# Patient Record
Sex: FEMALE | Race: WHITE | NOT HISPANIC OR LATINO | Employment: OTHER | URBAN - METROPOLITAN AREA
[De-identification: names, ages, dates, MRNs, and addresses within clinical notes are randomized per-mention and may not be internally consistent; named-entity substitution may affect disease eponyms.]

---

## 2017-03-08 ENCOUNTER — HOSPITAL ENCOUNTER (OUTPATIENT)
Dept: NON INVASIVE DIAGNOSTICS | Facility: CLINIC | Age: 82
Discharge: HOME | End: 2017-03-08
Payer: MEDICARE

## 2017-03-08 DIAGNOSIS — I73.9 PERIPHERAL VASCULAR DISEASE (HCC): ICD-10-CM

## 2017-03-08 DIAGNOSIS — I70.0 ATHEROSCLEROSIS OF AORTA (HCC): ICD-10-CM

## 2017-03-08 PROCEDURE — 93978 VASCULAR STUDY: CPT

## 2017-03-08 PROCEDURE — 93925 LOWER EXTREMITY STUDY: CPT

## 2017-03-08 PROCEDURE — 93923 UPR/LXTR ART STDY 3+ LVLS: CPT

## 2017-03-22 ENCOUNTER — HOSPITAL ENCOUNTER (OUTPATIENT)
Dept: NON INVASIVE DIAGNOSTICS | Facility: CLINIC | Age: 82
Discharge: HOME | End: 2017-03-22
Payer: MEDICARE

## 2017-03-22 DIAGNOSIS — I65.29 OCCLUSION AND STENOSIS OF UNSPECIFIED CAROTID ARTERY: ICD-10-CM

## 2017-03-22 PROCEDURE — 93880 EXTRACRANIAL BILAT STUDY: CPT

## 2017-10-17 ENCOUNTER — HOSPITAL ENCOUNTER (OUTPATIENT)
Dept: NON INVASIVE DIAGNOSTICS | Facility: CLINIC | Age: 82
Discharge: HOME | End: 2017-10-17
Payer: MEDICARE

## 2017-10-17 DIAGNOSIS — I73.9 PERIPHERAL VASCULAR DISEASE (HCC): ICD-10-CM

## 2017-10-17 DIAGNOSIS — I65.23 CAROTID ARTERY STENOSIS, ASYMPTOMATIC, BILATERAL: ICD-10-CM

## 2017-10-17 PROCEDURE — 93925 LOWER EXTREMITY STUDY: CPT

## 2017-10-17 PROCEDURE — 93923 UPR/LXTR ART STDY 3+ LVLS: CPT

## 2017-10-17 PROCEDURE — 93880 EXTRACRANIAL BILAT STUDY: CPT

## 2017-10-30 ENCOUNTER — ALLSCRIPTS OFFICE VISIT (OUTPATIENT)
Dept: OTHER | Facility: OTHER | Age: 82
End: 2017-10-30

## 2017-10-31 NOTE — PROGRESS NOTES
Assessment  1  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 (440 21) (I70 219)   2  Aortoiliac occlusive disease (444 09) (I74 09)   3  Asymptomatic carotid artery stenosis (433 10) (I65 29)    Plan  Acute anterior circulation transient ischemic attack    · VAS CAROTID COMPLETE STUDY; SIDE:Bilateral; Status:Hold For -  Scheduling,Retrospective By Protocol Authorization; Requested for:[id number]; Perform:[address]; Due:48Agp7559; Last Updated By:Maria Antonia Valentino; 10/30/2017 4:09:22 PM;Ordered; For:Acute anterior circulation transient ischemic attack; Ordered By:Junaid Brown; Aortoiliac occlusive disease, Asymptomatic carotid artery stenosis, Atherosclerosis of  artery of extremity with intermittent claudication    · VAS ABDOMINAL AORTA/ILIACS; COMPLETE STUDY; Status:Hold For - Scheduling;  Requested for:30Oct2018; Perform:[address]; Due:30Oct2019;Ordered; For:Aortoiliac occlusive disease, Asymptomatic carotid artery stenosis, 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; Ordered By:Junaid Brown;   · VAS LOWER LIMB ARTERIAL DUPLEX, COMPLETE BILATERAL/GRAFTS; SIDE:Bilateral;  Status:Hold For - Scheduling; Requested for:30Oct2018; Perform:[address]; Due:30Oct2019;Ordered; For:Aortoiliac occlusive disease, Asymptomatic carotid artery stenosis, 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; Ordered By:Junaid Brown;   · Increasing your exercise may help improve your circulation ; Status:Complete;   Done:  [id number]   Ordered; For:Aortoiliac occlusive disease, Asymptomatic carotid artery stenosis, 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; Ordered By:Junaid Brown;    Discussion/Summary  Discussion Summary:   1  Aortoiliac and infrainguinal arterial occlusive disease with bilateral lower extremity claudication   This does not appear to create any major limitation in activity  We did discuss the findings on duplex evaluation and the treatment options available  At this time We will plan on continuing conservative management with follow-up duplex annually  We did discuss the importance of continued exercise program   Bilateral asymptomatic carotid occlusive disease with moderate restenosis status post stenting on the left  This time no further intervention is necessary other than duplex follow-up in 6 months  Chief Complaint  Chief Complaint Free Text Note Form:  I am here to review my test results  Mira Espinoza is here to review her KERRY she had on 10/17/2017  Pt c/o pain in her calf area that starts when walking  Pt denies swelling  Pt denies open wounds or sores  Pt does not offer any other concerns at this time  History of Present Illness  HPI: 41-year-old with long history of aortoiliac and infrainguinal arterial occlusive disease as well as bilateral carotid occlusive disease secondary to neck irradiation presents in follow-up after recent duplex  She denies any focal neurologic symptoms which would be consistent with TIA, CVA or amorosis fugax  She does experience bilateral lower extremity claudication more severe on the left as compared to the right  She states she can walk 3â4 blocks before experiencing I lateral calf as well as some thigh discomfort  She will then rest for approximately 3 minutes at which time her discomfort will resolve and she can walk another 3â4 blocks  She denies any symptoms consistent with rest pain  This has not progressed significantly  This does not limit her activities other than slows her activities  Pt c/o pain in her calf area that starts when walking  Pt denies swelling  Pt denies open wounds or sores  Pt does not offer any other concerns at this time  Review of Systems  Complete Female - Vasc:   Constitutional: No fever or chills, feels well, no tiredness, no recent weight gain or weight loss     Eyes: no sudden vision loss,-- no blurred vision-- and-- no double vision, but-- no eye pain,-- no eyesight problems,-- no dryness of the eyes,-- eyes not red,-- no purulent discharge from the eyes,-- no itching of the eyes-- and-- wears glasses  ENT: no loss of hearing, no nosebleeds, no hoarseness  Cardiovascular: irregular heart rate,-- no painful veins-- and-- no bleeding veins, but-- no chest pain,-- no intermittent leg claudication,-- no palpitations-- and-- leg pain with walking  Respiratory: no shortness of breath,-- no wheezing,-- no cough,-- no orthopnea-- and-- no complaints of PND, but-- shortness of breath during exertion  Gastrointestinal: No nausea, No vomiting, no diarrhea, no blood in stool  Genitourinary: no dysuria, no Hematuria,no urinary incontinence  Musculoskeletal: no limb pain, no limb swelling  Integumentary: no rash, no lesions, no wounds, no ulcer  Neurological: no dementia, no headache, no numbness, no limb weakness, no dizziness, no difficulty walking  Psychiatric: no depression, no mood disorders, no anxiety  Hematologic/Lymphatic: no bleeding disorder, no easy bruising  ROS Reviewed:   ROS reviewed  Active Problems  1  Abdominal aortic atherosclerosis (440 0) (I70 0)   2  Aortoiliac occlusive disease (444 09) (I74 09)   3  Asymptomatic carotid artery stenosis (433 10) (I65 29)   4  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 (440 21) (I70 219)   5  Hyperlipidemia (272 4) (E78 5)   6  Hypertension (401 9) (I10)   7  Peripheral vascular disease, unspecified (443 9) (I73 9)    Past Medical History  1  History of Malignant neoplasm without specification of site (199 1) (C80 1)  Active Problems And Past Medical History Reviewed: The active problems and past medical history were reviewed and updated today  Surgical History  Surgical History Reviewed: The surgical history was reviewed and updated today  Family History  Brother    1   Family history of Heart Disease (V17 49)  Family History Reviewed: The family history was reviewed and updated today  Social History   · Being A Social Drinker   · Former smoker (W88 18) (M39 246)  Social History Reviewed: The social history was reviewed and updated today  Current Meds   1  Aspirin Low Dose 81 MG TABS; Therapy: (Recorded:17Swy9075) to Recorded   2  Atorvastatin Calcium 40 MG Oral Tablet; Therapy: (Recorded:36Msb1716) to Recorded   3  Benazepril HCl - 20 MG Oral Tablet; Therapy: 43ZYY9605 to Recorded   4  Centrum Silver TABS; Therapy: (Recorded:29Bky6960) to Recorded   5  Citracal Slow Release TB24;   Therapy: (Recorded:95Iog0938) to Recorded   6  Fluticasone Propionate 50 MCG/ACT Nasal Suspension; Therapy: 74LMU4761 to Recorded   7  [facility]; Therapy: (Zoe Magallanes) to Recorded   8  Levothyroxine Sodium 75 MCG Oral Tablet; Therapy: (Recorded:81Jrc4515) to Recorded   9  Nystatin 734313 UNIT/ML Mouth/Throat Suspension; Therapy: 35WCM0615 to Recorded   10  Omega 3 1000 MG Oral Capsule; Therapy: (Recorded:76Mua2495) to Recorded   11  Omeprazole 20 MG Oral Capsule Delayed Release; Therapy: 73KXQ0376 to Recorded   12  Vitamin B Complex-C CAPS; Therapy: (Recorded:94Mof4005) to Recorded   13  Vitamin D-3 TABS; Therapy: (Recorded:00Sxo4119) to Recorded  Medication List Reviewed: The medication list was reviewed and updated today  Allergies  1  No Known Drug Allergies    Vitals  Vital Signs    Recorded: 82KYV7375 03:51PM   Heart Rate 92   Respiration Quality Normal   Respiration 18   Systolic 135, RUE, Sitting   Diastolic 82, RUE, Sitting   Height 5 ft      Physical Exam    Carotid: right 2+,-- no bruit heard on the right,-- left 2+-- and-- no bruit on the left  Brachial: right 2+-- and-- left 2+  Radial: right 2+-- and-- left 2+  Femoral: right 2+,-- no bruit heard on the right,-- left 1+-- and-- no bruit on the left  Popliteal: right 1+-- and-- left 0  Posterior tibialis: right 0-- and-- left 0  Dorsalis pedis: right 2+-- and-- left 1+  Distal Pulse Exam: Normal Capillary Refill  Extremities: No upper or lower extremity edema  LE Varicose Veins: No Varicose Veins are Present  The heart rate was normal  The rhythm was regular  Heart sounds: normal S1,-- normal S2,-- no S3-- and-- no S4  Murmurs: No murmurs were heard  Pulmonary   Respiratory effort: No increased work of breathing or signs of respiratory distress  Auscultation of lungs: Clear to auscultation  No wheezing, no rales, no rhonchi  Psychiatric   Orientation to person, place and time: Normal    Mood and affect: Normal    Eyes   Conjunctiva and lids: No swelling, erythema, or discharge  Ears, Nose, Mouth, and Throat   Hearing: Normal    Neck   Neck:-- ChangesConsistent with her previous neck surgery and radiation with a permanent tracheostomy  Neurologic   Motor skills intact  Musculoskeletal   Gait and station: Normal    Muscle strength/tone: Normal    Skin   Venous Disease: No lipodermatosclerosis, stasis dermatitis, hyperpigmentation, or atrophie benja noted on exam       Results/Data  Diagnostic Studies Reviewed Vasc: I personally reviewed the films/images/results in the office today  My interpretation follows  Vascular Study Review Carotid duplex with right greater than 70% stenosis with flow velocity 266/57 cm/s  On the left there is a moderate restenosis with flow velocity of 277/49 cm/s  This is without significant change since previous study  and lower extremity duplex studies on the right there is greater than 75% stenosis in the proximal superficial femoral artery with ankle brachial index of 0 61  On the left there is 50-75% stenosis in the left common iliac artery stent and multiple areas the superficial femoral artery with ankle-brachial index of 0 57  This is a mild progression since previous studies        Signatures   Electronically signed by : Thai Ames MD; Oct 30 2017  4:12PM EST                       (Author)

## 2018-01-12 VITALS
HEIGHT: 60 IN | SYSTOLIC BLOOD PRESSURE: 142 MMHG | HEART RATE: 92 BPM | RESPIRATION RATE: 18 BRPM | DIASTOLIC BLOOD PRESSURE: 82 MMHG

## 2018-01-17 ENCOUNTER — GENERIC CONVERSION - ENCOUNTER (OUTPATIENT)
Dept: OTHER | Facility: OTHER | Age: 83
End: 2018-01-17

## 2018-01-17 ENCOUNTER — HOSPITAL ENCOUNTER (OUTPATIENT)
Dept: RADIOLOGY | Facility: HOSPITAL | Age: 83
Discharge: HOME | End: 2018-01-17
Payer: MEDICARE

## 2018-01-17 ENCOUNTER — TRANSCRIBE ORDERS (OUTPATIENT)
Dept: RADIOLOGY | Facility: HOSPITAL | Age: 83
End: 2018-01-17

## 2018-01-17 DIAGNOSIS — R05.9 COUGH: ICD-10-CM

## 2018-01-17 DIAGNOSIS — R06.02 SHORT OF BREATH ON EXERTION: ICD-10-CM

## 2018-01-17 DIAGNOSIS — R05.9 COUGH: Primary | ICD-10-CM

## 2018-01-17 PROCEDURE — 71046 X-RAY EXAM CHEST 2 VIEWS: CPT

## 2018-01-30 ENCOUNTER — TRANSCRIBE ORDERS (OUTPATIENT)
Dept: ADMINISTRATIVE | Facility: HOSPITAL | Age: 83
End: 2018-01-30

## 2018-01-30 DIAGNOSIS — I34.1 J.B. BARLOW'S SYNDROME: ICD-10-CM

## 2018-01-30 DIAGNOSIS — I07.9: ICD-10-CM

## 2018-01-30 DIAGNOSIS — R05.9 COUGH: ICD-10-CM

## 2018-01-30 DIAGNOSIS — I10 ESSENTIAL HYPERTENSION, MALIGNANT: ICD-10-CM

## 2018-01-30 DIAGNOSIS — C32.9 CARCINOMA LARYNX (HCC): ICD-10-CM

## 2018-01-30 DIAGNOSIS — F45.8 OTHER SOMATOFORM DISORDERS: Primary | ICD-10-CM

## 2018-02-07 ENCOUNTER — HOSPITAL ENCOUNTER (OUTPATIENT)
Dept: PULMONOLOGY | Facility: HOSPITAL | Age: 83
Discharge: HOME | End: 2018-02-07

## 2018-02-07 DIAGNOSIS — F45.8 OTHER SOMATOFORM DISORDERS: ICD-10-CM

## 2018-02-07 DIAGNOSIS — C32.9 CARCINOMA LARYNX (HCC): ICD-10-CM

## 2018-02-07 DIAGNOSIS — R05.9 COUGH: ICD-10-CM

## 2018-02-07 NOTE — RESPIRATORY THERAPY NOTE
Pt with total laryngectomy arrived for PFT  Communicated with patient, that due to laryngectomy PFT could not be performed, which was understood  Pt left without PFT performed

## 2018-02-14 ENCOUNTER — HOSPITAL ENCOUNTER (OUTPATIENT)
Dept: NON INVASIVE DIAGNOSTICS | Facility: CLINIC | Age: 83
Discharge: HOME | End: 2018-02-14
Payer: MEDICARE

## 2018-02-14 DIAGNOSIS — F45.8 OTHER SOMATOFORM DISORDERS: ICD-10-CM

## 2018-02-14 DIAGNOSIS — I07.9: ICD-10-CM

## 2018-02-14 DIAGNOSIS — I34.1 J.B. BARLOW'S SYNDROME: ICD-10-CM

## 2018-02-14 DIAGNOSIS — I10 ESSENTIAL HYPERTENSION, MALIGNANT: ICD-10-CM

## 2018-02-14 PROCEDURE — 93306 TTE W/DOPPLER COMPLETE: CPT | Performed by: INTERNAL MEDICINE

## 2018-02-14 PROCEDURE — 93306 TTE W/DOPPLER COMPLETE: CPT

## 2018-04-17 DIAGNOSIS — G45.8 OTHER TRANSIENT CEREBRAL ISCHEMIC ATTACKS AND RELATED SYNDROMES: ICD-10-CM

## 2018-04-18 ENCOUNTER — HOSPITAL ENCOUNTER (OUTPATIENT)
Dept: NON INVASIVE DIAGNOSTICS | Facility: CLINIC | Age: 83
Discharge: HOME | End: 2018-04-18
Payer: MEDICARE

## 2018-04-18 DIAGNOSIS — G45.8 OTHER TRANSIENT CEREBRAL ISCHEMIC ATTACKS AND RELATED SYNDROMES: ICD-10-CM

## 2018-04-18 PROCEDURE — 93880 EXTRACRANIAL BILAT STUDY: CPT

## 2018-04-18 PROCEDURE — 93880 EXTRACRANIAL BILAT STUDY: CPT | Performed by: SURGERY

## 2018-04-26 ENCOUNTER — OFFICE VISIT (OUTPATIENT)
Dept: VASCULAR SURGERY | Facility: CLINIC | Age: 83
End: 2018-04-26
Payer: MEDICARE

## 2018-04-26 VITALS
HEART RATE: 78 BPM | BODY MASS INDEX: 29.06 KG/M2 | HEIGHT: 60 IN | SYSTOLIC BLOOD PRESSURE: 120 MMHG | DIASTOLIC BLOOD PRESSURE: 60 MMHG | RESPIRATION RATE: 14 BRPM | WEIGHT: 148 LBS

## 2018-04-26 DIAGNOSIS — I65.23 ASYMPTOMATIC BILATERAL CAROTID ARTERY STENOSIS: Primary | ICD-10-CM

## 2018-04-26 PROBLEM — G45.8: Status: ACTIVE | Noted: 2017-10-30

## 2018-04-26 PROCEDURE — 99214 OFFICE O/P EST MOD 30 MIN: CPT | Performed by: NURSE PRACTITIONER

## 2018-04-26 RX ORDER — LEVOTHYROXINE SODIUM 88 UG/1
88 TABLET ORAL DAILY
COMMUNITY
Start: 2017-08-28 | End: 2021-02-07

## 2018-04-26 RX ORDER — BENAZEPRIL HYDROCHLORIDE 20 MG/1
TABLET ORAL
COMMUNITY
Start: 2016-03-11 | End: 2018-04-26 | Stop reason: SDUPTHER

## 2018-04-26 RX ORDER — CHLORAL HYDRATE 500 MG
4 CAPSULE ORAL DAILY
COMMUNITY

## 2018-04-26 RX ORDER — OMEPRAZOLE 20 MG/1
CAPSULE, DELAYED RELEASE ORAL
COMMUNITY
Start: 2016-02-09 | End: 2021-02-08 | Stop reason: HOSPADM

## 2018-04-26 RX ORDER — ATORVASTATIN CALCIUM 40 MG/1
TABLET, FILM COATED ORAL
COMMUNITY

## 2018-04-26 RX ORDER — CHOLECALCIFEROL (VITAMIN D3) 25 MCG
CAPSULE ORAL
COMMUNITY

## 2018-04-26 RX ORDER — FLUTICASONE PROPIONATE 50 MCG
SPRAY, SUSPENSION (ML) NASAL
COMMUNITY
Start: 2016-03-30

## 2018-04-26 RX ORDER — METHOCARBAMOL 500 MG/1
TABLET, FILM COATED ORAL
Refills: 1 | COMMUNITY
Start: 2018-01-17

## 2018-04-26 RX ORDER — BIOTIN 5 MG
TABLET ORAL
COMMUNITY

## 2018-04-26 RX ORDER — LOSARTAN POTASSIUM 50 MG/1
50 TABLET ORAL DAILY
Status: ON HOLD | COMMUNITY
Start: 2018-03-15 | End: 2020-07-23 | Stop reason: SDUPTHER

## 2018-04-26 NOTE — PATIENT INSTRUCTIONS
Carotid Artery Disease   AMBULATORY CARE:   Carotid artery disease  is a condition that causes narrow or blocked carotid arteries  Your carotid arteries are the blood vessels that supply your brain with most of the blood it needs to work  You have 2 carotid arteries, one on each side of your neck  Call 911 for any of the following:   · You have any of the following signs of a stroke:      ¨ Numbness or drooping on one side of your face     ¨ Weakness in an arm or leg    ¨ Confusion or difficulty speaking    ¨ Dizziness, a severe headache, or vision loss    · You have any of the following signs of a heart attack:      ¨ Squeezing, pressure, or pain in your chest that lasts longer than 5 minutes or returns    ¨ Discomfort or pain in your back, neck, jaw, stomach, or arm     ¨ Trouble breathing    ¨ Nausea or vomiting    ¨ Lightheadedness or a sudden cold sweat, especially with chest pain or trouble breathing  Contact your healthcare provider if:   · You have questions or concerns about your condition or care  Signs and symptoms of carotid artery disease: You may have no signs or symptoms  Most commonly, carotid artery disease causes transient ischemic attacks (TIAs), or mini-strokes  You may have numbness, weakness, lack of movement, or vision or speech problems  A TIA goes away quickly and does not cause permanent damage  A TIA may be a warning sign that you are about to have a stroke  If you have any symptoms of a TIA or stroke, seek care immediately  Warning signs of a stroke: The word F A S T  can help you remember and recognize warning signs of a stroke  · F = Face:  One side of the face droops  · A = Arms:  One arm starts to drop when both arms are raised  · S = Speech:  Speech is slurred or sounds different than usual     · T = Time:  A person who is having a stroke needs to be seen immediately  A stroke is a medical emergency that needs immediate treatment   Some medicines and treatments work best if given within a few hours of a stroke  Treatment  for carotid artery disease depends on how narrow your arteries have become, your symptoms, and your general health  The goal of treatment is to lower your risk for a stroke  You may need any of the following:  · Take aspirin if directed  Your healthcare provider may suggest that you take an aspirin a day to prevent blood clots from forming in the carotid arteries  If your healthcare provider wants you to take aspirin daily, do not take acetaminophen or ibuprofen instead  · Control risk factors  High blood pressure, high cholesterol, heart disease, diabetes, and being overweight increase your risk for atherosclerosis  · Procedures can help open blocked arteries  A carotid endarterectomy is used to cut plaque out of the artery  An angioplasty is used to push the plaque against the artery wall with a balloon device  Sometimes a stent is placed during an angioplasty  A stent is a metal mesh tube that is placed in the artery to keep it open  Manage carotid artery disease:   · Eat a variety of healthy foods  Healthy foods include fruit, vegetables, whole-grain breads, low-fat dairy products, lean meat, and fish  Choose fish that are high in omega-3 fatty acids, such as salmon and fresh tuna  Ask your healthcare provider for more information on a heart healthy diet and the DASH eating plan  · Limit sodium (salt)  Sodium may increase your blood pressure  Add less table salt to your foods  Read food labels and choose foods that are low in sodium  Your healthcare provider may suggest you follow a low sodium diet  · Reach or maintain a healthy weight  Extra weight makes your heart work harder  Ask your healthcare provider how much you should weight  He can help you create a safe weight loss plan  Even a weight loss of 10% of your body weight can help your heart function better  · Exercise as directed    Exercise helps improve heart function and can help you manage your weight  Exercise can also help lower your cholesterol and blood sugar levels  Try to get at least 30 minutes of exercise at least 5 times each week  Try to be active every day  Your healthcare provider can help you create an exercise plan that works best for you  · Limit alcohol  Alcohol can increase your blood pressure and triglyceride levels  Men should limit alcohol to 2 drinks per day  Women should limit alcohol to 1 drink per day  A drink of alcohol is 12 ounces of beer, 5 ounces of wine, or 1½ ounces of liquor  · Do not smoke  Nicotine and other chemicals in cigarettes and cigars can cause heart and lung damage  Ask your healthcare provider for information if you currently smoke and need help to quit  E-cigarettes or smokeless tobacco still contain nicotine  Talk to your healthcare provider before you use these products  Follow up with your healthcare provider as directed:  Write down your questions so you remember to ask them during your visits  © 2017 [facility] [facility] Information is for End User's use only and may not be sold, redistributed or otherwise used for commercial purposes  All illustrations and images included in CareNotes® are the copyrighted property of [employer]  or [employer] Manuel Rojas  The above information is an  only  It is not intended as medical advice for individual conditions or treatments  Talk to your doctor, nurse or pharmacist before following any medical regimen to see if it is safe and effective for you

## 2018-04-26 NOTE — PROGRESS NOTES
Assessment/Plan:  81year old female with history of laryngeal surgery, tracheostomy and neck radiation with bilateral carotid stenosis, history of L carotid stenting '10 who presents for follow up after recent carotid duplex  She is asymptomatic from a carotid standpoint  1  Carotid stenosis  Recent CV duplex showed progression of R ICA stenosis, 70-99% with velocities 601/120 and L ICA recurrent stenosis 70% with velocities 316/67  Will evaluate with CTA of the neck  Check BMP prior  Follow up after CTA with Dr [address]  2  Right Subclavian stenosis  Easily palpable radial pulse  Intermittent fatigue of the right upper extremity  3  Aortoiliac and peripheral arterial occlusive disease  History of iliac stenting '12  No limitations in her daily activities  Walks 1-2 miles a day  Will follow by routine surveillance duplex  Problem List Items Addressed This Visit        Cardiovascular and Mediastinum    Asymptomatic carotid artery stenosis - Primary    Relevant Orders    Basic metabolic panel    CTA neck w wo contrast            Subjective: "I am here to go over my test results "    Patient had a CV on 4/18/18  She denies any TIA or stroke symptoms  She denies any one sided weakness or numbness  She does not have any facial droopiness  Patient ID: Gregory Ortiz is a 81 y o  female  HPI  Patient returns to the office to follow up after carotid duplex 4/18/18 which noted a progression of carotid stenosis bilaterally  She has history of left carotid stenosis secondary to high grade carotid stenosis relate to radiation of the neck  She has known recurrent stenosis of L ICA stent  She was last seen by Dr Kalee Portillo 10/2017  She denies any focal neurological events consistent with TIA/CVA, amaurosis fugax  She has macular degeneration  She is ambulating without limitation     The following portions of the patient's history were reviewed and updated as appropriate: allergies, current medications, past family history, past medical history, past social history, past surgical history and problem list     Review of Systems   Constitutional: Negative for activity change, chills, fatigue and fever  HENT: Negative for congestion, ear pain, facial swelling, rhinorrhea, sinus pressure and sneezing  Eyes: Negative for photophobia, pain, discharge and itching  Respiratory: Positive for shortness of breath  Negative for apnea, choking, chest tightness and wheezing  Cardiovascular: Negative for chest pain, palpitations and leg swelling  Gastrointestinal: Negative for abdominal distention, anal bleeding, constipation, nausea and vomiting  Endocrine: Negative for cold intolerance, heat intolerance and polyuria  Genitourinary: Negative for difficulty urinating, dyspareunia, enuresis and flank pain  Musculoskeletal: Negative for arthralgias, gait problem, joint swelling, neck pain and neck stiffness  Skin: Negative for color change, rash and wound  Allergic/Immunologic: Negative for immunocompromised state  Neurological: Negative for dizziness, tremors, facial asymmetry, speech difficulty, weakness, light-headedness and numbness  Hematological: Does not bruise/bleed easily  Psychiatric/Behavioral: Negative for agitation, dysphoric mood and hallucinations  The patient is not nervous/anxious            Objective:  Vitals:    04/26/18 1138 04/26/18 1140   BP: 140/70 120/60   BP Location: Left arm Right arm   Patient Position: Sitting Sitting   Pulse: 78    Resp: 14    Weight: 67 1 kg (148 lb)    Height: 5' (1 524 m)        Patient Active Problem List   Diagnosis    Asymptomatic carotid artery stenosis    Abdominal aortic atherosclerosis (HCC)    Hyperlipidemia    Hypertension    Acute anterior circulation transient ischemic attack    Peripheral vascular disease (HCC)       Past Surgical History:   Procedure Laterality Date    ILIAC ARTERY STENT Left        Family History   Problem Relation Age of Onset    Heart disease Brother        Social History     Social History    Marital status:      Spouse name: N/A    Number of children: N/A    Years of education: N/A     Occupational History    Not on file  Social History Main Topics    Smoking status: Former Smoker     Quit date: 2002    Smokeless tobacco: Never Used    Alcohol use Not on file    Drug use: Unknown    Sexual activity: Not on file     Other Topics Concern    Not on file     Social History Narrative    No narrative on file       No Known Allergies      Current Outpatient Prescriptions:     aspirin (ASPIRIN LOW DOSE) 81 MG tablet, Take by mouth, Disp: , Rfl:     atorvastatin (LIPITOR) 40 mg tablet, Take by mouth, Disp: , Rfl:     B Complex Vitamins (VITAMIN B-COMPLEX PO), Take by mouth, Disp: , Rfl:     Calcium-Magnesium-Vitamin D ER (CITRACAL SLOW RELEASE) 600- MG-MG-UNIT TB24, Take by mouth, Disp: , Rfl:     Cholecalciferol (VITAMIN D-3) 1000 units CAPS, Take by mouth, Disp: , Rfl:     fluticasone (FLONASE) 50 mcg/act nasal spray, into each nostril, Disp: , Rfl:     Krill Oil 1000 MG CAPS, Take by mouth, Disp: , Rfl:     levothyroxine 88 mcg tablet, Take by mouth, Disp: , Rfl:     losartan (COZAAR) 50 mg tablet, , Disp: , Rfl:     methocarbamol (ROBAXIN) 500 mg tablet, TK 1 T PO BID PRN, Disp: , Rfl: 1    Multiple Vitamins-Minerals (CENTRUM SILVER 50+WOMEN PO), Take by mouth, Disp: , Rfl:     Omega-3 1000 MG CAPS, Take by mouth, Disp: , Rfl:     omeprazole (PriLOSEC) 20 mg delayed release capsule, Take by mouth, Disp: , Rfl:     VENTOLIN  (90 Base) MCG/ACT inhaler, , Disp: , Rfl:        Physical Exam   Constitutional: She is oriented to person, place, and time  She appears well-nourished  HENT:   Head: Normocephalic and atraumatic  Eyes:   glasses   Neck:   + bilateral carotid bruit and subclavian bruit bilaterally  Stoma with voicebox    Cardiovascular: Normal heart sounds      Pulses: Carotid pulses are on the right side with bruit, and on the left side with bruit  Radial pulses are 2+ on the right side  Femoral pulses are 2+ on the right side, and 2+ on the left side  Dorsalis pedis pulses are 1+ on the right side  Pulmonary/Chest: Effort normal and breath sounds normal    Abdominal: Soft  Bowel sounds are normal    Musculoskeletal: Normal range of motion  Neurological: She is alert and oriented to person, place, and time  Skin: Skin is warm

## 2018-05-02 LAB
BUN SERPL-MCNC: 31 MG/DL (ref 7–25)
BUN/CREAT SERPL: 20 (CALC) (ref 6–22)
CALCIUM SERPL-MCNC: 9.1 MG/DL (ref 8.6–10.4)
CHLORIDE SERPL-SCNC: 103 MMOL/L (ref 98–110)
CO2 SERPL-SCNC: 28 MMOL/L (ref 20–31)
CREAT SERPL-MCNC: 1.52 MG/DL (ref 0.6–0.88)
GLUCOSE SERPL-MCNC: 101 MG/DL (ref 65–99)
POTASSIUM SERPL-SCNC: 4.4 MMOL/L (ref 3.5–5.3)
SL AMB EGFR AFRICAN AMERICAN: 37 ML/MIN/1.73M2
SL AMB EGFR NON AFRICAN AMERICAN: 32 ML/MIN/1.73M2
SODIUM SERPL-SCNC: 140 MMOL/L (ref 135–146)

## 2018-05-07 ENCOUNTER — HOSPITAL ENCOUNTER (OUTPATIENT)
Dept: RADIOLOGY | Facility: HOSPITAL | Age: 83
Discharge: HOME | End: 2018-05-07

## 2018-05-07 ENCOUNTER — TELEPHONE (OUTPATIENT)
Dept: VASCULAR SURGERY | Facility: CLINIC | Age: 83
End: 2018-05-07

## 2018-05-07 DIAGNOSIS — I65.23 ASYMPTOMATIC BILATERAL CAROTID ARTERY STENOSIS: ICD-10-CM

## 2018-05-07 NOTE — TELEPHONE ENCOUNTER
Rec'd a call from ct dept that the pt is there now to have her cta but her gfr is low at 32 so they will not do the study  I asked Alvaro Hendrix to apologize to the pt and that we will call the pt with additional instructions/reschedule  There is a task to Manohar asking to clarify hydration orders  Once that is rec'd we will call the pt to [facility]

## 2018-06-29 ENCOUNTER — TRANSCRIBE ORDERS (OUTPATIENT)
Dept: ADMINISTRATIVE | Facility: HOSPITAL | Age: 83
End: 2018-06-29

## 2018-06-29 DIAGNOSIS — Z12.39 SCREENING BREAST EXAMINATION: Primary | ICD-10-CM

## 2018-07-10 ENCOUNTER — HOSPITAL ENCOUNTER (OUTPATIENT)
Dept: RADIOLOGY | Age: 83
Discharge: HOME | End: 2018-07-10
Payer: MEDICARE

## 2018-07-10 DIAGNOSIS — Z12.39 SCREENING BREAST EXAMINATION: ICD-10-CM

## 2018-07-10 PROCEDURE — 77067 SCR MAMMO BI INCL CAD: CPT

## 2018-08-27 DIAGNOSIS — I70.0 AORTIC ATHEROSCLEROSIS (HCC): Primary | ICD-10-CM

## 2018-08-27 DIAGNOSIS — I73.9 PERIPHERAL VASCULAR DISEASE, UNSPECIFIED (HCC): ICD-10-CM

## 2018-09-25 ENCOUNTER — TRANSCRIBE ORDERS (OUTPATIENT)
Dept: RADIOLOGY | Facility: HOSPITAL | Age: 83
End: 2018-09-25

## 2018-09-25 ENCOUNTER — HOSPITAL ENCOUNTER (OUTPATIENT)
Dept: RADIOLOGY | Facility: HOSPITAL | Age: 83
Discharge: HOME | End: 2018-09-25
Attending: PODIATRIST
Payer: MEDICARE

## 2018-09-25 DIAGNOSIS — M79.672 LEFT FOOT PAIN: Primary | ICD-10-CM

## 2018-09-25 DIAGNOSIS — M79.672 LEFT FOOT PAIN: ICD-10-CM

## 2018-09-25 PROCEDURE — 73630 X-RAY EXAM OF FOOT: CPT

## 2018-10-30 ENCOUNTER — HOSPITAL ENCOUNTER (OUTPATIENT)
Dept: NON INVASIVE DIAGNOSTICS | Facility: CLINIC | Age: 83
Discharge: HOME | End: 2018-10-30
Payer: MEDICARE

## 2018-10-30 DIAGNOSIS — I73.9 PERIPHERAL VASCULAR DISEASE, UNSPECIFIED (HCC): ICD-10-CM

## 2018-10-30 DIAGNOSIS — I70.0 AORTIC ATHEROSCLEROSIS (HCC): ICD-10-CM

## 2018-10-30 DIAGNOSIS — I65.29 OCCLUSION AND STENOSIS OF UNSPECIFIED CAROTID ARTERY: ICD-10-CM

## 2018-10-30 DIAGNOSIS — I70.219 ATHEROSCLEROSIS OF NATIVE ARTERIES OF EXTREMITY WITH INTERMITTENT CLAUDICATION (HCC): ICD-10-CM

## 2018-10-30 DIAGNOSIS — I74.09 OTHER ARTERIAL EMBOLISM AND THROMBOSIS OF ABDOMINAL AORTA (HCC): ICD-10-CM

## 2018-10-30 PROCEDURE — 93922 UPR/L XTREMITY ART 2 LEVELS: CPT | Performed by: SURGERY

## 2018-10-30 PROCEDURE — 93925 LOWER EXTREMITY STUDY: CPT | Performed by: SURGERY

## 2018-10-30 PROCEDURE — 93978 VASCULAR STUDY: CPT

## 2018-10-30 PROCEDURE — 93923 UPR/LXTR ART STDY 3+ LVLS: CPT

## 2018-10-30 PROCEDURE — 93925 LOWER EXTREMITY STUDY: CPT

## 2018-10-30 PROCEDURE — 93978 VASCULAR STUDY: CPT | Performed by: SURGERY

## 2019-02-28 PROBLEM — C32.9 LARYNGEAL CARCINOMA (HCC): Status: ACTIVE | Noted: 2019-02-28

## 2019-02-28 PROBLEM — Z43.0 TRACHEOSTOMY CARE (HCC): Status: ACTIVE | Noted: 2019-02-28

## 2019-07-16 ENCOUNTER — HOSPITAL ENCOUNTER (OUTPATIENT)
Dept: RADIOLOGY | Age: 84
Discharge: HOME | End: 2019-07-16
Payer: MEDICARE

## 2019-07-16 VITALS — WEIGHT: 137 LBS | HEIGHT: 59 IN | BODY MASS INDEX: 27.62 KG/M2

## 2019-07-16 DIAGNOSIS — Z12.31 ENCOUNTER FOR SCREENING MAMMOGRAM FOR MALIGNANT NEOPLASM OF BREAST: ICD-10-CM

## 2019-07-16 PROCEDURE — 77067 SCR MAMMO BI INCL CAD: CPT

## 2019-10-28 DIAGNOSIS — I73.9 PERIPHERAL VASCULAR DISEASE, UNSPECIFIED (HCC): ICD-10-CM

## 2019-10-28 DIAGNOSIS — I71.4 ABDOMINAL AORTIC ANEURYSM WITHOUT RUPTURE (HCC): Primary | ICD-10-CM

## 2019-10-29 ENCOUNTER — TELEPHONE (OUTPATIENT)
Dept: VASCULAR SURGERY | Facility: CLINIC | Age: 84
End: 2019-10-29

## 2019-10-29 NOTE — TELEPHONE ENCOUNTER
Patient called in reference to her f/up on last year test  saw that Brinda Laresn put in orders and told the patient we will be calling her with appointment date  LLF

## 2019-11-19 ENCOUNTER — HOSPITAL ENCOUNTER (OUTPATIENT)
Dept: NON INVASIVE DIAGNOSTICS | Facility: CLINIC | Age: 84
Discharge: HOME | End: 2019-11-19
Payer: MEDICARE

## 2019-11-19 DIAGNOSIS — I73.9 PERIPHERAL VASCULAR DISEASE, UNSPECIFIED (HCC): ICD-10-CM

## 2019-11-19 DIAGNOSIS — I71.4 ABDOMINAL AORTIC ANEURYSM WITHOUT RUPTURE (HCC): ICD-10-CM

## 2019-11-19 PROCEDURE — 93978 VASCULAR STUDY: CPT

## 2019-11-19 PROCEDURE — 93925 LOWER EXTREMITY STUDY: CPT | Performed by: SURGERY

## 2019-11-19 PROCEDURE — 93978 VASCULAR STUDY: CPT | Performed by: SURGERY

## 2019-11-19 PROCEDURE — 93925 LOWER EXTREMITY STUDY: CPT

## 2019-11-19 PROCEDURE — 93923 UPR/LXTR ART STDY 3+ LVLS: CPT

## 2019-11-19 PROCEDURE — 93922 UPR/L XTREMITY ART 2 LEVELS: CPT | Performed by: SURGERY

## 2019-12-04 ENCOUNTER — OFFICE VISIT (OUTPATIENT)
Dept: VASCULAR SURGERY | Facility: CLINIC | Age: 84
End: 2019-12-04
Payer: MEDICARE

## 2019-12-04 VITALS
RESPIRATION RATE: 18 BRPM | BODY MASS INDEX: 29.03 KG/M2 | HEART RATE: 100 BPM | WEIGHT: 144 LBS | DIASTOLIC BLOOD PRESSURE: 54 MMHG | TEMPERATURE: 97.7 F | SYSTOLIC BLOOD PRESSURE: 96 MMHG | HEIGHT: 59 IN

## 2019-12-04 DIAGNOSIS — I65.23 ASYMPTOMATIC BILATERAL CAROTID ARTERY STENOSIS: ICD-10-CM

## 2019-12-04 DIAGNOSIS — N18.30 CKD (CHRONIC KIDNEY DISEASE) STAGE 3, GFR 30-59 ML/MIN (HCC): Chronic | ICD-10-CM

## 2019-12-04 DIAGNOSIS — I70.219 ATHEROSCLEROSIS OF ARTERY OF EXTREMITY WITH INTERMITTENT CLAUDICATION (HCC): Primary | Chronic | ICD-10-CM

## 2019-12-04 PROCEDURE — 99215 OFFICE O/P EST HI 40 MIN: CPT | Performed by: SURGERY

## 2019-12-04 RX ORDER — LANOLIN ALCOHOL/MO/W.PET/CERES
500 CREAM (GRAM) TOPICAL
COMMUNITY

## 2019-12-04 RX ORDER — ATORVASTATIN CALCIUM 40 MG/1
40 TABLET, FILM COATED ORAL DAILY
COMMUNITY
End: 2020-07-23 | Stop reason: HOSPADM

## 2019-12-04 RX ORDER — CETIRIZINE HYDROCHLORIDE 5 MG/1
5 TABLET ORAL DAILY
COMMUNITY

## 2019-12-04 RX ORDER — MULTIVITAMIN WITH IRON
250 TABLET ORAL DAILY
COMMUNITY

## 2019-12-04 NOTE — PATIENT INSTRUCTIONS
Asymptomatic carotid artery stenosis    1  High-grade asymptomatic right carotid artery stenosis  Since her last visit unfortunately she did not obtain her CT angiogram which was initially canceled secondary to CKD  Unfortunately she did not follow-up to reschedule this study  On evaluation today she remains asymptomatic  We discussed the options available and since it has been over a year since her carotid has been imaged I have suggested initially proceeding with duplex to assure residual patency  Following this study further treatment recommendations will be made  2  Recurrent left carotid artery stenosis with history of carotid artery stenting and repeat angioplasty of restenosis  In this regard her restenosis is in the moderate range on previous imaging studies thus no further intervention is planned but further assessment will be made following upcoming duplex evaluation  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 (HCC)    Bilateral lower extremity arterial occlusive disease with minimal claudication symptoms  It appears her limitations are most related to back pain  At this time no further intervention is necessary other than annual follow-up  Of note she is already maintained on appropriate medical therapy with aspirin and statin

## 2019-12-04 NOTE — PROGRESS NOTES
Assessment/Plan:    Asymptomatic carotid artery stenosis    1  High-grade asymptomatic right carotid artery stenosis  Since her last visit unfortunately she did not obtain her CT angiogram which was initially canceled secondary to CKD  Unfortunately she did not follow-up to reschedule this study  On evaluation today she remains asymptomatic  We discussed the options available and since it has been over a year since her carotid has been imaged I have suggested initially proceeding with duplex to assure residual patency  Following this study further treatment recommendations will be made  2  Recurrent left carotid artery stenosis with history of carotid artery stenting and repeat angioplasty of restenosis  In this regard her restenosis is in the moderate range on previous imaging studies thus no further intervention is planned but further assessment will be made following upcoming duplex evaluation  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 (HCC)    Bilateral lower extremity arterial occlusive disease with minimal claudication symptoms  It appears her limitations are most related to back pain  At this time no further intervention is necessary other than annual follow-up  Of note she is already maintained on appropriate medical therapy with aspirin and statin  CKD (chronic kidney disease) stage 3, GFR 30-59 ml/min ([facility])    Chronic renal insufficiency stage III  Serum creatinine of 1 47 and GFR 33       Diagnoses and all orders for this visit:    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 (HCC)  -     VAS lower limb arterial duplex, complete bilateral; Future    Asymptomatic bilateral carotid artery stenosis  -     VAS carotid complete study; Future    CKD (chronic kidney disease) stage 3, GFR 30-59 ml/min ([facility])    Other orders  -     atorvastatin (LIPITOR) 40 mg tablet;  Take 40 mg by mouth daily  -     Magnesium 250 MG TABS; Take 250 mg by mouth daily  -     niacin 500 mg ER capsule; Take 500 mg by mouth daily at bedtime  -     cetirizine (ZyrTEC) 5 MG tablet; Take 5 mg by mouth daily          Subjective:      Patient ID: Cornell Artist is a 81 y o  female  Pt is here to review the results of her AOIL and KERRY on 11/19/19  Pt can walk for a mile or more before leg pain sets in  Pt then rests for 5 minutes for relief  Pt denies any numbness or tingling in her legs  Pt was last seen at [address] [place] [address] on 4/26/18 for bilateral carotid stenosis  Pt is currently taking ASA and Lipitor  52-year-old former smoker with history of laryngeal cancer status post neck irradiation and radical neck surgery with permanent tracheostomy presents in follow-up of carotid occlusive disease and peripheral arterial occlusive disease  From a cerebrovascular standpoint she remains asymptomatic and specifically denies any signs or symptoms which would be consistent with TIA, CVA or amaurosis fugax  She also denies any major limitations in her walking ability  She in fact is able to walk a mi before she has to stop mostly due to lower back pain  Previous evaluation 04/26/2018 revealed a severe progression of right carotid artery stenosis  At that time plans were made for CT angiogram but unfortunately this was canceled secondary to renal insufficiency  Unfortunately patient was lost to follow-up and imaging was never completed  Carotid duplex 04/18/2018  On the right flow velocities are markedly 601/120 cm/sec  On the left flow velocities are also elevated to 316/67 centimeters/second  Lower extremity arterial duplex 11/19/2019 with right distal common femoral artery 50-75% stenosis with ankle-brachial index is 0 81 and toe pressure 121  On the left there is highly calcified atherosclerotic disease of the femoral -popliteal segment with diffuse areas of stenosis of less than 50%  Ankle-brachial index of 0 64 toe pressure 72        The following portions of the patient's history were reviewed and updated as appropriate: allergies, current medications, past family history, past medical history, past social history, past surgical history and problem list     The ROS as paul was reviewed and changes made as indictated       Review of Systems   Constitutional: Negative  HENT: Negative  Eyes: Negative  Respiratory: Negative  Cardiovascular: Negative  Gastrointestinal: Negative  Endocrine: Negative  Genitourinary: Negative  Musculoskeletal: Negative  Skin: Negative  Allergic/Immunologic: Negative  Neurological: Negative  Hematological: Negative  Psychiatric/Behavioral: Negative  Objective:      BP 96/54 (BP Location: Left arm, Patient Position: Sitting, Cuff Size: Adult)   Pulse 100   Temp 97 7 °F (36 5 °C) (Tympanic)   Resp 18   Ht 4' 11" (1 499 m)   Wt 65 3 kg (144 lb)   BMI 29 08 kg/m²          Physical Exam   Constitutional: She is oriented to person, place, and time  She appears well-developed and well-nourished  HENT:   Head: Normocephalic and atraumatic  Eyes: Conjunctivae and EOM are normal    Neck: Normal range of motion  No JVD present  Carotid bruit is present ( bilateral carotid bruit)  Skin change in bilateral neck consistent with bilateral neck surgery post radiation  There is a permanent tracheostomy in place  Cardiovascular: Normal rate, regular rhythm, S1 normal, S2 normal and normal heart sounds  No murmur heard  Pulses:       Carotid pulses are 2+ on the right side, and 2+ on the left side  Radial pulses are 2+ on the right side, and 2+ on the left side  Femoral pulses are 2+ on the right side, and 2+ on the left side  Dorsalis pedis pulses are 2+ on the right side, and 1+ on the left side  Posterior tibial pulses are 0 on the right side, and 0 on the left side  Pulmonary/Chest: Effort normal and breath sounds normal    Abdominal: Soft   Normal appearance and normal aorta  She exhibits no abdominal bruit and no pulsatile midline mass  There is no tenderness  No hernia  Musculoskeletal: Normal range of motion  She exhibits no edema, tenderness or deformity  Neurological: She is alert and oriented to person, place, and time  She has normal strength  No sensory deficit  Skin: Skin is warm, dry and intact  No pallor  Psychiatric: She has a normal mood and affect   Her speech is normal and behavior is normal

## 2019-12-04 NOTE — ASSESSMENT & PLAN NOTE
Bilateral lower extremity arterial occlusive disease with minimal claudication symptoms  It appears her limitations are most related to back pain  At this time no further intervention is necessary other than annual follow-up  Of note she is already maintained on appropriate medical therapy with aspirin and statin

## 2019-12-04 NOTE — LETTER
December 4, 2019     Joceline Agrawal MD  [address] [address]    Patient: Fili Vela   YOB: 1935   Date of Visit: 12/4/2019       Dear Dr Julito Harper:    Thank you for referring Christoph Cornelius to me for evaluation  Below are the relevant portions of my assessment and plan of care  Asymptomatic carotid artery stenosis    1  High-grade asymptomatic right carotid artery stenosis  Since her last visit unfortunately she did not obtain her CT angiogram which was initially canceled secondary to CKD  Unfortunately she did not follow-up to reschedule this study  On evaluation today she remains asymptomatic  We discussed the options available and since it has been over a year since her carotid has been imaged I have suggested initially proceeding with duplex to assure residual patency  Following this study further treatment recommendations will be made  2  Recurrent left carotid artery stenosis with history of carotid artery stenting and repeat angioplasty of restenosis  In this regard her restenosis is in the moderate range on previous imaging studies thus no further intervention is planned but further assessment will be made following upcoming duplex evaluation  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 (HCC)    Bilateral lower extremity arterial occlusive disease with minimal claudication symptoms  It appears her limitations are most related to back pain  At this time no further intervention is necessary other than annual follow-up  Of note she is already maintained on appropriate medical therapy with aspirin and statin  If you have questions, please do not hesitate to call me  I look forward to following Bishop Wharton along with you           Sincerely,        Brooklyn Spears MD        CC: No Recipients

## 2019-12-04 NOTE — ASSESSMENT & PLAN NOTE
1  High-grade asymptomatic right carotid artery stenosis  Since her last visit unfortunately she did not obtain her CT angiogram which was initially canceled secondary to CKD  Unfortunately she did not follow-up to reschedule this study  On evaluation today she remains asymptomatic  We discussed the options available and since it has been over a year since her carotid has been imaged I have suggested initially proceeding with duplex to assure residual patency  Following this study further treatment recommendations will be made  2  Recurrent left carotid artery stenosis with history of carotid artery stenting and repeat angioplasty of restenosis  In this regard her restenosis is in the moderate range on previous imaging studies thus no further intervention is planned but further assessment will be made following upcoming duplex evaluation

## 2020-03-04 ENCOUNTER — OFFICE VISIT (OUTPATIENT)
Dept: VASCULAR SURGERY | Facility: CLINIC | Age: 85
End: 2020-03-04
Payer: MEDICARE

## 2020-03-04 ENCOUNTER — HOSPITAL ENCOUNTER (OUTPATIENT)
Dept: NON INVASIVE DIAGNOSTICS | Facility: HOSPITAL | Age: 85
Discharge: HOME | End: 2020-03-04
Attending: SURGERY
Payer: MEDICARE

## 2020-03-04 VITALS
WEIGHT: 144 LBS | HEIGHT: 59 IN | TEMPERATURE: 97.5 F | SYSTOLIC BLOOD PRESSURE: 100 MMHG | BODY MASS INDEX: 29.03 KG/M2 | RESPIRATION RATE: 18 BRPM | DIASTOLIC BLOOD PRESSURE: 56 MMHG | HEART RATE: 70 BPM

## 2020-03-04 DIAGNOSIS — I65.23 ASYMPTOMATIC BILATERAL CAROTID ARTERY STENOSIS: Primary | ICD-10-CM

## 2020-03-04 DIAGNOSIS — I70.219 ATHEROSCLEROSIS OF ARTERY OF EXTREMITY WITH INTERMITTENT CLAUDICATION (HCC): Chronic | ICD-10-CM

## 2020-03-04 DIAGNOSIS — I65.23 ASYMPTOMATIC BILATERAL CAROTID ARTERY STENOSIS: ICD-10-CM

## 2020-03-04 PROCEDURE — 99214 OFFICE O/P EST MOD 30 MIN: CPT | Performed by: SURGERY

## 2020-03-04 PROCEDURE — 93880 EXTRACRANIAL BILAT STUDY: CPT | Performed by: SURGERY

## 2020-03-04 PROCEDURE — 93880 EXTRACRANIAL BILAT STUDY: CPT

## 2020-03-04 NOTE — PROGRESS NOTES
Assessment/Plan:    Asymptomatic carotid artery stenosis    1  Recurrent left internal carotid artery stenosis status post carotid artery stenting  We discussed the findings on recent duplex evaluation which show no significant change  In addition she remains asymptomatic  At this point no further workup or intervention is planned  2  High-grade right internal carotid artery stenosis versus occlusion  We also discussed the findings on duplex evaluation in this regard along with the possibilities of occlusion versus high-grade stenosis and the treatment recommendations in that setting  We discussed the option of imaging further with either MRI or CT angiogram   Because of her underlying renal insufficiency and concern for further renal dysfunction we will proceed with noncontrast MRI  Following that she will return to the office for further treatment recommendations  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 (HCC)    Aortoiliac and infrainguinal arterial occlusive disease which is minimally symptomatic  Her current complaints are mostly of lower back pain  At this point no further workup or intervention is planned  We discussed the importance of continued exercise and medical management  Diagnoses and all orders for this visit:    Asymptomatic bilateral carotid artery stenosis  -     MRI brain wo mra head and neck wo; Future    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 (HCC)          Subjective:      Patient ID: Alyce Barnard is a 81 y o  female  PT is here to review her CV of 3/4/2020  Pt denies having a CVA or TIA in the past   Pt denies all CVA or TIA symptoms  PT does not exhibit any CVA or TIA symptoms  Pt was last seen on 12/4/2019 for Carotid Artery Stenosis and Leg pain w/walking  Pt has a Hx of Carotid Artery Stenosis, HTN, CKD Stage 3, HLD and Lower Extremity Claudication  Pt is currently taking ASA and Lipitor  16-year-old with history of laryngeal cancer status post surgical resection with permanent tracheostomy as well as radiation therapy to the neck has had known carotid occlusive disease and is status post left carotid artery stenting  She has had recurrent stenosis on the left treated with a recurrent angioplasty  She has also been found to have a right  Carotid artery stenosis which has been asymptomatic  Initial evaluation was plan with CT angiogram but the procedure was canceled secondary to renal insufficiency and the patient did not follow-up in that regard  Subsequently follow-up duplex has been ordered and again this follow-up study has been delayed at the patient request   On evaluation today she has no complaints  Specifically she denies any focal neurologic event which would be consistent with TIA, CVA or amaurosis fugax  She does complain of some lower back pain with walking but denies any specific calf claudication symptoms  She denies any limitations from these complaints and in fact walks daily  Carotid duplex 03/04/2020 with minimal flow in the internal carotid artery beyond its origin with lack of diastolic flow which is suggestive of a critical stenosis verses occlusion more distally  On the left there is a recurrent stenosis with flow velocity of 321/83 centimeters/second which is unchanged from previous study 04/18/2018  The following portions of the patient's history were reviewed and updated as appropriate: allergies, current medications, past family history, past medical history, past social history, past surgical history and problem list     I have reviewed and made appropriate changes to the review of systems input by the medical assistant      Vitals:    03/04/20 0946 03/04/20 0950   BP: 108/60 100/56   BP Location: Left arm Right arm   Patient Position: Sitting Sitting   Cuff Size: Adult Adult   Pulse: 70    Resp: 18    Temp: 97 5 °F (36 4 °C)    TempSrc: Tympanic    Weight: 65 3 kg (144 lb)    Height: 4' 11" (1 499 m)        Patient Active Problem List   Diagnosis    Asymptomatic carotid artery stenosis    Abdominal aortic atherosclerosis (HCC)    Hyperlipidemia    Hypertension    Acute anterior circulation transient ischemic attack    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 (HCC)    [facility] care ([facility] )    [facility]    CKD (chronic kidney disease) stage 3, GFR 30-59 ml/min ([facility])       Past Surgical History:   Procedure Laterality Date    ILIAC ARTERY STENT Left     LARYNX SURGERY         Family History   Problem Relation Age of Onset    Heart disease Brother     No Known Problems Mother     No Known Problems Father     Breast cancer Sister 71    No Known Problems Daughter     No Known Problems Maternal Grandmother     No Known Problems Maternal Grandfather     No Known Problems Paternal Grandmother     No Known Problems Paternal Grandfather     No Known Problems Sister     No Known Problems Maternal Aunt     No Known Problems Maternal Aunt     No Known Problems Maternal Aunt        Social History     Socioeconomic History    Marital status:       Spouse name: Not on file    Number of children: Not on file    Years of education: Not on file    Highest education level: Not on file   Occupational History    Not on file   Social Needs    Financial resource strain: Not on file    Food insecurity:     Worry: Not on file     Inability: Not on file    Transportation needs:     Medical: Not on file     Non-medical: Not on file   Tobacco Use    Smoking status: Former Smoker     Last attempt to quit:      Years since quittin 1    Smokeless tobacco: Never Used   Substance and Sexual Activity    Alcohol use: Not on file    Drug use: Not on file    Sexual activity: Not on file   Lifestyle    Physical activity:     Days per week: Not on file     Minutes per session: Not on file    Stress: Not on file   Relationships    Social connections:     Talks on phone: Not on file     Gets together: Not on file     Attends Gnosticism service: Not on file     Active member of club or organization: Not on file     Attends meetings of clubs or organizations: Not on file     Relationship status: Not on file    Intimate partner violence:     Fear of current or ex partner: Not on file     Emotionally abused: Not on file     Physically abused: Not on file     Forced sexual activity: Not on file   Other Topics Concern    Not on file   Social History Narrative    Not on file       No Known Allergies      Current Outpatient Medications:     aspirin (ASPIRIN LOW DOSE) 81 MG tablet, Take by mouth, Disp: , Rfl:     atorvastatin (LIPITOR) 40 mg tablet, Take by mouth, Disp: , Rfl:     atorvastatin (LIPITOR) 40 mg tablet, Take 40 mg by mouth daily, Disp: , Rfl:     Calcium-Magnesium-Vitamin D ER (CITRACAL SLOW RELEASE) 600- MG-MG-UNIT TB24, Take by mouth, Disp: , Rfl:     cefuroxime (CEFTIN) 250 mg tablet, , Disp: , Rfl:     cetirizine (ZyrTEC) 5 MG tablet, Take 5 mg by mouth daily, Disp: , Rfl:     Cholecalciferol (VITAMIN D-3) 1000 units CAPS, Take by mouth, Disp: , Rfl:     fluticasone (FLONASE) 50 mcg/act nasal spray, into each nostril, Disp: , Rfl:     Krill Oil 1000 MG CAPS, Take by mouth, Disp: , Rfl:     levothyroxine 100 mcg tablet, , Disp: , Rfl:     levothyroxine 88 mcg tablet, Take by mouth, Disp: , Rfl:     losartan (COZAAR) 50 mg tablet, , Disp: , Rfl:     Magnesium 250 MG TABS, Take 250 mg by mouth daily, Disp: , Rfl:     metFORMIN (GLUCOPHAGE) 500 mg tablet, , Disp: , Rfl:     methocarbamol (ROBAXIN) 500 mg tablet, TK 1 T PO BID PRN, Disp: , Rfl: 1    Multiple Vitamins-Minerals (CENTRUM SILVER 50+WOMEN PO), Take by mouth, Disp: , Rfl:     niacin 500 mg ER capsule, Take 500 mg by mouth daily at bedtime, Disp: , Rfl:     Omega-3 1000 MG CAPS, Take by mouth, Disp: , Rfl:     omeprazole (PriLOSEC) 20 mg delayed release capsule, Take by mouth, Disp: , Rfl:     VENTOLIN  (90 Base) MCG/ACT inhaler, , Disp: , Rfl:        Review of Systems   Constitutional: Negative  HENT: Negative  Eyes: Negative  Respiratory: Negative  Cardiovascular: Negative  Gastrointestinal: Negative  Endocrine: Negative  Genitourinary: Negative  Musculoskeletal: Positive for back pain and neck pain  Skin: Negative  Allergic/Immunologic: Negative  Neurological: Negative  Hematological: Bruises/bleeds easily  Psychiatric/Behavioral: Negative  Objective:      /56 (BP Location: Right arm, Patient Position: Sitting, Cuff Size: Adult)   Pulse 70   Temp 97 5 °F (36 4 °C) (Tympanic)   Resp 18   Ht 4' 11" (1 499 m)   Wt 65 3 kg (144 lb)   BMI 29 08 kg/m²          Physical Exam   Constitutional: She is oriented to person, place, and time  She appears well-developed and well-nourished  HENT:   Head: Normocephalic and atraumatic  Eyes: Pupils are equal, round, and reactive to light  EOM are normal    Neck: Normal range of motion  No JVD present  Carotid bruit is present (  Bilateral)  Permanent tracheostomy  Skin changes and appearance consistent with previous surgery and radiation therapy   Cardiovascular: Normal rate and regular rhythm  No murmur heard  Pulses:       Carotid pulses are 2+ on the right side, and 2+ on the left side  Radial pulses are 2+ on the right side, and 2+ on the left side  Femoral pulses are 2+ on the right side, and 2+ on the left side  Popliteal pulses are 0 on the right side, and 0 on the left side  Dorsalis pedis pulses are 0 on the right side, and 0 on the left side  Posterior tibial pulses are 0 on the right side, and 0 on the left side  Pulmonary/Chest: Effort normal and breath sounds normal  No respiratory distress  Musculoskeletal: Normal range of motion  She exhibits no edema or tenderness  Neurological: She is alert and oriented to person, place, and time  She has normal strength  No sensory deficit  Skin: Skin is warm and dry  Psychiatric: She has a normal mood and affect

## 2020-03-04 NOTE — PATIENT INSTRUCTIONS
Asymptomatic carotid artery stenosis    1  Recurrent left internal carotid artery stenosis status post carotid artery stenting  We discussed the findings on recent duplex evaluation which show no significant change  In addition she remains asymptomatic  At this point no further workup or intervention is planned  2  High-grade right internal carotid artery stenosis versus occlusion  We also discussed the findings on duplex evaluation in this regard along with the possibilities of occlusion versus high-grade stenosis and the treatment recommendations in that setting  We discussed the option of imaging further with either MRI or CT angiogram   Because of her underlying renal insufficiency and concern for further renal dysfunction we will proceed with noncontrast MRI  Following that she will return to the office for further treatment recommendations  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 (HCC)    Aortoiliac and infrainguinal arterial occlusive disease which is minimally symptomatic  Her current complaints are mostly of lower back pain  At this point no further workup or intervention is planned  We discussed the importance of continued exercise and medical management

## 2020-03-04 NOTE — LETTER
March 4, 2020     Sofy Castro MD  [address] [address]    Patient: Mike Mc   YOB: 1935   Date of Visit: 3/4/2020       Dear Dr Ariadne Yadav:    Thank you for referring Kelsey Pool to me for evaluation  Below are the relevant portions of my assessment and plan of care  Asymptomatic carotid artery stenosis    1  Recurrent left internal carotid artery stenosis status post carotid artery stenting  We discussed the findings on recent duplex evaluation which show no significant change  In addition she remains asymptomatic  At this point no further workup or intervention is planned  2  High-grade right internal carotid artery stenosis versus occlusion  We also discussed the findings on duplex evaluation in this regard along with the possibilities of occlusion versus high-grade stenosis and the treatment recommendations in that setting  We discussed the option of imaging further with either MRI or CT angiogram   Because of her underlying renal insufficiency and concern for further renal dysfunction we will proceed with noncontrast MRI  Following that she will return to the office for further treatment recommendations  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 (HCC)    Aortoiliac and infrainguinal arterial occlusive disease which is minimally symptomatic  Her current complaints are mostly of lower back pain  At this point no further workup or intervention is planned  We discussed the importance of continued exercise and medical management  If you have questions, please do not hesitate to call me  I look forward to following Carol Lopez along with you           Sincerely,        Garcia Oshea MD        CC: No Recipients

## 2020-03-04 NOTE — ASSESSMENT & PLAN NOTE
1  Recurrent left internal carotid artery stenosis status post carotid artery stenting  We discussed the findings on recent duplex evaluation which show no significant change  In addition she remains asymptomatic  At this point no further workup or intervention is planned  2  High-grade right internal carotid artery stenosis versus occlusion  We also discussed the findings on duplex evaluation in this regard along with the possibilities of occlusion versus high-grade stenosis and the treatment recommendations in that setting  We discussed the option of imaging further with either MRI or CT angiogram   Because of her underlying renal insufficiency and concern for further renal dysfunction we will proceed with noncontrast MRI  Following that she will return to the office for further treatment recommendations

## 2020-03-04 NOTE — ASSESSMENT & PLAN NOTE
Aortoiliac and infrainguinal arterial occlusive disease which is minimally symptomatic  Her current complaints are mostly of lower back pain  At this point no further workup or intervention is planned  We discussed the importance of continued exercise and medical management

## 2020-03-17 ENCOUNTER — HOSPITAL ENCOUNTER (OUTPATIENT)
Dept: RADIOLOGY | Facility: HOSPITAL | Age: 85
Discharge: HOME | End: 2020-03-17
Attending: SURGERY
Payer: MEDICARE

## 2020-03-17 DIAGNOSIS — I65.23 ASYMPTOMATIC BILATERAL CAROTID ARTERY STENOSIS: ICD-10-CM

## 2020-03-17 PROCEDURE — 70551 MRI BRAIN STEM W/O DYE: CPT

## 2020-03-17 PROCEDURE — 70547 MR ANGIOGRAPHY NECK W/O DYE: CPT

## 2020-03-18 ENCOUNTER — DOCUMENTATION (OUTPATIENT)
Dept: OTHER | Facility: HOSPITAL | Age: 85
End: 2020-03-18

## 2020-03-18 ENCOUNTER — TELEPHONE (OUTPATIENT)
Dept: VASCULAR SURGERY | Facility: CLINIC | Age: 85
End: 2020-03-18

## 2020-03-18 NOTE — QUICK NOTE
Dalia Saavedra 80 y/o F patient of Dr Zachary Marshall with severe bilateral carotid artery stenosis  The patient has recurrent L ICA stenosis s/p stenting (70+% 321/83/3 40 by duplex) and R ICA could not be identified c/w occlusion  Further imaging was recommended  She underwent non-contrast MR studies for further evaluation due to CKD with eGFR around 30  I received call from Dr Reji Reina, radiology today regarding findings on imaging studies  He reports that the MRA carotid imaging is very limited and he is unable to fully delineate her anatomy without contrast  He reports that there is a severe R stenosis and diminished caliber vessel on the RIGHT  Also, that there is a "decent stenosis" on the left but unable to define  The MR machine stopped imaging at 3D TOF but likely stent artifact would prohibit seeing the lumen  Also, on MRA he is unable to see the vertebral until intracranially  MRA head w/o contrast shows the R ICA is smaller than the L  The R signal and vessel caliber are diminished intracranially c/w 2014  MRI of Brain w/o contrast shows nothing acute with mild micorangiopathic disease  Dr Reji Reina suggesting contrast based CT study, if info is needed and patient would qualify for such study  I told him that I would send findings to Dr Zachary Marshall and we will look for the full report

## 2020-03-18 NOTE — TELEPHONE ENCOUNTER
Per Mary Ellen Preston from Radiology, patient's MRA head done 3/17/20 has significant findings that are available for review in [facility]  Patient is scheduled to see Dr Kuo 4/1/20 to review results

## 2020-03-30 ENCOUNTER — TELEPHONE (OUTPATIENT)
Dept: ADMINISTRATIVE | Facility: HOSPITAL | Age: 85
End: 2020-03-30

## 2020-03-30 ENCOUNTER — TELEMEDICINE (OUTPATIENT)
Dept: VASCULAR SURGERY | Facility: CLINIC | Age: 85
End: 2020-03-30
Payer: MEDICARE

## 2020-03-30 DIAGNOSIS — I65.23 ASYMPTOMATIC BILATERAL CAROTID ARTERY STENOSIS: ICD-10-CM

## 2020-03-30 DIAGNOSIS — N18.30 CKD (CHRONIC KIDNEY DISEASE) STAGE 3, GFR 30-59 ML/MIN (HCC): Chronic | ICD-10-CM

## 2020-03-30 DIAGNOSIS — I65.23 ASYMPTOMATIC BILATERAL CAROTID ARTERY STENOSIS: Primary | ICD-10-CM

## 2020-03-30 PROCEDURE — 99443 PR PHYS/QHP TELEPHONE EVALUATION 21-30 MIN: CPT | Performed by: SURGERY

## 2020-03-30 RX ORDER — CLOPIDOGREL BISULFATE 75 MG/1
75 TABLET ORAL DAILY
Qty: 30 TABLET | Refills: 3 | Status: SHIPPED | OUTPATIENT
Start: 2020-03-30 | End: 2020-03-30

## 2020-03-30 RX ORDER — CLOPIDOGREL BISULFATE 75 MG/1
TABLET ORAL
Qty: 90 TABLET | Refills: 3 | Status: SHIPPED | OUTPATIENT
Start: 2020-03-30 | End: 2021-03-29

## 2020-03-30 NOTE — PROGRESS NOTES
Virtual Brief Visit    Problem List Items Addressed This Visit        Cardiovascular and Mediastinum    Asymptomatic carotid artery stenosis - Primary     Recurrent asymptomatic bilateral internal carotid artery stenosis  We discussed the findings on recent duplex and MRI studies along with the options available  We specifically discussed the options of continued conservative management verses further imaging with CT angiogram verses further imaging with arteriography with possible endovascular intervention  After discussion it was felt the best option would be arteriography following nephrology clearance with imaging and possible endovascular intervention and treatment of either the right or the left internal carotid artery based upon findings at that time  We discussed the associated risks and benefits  We will plan on proceeding following nephrology clearance  This procedure will also be on hold until clearance for elective procedures following the COVID 19 pandemic  In the interval she will continue her current medical management for which I have added Plavix therapy  Relevant Medications    clopidogrel (PLAVIX) 75 mg tablet    Other Relevant Orders    IR consult       Genitourinary    CKD (chronic kidney disease) stage 3, GFR 30-59 ml/min ([facility]) (Chronic)     Chronic renal insufficiency in patient pending cerebral arteriogram   We will ask that Nephrology evaluate in preparation for contrast based imaging  Relevant Orders    Ambulatory referral to Nephrology                Reason for visit is recurrent bilateral carotid occlusive disease    Encounter provider Nely Montoya MD    Provider located at [place] [address]  [address] [place] [address]      Recent Visits  No visits were found meeting these conditions     Showing recent visits within past 7 days and meeting all other requirements     Today's Visits  Date Type Provider Dept   03/30/20 Orlando Hashimoto, MD [facility] [address] [facility] today's visits and meeting all other requirements     Future Appointments  No visits were found meeting these conditions  Showing future appointments within next 150 days and meeting all other requirements        After connecting through telephone, the patient was identified by name and date of birth  Ania Arora was informed that this is a telemedicine visit and that the visit is being conducted through telephone  My office door was closed  No one else was in the room  She acknowledged consent and understanding of privacy and security of the video platform  The patient has agreed to participate and understands they can discontinue the visit at any time  Patient is aware this is a billable service  Adelina So is a 81 y o  female   Patient with remote history of laryngeal cancer with chronic tracheostomy and previous radiation therapy  Patient has known bilateral carotid occlusive disease previously treated with angioplasty and stenting  On follow-up imaging she has had significant recurrence more severe on the right as compared to the left  She now presents in follow-up of recent MR study which was performed secondary to chronic renal insufficiency  On discussion today she denies any focal neurologic symptoms which would be consistent with TIA, CVA or amaurosis fugax  She remains active with no major limitations  She states she remains healthy and confined to home secondary to the COVID 19 pandemic  She states she underwent the MR study without difficulty         Past Medical History:   Diagnosis Date    Aortoiliac occlusive disease ([address] )     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 ([address] )     Carotid artery stenosis     Hyperlipidemia     Hypertension     PVD (peripheral vascular disease) ([address] )     Throat cancer [facility])        Past Surgical History:   Procedure Laterality Date  ILIAC ARTERY STENT Left     LARYNX SURGERY  2002       Current Outpatient Medications   Medication Sig Dispense Refill    aspirin (ASPIRIN LOW DOSE) 81 MG tablet Take by mouth      atorvastatin (LIPITOR) 40 mg tablet Take by mouth      atorvastatin (LIPITOR) 40 mg tablet Take 40 mg by mouth daily      Calcium-Magnesium-Vitamin D ER (CITRACAL SLOW RELEASE) 600- MG-MG-UNIT TB24 Take by mouth      cefuroxime (CEFTIN) 250 mg tablet       cetirizine (ZyrTEC) 5 MG tablet Take 5 mg by mouth daily      Cholecalciferol (VITAMIN D-3) 1000 units CAPS Take by mouth      clopidogrel (PLAVIX) 75 mg tablet Take 1 tablet (75 mg total) by mouth daily 30 tablet 3    fluticasone (FLONASE) 50 mcg/act nasal spray into each nostril      Krill Oil 1000 MG CAPS Take by mouth      levothyroxine 100 mcg tablet       levothyroxine 88 mcg tablet Take by mouth      losartan (COZAAR) 50 mg tablet       Magnesium 250 MG TABS Take 250 mg by mouth daily      metFORMIN (GLUCOPHAGE) 500 mg tablet       methocarbamol (ROBAXIN) 500 mg tablet TK 1 T PO BID PRN  1    Multiple Vitamins-Minerals (CENTRUM SILVER 50+WOMEN PO) Take by mouth      niacin 500 mg ER capsule Take 500 mg by mouth daily at bedtime      Omega-3 1000 MG CAPS Take by mouth      omeprazole (PriLOSEC) 20 mg delayed release capsule Take by mouth      VENTOLIN  (90 Base) MCG/ACT inhaler        No current facility-administered medications for this visit  No Known Allergies    Review of Systems   Constitutional: Negative  Respiratory: Negative  Chronic trach   Cardiovascular: Negative  Neurological: Negative  Psychiatric/Behavioral: Negative  I spent 20 minutes with the patient during this visit          Vascular Surgery Virtual Telemedicine Visit -   Atul Guerra 81 y o  female MRN: [id number]    @ Encounter: [id number]    Due to the [address] Emergency, this visit was done via telephone  Assessment/Plan:    Patient Active Problem List    Diagnosis Date Noted    CKD (chronic kidney disease) stage 3, GFR 30-59 ml/min ([facility] ) 12/04/2019    Tracheostomy care ([facility] ) 02/28/2019    Laryngeal carcinoma ([facility] ) 02/28/2019    Acute anterior circulation transient ischemic attack 10/30/2017    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 ([facility] ) 02/24/2016    Abdominal aortic atherosclerosis ([place] [address] ) 02/16/2015    Asymptomatic carotid artery stenosis 12/30/2013    Hyperlipidemia 12/30/2013    Hypertension 12/30/2013       {Visit Dx and Orders (Refreshable);LNK, Assessment/Plan         -------------------------------------------------------------------------------------------------------------------    Reason for visit is recurrent carotid occlusive disease    This e-visit was done via telephone  Encounter provider Tejas Birmingham MD    Provider located at [address]      Recent Visits  No visits were found meeting these conditions  Showing recent visits within past 7 days and meeting all other requirements     Today's Visits  Date Type Provider Dept   03/30/20 Telemedicine Tejas Birmingham MD [facility] [facility] today's visits and meeting all other requirements     Future Appointments  No visits were found meeting these conditions  Showing future appointments within next 150 days and meeting all other requirements        After connecting through [employer], the patient was identified by name and date of birth  Ti Alvarez was informed that this is a telemedicine visit and that the exam is being conducted confidentially over secure lines  My office door was closed  No one else was in the room  She acknowledged consent and understanding of privacy and security of the video platform   The patient has agreed to participate and understands they can discontinue the visit at any time     HPI:         Past Medical History:   Diagnosis Date    [facility] occlusive disease ([facility] )     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 ([facility] )     Carotid artery stenosis     Hyperlipidemia     Hypertension     PVD [facility]peripheral vascular disease) [facility]     Throat cancer ([facility] )        Review of Systems - Negative except above    No Known Allergies        Current Outpatient Medications:     aspirin (ASPIRIN LOW DOSE) 81 MG tablet, Take by mouth, Disp: , Rfl:     atorvastatin (LIPITOR) 40 mg tablet, Take by mouth, Disp: , Rfl:     atorvastatin (LIPITOR) 40 mg tablet, Take 40 mg by mouth daily, Disp: , Rfl:     Calcium-Magnesium-Vitamin D ER (CITRACAL SLOW RELEASE) 600- MG-MG-UNIT TB24, Take by mouth, Disp: , Rfl:     cefuroxime (CEFTIN) 250 mg tablet, , Disp: , Rfl:     cetirizine (ZyrTEC) 5 MG tablet, Take 5 mg by mouth daily, Disp: , Rfl:     Cholecalciferol (VITAMIN D-3) 1000 units CAPS, Take by mouth, Disp: , Rfl:     clopidogrel (PLAVIX) 75 mg tablet, Take 1 tablet (75 mg total) by mouth daily, Disp: 30 tablet, Rfl: 3    fluticasone (FLONASE) 50 mcg/act nasal spray, into each nostril, Disp: , Rfl:     Krill Oil 1000 MG CAPS, Take by mouth, Disp: , Rfl:     levothyroxine 100 mcg tablet, , Disp: , Rfl:     levothyroxine 88 mcg tablet, Take by mouth, Disp: , Rfl:     losartan (COZAAR) 50 mg tablet, , Disp: , Rfl:     Magnesium 250 MG TABS, Take 250 mg by mouth daily, Disp: , Rfl:     metFORMIN (GLUCOPHAGE) 500 mg tablet, , Disp: , Rfl:     methocarbamol (ROBAXIN) 500 mg tablet, TK 1 T PO BID PRN, Disp: , Rfl: 1    Multiple Vitamins-Minerals (CENTRUM SILVER 50+WOMEN PO), Take by mouth, Disp: , Rfl:     niacin 500 mg ER capsule, Take 500 mg by mouth daily at bedtime, Disp: , Rfl:     Omega-3 1000 MG CAPS, Take by mouth, Disp: , Rfl:     omeprazole (PriLOSEC) 20 mg delayed release capsule, Take by mouth, Disp: , Rfl:     VENTOLIN  (90 Base) MCG/ACT inhaler, , Disp: , Rfl:     Social History     Socioeconomic History    Marital status:       Spouse name: Not on file    Number of children: Not on file    Years of education: Not on file    Highest education level: Not on file   Occupational History    Not on file   Social Needs    Financial resource strain: Not on file    Food insecurity:     Worry: Not on file     Inability: Not on file    Transportation needs:     Medical: Not on file     Non-medical: Not on file   Tobacco Use    Smoking status: Former Smoker     Last attempt to quit:      Years since quittin 2    Smokeless tobacco: Never Used   Substance and Sexual Activity    Alcohol use: Not on file    Drug use: Not on file    Sexual activity: Not on file   Lifestyle    Physical activity:     Days per week: Not on file     Minutes per session: Not on file    Stress: Not on file   Relationships    Social connections:     Talks on phone: Not on file     Gets together: Not on file     Attends Muslim service: Not on file     Active member of club or organization: Not on file     Attends meetings of clubs or organizations: Not on file     Relationship status: Not on file    Intimate partner violence:     Fear of current or ex partner: Not on file     Emotionally abused: Not on file     Physically abused: Not on file     Forced sexual activity: Not on file   Other Topics Concern    Not on file   Social History Narrative    Not on file       Family History   Problem Relation Age of Onset    Heart disease Brother     No Known Problems Mother     No Known Problems Father     Breast cancer Sister 71    No Known Problems Daughter     No Known Problems Maternal Grandmother     No Known Problems Maternal Grandfather     No Known Problems Paternal Grandmother     No Known Problems Paternal Grandfather     No Known Problems Sister     No Known Problems Maternal Aunt     No Known Problems Maternal Aunt     No Known Problems Maternal Aunt        Physical Exam:    Vitals: There were no vitals taken for this visit  , There is no height or weight on file to calculate BMI ,   Wt Readings from Last 3 Encounters:   03/04/20 65 3 kg (144 lb)   02/27/20 65 3 kg (144 lb)   12/04/19 65 3 kg (144 lb)       No exam was done as this was a telemedicine (telephone) visit  Labs & Results:  Serum creatinine 1 5 and GFR 30s from 2019    Imaging review:  MR was reviewed with Radiology  There is no evidence of CVA  There is bilateral severe carotid stenosis with atresia of the right internal carotid artery  Counseling / Coordination of Care  Total time spent today 30minutes  Greater than 50% of total time was spent with the patient and / or family counseling and / or coordination of care  A description of the counseling / coordination of care: 20 min  Thank you for the opportunity to participate in the care of this patient  Operative Scheduling Information:    Hospital:  [facility]    Physician:  Alireza Gilliam and senior interventional radiologist    Surgery: Cerebral arteriogram via femoral approach with possible endovascular intervention for recurrent right carotid artery stenosis, possible left carotid artery intervention for recurrent carotid artery stenosis  Possible use of [employer] AngioGuard system  Request [employer] rep    Dr Alireza Gilliam to do in interventional radiology with IR doc    Urgency:  Standard    Level:  Level 3: Outpatients to be scheduled for elective surgery than can be delayed up to 4 weeks without reasonable expectation of detriment to patient    Case Length:  Normal    Post-op Bed:  Stepdown    OR Table:  IR    Equipment Needs:  Rep:  [employer] with AngioGuard distal protection device    Medication Instructions:  Aspirin:   Continue (do not hold)  Plavix:  Continue (do not hold)    Hydration:  Nephrology preop consult with plan for hydration as per Nephrology

## 2020-03-30 NOTE — PATIENT INSTRUCTIONS
Problem List Items Addressed This Visit        Cardiovascular and Mediastinum    Asymptomatic carotid artery stenosis - Primary     Recurrent asymptomatic bilateral internal carotid artery stenosis  We discussed the findings on recent duplex and MRI studies along with the options available  We specifically discussed the options of continued conservative management verses further imaging with CT angiogram verses further imaging with arteriography with possible endovascular intervention  After discussion it was felt the best option would be arteriography following nephrology clearance with imaging and possible endovascular intervention and treatment of either the right or the left internal carotid artery based upon findings at that time  We discussed the associated risks and benefits  We will plan on proceeding following nephrology clearance  This procedure will also be on hold until clearance for elective procedures following the COVID 19 pandemic  In the interval she will continue her current medical management for which I have added Plavix therapy  Relevant Medications    clopidogrel (PLAVIX) 75 mg tablet    Other Relevant Orders    IR consult       Genitourinary    CKD (chronic kidney disease) stage 3, GFR 30-59 ml/min ([facility]) (Chronic)     Chronic renal insufficiency in patient pending cerebral arteriogram   We will ask that Nephrology evaluate in preparation for contrast based imaging           Relevant Orders    Ambulatory referral to Nephrology No

## 2020-03-30 NOTE — TELEPHONE ENCOUNTER
Patient had a Virtual Visit with Dr Facundo Rosario on 03/30/20 and was order a level 3 Cerebral Arteriogram  Patient will need Nephrology clearance  Spoke with Erlin Kitchen and schedule patient for 05/05/20 because she does not have an smart phone to have a video visit

## 2020-03-30 NOTE — ASSESSMENT & PLAN NOTE
Chronic renal insufficiency in patient pending cerebral arteriogram   We will ask that Nephrology evaluate in preparation for contrast based imaging

## 2020-03-30 NOTE — LETTER
March 30, 2020     Sp López MD  [address] [place] [address]    Patient: Jony Wolf   YOB: 1935   Date of Visit: 3/30/2020       Dear Dr Scott Baker:    Thank you for referring Alix Cullen to me for evaluation  Below are the relevant portions of my assessment and plan of care  Problem List Items Addressed This Visit        Cardiovascular and Mediastinum    Asymptomatic carotid artery stenosis - Primary     Recurrent asymptomatic bilateral internal carotid artery stenosis  We discussed the findings on recent duplex and MRI studies along with the options available  We specifically discussed the options of continued conservative management verses further imaging with CT angiogram verses further imaging with arteriography with possible endovascular intervention  After discussion it was felt the best option would be arteriography following nephrology clearance with imaging and possible endovascular intervention and treatment of either the right or the left internal carotid artery based upon findings at that time  We discussed the associated risks and benefits  We will plan on proceeding following nephrology clearance  This procedure will also be on hold until clearance for elective procedures following the COVID 19 pandemic  In the interval she will continue her current medical management for which I have added Plavix therapy  Relevant Medications    clopidogrel (PLAVIX) 75 mg tablet    Other Relevant Orders    IR consult       Genitourinary    CKD (chronic kidney disease) stage 3, GFR 30-59 ml/min ([facility]) (Chronic)     Chronic renal insufficiency in patient pending cerebral arteriogram   We will ask that Nephrology evaluate in preparation for contrast based imaging  Relevant Orders    Ambulatory referral to Nephrology          If you have questions, please do not hesitate to call me  I look forward to following Elva Jay along with you  Sincerely,        Eric Mcgovern MD        CC: No Recipients

## 2020-03-30 NOTE — ASSESSMENT & PLAN NOTE
Recurrent asymptomatic bilateral internal carotid artery stenosis  We discussed the findings on recent duplex and MRI studies along with the options available  We specifically discussed the options of continued conservative management verses further imaging with CT angiogram verses further imaging with arteriography with possible endovascular intervention  After discussion it was felt the best option would be arteriography following nephrology clearance with imaging and possible endovascular intervention and treatment of either the right or the left internal carotid artery based upon findings at that time  We discussed the associated risks and benefits  We will plan on proceeding following nephrology clearance  This procedure will also be on hold until clearance for elective procedures following the COVID 19 pandemic  In the interval she will continue her current medical management for which I have added Plavix therapy

## 2020-05-19 ENCOUNTER — TELEPHONE (OUTPATIENT)
Dept: NEPHROLOGY | Facility: CLINIC | Age: 85
End: 2020-05-19

## 2020-05-27 ENCOUNTER — CONSULT (OUTPATIENT)
Dept: NEPHROLOGY | Facility: CLINIC | Age: 85
End: 2020-05-27
Payer: MEDICARE

## 2020-05-27 VITALS
WEIGHT: 150.2 LBS | HEIGHT: 59 IN | BODY MASS INDEX: 30.28 KG/M2 | HEART RATE: 100 BPM | DIASTOLIC BLOOD PRESSURE: 70 MMHG | SYSTOLIC BLOOD PRESSURE: 144 MMHG

## 2020-05-27 DIAGNOSIS — I15.0 RENOVASCULAR HYPERTENSION: ICD-10-CM

## 2020-05-27 DIAGNOSIS — N18.30 CKD (CHRONIC KIDNEY DISEASE) STAGE 3, GFR 30-59 ML/MIN (HCC): Primary | Chronic | ICD-10-CM

## 2020-05-27 DIAGNOSIS — I65.23 ASYMPTOMATIC BILATERAL CAROTID ARTERY STENOSIS: ICD-10-CM

## 2020-05-27 LAB
SL AMB  POCT GLUCOSE, UA: ABNORMAL
SL AMB LEUKOCYTE ESTERASE,UA: ABNORMAL
SL AMB POCT BILIRUBIN,UA: ABNORMAL
SL AMB POCT BLOOD,UA: ABNORMAL
SL AMB POCT CLARITY,UA: ABNORMAL
SL AMB POCT COLOR,UA: YELLOW
SL AMB POCT KETONES,UA: ABNORMAL
SL AMB POCT NITRITE,UA: ABNORMAL
SL AMB POCT PH,UA: 6
SL AMB POCT SPECIFIC GRAVITY,UA: 1.01
SL AMB POCT URINE PROTEIN: ABNORMAL
SL AMB POCT UROBILINOGEN: 0.2

## 2020-05-27 PROCEDURE — 99204 OFFICE O/P NEW MOD 45 MIN: CPT | Performed by: INTERNAL MEDICINE

## 2020-05-27 PROCEDURE — 81002 URINALYSIS NONAUTO W/O SCOPE: CPT | Performed by: INTERNAL MEDICINE

## 2020-06-01 ENCOUNTER — HOSPITAL ENCOUNTER (OUTPATIENT)
Dept: RADIOLOGY | Facility: HOSPITAL | Age: 85
Discharge: HOME | End: 2020-06-01
Attending: INTERNAL MEDICINE
Payer: MEDICARE

## 2020-06-01 DIAGNOSIS — I15.0 RENOVASCULAR HYPERTENSION: ICD-10-CM

## 2020-06-01 DIAGNOSIS — I65.23 ASYMPTOMATIC BILATERAL CAROTID ARTERY STENOSIS: ICD-10-CM

## 2020-06-01 DIAGNOSIS — N18.30 CKD (CHRONIC KIDNEY DISEASE) STAGE 3, GFR 30-59 ML/MIN (HCC): ICD-10-CM

## 2020-06-01 PROCEDURE — 76770 US EXAM ABDO BACK WALL COMP: CPT

## 2020-06-01 NOTE — TELEPHONE ENCOUNTER
Per Dr Stas Alejandro 5/27/20 note:  · "Repeat laboratory studies, assuming renal function is within previous baseline okay to proceed with angiography with possible stenting  · Check renal ultrasound for size and echogenicity  · Assuming renal function is stable, follow-up in 6 months with repeat labs at that time  · Given urinary findings, patient asymptomatic, no dysuria, not recommending antibiotic treatment     Pre angiogram:  1  Stop losartan 2 days prior to the procedure  2  Give IV normal saline 100 cc/hour 4 hours pre and post angiogram  3  Repeat BMP within 1 week post procedure"    Pt has not yet had lab work repeated

## 2020-06-03 ENCOUNTER — TELEPHONE (OUTPATIENT)
Dept: NEPHROLOGY | Facility: CLINIC | Age: 85
End: 2020-06-03

## 2020-06-12 NOTE — TELEPHONE ENCOUNTER
[facility] requesting pt go for blood work ordered by Dr Willetta Phoenix

## 2020-06-23 NOTE — TELEPHONE ENCOUNTER
Dr Jeanette Steele is on vacation  Labs reviewed and creatinine stable at 1 3  Patient cleared from nephrology standpoint  Please follow Dr Shannon Cool prior note for fluid/med recommendations

## 2020-06-24 DIAGNOSIS — I65.23 ASYMPTOMATIC BILATERAL CAROTID ARTERY STENOSIS: Primary | ICD-10-CM

## 2020-06-24 DIAGNOSIS — Z11.59 SCREENING FOR VIRAL DISEASE: ICD-10-CM

## 2020-07-02 ENCOUNTER — TELEPHONE (OUTPATIENT)
Dept: VASCULAR SURGERY | Facility: CLINIC | Age: 85
End: 2020-07-02

## 2020-07-02 DIAGNOSIS — I65.23 ASYMPTOMATIC BILATERAL CAROTID ARTERY STENOSIS: Primary | ICD-10-CM

## 2020-07-02 NOTE — TELEPHONE ENCOUNTER
From: Alie Lua   Sent: Wednesday, June 24, 2020 1:38 PM  To: Ishaan Frias; IR Scheduling  Cc: Vascular Surgery Schedulers  Subject: RE: [facility]/IR REQUEST    ADDED for 7/22/20 8a  C=7/15    Kevin w/ Jessika Bland assist    Called patient and confirmed date of cerebral agram for 7/22/20 with Dr Chhaya Hogan and Dr Jessika Bland  Surgery Date: 7/22/20  Surgeon(s): David Corona (NPI: [id number]) and Harpreet Alvarado (NPI: [id number])  Facility: [address] [address] (Tax: [id number] / NPI: [id number])  Inpatient / Outpatient: Outpatient  Level: 3    Clearance Received: Yes, Nephrology  Consent Received: Consent will be signed day of procedure  Medication Hold / Last Dose: NO HOLD ON ASPIRIN OR PLAVIX/HOLD LOSARTEN 2 DAYS PRIOR LAST DOSE 7/19/20  VQI Spreadsheet: N/A  IR Notified: 6/24/20  Rep  Notified: N/A  Vas Lab Requested: N/A  Patient Contacted: 7/2/20    Diagnosis: I65 23   Procedure/ CPT Code(s): N7145193, [id number] NO AUTH NEEDED    Is this an EVLT Case? No     Is patient requesting a call when authorization has been obtained? Patient did not request a call  Post Operative Date: 1PM 8/13/20 [facility]  [facility] IS ON VACATION THE WEEK PRIOR     *Please review with patient med hold, PATs, and check H&P *  PATIENT WAS MAILED SURGERY/SHOWERING/DISCHARGE/COVID INSTRUCTIONS AFTER REVIEWING WITH HER VIA PHONE CALL  INSTRUCTIONS WERE GONE OVER WITH PATIENT  MAILED LAB SCRIPTS, COVID INSTRUCTIONS AND AGRAM INSTRUCTIONS TO PATIENT TODAY

## 2020-07-15 ENCOUNTER — TELEPHONE (OUTPATIENT)
Dept: RADIOLOGY | Facility: HOSPITAL | Age: 85
End: 2020-07-15

## 2020-07-15 RX ORDER — SODIUM CHLORIDE 9 MG/ML
75 INJECTION, SOLUTION INTRAVENOUS CONTINUOUS
Status: CANCELLED | OUTPATIENT
Start: 2020-07-15

## 2020-07-15 NOTE — TELEPHONE ENCOUNTER
From: Ganga Perdomo [mailto:[email or web address]   Sent: Wednesday, July 15, 2020 1:23 PM  To: Oumou Camilo  Cc: Vascular Surgery Schedulers  Subject: Viviana Loera RE: Case request 7/22 Angio Guard [facility]    WARNING! This email came from outside the [address] [place] [address] Ave  DO NOT click on any links or open attachments from this email unless you know the sender & the content are safe  Good Afternoon,    I am your current [employer] Representative and I am available to come in and assist Dr Bria Schultz next Wednesday  Thanks,    [address], [facility]        [email or web address]   mobile         [address]  [facility]        www [email or web address]  com [[email or web address]  [email or web address]]           Visit us at: [email or web address]

## 2020-07-16 ENCOUNTER — TELEPHONE (OUTPATIENT)
Dept: RADIOLOGY | Facility: HOSPITAL | Age: 85
End: 2020-07-16

## 2020-07-17 DIAGNOSIS — Z11.59 SCREENING FOR VIRAL DISEASE: ICD-10-CM

## 2020-07-17 PROCEDURE — U0003 INFECTIOUS AGENT DETECTION BY NUCLEIC ACID (DNA OR RNA); SEVERE ACUTE RESPIRATORY SYNDROME CORONAVIRUS 2 (SARS-COV-2) (CORONAVIRUS DISEASE [COVID-19]), AMPLIFIED PROBE TECHNIQUE, MAKING USE OF HIGH THROUGHPUT TECHNOLOGIES AS DESCRIBED BY CMS-2020-01-R: HCPCS

## 2020-07-18 LAB
INPATIENT: NORMAL
SARS-COV-2 RNA SPEC QL NAA+PROBE: NOT DETECTED

## 2020-07-20 ENCOUNTER — HOSPITAL ENCOUNTER (OUTPATIENT)
Dept: RADIOLOGY | Age: 85
Discharge: HOME | End: 2020-07-20
Payer: MEDICARE

## 2020-07-20 VITALS — BODY MASS INDEX: 29.84 KG/M2 | HEIGHT: 59 IN | WEIGHT: 148 LBS

## 2020-07-20 DIAGNOSIS — Z12.31 ENCOUNTER FOR SCREENING MAMMOGRAM FOR MALIGNANT NEOPLASM OF BREAST: ICD-10-CM

## 2020-07-20 PROCEDURE — 77063 BREAST TOMOSYNTHESIS BI: CPT

## 2020-07-20 PROCEDURE — 77067 SCR MAMMO BI INCL CAD: CPT

## 2020-07-21 ENCOUNTER — TELEPHONE (OUTPATIENT)
Dept: INPATIENT UNIT | Facility: HOSPITAL | Age: 85
End: 2020-07-21

## 2020-07-22 ENCOUNTER — ANESTHESIA EVENT (OUTPATIENT)
Dept: RADIOLOGY | Facility: HOSPITAL | Age: 85
End: 2020-07-22
Payer: MEDICARE

## 2020-07-22 ENCOUNTER — HOSPITAL ENCOUNTER (OUTPATIENT)
Dept: RADIOLOGY | Facility: HOSPITAL | Age: 85
Discharge: HOME | End: 2020-07-23
Attending: SURGERY | Admitting: SURGERY
Payer: MEDICARE

## 2020-07-22 ENCOUNTER — ANESTHESIA (OUTPATIENT)
Dept: RADIOLOGY | Facility: HOSPITAL | Age: 85
End: 2020-07-22
Payer: MEDICARE

## 2020-07-22 DIAGNOSIS — I65.22 ASYMPTOMATIC STENOSIS OF LEFT CAROTID ARTERY: Primary | ICD-10-CM

## 2020-07-22 DIAGNOSIS — I65.23 ASYMPTOMATIC BILATERAL CAROTID ARTERY STENOSIS: ICD-10-CM

## 2020-07-22 LAB
GLUCOSE SERPL-MCNC: 117 MG/DL (ref 65–140)
INR PPP: 1.06 (ref 0.84–1.19)
KCT BLD-ACNC: 120 SEC (ref 89–137)
KCT BLD-ACNC: 246 SEC (ref 89–137)
KCT BLD-ACNC: 285 SEC (ref 89–137)
PROTHROMBIN TIME: 13.8 SECONDS (ref 11.6–14.5)
SPECIMEN SOURCE: ABNORMAL
SPECIMEN SOURCE: ABNORMAL
SPECIMEN SOURCE: NORMAL

## 2020-07-22 PROCEDURE — C1725 CATH, TRANSLUMIN NON-LASER: HCPCS

## 2020-07-22 PROCEDURE — C1769 GUIDE WIRE: HCPCS

## 2020-07-22 PROCEDURE — C1894 INTRO/SHEATH, NON-LASER: HCPCS

## 2020-07-22 PROCEDURE — 37215 TRANSCATH STENT CCA W/EPS: CPT

## 2020-07-22 PROCEDURE — 85347 COAGULATION TIME ACTIVATED: CPT

## 2020-07-22 PROCEDURE — C1760 CLOSURE DEV, VASC: HCPCS

## 2020-07-22 PROCEDURE — C1876 STENT, NON-COA/NON-COV W/DEL: HCPCS

## 2020-07-22 PROCEDURE — 82948 REAGENT STRIP/BLOOD GLUCOSE: CPT

## 2020-07-22 PROCEDURE — 85610 PROTHROMBIN TIME: CPT | Performed by: SURGERY

## 2020-07-22 PROCEDURE — 37215 TRANSCATH STENT CCA W/EPS: CPT | Performed by: RADIOLOGY

## 2020-07-22 PROCEDURE — C1884 EMBOLIZATION PROTECT SYST: HCPCS

## 2020-07-22 PROCEDURE — 99024 POSTOP FOLLOW-UP VISIT: CPT | Performed by: SURGERY

## 2020-07-22 PROCEDURE — 1123F ACP DISCUSS/DSCN MKR DOCD: CPT | Performed by: SURGERY

## 2020-07-22 PROCEDURE — 94760 N-INVAS EAR/PLS OXIMETRY 1: CPT

## 2020-07-22 PROCEDURE — 37215 TRANSCATH STENT CCA W/EPS: CPT | Performed by: SURGERY

## 2020-07-22 RX ORDER — CLOPIDOGREL BISULFATE 75 MG/1
75 TABLET ORAL DAILY
Status: DISCONTINUED | OUTPATIENT
Start: 2020-07-22 | End: 2020-07-23 | Stop reason: HOSPADM

## 2020-07-22 RX ORDER — FENTANYL CITRATE 50 UG/ML
INJECTION, SOLUTION INTRAMUSCULAR; INTRAVENOUS AS NEEDED
Status: DISCONTINUED | OUTPATIENT
Start: 2020-07-22 | End: 2020-07-22 | Stop reason: SURG

## 2020-07-22 RX ORDER — PROPOFOL 10 MG/ML
INJECTION, EMULSION INTRAVENOUS AS NEEDED
Status: DISCONTINUED | OUTPATIENT
Start: 2020-07-22 | End: 2020-07-22 | Stop reason: SURG

## 2020-07-22 RX ORDER — HEPARIN SODIUM 1000 [USP'U]/ML
INJECTION, SOLUTION INTRAVENOUS; SUBCUTANEOUS AS NEEDED
Status: DISCONTINUED | OUTPATIENT
Start: 2020-07-22 | End: 2020-07-22 | Stop reason: SURG

## 2020-07-22 RX ORDER — GLYCOPYRROLATE 0.2 MG/ML
INJECTION INTRAMUSCULAR; INTRAVENOUS AS NEEDED
Status: DISCONTINUED | OUTPATIENT
Start: 2020-07-22 | End: 2020-07-22 | Stop reason: SURG

## 2020-07-22 RX ORDER — OXYCODONE HYDROCHLORIDE 5 MG/1
2.5 TABLET ORAL EVERY 4 HOURS PRN
Status: DISCONTINUED | OUTPATIENT
Start: 2020-07-22 | End: 2020-07-23 | Stop reason: HOSPADM

## 2020-07-22 RX ORDER — ATORVASTATIN CALCIUM 40 MG/1
40 TABLET, FILM COATED ORAL
Status: DISCONTINUED | OUTPATIENT
Start: 2020-07-23 | End: 2020-07-23 | Stop reason: HOSPADM

## 2020-07-22 RX ORDER — HEPARIN SODIUM 5000 [USP'U]/ML
5000 INJECTION, SOLUTION INTRAVENOUS; SUBCUTANEOUS EVERY 8 HOURS SCHEDULED
Status: DISCONTINUED | OUTPATIENT
Start: 2020-07-22 | End: 2020-07-22

## 2020-07-22 RX ORDER — HYDRALAZINE HYDROCHLORIDE 20 MG/ML
15 INJECTION INTRAMUSCULAR; INTRAVENOUS
Status: DISCONTINUED | OUTPATIENT
Start: 2020-07-22 | End: 2020-07-23

## 2020-07-22 RX ORDER — SODIUM CHLORIDE 9 MG/ML
INJECTION, SOLUTION INTRAVENOUS CONTINUOUS PRN
Status: DISCONTINUED | OUTPATIENT
Start: 2020-07-22 | End: 2020-07-22 | Stop reason: SURG

## 2020-07-22 RX ORDER — FENTANYL CITRATE/PF 50 MCG/ML
25 SYRINGE (ML) INJECTION
Status: DISCONTINUED | OUTPATIENT
Start: 2020-07-22 | End: 2020-07-22 | Stop reason: HOSPADM

## 2020-07-22 RX ORDER — ACETAMINOPHEN 325 MG/1
650 TABLET ORAL EVERY 6 HOURS PRN
Status: DISCONTINUED | OUTPATIENT
Start: 2020-07-22 | End: 2020-07-23 | Stop reason: HOSPADM

## 2020-07-22 RX ORDER — ONDANSETRON 2 MG/ML
4 INJECTION INTRAMUSCULAR; INTRAVENOUS ONCE AS NEEDED
Status: DISCONTINUED | OUTPATIENT
Start: 2020-07-22 | End: 2020-07-22 | Stop reason: HOSPADM

## 2020-07-22 RX ORDER — LIDOCAINE HYDROCHLORIDE 10 MG/ML
INJECTION, SOLUTION EPIDURAL; INFILTRATION; INTRACAUDAL; PERINEURAL
Status: COMPLETED | OUTPATIENT
Start: 2020-07-22 | End: 2020-07-22

## 2020-07-22 RX ORDER — OXYCODONE HYDROCHLORIDE 5 MG/1
5 TABLET ORAL EVERY 4 HOURS PRN
Status: DISCONTINUED | OUTPATIENT
Start: 2020-07-22 | End: 2020-07-23 | Stop reason: HOSPADM

## 2020-07-22 RX ORDER — ASPIRIN 81 MG/1
81 TABLET, CHEWABLE ORAL DAILY
Status: DISCONTINUED | OUTPATIENT
Start: 2020-07-22 | End: 2020-07-23 | Stop reason: HOSPADM

## 2020-07-22 RX ORDER — LIDOCAINE HYDROCHLORIDE 10 MG/ML
INJECTION, SOLUTION EPIDURAL; INFILTRATION; INTRACAUDAL; PERINEURAL AS NEEDED
Status: DISCONTINUED | OUTPATIENT
Start: 2020-07-22 | End: 2020-07-22 | Stop reason: SURG

## 2020-07-22 RX ORDER — HEPARIN SODIUM 5000 [USP'U]/ML
5000 INJECTION, SOLUTION INTRAVENOUS; SUBCUTANEOUS EVERY 8 HOURS SCHEDULED
Status: DISCONTINUED | OUTPATIENT
Start: 2020-07-22 | End: 2020-07-23 | Stop reason: HOSPADM

## 2020-07-22 RX ORDER — ONDANSETRON 2 MG/ML
INJECTION INTRAMUSCULAR; INTRAVENOUS AS NEEDED
Status: DISCONTINUED | OUTPATIENT
Start: 2020-07-22 | End: 2020-07-22 | Stop reason: SURG

## 2020-07-22 RX ORDER — SODIUM CHLORIDE 9 MG/ML
75 INJECTION, SOLUTION INTRAVENOUS CONTINUOUS
Status: DISCONTINUED | OUTPATIENT
Start: 2020-07-22 | End: 2020-07-22

## 2020-07-22 RX ORDER — LIDOCAINE WITH 8.4% SOD BICARB 0.9%(10ML)
SYRINGE (ML) INJECTION CODE/TRAUMA/SEDATION MEDICATION
Status: COMPLETED | OUTPATIENT
Start: 2020-07-22 | End: 2020-07-22

## 2020-07-22 RX ORDER — SODIUM CHLORIDE 9 MG/ML
75 INJECTION, SOLUTION INTRAVENOUS CONTINUOUS
Status: DISCONTINUED | OUTPATIENT
Start: 2020-07-22 | End: 2020-07-23

## 2020-07-22 RX ORDER — EPHEDRINE SULFATE 50 MG/ML
INJECTION INTRAVENOUS AS NEEDED
Status: DISCONTINUED | OUTPATIENT
Start: 2020-07-22 | End: 2020-07-22 | Stop reason: SURG

## 2020-07-22 RX ORDER — LABETALOL 20 MG/4 ML (5 MG/ML) INTRAVENOUS SYRINGE
5
Status: DISCONTINUED | OUTPATIENT
Start: 2020-07-22 | End: 2020-07-23

## 2020-07-22 RX ADMIN — PHENYLEPHRINE HYDROCHLORIDE 20 MCG/MIN: 10 INJECTION INTRAVENOUS at 15:09

## 2020-07-22 RX ADMIN — HEPARIN SODIUM 8000 UNITS: 1000 INJECTION INTRAVENOUS; SUBCUTANEOUS at 14:04

## 2020-07-22 RX ADMIN — SODIUM CHLORIDE 4 MG/HR: 0.9 INJECTION, SOLUTION INTRAVENOUS at 13:30

## 2020-07-22 RX ADMIN — EPHEDRINE SULFATE 5 MG: 50 INJECTION, SOLUTION INTRAVENOUS at 14:36

## 2020-07-22 RX ADMIN — ASPIRIN 81 MG 81 MG: 81 TABLET ORAL at 21:20

## 2020-07-22 RX ADMIN — Medication 10 ML: at 13:40

## 2020-07-22 RX ADMIN — FENTANYL CITRATE 25 MCG: 50 INJECTION, SOLUTION INTRAMUSCULAR; INTRAVENOUS at 13:30

## 2020-07-22 RX ADMIN — FENTANYL CITRATE 25 MCG: 50 INJECTION INTRAMUSCULAR; INTRAVENOUS at 16:51

## 2020-07-22 RX ADMIN — FENTANYL CITRATE 25 MCG: 50 INJECTION INTRAMUSCULAR; INTRAVENOUS at 16:30

## 2020-07-22 RX ADMIN — PHENYLEPHRINE HYDROCHLORIDE 200 MCG: 10 INJECTION INTRAVENOUS at 14:59

## 2020-07-22 RX ADMIN — PHENYLEPHRINE HYDROCHLORIDE 200 MCG: 10 INJECTION INTRAVENOUS at 15:05

## 2020-07-22 RX ADMIN — PROPOFOL 20 MG: 10 INJECTION, EMULSION INTRAVENOUS at 13:30

## 2020-07-22 RX ADMIN — ONDANSETRON 4 MG: 2 INJECTION INTRAMUSCULAR; INTRAVENOUS at 13:30

## 2020-07-22 RX ADMIN — GLYCOPYRROLATE 0.2 MG: 0.2 INJECTION, SOLUTION INTRAMUSCULAR; INTRAVENOUS at 14:35

## 2020-07-22 RX ADMIN — LIDOCAINE HYDROCHLORIDE 50 MG: 10 INJECTION, SOLUTION EPIDURAL; INFILTRATION; INTRACAUDAL; PERINEURAL at 13:30

## 2020-07-22 RX ADMIN — CLOPIDOGREL BISULFATE 75 MG: 75 TABLET ORAL at 21:20

## 2020-07-22 RX ADMIN — SODIUM CHLORIDE: 0.9 INJECTION, SOLUTION INTRAVENOUS at 13:16

## 2020-07-22 RX ADMIN — SODIUM CHLORIDE 75 ML/HR: 0.9 INJECTION, SOLUTION INTRAVENOUS at 12:08

## 2020-07-22 RX ADMIN — INSULIN DETEMIR 9 UNITS: 100 INJECTION, SOLUTION SUBCUTANEOUS at 21:21

## 2020-07-22 RX ADMIN — HEPARIN SODIUM 1000 UNITS: 1000 INJECTION INTRAVENOUS; SUBCUTANEOUS at 14:44

## 2020-07-22 RX ADMIN — HEPARIN SODIUM 5000 UNITS: 5000 INJECTION INTRAVENOUS; SUBCUTANEOUS at 21:20

## 2020-07-22 RX ADMIN — EPHEDRINE SULFATE 5 MG: 50 INJECTION, SOLUTION INTRAVENOUS at 14:45

## 2020-07-22 RX ADMIN — LIDOCAINE HYDROCHLORIDE 1 ML: 10 INJECTION, SOLUTION EPIDURAL; INFILTRATION; INTRACAUDAL; PERINEURAL at 13:50

## 2020-07-22 RX ADMIN — LABETALOL 20 MG/4 ML (5 MG/ML) INTRAVENOUS SYRINGE 5 MG: at 20:20

## 2020-07-22 RX ADMIN — SODIUM CHLORIDE 75 ML/HR: 0.9 INJECTION, SOLUTION INTRAVENOUS at 16:16

## 2020-07-22 RX ADMIN — IODIXANOL 65 ML: 320 INJECTION, SOLUTION INTRAVASCULAR at 15:27

## 2020-07-22 RX ADMIN — PHENYLEPHRINE HYDROCHLORIDE 200 MCG: 10 INJECTION INTRAVENOUS at 14:47

## 2020-07-22 NOTE — H&P
Cardiovascular and Mediastinum     Asymptomatic carotid artery stenosis - Primary       Recurrent asymptomatic bilateral internal carotid artery stenosis  We discussed the findings on recent duplex and MRI studies along with the options available  We specifically discussed the options of continued conservative management verses further imaging with CT angiogram verses further imaging with arteriography with possible endovascular intervention  After discussion it was felt the best option would be arteriography following nephrology clearance with imaging and possible endovascular intervention and treatment of either the right or the left internal carotid artery based upon findings at that time  We discussed the associated risks and benefits            Relevant Medications     clopidogrel (PLAVIX) 75 mg tablet                    Genitourinary     CKD (chronic kidney disease) stage 3, GFR 30-59 ml/min (HCC) (Chronic)       Chronic renal insufficiency in patient pending cerebral arteriogram   Pre and periop hydration           Relevant Orders     Ambulatory referral to Nephrology                      Subjective  Sana Siddiqui is a 81 y o  female   Patient with remote history of laryngeal cancer with chronic tracheostomy and previous radiation therapy  Patient has known bilateral carotid occlusive disease previously treated with angioplasty and stenting  On follow-up imaging she has had significant recurrence more severe on the right as compared to the left  She now presents in follow-up of recent MR study which was performed secondary to chronic renal insufficiency      On discussion today she denies any focal neurologic symptoms which would be consistent with TIA, CVA or amaurosis fugax  She remains active with no major limitations  She states she remains healthy and confined to home secondary to the COVID 19 pandemic  She states she underwent the MR study without difficulty           Medical History Past Medical History:   Diagnosis Date    Aortoiliac occlusive disease ([place] [address] )      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 (HCC)      Carotid artery stenosis      Hyperlipidemia      Hypertension      PVD (peripheral vascular disease) ([place] [address] )      Throat cancer ([place] [address] )              Surgical History         Past Surgical History:   Procedure Laterality Date    ILIAC ARTERY STENT Left      LARYNX SURGERY   2002            Current Medications          Current Outpatient Medications   Medication Sig Dispense Refill    aspirin (ASPIRIN LOW DOSE) 81 MG tablet Take by mouth        atorvastatin (LIPITOR) 40 mg tablet Take by mouth        atorvastatin (LIPITOR) 40 mg tablet Take 40 mg by mouth daily        Calcium-Magnesium-Vitamin D ER (CITRACAL SLOW RELEASE) 600- MG-MG-UNIT TB24 Take by mouth        cefuroxime (CEFTIN) 250 mg tablet          cetirizine (ZyrTEC) 5 MG tablet Take 5 mg by mouth daily        Cholecalciferol (VITAMIN D-3) 1000 units CAPS Take by mouth        clopidogrel (PLAVIX) 75 mg tablet Take 1 tablet (75 mg total) by mouth daily 30 tablet 3    fluticasone (FLONASE) 50 mcg/act nasal spray into each nostril        Krill Oil 1000 MG CAPS Take by mouth        levothyroxine 100 mcg tablet          levothyroxine 88 mcg tablet Take by mouth        losartan (COZAAR) 50 mg tablet          Magnesium 250 MG TABS Take 250 mg by mouth daily        metFORMIN (GLUCOPHAGE) 500 mg tablet          methocarbamol (ROBAXIN) 500 mg tablet TK 1 T PO BID PRN   1    Multiple Vitamins-Minerals (CENTRUM SILVER 50+WOMEN PO) Take by mouth        niacin 500 mg ER capsule Take 500 mg by mouth daily at bedtime        Omega-3 1000 MG CAPS Take by mouth        omeprazole (PriLOSEC) 20 mg delayed release capsule Take by mouth        VENTOLIN  (90 Base) MCG/ACT inhaler            No current facility-administered medications for this visit              No Known Allergies     Review of Systems   Constitutional: Negative  Respiratory: Negative  Chronic trach   Cardiovascular: Negative  Neurological: Negative      Psychiatric/Behavioral: Negative                     Patient Active Problem List     Diagnosis Date Noted    CKD (chronic kidney disease) stage 3, GFR 30-59 ml/min ([place] [address] ) 12/04/2019    Tracheostomy care ([place] [address] ) 02/28/2019    Laryngeal carcinoma ([place] [address] ) 02/28/2019    Acute anterior circulation transient ischemic attack 10/30/2017    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 ([place] [address] ) 02/24/2016    Abdominal aortic atherosclerosis ([place] [address] ) 02/16/2015    Asymptomatic carotid artery stenosis 12/30/2013    Hyperlipidemia 12/30/2013    Hypertension 12/30/2013              Medical History        Past Medical History:   Diagnosis Date    Aortoiliac occlusive disease ([place] [address] )      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 (HCC)      Carotid artery stenosis      Hyperlipidemia      Hypertension      PVD (peripheral vascular disease) (HCC)      Throat cancer (HCC)              Review of Systems - Negative except above     No Known Allergies        Current Outpatient Medications:     aspirin (ASPIRIN LOW DOSE) 81 MG tablet, Take by mouth, Disp: , Rfl:     atorvastatin (LIPITOR) 40 mg tablet, Take by mouth, Disp: , Rfl:     atorvastatin (LIPITOR) 40 mg tablet, Take 40 mg by mouth daily, Disp: , Rfl:     Calcium-Magnesium-Vitamin D ER (CITRACAL SLOW RELEASE) 600- MG-MG-UNIT TB24, Take by mouth, Disp: , Rfl:     cefuroxime (CEFTIN) 250 mg tablet, , Disp: , Rfl:     cetirizine (ZyrTEC) 5 MG tablet, Take 5 mg by mouth daily, Disp: , Rfl:     Cholecalciferol (VITAMIN D-3) 1000 units CAPS, Take by mouth, Disp: , Rfl:     clopidogrel (PLAVIX) 75 mg tablet, Take 1 tablet (75 mg total) by mouth daily, Disp: 30 tablet, Rfl: 3    fluticasone (FLONASE) 50 mcg/act nasal spray, into each nostril, Disp: , Rfl:   Krill Oil 1000 MG CAPS, Take by mouth, Disp: , Rfl:     levothyroxine 100 mcg tablet, , Disp: , Rfl:     levothyroxine 88 mcg tablet, Take by mouth, Disp: , Rfl:     losartan (COZAAR) 50 mg tablet, , Disp: , Rfl:     Magnesium 250 MG TABS, Take 250 mg by mouth daily, Disp: , Rfl:     metFORMIN (GLUCOPHAGE) 500 mg tablet, , Disp: , Rfl:     methocarbamol (ROBAXIN) 500 mg tablet, TK 1 T PO BID PRN, Disp: , Rfl: 1    Multiple Vitamins-Minerals (CENTRUM SILVER 50+WOMEN PO), Take by mouth, Disp: , Rfl:     niacin 500 mg ER capsule, Take 500 mg by mouth daily at bedtime, Disp: , Rfl:     Omega-3 1000 MG CAPS, Take by mouth, Disp: , Rfl:     omeprazole (PriLOSEC) 20 mg delayed release capsule, Take by mouth, Disp: , Rfl:     VENTOLIN  (90 Base) MCG/ACT inhaler, , Disp: , Rfl:      Social History               Socioeconomic History    Marital status:         Spouse name: Not on file    Number of children: Not on file    Years of education: Not on file    Highest education level: Not on file   Occupational History    Not on file   Social Needs    Financial resource strain: Not on file    Food insecurity:       Worry: Not on file       Inability: Not on file    Transportation needs:       Medical: Not on file       Non-medical: Not on file   Tobacco Use    Smoking status: Former Smoker       Last attempt to quit:        Years since quittin 2    Smokeless tobacco: Never Used   Substance and Sexual Activity    Alcohol use: Not on file    Drug use: Not on file    Sexual activity: Not on file   Lifestyle    Physical activity:       Days per week: Not on file       Minutes per session: Not on file    Stress: Not on file   Relationships    Social connections:       Talks on phone: Not on file       Gets together: Not on file       Attends Congregational service: Not on file       Active member of club or organization: Not on file       Attends meetings of clubs or organizations: Not on file       Relationship status: Not on file    Intimate partner violence:       Fear of current or ex partner: Not on file       Emotionally abused: Not on file       Physically abused: Not on file       Forced sexual activity: Not on file   Other Topics Concern    Not on file   Social History Narrative    Not on file                  Family History   Problem Relation Age of Onset    Heart disease Brother      No Known Problems Mother      No Known Problems Father      Breast cancer Sister 71    No Known Problems Daughter      No Known Problems Maternal Grandmother      No Known Problems Maternal Grandfather      No Known Problems Paternal Grandmother      No Known Problems Paternal Grandfather      No Known Problems Sister      No Known Problems Maternal Aunt      No Known Problems Maternal Aunt      No Known Problems Maternal Aunt           Physical Exam:     /59 (BP Location: Left arm)   Pulse (!) 116   Temp 98 2 °F (36 8 °C) (Oral)   Resp 18   Ht 4' 11" (1 499 m)   Wt 68 kg (150 lb)   SpO2 94%   BMI 30 30 kg/m²     General: resting comfortably, NAD  Neck: Post radical neck and radiation changes with permanent trach  Cardiac: regular  Lungs: clear  Neuro: strength and sensory intact and symmetric     Labs & Results:  Serum creatinine 1 5 and GFR 30s from 2019     Imaging review:  MR was reviewed with Radiology  There is no evidence of CVA  There is bilateral severe carotid stenosis with atresia of the right internal carotid artery   Left vertebral artery is atretic with large dominant right VA

## 2020-07-22 NOTE — OP NOTE
OPERATIVE REPORT  PATIENT NAME: Sana Siddiqui    :  1935  MRN: [id number]  Pt Location:  [facility] interventional radiology suite    SURGERY DATE: 2020    Surgeon:  Primary surgeon:  Mukesh Alvarenga MD  Interventional radiologist:  Liban Allen MD    Preop Diagnosis:  1  Recurrent left internal carotid artery stenosis status post stenting  2  Critical near occlusive right internal carotid artery stenosis  3  Atresia of left vertebral artery    Postoperative diagnosis:  Same    Procedure:   1  Ultrasound-guided right common femoral artery puncture with closure using Star closure device  2  Aortogram of aortic arch  3  Left carotid arteriogram  4  Left carotid arteriogram with angioplasty and stenting with 8 x 40 mm self expanding stent post dilated with 4 mm balloon using a 6 mm AngioGuard distal protection device     Specimen(s):  None  Estimated Blood Loss:   Minimal    Drains:  None    Anesthesia Type:   IV sedation with anesthesia    Operative Indications:  67-year-old with history of laryngeal cancer status post laryngectomy and neck dissection with radiation therapy has had critical carotid occlusive disease with recurrent stenosis of left internal carotid artery following stenting  On follow-up imaging she has critical restenosis of both the left internal carotid artery and primary critical stenosis of the right internal carotid artery with atresia of the left vertebral artery  This constellation of disease affecting 3 of for cerebral vessels results in a markedly elevated risk of stroke  Operative Findings: The left internal carotid artery stent was patent  There is highly calcified atherosclerotic plaque in the internal carotid artery beyond the stent with evidence of severe stenosis  On the right the internal carotid artery was nearly occluded with a 90% or greater stenosis at its origin  Traversing this stenosis was challenging secondary to the critical stenosis  Pre dilatation was performed with a 4 mm balloon with improvement but significant residual stenosis  Of note the patient was intolerant to balloon inflation with severe bradycardia  Subsequent administration of Glycopyrolate successfully block the bradycardic response  Following placement of a 8 x 40 mm self expanding stent and post dilatation with a 4 mm balloon resulted in a good cosmetic result with no significant residual stenosis  The patient remained awake and following commands throughout the procedure with no new weakness or other neurologic affect  Complications:   None    Procedure and Technique:  A qualified surgical resident was not available for this case  Both a vascular surgeon and interventional radiologists were present through the entire procedure due to the complexity of the procedure and their individual skill sets  After informed consent was obtained, the patient was brought to the hybrid OR and placed in the supine position  IV sedation was administered and an arterial line monitor was placed  The right femoral head was imaged and marked  Duplex ultrasound was then used to evaluate the common femoral artery which was noted to be compressible with no significant atherosclerotic disease  An area void of any significant calcium was then imaged and under direct ultrasound guidance a micropuncture needle was inserted into the artery and exchanged for an appropriate dilator and Bentson wire  A 5 [place] sheath was placed  IV heparin was administered  An ACT level was obtained  Further heparin was administered to maintain a therapeutic level  A Bentson wire and pigtail catheter was then positioned in the aortic arch and an appropriate obliquity was obtained  An arch aortogram was then obtained  Initially the left internal carotid artery was cannulated with a Chuyita catheter  Two view images were obtained of the distal common and cervical internal carotid arteries  The previously placed stent was patent with moderate in stent restenosis but an area of highly calcified plaque just distal to the stent which did appear to create a significant stenosis though the degree of stenosis was difficult to ascertain secondary to the shadowing effect of the calcium  The left common carotid artery was then cannulated with a Chuyita catheter and Bentson wire  Images were obtained of the carotid bifurcation in multiple obliquities  These showed patency of the external carotid artery and a critical stenosis of greater than 90 % in the internal carotid artery over a 1 5 cm segment  An angled wire was then used to cannulate the external carotid artery under roadmap imaging and the catheter was advanced into the internal carotid artery and exchanged for a stiff wire  A 6 [place] shuttle sheath was then passed over this wire into the distal common carotid artery  A 5 mm AngioGuard distal protection device was then used to cross the internal carotid artery under roadmap imaging  Crossing this critical lesion was challenging secondary to the degree of stenosis itself as well as the angulation  Gentle 4 pressure had to be maintained on the filter device to advance the filter itself  The filter was then positioned appropriately in the distal internal carotid artery and deployed  The delivery catheter was removed  A 4 x 30 mm balloon was then positioned across the stenosis and inflated  The patient remained had a severe bradycardic response to balloon inflation which resolved with deflation  The patient remained awake and had no complaints during this episode  Completion imaging showed significant improvement but residual stenosis in the internal carotid artery  A 8 x 40 mm self-expanding stent was then positioned across the stenosis and deployed  The stent appeared constrained at the area of high-grade stenosis  Postdilatation was performed with a 4 x 20 mm balloon  Completion imaging showed no significant residual stenosis in 2 separate obliquities  The AngioGuard distal protection device was then removed in the standard fashion  Completion images were obtained showing no significant residual stenosis  The shuttle sheath was then removed and the right femoral puncture site was closed with a Star closure device  Manual compression was held  No bleeding points were and countered  A Histacryl dressing was placed  The patient remained neurologically intact and awake during this entire procedure  I was present for the entire procedure    Patient Disposition:  PACU         Vascular Quality Initiative - Carotid Artery Stent    Functional Status: Fully active; able to carry on all predisease activities without restriction  High Risk Factors For CEA: Anatomic risk Prior neck radiation and previous neck surgery with permanent trach    Refused for Surgery: No    Pre-op Imaging is CT/CTA: No     MRA    Urgency: Elective      ASA: IV  Anesthesia:  General    Indication: Asymptomatic stenosis    Skin Prep: Chlorhexidene    Arch Type: Type I    Bovine Arch: no     Approach: Femoral     Medication Loading: Both    Prophylactic Anti-bradyarrhythmic no    Anticoagulant: Heparin    Antiplatelet IIb/IIIa: no    Bradyarrhythmia Requiring Tx: Yes, medication    Fluoro Time:     Distinct Lesions Treated: 1      If Distinct Lesions Treated is 1, Second Stenosis (Not Treated): No    Lesion Type: Atherosclerosis  Lesion Side: right    Lesion Location: Bifurcation     ICA Distal Tortuosity: None/Mild    Lesion Length: 15mm    Lesion Stenosis:90%    Protection Device Used: Yes, successful      Protection Device Type: Distal embolic protection device     EPD Type: Angioguard    Pre-dilate Before Protection Device: no  Angioplasty required in order to cross lesion with protection device   For TCAR procedures answer yes if vessel pre-dilated prior to stent placement, or no if not pre-dilated before stent placement  Pre-dilate Lesion: yes  Pre-dilate Lesion: Angioplasty required in order to cross lesion for stent placement  Required even if there is a technical failure in stent deployment         Technical Failure: No       Number of Stents: 1    Post- dilate: Yes, Post Dilate : Balloon Diameter 4mm(2-15 mm)     Neurologic Change: No    Intra-Cranial Completion Angiogram: No    Total Procedure Time: 86min    SIGNATURE: Sugey Lubin MD  DATE: July 22, 2020  TIME: 3:23 PM

## 2020-07-22 NOTE — ANESTHESIA PREPROCEDURE EVALUATION
Review of Systems/Medical History  Patient summary reviewed  Chart reviewed      Cardiovascular  Hyperlipidemia, Hypertension ,   Comment: Carotid stenosis ,  Pulmonary    Comment: Laryngeal carcinoma s/p laryngectomy   Tracheostomy        GI/Hepatic       Chronic kidney disease ,        Endo/Other  Negative endo/other ROS      GYN  Negative gynecology ROS          Hematology   Musculoskeletal  Negative musculoskeletal ROS        Neurology    TIA,    Psychology   Negative psychology ROS            TTE 2018   SUMMARY     SUMMARY:  intra ventricular increase volocity peak to 4cm with valalva  small lv ventricle with vigoous contraction     LEFT VENTRICLE:  The cavity was small  Systolic function was vigorous  There were no regional wall motion abnormalities  Doppler parameters were consistent with abnormal left ventricular relaxation (grade 1 diastolic dysfunction)      MITRAL VALVE:  There was trace regurgitation      TRICUSPID VALVE:  There was trace regurgitation  Physical Exam    Airway  Comment: Tracheostomy in situ             Dental       Cardiovascular      Pulmonary      Other Findings        Anesthesia Plan  ASA Score- 4     Anesthesia Type-   Additional Monitors: arterial line  Airway Plan:     Comment: Existing tracheostomy  Plan for sedation per surgeon request  ETT via stoma in event of general anesthesia        Plan Factors-    Induction- intravenous  Postoperative Plan- Plan for postoperative opioid use  Informed Consent- Anesthetic plan and risks discussed with patient  I personally reviewed this patient with the CRNA  Discussed and agreed on the Anesthesia Plan with the CRNA  Eric Beauchamp

## 2020-07-22 NOTE — ANESTHESIA PROCEDURE NOTES
Arterial Line Insertion  Performed by: Ingrid Cerrato CRNA  Authorized by: Marti Bowling MD   Consent: Verbal consent obtained  Written consent obtained  Risks and benefits: risks, benefits and alternatives were discussed  Consent given by: patient  Patient understanding: patient states understanding of the procedure being performed  Patient consent: the patient's understanding of the procedure matches consent given  Procedure consent: procedure consent matches procedure scheduled  Relevant documents: relevant documents present and verified  Required items: required blood products, implants, devices, and special equipment available  Patient identity confirmed: arm band and verbally with patient  Time out: Immediately prior to procedure a "time out" was called to verify the correct patient, procedure, equipment, support staff and site/side marked as required  Preparation: Patient was prepped and draped in the usual sterile fashion    Indications: hemodynamic monitoring  Orientation:  Left  Location: radial arterylidocaine (PF) (XYLOCAINE-MPF) 1 % infiltration, 1 mL  Procedure Details:  Isrrael's test normal: yes  Needle gauge: 20  Seldinger technique: Seldinger technique used  Number of attempts: 1    Post-procedure:  Post-procedure: dressing applied  Waveform: good waveform and waveform confirmed  Post-procedure CNS: normal  Patient tolerance: Patient tolerated the procedure well with no immediate complications

## 2020-07-22 NOTE — PROGRESS NOTES
IR cerebral angiogram with right carotid stent placement performed by Dr Nery Syed without complication  Patient to recover in PACU, report given bedside to PACU RN

## 2020-07-23 VITALS
SYSTOLIC BLOOD PRESSURE: 127 MMHG | WEIGHT: 157.19 LBS | DIASTOLIC BLOOD PRESSURE: 70 MMHG | HEIGHT: 59 IN | TEMPERATURE: 97.6 F | RESPIRATION RATE: 18 BRPM | OXYGEN SATURATION: 97 % | HEART RATE: 69 BPM | BODY MASS INDEX: 31.69 KG/M2

## 2020-07-23 LAB
ANION GAP SERPL CALCULATED.3IONS-SCNC: 6 MMOL/L (ref 4–13)
APTT PPP: 27 SECONDS (ref 23–37)
BUN SERPL-MCNC: 25 MG/DL (ref 5–25)
CALCIUM SERPL-MCNC: 7.8 MG/DL (ref 8.3–10.1)
CHLORIDE SERPL-SCNC: 113 MMOL/L (ref 100–108)
CO2 SERPL-SCNC: 22 MMOL/L (ref 21–32)
CREAT SERPL-MCNC: 1.01 MG/DL (ref 0.6–1.3)
ERYTHROCYTE [DISTWIDTH] IN BLOOD BY AUTOMATED COUNT: 15.2 % (ref 11.6–15.1)
GFR SERPL CREATININE-BSD FRML MDRD: 52 ML/MIN/1.73SQ M
GLUCOSE SERPL-MCNC: 86 MG/DL (ref 65–140)
GLUCOSE SERPL-MCNC: 94 MG/DL (ref 65–140)
GLUCOSE SERPL-MCNC: 95 MG/DL (ref 65–140)
HCT VFR BLD AUTO: 27.9 % (ref 34.8–46.1)
HGB BLD-MCNC: 8.3 G/DL (ref 11.5–15.4)
INR PPP: 1.13 (ref 0.84–1.19)
MCH RBC QN AUTO: 25.5 PG (ref 26.8–34.3)
MCHC RBC AUTO-ENTMCNC: 29.7 G/DL (ref 31.4–37.4)
MCV RBC AUTO: 86 FL (ref 82–98)
PLATELET # BLD AUTO: 123 THOUSANDS/UL (ref 149–390)
PMV BLD AUTO: 11.1 FL (ref 8.9–12.7)
POTASSIUM SERPL-SCNC: 4.6 MMOL/L (ref 3.5–5.3)
PROTHROMBIN TIME: 14.5 SECONDS (ref 11.6–14.5)
RBC # BLD AUTO: 3.25 MILLION/UL (ref 3.81–5.12)
SODIUM SERPL-SCNC: 141 MMOL/L (ref 136–145)
WBC # BLD AUTO: 7.73 THOUSAND/UL (ref 4.31–10.16)

## 2020-07-23 PROCEDURE — 80048 BASIC METABOLIC PNL TOTAL CA: CPT | Performed by: SURGERY

## 2020-07-23 PROCEDURE — 99024 POSTOP FOLLOW-UP VISIT: CPT | Performed by: PHYSICIAN ASSISTANT

## 2020-07-23 PROCEDURE — 82948 REAGENT STRIP/BLOOD GLUCOSE: CPT

## 2020-07-23 PROCEDURE — 85730 THROMBOPLASTIN TIME PARTIAL: CPT | Performed by: SURGERY

## 2020-07-23 PROCEDURE — 99024 POSTOP FOLLOW-UP VISIT: CPT | Performed by: SURGERY

## 2020-07-23 PROCEDURE — 85610 PROTHROMBIN TIME: CPT | Performed by: SURGERY

## 2020-07-23 PROCEDURE — 94760 N-INVAS EAR/PLS OXIMETRY 1: CPT

## 2020-07-23 PROCEDURE — 85027 COMPLETE CBC AUTOMATED: CPT | Performed by: SURGERY

## 2020-07-23 RX ORDER — ACETAMINOPHEN 325 MG/1
650 TABLET ORAL EVERY 6 HOURS PRN
COMMUNITY
Start: 2020-07-23

## 2020-07-23 RX ORDER — LOSARTAN POTASSIUM 50 MG/1
50 TABLET ORAL DAILY
Status: ON HOLD | COMMUNITY
Start: 2020-07-24 | End: 2021-02-08 | Stop reason: SDUPTHER

## 2020-07-23 RX ADMIN — CLOPIDOGREL BISULFATE 75 MG: 75 TABLET ORAL at 08:16

## 2020-07-23 RX ADMIN — OXYCODONE HYDROCHLORIDE 5 MG: 5 TABLET ORAL at 04:58

## 2020-07-23 RX ADMIN — HEPARIN SODIUM 5000 UNITS: 5000 INJECTION INTRAVENOUS; SUBCUTANEOUS at 04:58

## 2020-07-23 RX ADMIN — INSULIN LISPRO 3 UNITS: 100 INJECTION, SOLUTION INTRAVENOUS; SUBCUTANEOUS at 08:16

## 2020-07-23 NOTE — PROGRESS NOTES
Progress Note - Vascular Surgery   Michael Waddell 81 y o  female MRN: [id number]  Unit/Bed#: [facility] 505-01 Encounter: [id number]    Assessment:  80 y/o F w/ bilateral ICA stenosis    Plan:  - POD#1 s/p Right CFA puncture with Star closure, Left carotid agram with PTA & stenting    - Groin incision site stable  - BP controlled this morning   - Cardiac diet   - d/c arterial line  - ASA/Statin/Plavix  - Likely discharge home today  Subjective/Objective     Subjective: Awake, alert, no acute distress  Denies n/v/f/sob/cp  Objective:    Blood pressure 112/52, pulse 69, temperature 97 5 °F (36 4 °C), temperature source Oral, resp  rate 18, height 4' 11" (1 499 m), weight 71 3 kg (157 lb 3 oz), SpO2 96 %, not currently breastfeeding  ,Body mass index is 31 75 kg/m²  Intake/Output Summary (Last 24 hours) at 7/23/2020 0618  Last data filed at 7/23/2020 0201  Gross per 24 hour   Intake 2425 ml   Output 550 ml   Net 1875 ml       Invasive Devices     Peripheral Intravenous Line            Peripheral IV 07/22/20 Left Antecubital less than 1 day    Peripheral IV 07/22/20 Left Hand less than 1 day          Arterial Line            Arterial Line 07/22/20 Left Radial less than 1 day                Physical Exam: General appearance: alert and oriented, in no acute distress  Head: Normocephalic, without obvious abnormality, atraumatic  Neck: permanent trach noted, on trach collar O2  Lungs: clear to auscultation bilaterally  Heart: regular rate and rhythm, S1, S2 normal, no murmur, click, rub or gallop  Abdomen: soft, non-tender; bowel sounds normal; no masses,  no organomegaly  Extremities: groin incision stable; lower extremities well perfused with motorsensory intact  Lab, Imaging and other studies:I have personally reviewed pertinent lab results      VTE Pharmacologic Prophylaxis: Heparin  VTE Mechanical Prophylaxis: sequential compression device

## 2020-07-23 NOTE — DISCHARGE SUMMARY
Discharge Summary   Cole Barillas 81 y o  female MRN: [id number]  Unit/Bed#: [facility] 505-01 Encounter: [id number]    Admission Date: 7/22/2020     Discharge Date:07/23/20    Attending:Junaid Driscoll MD     Consultants:     Admitting Diagnosis: Asymptomatic bilateral carotid artery stenosis [I65 [id number]]    Principle/ Secondary Diagnosis:  · Recurrent left ICA stenosis (prior stenting)- asymptomatic  · Critical near occlusive right ICA stenosis  · Left vertebral artery atresia    Past Medical History:   Diagnosis Date    [facility]ort[facility]iliac occlusive disease ([facility] )     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 ([facility] )     Carotid artery stenosis     Hyperlipidemia     Hypertension     PVD (peripheral vascular disease) ([facility] )     Throat cancer [facility])      Past Surgical History:   Procedure Laterality Date    GALLBLADDER SURGERY  03/30/2019    ILIAC ARTERY STENT Left     LARYNX SURGERY  2002    LEG SURGERY  07/10/2012       Procedures Performed:   · 07/22/2020 ultrasound-guided right CFA puncture with Star closure  Aortic arch arteriogram, left carotid arteriogram with PTA and stenting- [facility]    Laboratory data at discharge:   Results from last 7 days   Lab Units 07/23/20  0506   WBC Thousand/uL 7 73   HEMOGLOBIN g/dL 8 3*   HEMATOCRIT % 27 9*   PLATELETS Thousands/uL 123*     Results from last 7 days   Lab Units 07/23/20  0506   POTASSIUM mmol/L 4 6   CHLORIDE mmol/L 113*   CO2 mmol/L 22   BUN mg/dL 25   CREATININE mg/dL 1 01   CALCIUM mg/dL 7 8*     Results from last 7 days   Lab Units 07/23/20  0506 07/22/20  1156   INR  1 13 1 06   PTT seconds 27  --            Discharge instructions/Information to patient and family:   See after visit summary for information provided to patient and family  · Carotid stent instructions    Discharge Medications:  See after visit summary for reconciled discharge medications provided to patient and family    · Continued anti-platelet therapy-Plavix 75 mg daily · Continued statin therapy-Lipitor    Hospital Course:  Cass Hicks is an 80y/o female known to the vascular service for bilateral carotid stenosis having undergone prior PTA/stent and aortoiliac occlusive disease with claudication having undergone left iliac stent  She also has a 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 and radiation therapy  She was found to have recurrent left ICA stenosis as well as critical near occlusive right ICA stenosis, both of which were asymptomatic, along with left vertebral artery atresia  She was worked up in the outpatient setting by vascular surgery service and recommended endovascular intervention  On 07/22/2020, the patient was electively admitted to [place] [address] at which time she underwent ultrasound-guided right CFA puncture with Star closure, aortic arch arteriogram, left carotid arteriogram with PTA and stenting by Dr Khushboo Barrios  Postprocedure, she was maintained in the PACU then transferred to medical-surgical/telemetry/step-down floor where she continued to convalesce for the remainder of her hospitalization  She was maintained on anti-platelet therapy in the form of Plavix  By PPD #1, she remained neurovascularly intact  Her groin puncture site was stable, without hematoma  She was tolerating a diet  She was voiding spontaneously  Her pain was controlled  She was ambulatory  She was deemed appropriate for discharge and was discharged home in the care of her daughter on 07/23/2020  Prior to discharge, the patient was given instructions for outpatient care and follow-up  The patient has been instructed to call w/ any questions, changes, or concerns for the medical condition  For further details of the hospitalization, please refer to the medical record      Condition at Discharge: stable     Provisions for Follow-Up Care:  See after visit summary for information related to follow-up care and any pertinent home health orders  Disposition: Home    Planned Readmission: No    Discharge Statement   I spent 30 minutes discharging the patient  This time was spent on the day of discharge  I had direct contact with the patient on the day of discharge  Additional documentation is required if more than 30 minutes were spent on discharge  Julio Cesar Mcknight PA-C  7/23/2020      This text is generated with voice recognition software  There may be translation, syntax,  or grammatical errors  If you have any questions, please contact the dictating provider

## 2020-07-23 NOTE — PLAN OF CARE
Problem: Potential for Falls  Goal: Patient will remain free of falls  Description  INTERVENTIONS:  - Assess patient frequently for physical needs  -  Identify cognitive and physical deficits and behaviors that affect risk of falls  -  [place] fall precautions as indicated by assessment   - Educate patient/family on patient safety including physical limitations  - Instruct patient to call for assistance with activity based on assessment  - Modify environment to reduce risk of injury  - Consider OT/PT consult to assist with strengthening/mobility  Outcome: Progressing     Problem: NEUROSENSORY - ADULT  Goal: Achieves stable or improved neurological status  Description  INTERVENTIONS  - Monitor and report changes in neurological status  - Monitor vital signs such as temperature, blood pressure, glucose, and any other labs ordered   - Initiate measures to prevent increased intracranial pressure  - Monitor for seizure activity and implement precautions if appropriate      Outcome: Progressing  Goal: Remains free of injury related to seizures activity  Description  INTERVENTIONS  - Maintain airway, patient safety  and administer oxygen as ordered  - Monitor patient for seizure activity, document and report duration and description of seizure to physician/advanced practitioner  - If seizure occurs,  ensure patient safety during seizure  - Reorient patient post seizure  - Seizure pads on all 4 side rails  - Instruct patient/family to notify RN of any seizure activity including if an aura is experienced  - Instruct patient/family to call for assistance with activity based on nursing assessment  - Administer anti-seizure medications if ordered    Outcome: Progressing  Goal: Achieves maximal functionality and self care  Description  INTERVENTIONS  - Monitor swallowing and airway patency with patient fatigue and changes in neurological status  - Encourage and assist patient to increase activity and self care     - Encourage visually impaired, hearing impaired and aphasic patients to use assistive/communication devices  Outcome: Progressing     Problem: RESPIRATORY - ADULT  Goal: Achieves optimal ventilation and oxygenation  Description  INTERVENTIONS:  - Assess for changes in respiratory status  - Assess for changes in mentation and behavior  - Position to facilitate oxygenation and minimize respiratory effort  - Oxygen administered by appropriate delivery if ordered  - Initiate smoking cessation education as indicated  - Encourage broncho-pulmonary hygiene including cough, deep breathe, Incentive Spirometry  - Assess the need for suctioning and aspirate as needed  - Assess and instruct to report SOB or any respiratory difficulty  - Respiratory Therapy support as indicated  Outcome: Progressing

## 2020-07-23 NOTE — QUICK NOTE
Postoperative evaluation    Patient is status post redo L carotid artery stent placement with agram for L internal carotid artery stenosis via right CFA puncture with Star closure device  Patient hypertensive 163/119 requiring dose of Lopressor  Patient then hypotensive to 87/51    BP: 106/56 on evaluation    Pt on trach collar 5L resting comfortably  Neuro grossly intact, moving all extremities  Right groin incision soft, c/d/i      Plan:  Cardiac diet  NS at 70  Hypotension and hypertension protocol  Continue to monitor neuro status  Pain control      Kendal Akins MD

## 2020-07-23 NOTE — DISCHARGE INSTRUCTIONS
DISCHARGE INSTRUCTIONS  CAROTID ARTERIOGRAM/STENT    Following discharge from the hospital, you may have some questions about your procedure, your activities or your general condition  These instructions may answer some of your questions and help you adjust during the first few weeks following your procedure  ACTIVITY:   Resume your normal activities and exercise schedule as tolerated  If you were driving prior to the procedure, you may resume driving one week following discharge from the hospital     DIET:  Resume your normal diet  Try to eat low fat and low cholesterol foods  Drink more liquids than usual for the next 24 hours  RECURRENT SYMPTOMS If you develop any new numbness, weakness, blindness, or difficulty with speech after discharge CALL 911 or go to the nearest Emergency department immediately  INCISION: Your physician may have chosen to use a type of adhesive glue, to close your incision  The glue is used to cover the incision, assist in closure, and prevent contamination  This adhesive will darken and peel away on its own within one to two weeks  You may shower after your surgery, but do not scrub the incision  Sitting in a tub is not recommended for the two days following surgery or if you have any open wounds  It is normal to have some bruising, swelling or mild discoloration around the incision  IF increasing redness or pain develops, call our office immediately  If one is present, you may remove the dressing, band-aid or steri-strips over your wound after two days  If you notice any active bleeding, apply pressure to the site and call our office ([phone])  FOLLOW UP STUDIES:  Doppler ultrasound studies are very important to your post-procedure care  Your Physician will arrange for them at your first post-procedure visit        Appt w/ Dr Jacobo Ortiz: 8/13/2020 at [address] [place], [place] [address]Joce Maza Brucker [place] [address] QUESTIONS    [phone] [facility] FREE [phone]  [address] [facility] , [address], [place], [address]  [address], [address], [address]  [address], [place], [address]  [address] [facility], [address] [facility], [facility], [address]  [address]th [address], [address]  [address] [facility] [address]   [address] [facility] [address], [address], [place], [address]  [address], [address] [facility], [facility]

## 2020-07-23 NOTE — SOCIAL WORK
Patient is here for outpatient procedure and anticipates dc home today  Met with the patient to assess discharge needs  She lives alone in an apartment and is independent with all personal and trach care  Her DME provider for trach supplies is Elizabeth  Patient has no HHC and denies MH and D&A treatment  She has an Advance Directive and POA is daughter, Betina Aldana, cell [phone]  Patient  drives but bert has been doing shopping and helping during covid  PCP is Bernadette Perez  The patient has prescription coverage and uses [employer]  Patient has no dc needs

## 2020-08-12 ENCOUNTER — TELEPHONE (OUTPATIENT)
Dept: VASCULAR SURGERY | Facility: CLINIC | Age: 85
End: 2020-08-12

## 2020-08-13 ENCOUNTER — OFFICE VISIT (OUTPATIENT)
Dept: VASCULAR SURGERY | Facility: CLINIC | Age: 85
End: 2020-08-13
Payer: MEDICARE

## 2020-08-13 VITALS
DIASTOLIC BLOOD PRESSURE: 82 MMHG | BODY MASS INDEX: 30.84 KG/M2 | WEIGHT: 153 LBS | RESPIRATION RATE: 18 BRPM | HEART RATE: 73 BPM | HEIGHT: 59 IN | SYSTOLIC BLOOD PRESSURE: 130 MMHG | TEMPERATURE: 97.3 F

## 2020-08-13 DIAGNOSIS — I65.23 ASYMPTOMATIC BILATERAL CAROTID ARTERY STENOSIS: Primary | ICD-10-CM

## 2020-08-13 PROCEDURE — 99213 OFFICE O/P EST LOW 20 MIN: CPT | Performed by: SURGERY

## 2020-08-13 NOTE — LETTER
August 13, 2020     Tristan Lackey MD  [address] [place] [address]    Patient: Dionicio Baez   YOB: 1935   Date of Visit: 8/13/2020       Dear Dr Lev Tripathi:    Thank you for referring Farhan Head to me for evaluation  Below are the relevant portions of my assessment and plan of care  Asymptomatic carotid artery stenosis   1  High-grade asymptomatic right internal carotid artery stenosis with history of neck surgery and radiation therapy now status post carotid artery stenting via femoral approach  Of note findings on arteriography are of a critical stenosis approaching 90% successfully treated with angioplasty and stent placement  She has done well in this regard  We will plan on continued Plavix and statin therapy with standard duplex follow-up which will be scheduled in approximately 1 month  2  Recurrent left internal carotid artery stenosis status post stenting  The degree of stenosis is approximately 70% based upon arteriographic imaging  At this point no further intervention will be performed other than continued surveillance with periodic duplex imaging and continued medical management as noted above  If you have questions, please do not hesitate to call me  I look forward to following Thao Brice along with you           Sincerely,        David Brewster MD        CC: No Recipients

## 2020-08-13 NOTE — ASSESSMENT & PLAN NOTE
1  High-grade asymptomatic right internal carotid artery stenosis with history of neck surgery and radiation therapy now status post carotid artery stenting via femoral approach  Of note findings on arteriography are of a critical stenosis approaching 90% successfully treated with angioplasty and stent placement  She has done well in this regard  We will plan on continued Plavix and statin therapy with standard duplex follow-up which will be scheduled in approximately 1 month  2  Recurrent left internal carotid artery stenosis status post stenting  The degree of stenosis is approximately 70% based upon arteriographic imaging  At this point no further intervention will be performed other than continued surveillance with periodic duplex imaging and continued medical management as noted above

## 2020-08-13 NOTE — PATIENT INSTRUCTIONS
Asymptomatic carotid artery stenosis   1  High-grade asymptomatic right internal carotid artery stenosis with history of neck surgery and radiation therapy now status post carotid artery stenting via femoral approach  Of note findings on arteriography are of a critical stenosis approaching 90% successfully treated with angioplasty and stent placement  She has done well in this regard  We will plan on continued Plavix and statin therapy with standard duplex follow-up which will be scheduled in approximately 1 month  2  Recurrent left internal carotid artery stenosis status post stenting  The degree of stenosis is approximately 70% based upon arteriographic imaging  At this point no further intervention will be performed other than continued surveillance with periodic duplex imaging and continued medical management as noted above

## 2020-08-13 NOTE — PROGRESS NOTES
Assessment/Plan:    Asymptomatic carotid artery stenosis   1  High-grade asymptomatic right internal carotid artery stenosis with history of neck surgery and radiation therapy now status post carotid artery stenting via femoral approach  Of note findings on arteriography are of a critical stenosis approaching 90% successfully treated with angioplasty and stent placement  She has done well in this regard  We will plan on continued Plavix and statin therapy with standard duplex follow-up which will be scheduled in approximately 1 month  2  Recurrent left internal carotid artery stenosis status post stenting  The degree of stenosis is approximately 70% based upon arteriographic imaging  At this point no further intervention will be performed other than continued surveillance with periodic duplex imaging and continued medical management as noted above  Diagnoses and all orders for this visit:    Asymptomatic bilateral carotid artery stenosis  -     VAS carotid complete study; Future          Subjective:      Patient ID: Zoya Christianson is a 81 y o  female  Patient had a Cerebral Agram on 7/22/20 by Harry Burden and Wesley Rodriguez  Pt denies TIA or CVA symptoms  Pt is on Atorvastatin  45-year-old with history of laryngeal cancer status post resection with bilateral radical neck and ultimately radiation therapy has had a long history of bilateral carotid artery stenosis treated with carotid artery stenting and treatment of a recurrent left carotid stenosis with angioplasty was found to have progression of bilateral carotid stenosis on recent duplex and CT angiogram   She underwent cerebral arteriography on 07/22/2020 at which time the degree of stenosis on the left was less severe than anticipated and thus no intervention was performed    On the other hand the stenosis on the right was critical and thus angioplasty and stenting was performed with use of distal protection with restoration of a relatively normal lumen without significant residual stenosis  On evaluation today the patient states she is doing well  She denies any discomfort at the puncture site in the right groin  She denies any focal neurologic symptoms which would be consistent with TIA, CVA or amaurosis fugax  She has returned to her normal level of activity  The following portions of the patient's history were reviewed and updated as appropriate: allergies, current medications, past family history, past medical history, past social history, past surgical history and problem list     I have reviewed and made appropriate changes to the review of systems input by the medical assistant      Vitals:    08/13/20 1300 08/13/20 1302   BP: 122/72 130/82   BP Location: Left arm Right arm   Patient Position: Sitting    Pulse: 73    Resp: 18    Temp: (!) 97 3 °F (36 3 °C)    TempSrc: Tympanic    Weight: 69 4 kg (153 lb)    Height: 4' 11" (1 499 m)        Patient Active Problem List   Diagnosis    Asymptomatic carotid artery stenosis    Abdominal aortic atherosclerosis (HCC)    Hyperlipidemia    Hypertension    Acute anterior circulation transient ischemic attack    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 [facility]    [facility]    Laryngeal carcinoma ([facility]    CKD (chronic kidney disease) stage 3, GFR 30-59 ml/min ([facility])    Renovascular hypertension       Past Surgical History:   Procedure Laterality Date    GALLBLADDER SURGERY  03/30/2019    ILIAC ARTERY STENT Left     IR CEREBRAL ANGIOGRAPHY / INTERVENTION  7/22/2020    LARYNX SURGERY  2002    LEG SURGERY  07/10/2012       Family History   Problem Relation Age of Onset    Heart disease Brother     No Known Problems Mother     No Known Problems Father     Breast cancer Sister 71    No Known Problems Daughter     No Known Problems Maternal Grandmother     No Known Problems Maternal Grandfather     No Known Problems Paternal Grandmother    Robinson No Known Problems Paternal Grandfather     No Known Problems Sister     No Known Problems Maternal Aunt     No Known Problems Maternal Aunt     No Known Problems Maternal Aunt        Social History     Socioeconomic History    Marital status:       Spouse name: Not on file    Number of children: Not on file    Years of education: Not on file    Highest education level: Not on file   Occupational History    Not on file   Social Needs    Financial resource strain: Not on file    Food insecurity     Worry: Not on file     Inability: Not on file    Transportation needs     Medical: Not on file     Non-medical: Not on file   Tobacco Use    Smoking status: Former Smoker     Last attempt to quit:      Years since quittin 6    Smokeless tobacco: Never Used   Substance and Sexual Activity    Alcohol use: Never     Frequency: Never    Drug use: Never    Sexual activity: Not on file   Lifestyle    Physical activity     Days per week: Not on file     Minutes per session: Not on file    Stress: Not on file   Relationships    Social connections     Talks on phone: Not on file     Gets together: Not on file     Attends Shinto service: Not on file     Active member of club or organization: Not on file     Attends meetings of clubs or organizations: Not on file     Relationship status: Not on file    Intimate partner violence     Fear of current or ex partner: Not on file     Emotionally abused: Not on file     Physically abused: Not on file     Forced sexual activity: Not on file   Other Topics Concern    Not on file   Social History Narrative    Not on file       No Known Allergies      Current Outpatient Medications:     acetaminophen (TYLENOL) 325 mg tablet, Take 2 tablets (650 mg total) by mouth every 6 (six) hours as needed for mild pain, Disp: , Rfl:     atorvastatin (LIPITOR) 40 mg tablet, Take by mouth, Disp: , Rfl:     Calcium-Magnesium-Vitamin D ER (CITRACAL SLOW RELEASE) 600- MG-MG-UNIT TB24, Take by mouth, Disp: , Rfl:     cefuroxime (CEFTIN) 250 mg tablet, , Disp: , Rfl:     cetirizine (ZyrTEC) 5 MG tablet, Take 5 mg by mouth daily, Disp: , Rfl:     Cholecalciferol (VITAMIN D-3) 1000 units CAPS, Take by mouth, Disp: , Rfl:     clopidogrel (PLAVIX) 75 mg tablet, TAKE 1 TABLET(75 MG) BY MOUTH DAILY, Disp: 90 tablet, Rfl: 3    fluticasone (FLONASE) 50 mcg/act nasal spray, into each nostril, Disp: , Rfl:     Krill Oil 1000 MG CAPS, Take by mouth, Disp: , Rfl:     levothyroxine 100 mcg tablet, , Disp: , Rfl:     levothyroxine 88 mcg tablet, Take 88 mcg by mouth daily , Disp: , Rfl:     losartan (COZAAR) 50 mg tablet, Take 1 tablet (50 mg total) by mouth daily, Disp: , Rfl:     Magnesium 250 MG TABS, Take 250 mg by mouth daily, Disp: , Rfl:     metFORMIN (GLUCOPHAGE) 500 mg tablet, Take 0 5 tablets (250 mg total) by mouth daily with breakfast, Disp: , Rfl:     methocarbamol (ROBAXIN) 500 mg tablet, TK 1 T PO BID PRN, Disp: , Rfl: 1    Multiple Vitamins-Minerals (CENTRUM SILVER 50+WOMEN PO), Take by mouth, Disp: , Rfl:     niacin 500 mg ER capsule, Take 500 mg by mouth daily at bedtime, Disp: , Rfl:     Omega-3 1000 MG CAPS, Take 4 capsules by mouth daily , Disp: , Rfl:     omeprazole (PriLOSEC) 20 mg delayed release capsule, Take by mouth, Disp: , Rfl:     VENTOLIN  (90 Base) MCG/ACT inhaler, , Disp: , Rfl:        Review of Systems   Constitutional: Negative for chills and fever  HENT: Negative for sore throat and trouble swallowing  Eyes: Negative  Respiratory: Negative  Cardiovascular: Negative  Gastrointestinal: Negative  Endocrine: Negative  Genitourinary: Negative  Musculoskeletal: Positive for arthralgias  Skin: Negative  Neurological: Negative for dizziness, facial asymmetry, speech difficulty, weakness, numbness and headaches  Hematological: Negative  Psychiatric/Behavioral: Negative            Objective:      BP 130/82 (BP Location: Right arm)   Pulse 73   Temp (!) 97 3 °F (36 3 °C) (Tympanic)   Resp 18   Ht 4' 11" (1 499 m)   Wt 69 4 kg (153 lb)   BMI 30 90 kg/m²          Physical Exam  Constitutional:       Appearance: She is well-developed  HENT:      Head: Normocephalic and atraumatic  Eyes:      Pupils: Pupils are equal, round, and reactive to light  Neck:      Musculoskeletal: Normal range of motion  Vascular: Carotid bruit (  Bilateral) present  No JVD  Comments:  Permanent tracheostomy  Skin changes consistent with previous surgery and radiation therapy  Cardiovascular:      Rate and Rhythm: Normal rate and regular rhythm  Pulses:           Carotid pulses are 2+ on the right side with bruit and 2+ on the left side with bruit  Heart sounds: No murmur  Pulmonary:      Effort: Pulmonary effort is normal  No respiratory distress  Breath sounds: Normal breath sounds  Musculoskeletal: Normal range of motion  General: No tenderness  Skin:     General: Skin is warm and dry  Neurological:      Mental Status: She is alert and oriented to person, place, and time  Sensory: No sensory deficit  Motor: Motor function is intact

## 2020-09-15 ENCOUNTER — HOSPITAL ENCOUNTER (OUTPATIENT)
Dept: NON INVASIVE DIAGNOSTICS | Facility: CLINIC | Age: 85
Discharge: HOME | End: 2020-09-15
Payer: MEDICARE

## 2020-09-15 DIAGNOSIS — I65.23 ASYMPTOMATIC BILATERAL CAROTID ARTERY STENOSIS: ICD-10-CM

## 2020-09-15 PROCEDURE — 93880 EXTRACRANIAL BILAT STUDY: CPT

## 2020-09-15 PROCEDURE — 93880 EXTRACRANIAL BILAT STUDY: CPT | Performed by: SURGERY

## 2020-11-04 ENCOUNTER — HOSPITAL ENCOUNTER (OUTPATIENT)
Dept: NON INVASIVE DIAGNOSTICS | Facility: CLINIC | Age: 85
Discharge: HOME | End: 2020-11-04
Payer: MEDICARE

## 2020-11-04 DIAGNOSIS — I70.219 ATHEROSCLEROSIS OF ARTERY OF EXTREMITY WITH INTERMITTENT CLAUDICATION (HCC): Chronic | ICD-10-CM

## 2020-11-04 PROCEDURE — 93922 UPR/L XTREMITY ART 2 LEVELS: CPT | Performed by: SURGERY

## 2020-11-04 PROCEDURE — 93925 LOWER EXTREMITY STUDY: CPT | Performed by: SURGERY

## 2020-11-04 PROCEDURE — 93925 LOWER EXTREMITY STUDY: CPT

## 2020-11-04 PROCEDURE — 93923 UPR/LXTR ART STDY 3+ LVLS: CPT

## 2020-11-30 ENCOUNTER — TELEPHONE (OUTPATIENT)
Dept: NEPHROLOGY | Facility: CLINIC | Age: 85
End: 2020-11-30

## 2020-12-08 PROBLEM — J04.10 TRACHEITIS: Status: ACTIVE | Noted: 2020-12-08

## 2021-02-07 ENCOUNTER — APPOINTMENT (EMERGENCY)
Dept: RADIOLOGY | Facility: HOSPITAL | Age: 86
DRG: 812 | End: 2021-02-07
Payer: MEDICARE

## 2021-02-07 ENCOUNTER — HOSPITAL ENCOUNTER (INPATIENT)
Facility: HOSPITAL | Age: 86
LOS: 1 days | Discharge: HOME | DRG: 812 | End: 2021-02-08
Attending: EMERGENCY MEDICINE | Admitting: INTERNAL MEDICINE
Payer: MEDICARE

## 2021-02-07 DIAGNOSIS — I21.4 NSTEMI (NON-ST ELEVATED MYOCARDIAL INFARCTION) (HCC): ICD-10-CM

## 2021-02-07 DIAGNOSIS — D64.9 ANEMIA: Primary | ICD-10-CM

## 2021-02-07 DIAGNOSIS — N17.9 AKI (ACUTE KIDNEY INJURY) (HCC): ICD-10-CM

## 2021-02-07 PROBLEM — N18.9 ACUTE ON CHRONIC KIDNEY FAILURE (HCC): Status: ACTIVE | Noted: 2021-02-07

## 2021-02-07 PROBLEM — R77.8 ELEVATED TROPONIN: Status: ACTIVE | Noted: 2021-02-07

## 2021-02-07 PROBLEM — R06.02 SOB (SHORTNESS OF BREATH): Status: ACTIVE | Noted: 2021-02-07

## 2021-02-07 PROBLEM — D62 ACUTE BLOOD LOSS ANEMIA: Status: ACTIVE | Noted: 2021-02-07

## 2021-02-07 LAB
ABO GROUP BLD: NORMAL
ALBUMIN SERPL BCP-MCNC: 3.3 G/DL (ref 3.5–5)
ALP SERPL-CCNC: 83 U/L (ref 46–116)
ALT SERPL W P-5'-P-CCNC: 22 U/L (ref 12–78)
ANION GAP SERPL CALCULATED.3IONS-SCNC: 9 MMOL/L (ref 4–13)
AST SERPL W P-5'-P-CCNC: 26 U/L (ref 5–45)
ATRIAL RATE: 102 BPM
BASOPHILS # BLD AUTO: 0.06 THOUSANDS/ΜL (ref 0–0.1)
BASOPHILS NFR BLD AUTO: 1 % (ref 0–1)
BILIRUB DIRECT SERPL-MCNC: 0.13 MG/DL (ref 0–0.2)
BILIRUB SERPL-MCNC: 0.45 MG/DL (ref 0.2–1)
BILIRUB UR QL STRIP: NEGATIVE
BLD GP AB SCN SERPL QL: NEGATIVE
BUN SERPL-MCNC: 27 MG/DL (ref 5–25)
CALCIUM SERPL-MCNC: 9.8 MG/DL (ref 8.3–10.1)
CHLORIDE SERPL-SCNC: 110 MMOL/L (ref 100–108)
CLARITY UR: CLEAR
CO2 SERPL-SCNC: 23 MMOL/L (ref 21–32)
COLOR UR: YELLOW
CREAT SERPL-MCNC: 1.79 MG/DL (ref 0.6–1.3)
EOSINOPHIL # BLD AUTO: 0.11 THOUSAND/ΜL (ref 0–0.61)
EOSINOPHIL NFR BLD AUTO: 2 % (ref 0–6)
ERYTHROCYTE [DISTWIDTH] IN BLOOD BY AUTOMATED COUNT: 19.5 % (ref 11.6–15.1)
FERRITIN SERPL-MCNC: 8 NG/ML (ref 8–388)
GFR SERPL CREATININE-BSD FRML MDRD: 26 ML/MIN/1.73SQ M
GLUCOSE SERPL-MCNC: 100 MG/DL (ref 65–140)
GLUCOSE UR STRIP-MCNC: NEGATIVE MG/DL
HCT VFR BLD AUTO: 23.7 % (ref 34.8–46.1)
HGB BLD-MCNC: 6.4 G/DL (ref 11.5–15.4)
HGB UR QL STRIP.AUTO: NEGATIVE
IMM GRANULOCYTES # BLD AUTO: 0.03 THOUSAND/UL (ref 0–0.2)
IMM GRANULOCYTES NFR BLD AUTO: 1 % (ref 0–2)
IRON SATN MFR SERPL: 2 %
IRON SERPL-MCNC: 10 UG/DL (ref 50–170)
KETONES UR STRIP-MCNC: ABNORMAL MG/DL
LEUKOCYTE ESTERASE UR QL STRIP: NEGATIVE
LIPASE SERPL-CCNC: 84 U/L (ref 73–393)
LYMPHOCYTES # BLD AUTO: 0.92 THOUSANDS/ΜL (ref 0.6–4.47)
LYMPHOCYTES NFR BLD AUTO: 17 % (ref 14–44)
MCH RBC QN AUTO: 17.4 PG (ref 26.8–34.3)
MCHC RBC AUTO-ENTMCNC: 27 G/DL (ref 31.4–37.4)
MCV RBC AUTO: 65 FL (ref 82–98)
MONOCYTES # BLD AUTO: 0.45 THOUSAND/ΜL (ref 0.17–1.22)
MONOCYTES NFR BLD AUTO: 8 % (ref 4–12)
NEUTROPHILS # BLD AUTO: 4.01 THOUSANDS/ΜL (ref 1.85–7.62)
NEUTS SEG NFR BLD AUTO: 71 % (ref 43–75)
NITRITE UR QL STRIP: NEGATIVE
NRBC BLD AUTO-RTO: 1 /100 WBCS
P AXIS: 75 DEGREES
PH UR STRIP.AUTO: 6 [PH] (ref 4.5–8)
PLATELET # BLD AUTO: 254 THOUSANDS/UL (ref 149–390)
PMV BLD AUTO: 10.4 FL (ref 8.9–12.7)
POTASSIUM SERPL-SCNC: 4 MMOL/L (ref 3.5–5.3)
PR INTERVAL: 132 MS
PROCALCITONIN SERPL-MCNC: 0.1 NG/ML
PROT SERPL-MCNC: 7.6 G/DL (ref 6.4–8.2)
PROT UR STRIP-MCNC: NEGATIVE MG/DL
QRS AXIS: -36 DEGREES
QRSD INTERVAL: 70 MS
QT INTERVAL: 358 MS
QTC INTERVAL: 466 MS
RBC # BLD AUTO: 3.67 MILLION/UL (ref 3.81–5.12)
RH BLD: POSITIVE
SODIUM SERPL-SCNC: 142 MMOL/L (ref 136–145)
SP GR UR STRIP.AUTO: 1.01 (ref 1–1.03)
SPECIMEN EXPIRATION DATE: NORMAL
T WAVE AXIS: 68 DEGREES
TIBC SERPL-MCNC: 483 UG/DL (ref 250–450)
TROPONIN I SERPL-MCNC: 0.19 NG/ML
TROPONIN I SERPL-MCNC: 0.24 NG/ML
TROPONIN I SERPL-MCNC: 0.28 NG/ML
UROBILINOGEN UR QL STRIP.AUTO: 0.2 E.U./DL
VENTRICULAR RATE: 102 BPM
WBC # BLD AUTO: 5.58 THOUSAND/UL (ref 4.31–10.16)

## 2021-02-07 PROCEDURE — 80076 HEPATIC FUNCTION PANEL: CPT | Performed by: EMERGENCY MEDICINE

## 2021-02-07 PROCEDURE — P9016 RBC LEUKOCYTES REDUCED: HCPCS

## 2021-02-07 PROCEDURE — 80048 BASIC METABOLIC PNL TOTAL CA: CPT | Performed by: EMERGENCY MEDICINE

## 2021-02-07 PROCEDURE — 99285 EMERGENCY DEPT VISIT HI MDM: CPT

## 2021-02-07 PROCEDURE — 84145 PROCALCITONIN (PCT): CPT | Performed by: EMERGENCY MEDICINE

## 2021-02-07 PROCEDURE — 93005 ELECTROCARDIOGRAM TRACING: CPT

## 2021-02-07 PROCEDURE — C9113 INJ PANTOPRAZOLE SODIUM, VIA: HCPCS | Performed by: STUDENT IN AN ORGANIZED HEALTH CARE EDUCATION/TRAINING PROGRAM

## 2021-02-07 PROCEDURE — 85025 COMPLETE CBC W/AUTO DIFF WBC: CPT | Performed by: EMERGENCY MEDICINE

## 2021-02-07 PROCEDURE — 82728 ASSAY OF FERRITIN: CPT | Performed by: STUDENT IN AN ORGANIZED HEALTH CARE EDUCATION/TRAINING PROGRAM

## 2021-02-07 PROCEDURE — G1004 CDSM NDSC: HCPCS

## 2021-02-07 PROCEDURE — 86900 BLOOD TYPING SEROLOGIC ABO: CPT | Performed by: EMERGENCY MEDICINE

## 2021-02-07 PROCEDURE — 99285 EMERGENCY DEPT VISIT HI MDM: CPT | Performed by: EMERGENCY MEDICINE

## 2021-02-07 PROCEDURE — 81003 URINALYSIS AUTO W/O SCOPE: CPT

## 2021-02-07 PROCEDURE — 83550 IRON BINDING TEST: CPT | Performed by: STUDENT IN AN ORGANIZED HEALTH CARE EDUCATION/TRAINING PROGRAM

## 2021-02-07 PROCEDURE — 84484 ASSAY OF TROPONIN QUANT: CPT | Performed by: EMERGENCY MEDICINE

## 2021-02-07 PROCEDURE — 86850 RBC ANTIBODY SCREEN: CPT | Performed by: EMERGENCY MEDICINE

## 2021-02-07 PROCEDURE — 71046 X-RAY EXAM CHEST 2 VIEWS: CPT

## 2021-02-07 PROCEDURE — 84484 ASSAY OF TROPONIN QUANT: CPT

## 2021-02-07 PROCEDURE — 36415 COLL VENOUS BLD VENIPUNCTURE: CPT | Performed by: EMERGENCY MEDICINE

## 2021-02-07 PROCEDURE — 96374 THER/PROPH/DIAG INJ IV PUSH: CPT

## 2021-02-07 PROCEDURE — 94760 N-INVAS EAR/PLS OXIMETRY 1: CPT

## 2021-02-07 PROCEDURE — 74176 CT ABD & PELVIS W/O CONTRAST: CPT

## 2021-02-07 PROCEDURE — 83540 ASSAY OF IRON: CPT | Performed by: STUDENT IN AN ORGANIZED HEALTH CARE EDUCATION/TRAINING PROGRAM

## 2021-02-07 PROCEDURE — 83690 ASSAY OF LIPASE: CPT | Performed by: EMERGENCY MEDICINE

## 2021-02-07 PROCEDURE — 86901 BLOOD TYPING SEROLOGIC RH(D): CPT | Performed by: EMERGENCY MEDICINE

## 2021-02-07 PROCEDURE — 30233N1 TRANSFUSION OF NONAUTOLOGOUS RED BLOOD CELLS INTO PERIPHERAL VEIN, PERCUTANEOUS APPROACH: ICD-10-PCS | Performed by: EMERGENCY MEDICINE

## 2021-02-07 PROCEDURE — 1123F ACP DISCUSS/DSCN MKR DOCD: CPT | Performed by: EMERGENCY MEDICINE

## 2021-02-07 PROCEDURE — 93010 ELECTROCARDIOGRAM REPORT: CPT | Performed by: INTERNAL MEDICINE

## 2021-02-07 PROCEDURE — 36430 TRANSFUSION BLD/BLD COMPNT: CPT

## 2021-02-07 PROCEDURE — 86923 COMPATIBILITY TEST ELECTRIC: CPT

## 2021-02-07 PROCEDURE — NC001 PR NO CHARGE: Performed by: INTERNAL MEDICINE

## 2021-02-07 PROCEDURE — 84484 ASSAY OF TROPONIN QUANT: CPT | Performed by: STUDENT IN AN ORGANIZED HEALTH CARE EDUCATION/TRAINING PROGRAM

## 2021-02-07 RX ORDER — ATORVASTATIN CALCIUM 40 MG/1
40 TABLET, FILM COATED ORAL
Status: DISCONTINUED | OUTPATIENT
Start: 2021-02-07 | End: 2021-02-08 | Stop reason: HOSPADM

## 2021-02-07 RX ORDER — PANTOPRAZOLE SODIUM 40 MG/1
40 INJECTION, POWDER, FOR SOLUTION INTRAVENOUS EVERY 12 HOURS SCHEDULED
Status: DISCONTINUED | OUTPATIENT
Start: 2021-02-07 | End: 2021-02-08 | Stop reason: HOSPADM

## 2021-02-07 RX ORDER — LORATADINE 10 MG/1
5 TABLET ORAL DAILY
Status: DISCONTINUED | OUTPATIENT
Start: 2021-02-08 | End: 2021-02-08 | Stop reason: HOSPADM

## 2021-02-07 RX ORDER — FENTANYL CITRATE 50 UG/ML
50 INJECTION, SOLUTION INTRAMUSCULAR; INTRAVENOUS ONCE
Status: COMPLETED | OUTPATIENT
Start: 2021-02-07 | End: 2021-02-07

## 2021-02-07 RX ORDER — ASPIRIN 81 MG/1
324 TABLET, CHEWABLE ORAL ONCE
Status: COMPLETED | OUTPATIENT
Start: 2021-02-07 | End: 2021-02-07

## 2021-02-07 RX ORDER — HEPARIN SODIUM 5000 [USP'U]/ML
5000 INJECTION, SOLUTION INTRAVENOUS; SUBCUTANEOUS EVERY 8 HOURS SCHEDULED
Status: DISCONTINUED | OUTPATIENT
Start: 2021-02-07 | End: 2021-02-07

## 2021-02-07 RX ORDER — NIACIN 100 MG
100 TABLET ORAL
Status: DISCONTINUED | OUTPATIENT
Start: 2021-02-07 | End: 2021-02-08 | Stop reason: HOSPADM

## 2021-02-07 RX ORDER — ALBUTEROL SULFATE 90 UG/1
2 AEROSOL, METERED RESPIRATORY (INHALATION) EVERY 4 HOURS PRN
Status: DISCONTINUED | OUTPATIENT
Start: 2021-02-07 | End: 2021-02-08 | Stop reason: HOSPADM

## 2021-02-07 RX ORDER — LEVOTHYROXINE SODIUM 0.1 MG/1
100 TABLET ORAL
Status: DISCONTINUED | OUTPATIENT
Start: 2021-02-08 | End: 2021-02-08 | Stop reason: HOSPADM

## 2021-02-07 RX ORDER — CLOPIDOGREL BISULFATE 75 MG/1
75 TABLET ORAL DAILY
Status: DISCONTINUED | OUTPATIENT
Start: 2021-02-07 | End: 2021-02-08 | Stop reason: HOSPADM

## 2021-02-07 RX ADMIN — Medication 100 MG: at 22:34

## 2021-02-07 RX ADMIN — CLOPIDOGREL BISULFATE 75 MG: 75 TABLET ORAL at 21:52

## 2021-02-07 RX ADMIN — FENTANYL CITRATE 50 MCG: 50 INJECTION INTRAMUSCULAR; INTRAVENOUS at 16:09

## 2021-02-07 RX ADMIN — ATORVASTATIN CALCIUM 40 MG: 40 TABLET, FILM COATED ORAL at 20:58

## 2021-02-07 RX ADMIN — PANTOPRAZOLE SODIUM 40 MG: 40 INJECTION, POWDER, FOR SOLUTION INTRAVENOUS at 20:58

## 2021-02-07 RX ADMIN — ASPIRIN 324 MG: 81 TABLET, CHEWABLE ORAL at 19:05

## 2021-02-07 NOTE — ED PROVIDER NOTES
Final Diagnosis:  1  Anemia    2  NSTEMI (non-ST elevated myocardial infarction) ([facility] )    3  JORDAN (acute kidney injury) [facility])        Chief Complaint   Patient presents with    Shortness of Breath     pt c/o MINER for the "last couple of days" pt denies CP but complains of pain under her left ribs     HPI  Patient with a history of laryngeal cancer status post tracheostomy, with a history of cholecystitis, with history of CKD presents with shortness of breath, fatigue and left upper quadrant tenderness  She denies alcohol use  She denies any blood loss via stool abnormal uterine bleeding etcetera  He has no chest pain associated with her shortness of breath though atypical ACS possible  Patient lives by herself and ambulates normally her shortness of breath is exacerbated thus    - Nolanguage barrier    - History obtained from patient  - There are no limitations to the history obtained  - Previous charting underwent limited review with attention to labs, ekgs, and prior imaging  PMH:   has a past medical history of Aortoiliac occlusive disease ([facility] ), 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 ([place] [address] ), Carotid artery stenosis, Hyperlipidemia, Hypertension, PVD (peripheral vascular disease) ([facility]  ), and Throat cancer ([place] [address] )  PSH:   has a past surgical history that includes Iliac artery stent (Left); Larynx surgery (2002); Leg Surgery (07/10/2012); Gallbladder surgery (03/30/2019); and IR cerebral angiography / intervention (7/22/2020)  Social History:  Patient lives by herself    ROS:  Review of Systems   Constitutional: Positive for fatigue  Negative for activity change, appetite change, chills, diaphoresis and fever  HENT: Negative for congestion, facial swelling, mouth sores, rhinorrhea, sinus pain, sneezing, sore throat, trouble swallowing and voice change  Eyes: Negative for photophobia and visual disturbance     Respiratory: Positive for cough, shortness of breath and wheezing  Negative for chest tightness and stridor  Cardiovascular: Negative for chest pain, palpitations and leg swelling  Gastrointestinal: Positive for abdominal pain  Negative for abdominal distention, blood in stool, constipation, diarrhea, nausea and vomiting  Genitourinary: Negative for decreased urine volume, difficulty urinating, dysuria, flank pain, frequency, menstrual problem, pelvic pain, vaginal bleeding and vaginal discharge  Musculoskeletal: Negative for arthralgias, gait problem, neck pain and neck stiffness  Skin: Negative for color change, rash and wound  Neurological: Positive for weakness  Negative for dizziness, syncope, facial asymmetry, speech difficulty, light-headedness, numbness and headaches  Psychiatric/Behavioral: Negative for confusion, self-injury and suicidal ideas  The patient is not nervous/anxious  PE:   Vitals:    02/07/21 1731 02/07/21 1813 02/07/21 1856 02/07/21 1910   BP:  144/63 156/67 160/67   BP Location:  Left arm     Pulse: (!) 106 98 104 100   Resp: 20 20 20 20   Temp:   98 °F (36 7 °C) 98 1 °F (36 7 °C)   TempSrc:       SpO2: 97% 99%  99%   Weight:         Vitals reviewed by me  Physical Exam  Vitals signs and nursing note reviewed  Constitutional:       General: She is not in acute distress  Appearance: She is well-developed  She is not diaphoretic  HENT:      Head: Normocephalic and atraumatic  Right Ear: External ear normal       Left Ear: External ear normal       Nose: Nose normal       Mouth/Throat:      Comments: Tracheostomy site normal  Eyes:      General: No scleral icterus  Conjunctiva/sclera: Conjunctivae normal       Pupils: Pupils are equal, round, and reactive to light  Neck:      Musculoskeletal: Normal range of motion and neck supple  Cardiovascular:      Rate and Rhythm: Normal rate and regular rhythm  Heart sounds: Normal heart sounds  No murmur     Pulmonary:      Effort: Pulmonary effort is normal  No respiratory distress  Breath sounds: No stridor  Rhonchi and rales present  No wheezing  Abdominal:      General: Bowel sounds are normal  There is no distension  Palpations: Abdomen is soft  There is no mass  Tenderness: There is abdominal tenderness  There is no guarding or rebound  Genitourinary:     Rectum: Normal  Guaiac result negative  Musculoskeletal: Normal range of motion  General: No tenderness or deformity  Lymphadenopathy:      Cervical: No cervical adenopathy  Skin:     General: Skin is warm and dry  Capillary Refill: Capillary refill takes less than 2 seconds  Findings: No rash  Neurological:      General: No focal deficit present  Mental Status: She is alert and oriented to person, place, and time  Mental status is at baseline  Cranial Nerves: No cranial nerve deficit  Sensory: No sensory deficit  Psychiatric:         Behavior: Behavior normal          Thought Content: Thought content normal          Judgment: Judgment normal           A:  - Nursing note reviewed      Differential includes but not limited to anemia    Patient with hemoccult negative unlikely GI bleed given stooling history    Pancreatitis is on the differential lipase is normal      Pleuritis is on the differential will get a chest x-ray to evaluate for aspiration pneumonia    Patient found have JORDAN as well as anemia unclear origin  HEART Risk Score      Most Recent Value   Heart Score Risk Calculator   History  0 Filed at: 02/07/2021 2253   ECG  0 Filed at: 02/07/2021 2253   Age  2 Filed at: 02/07/2021 2253   Risk Factors  2 Filed at: 02/07/2021 2253   Troponin  2 Filed at: 02/07/2021 2253   HEART Score  6 Filed at: 02/07/2021 2253                    Procedure Note: EKG  Date/Time: 02/07/21 10:53 PM   Interpreted by: Colten Grayson  Indications / Diagnosis:  Shortness of breath  ECG reviewed by me, the ED Provider: yes   The EKG demonstrates:  Rhythm: sinus tachycardia 102  Intervals: normal intervals  Axis:  Left axis  QRS/Blocks: normal QRS  ST Changes: No acute ST Changes, no STD/SABRINA  T wave changes: none         CT abdomen pelvis wo contrast   Final Result      Colonic diverticulosis without evidence for diverticulitis  No evidence for bowel obstruction  Small bilateral pleural effusions  Workstation performed: [id number]         XR chest 2 views    (Results Pending)     Orders Placed This Encounter   Procedures    XR chest 2 views    CT abdomen pelvis wo contrast    CBC and differential    Basic metabolic panel    Hepatic function panel    Troponin I    Lipase    Procalcitonin with AM Reflex    Troponin I    Procalcitonin Reflex    Basic metabolic panel    Magnesium    CBC (With Platelets)    Protime-INR    Troponin I    Iron Saturation %    Ferritin    Diet Surgical; Clear Liquid    Diet NPO    Vital Signs    Notify physician and Hold transfusion if:    Nursing communication Continue IV as ordered  [facility] Notify admitting physician    Notify admitting physician on arrival    24 Hour Telemetry Monitoring    Vital Signs per unit routine    Up with assistance    Insert peripheral IV    Maintain IV access    Apply SCD or Foot pumps    Level 3 - DNAR (Do Not Attempt Resuscitation) and DNI (Do Not Intubate)    Inpatient consult to Case Management    Inpatient consult to gastroenterology    OT eval and treat    PT eval and treat    Respiratory Protocol    POCT urinalysis dipstick    ECG 12 lead    Type and screen    Prepare Leukoreduced RBC: 1 Units    Place in Observation     Labs Reviewed   CBC AND DIFFERENTIAL - Abnormal       Result Value Ref Range Status    WBC 5 58  4 31 - 10 16 Thousand/uL Final    RBC 3 67 (*) 3 81 - 5 12 Million/uL Final    Hemoglobin 6 4 (*) 11 5 - 15 4 g/dL Final    Comment: This result has been called to [facility] by Kayla Schwartz on 02 07 2021 at [phone], and has been read back  Hematocrit 23 7 (*) 34 8 - 46 1 % Final    MCV 65 (*) 82 - 98 fL Final    MCH 17 4 (*) 26 8 - 34 3 pg Final    MCHC 27 0 (*) 31 4 - 37 4 g/dL Final    RDW 19 5 (*) 11 6 - 15 1 % Final    MPV 10 4  8 9 - 12 7 fL Final    Platelets 891  649 - 390 Thousands/uL Final    nRBC 1  /100 WBCs Final    Neutrophils Relative 71  43 - 75 % Final    Immat GRANS % 1  0 - 2 % Final    Lymphocytes Relative 17  14 - 44 % Final    Monocytes Relative 8  4 - 12 % Final    Eosinophils Relative 2  0 - 6 % Final    Basophils Relative 1  0 - 1 % Final    Neutrophils Absolute 4 01  1 85 - 7 62 Thousands/µL Final    Immature Grans Absolute 0 03  0 00 - 0 20 Thousand/uL Final    Lymphocytes Absolute 0 92  0 60 - 4 47 Thousands/µL Final    Monocytes Absolute 0 45  0 17 - 1 22 Thousand/µL Final    Eosinophils Absolute 0 11  0 00 - 0 61 Thousand/µL Final    Basophils Absolute 0 06  0 00 - 0 10 Thousands/µL Final   BASIC METABOLIC PANEL - Abnormal    Sodium 142  136 - 145 mmol/L Final    Potassium 4 0  3 5 - 5 3 mmol/L Final    Chloride 110 (*) 100 - 108 mmol/L Final    CO2 23  21 - 32 mmol/L Final    ANION GAP 9  4 - 13 mmol/L Final    BUN 27 (*) 5 - 25 mg/dL Final    Creatinine 1 79 (*) 0 60 - 1 30 mg/dL Final    Comment: Standardized to IDMS reference method    Glucose 100  65 - 140 mg/dL Final    Comment: If the patient is fasting, the ADA then defines impaired fasting glucose as > 100 mg/dL and diabetes as > or equal to 123 mg/dL  Specimen collection should occur prior to Sulfasalazine administration due to the potential for falsely depressed results  Specimen collection should occur prior to Sulfapyridine administration due to the potential for falsely elevated results      Calcium 9 8  8 3 - 10 1 mg/dL Final    eGFR 26  ml/min/1 73sq m Final    Narrative:     [place] guidelines for Chronic Kidney Disease (CKD):     Stage 1 with normal or high GFR (GFR > 90 mL/min/1 73 square meters)    Stage 2 Mild CKD (GFR = 60-89 mL/min/1 73 square meters)    Stage 3A Moderate CKD (GFR = 45-59 mL/min/1 73 square meters)    Stage 3B Moderate CKD (GFR = 30-44 mL/min/1 73 square meters)    Stage 4 Severe CKD (GFR = 15-29 mL/min/1 73 square meters)    Stage 5 End Stage CKD (GFR <15 mL/min/1 73 square meters)  Note: GFR calculation is accurate only with a steady state creatinine   HEPATIC FUNCTION PANEL - Abnormal    Total Bilirubin 0 45  0 20 - 1 00 mg/dL Final    Comment: Use of this assay is not recommended for patients undergoing treatment with eltrombopag due to the potential for falsely elevated results  Bilirubin, Direct 0 13  0 00 - 0 20 mg/dL Final    Alkaline Phosphatase 83  46 - 116 U/L Final    AST 26  5 - 45 U/L Final    Comment: Specimen collection should occur prior to Sulfasalazine and/or Sulfapyridine administration due to the potential for falsely depressed results  ALT 22  12 - 78 U/L Final    Comment: Specimen collection should occur prior to Sulfasalazine and/or Sulfapyridine administration due to the potential for falsely depressed results  Total Protein 7 6  6 4 - 8 2 g/dL Final    Albumin 3 3 (*) 3 5 - 5 0 g/dL Final   TROPONIN I - Abnormal    Troponin I 0 19 (*) <=0 04 ng/mL Final    Comment: Siemens Chemistry analyzer 99% cutoff is > 0 04 ng/mL in network labs     o cTnI 99% cutoff is useful only when applied to patients in the clinical setting of myocardial ischemia   o cTnI 99% cutoff should be interpreted in the context of clinical history, ECG findings and possibly cardiac imaging to establish correct diagnosis  o cTnI 99% cutoff may be suggestive but clearly not indicative of a coronary event without the clinical setting of myocardial ischemia      Results indicate test should be repeated on new specimen collected within 4-6 hours of the original   TROPONIN I - Abnormal    Troponin I 0 24 (*) <=0 04 ng/mL Final    Comment: Siemens Chemistry analyzer 99% cutoff is > 0 04 ng/mL in network labs o cTnI 99% cutoff is useful only when applied to patients in the clinical setting of myocardial ischemia   o cTnI 99% cutoff should be interpreted in the context of clinical history, ECG findings and possibly cardiac imaging to establish correct diagnosis  o cTnI 99% cutoff may be suggestive but clearly not indicative of a coronary event without the clinical setting of myocardial ischemia  Results indicate test should be repeated on new specimen collected within 4-6 hours of the original   TROPONIN I - Abnormal    Troponin I 0 28 (*) <=0 04 ng/mL Final    Comment: Siemens Chemistry analyzer 99% cutoff is > 0 04 ng/mL in network labs     o cTnI 99% cutoff is useful only when applied to patients in the clinical setting of myocardial ischemia   o cTnI 99% cutoff should be interpreted in the context of clinical history, ECG findings and possibly cardiac imaging to establish correct diagnosis  o cTnI 99% cutoff may be suggestive but clearly not indicative of a coronary event without the clinical setting of myocardial ischemia  Results indicate test should be repeated on new specimen collected within 4-6 hours of the original   IRON SATURATION - Abnormal    Iron Saturation 2  % Final    TIBC 483 (*) 250 - 450 ug/dL Final    Iron 10 (*) 50 - 170 ug/dL Final    Comment: Patients treated with metal-binding drugs (ie  Deferoxamine) may have depressed iron values     URINE MACROSCOPIC, POC - Abnormal    Color, UA Yellow   Final    Clarity, UA Clear   Final    pH, UA 6 0  4 5 - 8 0 Final    Leukocytes, UA Negative  Negative Final    Nitrite, UA Negative  Negative Final    Protein, UA Negative  Negative mg/dl Final    Glucose, UA Negative  Negative mg/dl Final    Ketones, UA 15 (1+) (*) Negative mg/dl Final    Urobilinogen, UA 0 2  0 2, 1 0 E U /dl E U /dl Final    Bilirubin, UA Negative  Negative Final    Blood, UA Negative  Negative Final    Specific [place], UA 1 015  1 003 - 1 030 Final    Narrative: CLINITEK RESULT   LIPASE - Normal    Lipase 84  73 - 393 u/L Final   PROCALCITONIN TEST - Normal    Procalcitonin 0 10  <=0 25 ng/ml Final    Comment: Suspected Lower Respiratory Tract Infection (LRTI):  - LESS than or EQUAL to 0 25 ng/mL:   low likelihood for bacterial LRTI; antibiotics DISCOURAGED   - GREATER than 0 25 ng/mL:   increased likelihood for bacterial LRTI; antibiotics ENCOURAGED  Suspected Sepsis:  - Strongly consider initiating antibiotics in ALL UNSTABLE patients  - LESS than or EQUAL to 0 5 ng/mL:   low likelihood for bacterial sepsis; antibiotics DISCOURAGED   - GREATER than 0 5 ng/mL:   increased likelihood for bacterial sepsis; antibiotics ENCOURAGED   - GREATER than 2 ng/mL:   high risk for severe sepsis / septic shock; antibiotics strongly ENCOURAGED  Decisions on antibiotic use should not be based solely on Procalcitonin (PCT) levels  If PCT is low but uncertainty exists with stopping antibiotics, repeat PCT in 6-24 hours to confirm the low level  If antibiotics are administered (regardless if initial PCT was high or low), repeat PCT every 1-2 days to consider early antibiotic cessation (when GREATER than 80% decrease from the peak OR when PCT drops below designated cutoffs, whichever comes first), so long as the infection is NOT one that typically requires prolonged treatment durations (e g , bone/joint infections, endocarditis, Staph  aureus bacteremia)      Situations of FALSE-POSITIVE Procalcitonin values:  1) Newborns < 67 hours old  2) Massive stress from severe trauma / burns, major surgery, acute pancreatitis, cardiogenic / hemorrhagic shock, sickle cell crisis, or other organ perfusion abnormalities  3) Malaria and some Candidal infections  4) Treatment with agents that stimulate cytokines (e g , OKT3, anti-lymphocyte globulins, alemtuzumab, IL-2, granulocyte transfusion [NOT GCSFs])  5) Chronic renal disease causes elevated baseline levels (consider GREATER than 0 75 ng/mL as an abnormal cut-off); initiating HD/CRRT may cause transient decreases  6) Paraneoplastic syndromes from medullary thyroid or SCLC, some forms of vasculitis, and acute wkmkq-ra-wadh disease    Situations of FALSE-NEGATIVE Procalcitonin values:  1) Too early in clinical course for PCT to have reached its peak (may repeat in 6-24 hours to confirm low level)  2) Localized infection WITHOUT systemic (SIRS / sepsis) response (e g , an abscess, osteomyelitis, cystitis)  3) Mycobacteria (e g , Tuberculosis, MAC)  4) Cystic fibrosis exacerbations     FERRITIN - Normal    Ferritin 8  8 - 388 ng/mL Final   PROCALCITONIN REFLEX   POCT URINALYSIS DIPSTICK   TYPE AND SCREEN    ABO Grouping A   Final    Rh Factor Positive   Final    Antibody Screen Negative   Final    Specimen Expiration Date [id number]   Final   PREPARE LEUKOREDUCED RBC    Unit Product Code        Unit Number [id number]       Unit ABO A       Unit [facility] POS       Crossmatch Compatible   Final    Unit Dispense Status Issued      IRON PANEL (INCLUDES FERRITIN,IRON SAT%, IRON, AND TIBC)    Narrative: The following orders were created for panel order Iron Panel (Includes Ferritin, Iron Sat%, Iron, and TIBC)  Procedure                               Abnormality         Status                     ---------                               -----------         ------                     Iron Saturation %[[id number]]            Abnormal            Final result               Ferritin[[id number]]                     Normal              Final result                 Please view results for these tests on the individual orders  Final Diagnosis:  1  Anemia    2  NSTEMI (non-ST elevated myocardial infarction) ([facility] )    3   JORDAN (acute kidney injury) ([facility] )        P:  - patient transfused 1 unit  Patient to be admitted to [facility]    Patient given aspirin repeat troponin and EKG ordered      Medications   atorvastatin (LIPITOR) tablet 40 mg (40 mg Oral Given 21) loratadine (CLARITIN) tablet 5 mg (has no administration in time range)   levothyroxine tablet 100 mcg (has no administration in time range)   niacin tablet 100 mg (100 mg Oral Given 2/7/21 2234)   albuterol (PROVENTIL HFA,VENTOLIN HFA) inhaler 2 puff (has no administration in time range)   pantoprazole (PROTONIX) injection 40 mg (40 mg Intravenous Given 2/7/21 2058)   clopidogrel (PLAVIX) tablet 75 mg (75 mg Oral Given 2/7/21 2152)   fentanyl citrate (PF) 100 MCG/2ML 50 mcg (50 mcg Intravenous Given 2/7/21 1609)   aspirin chewable tablet 324 mg (324 mg Oral Given 2/7/21 1905)     Time reflects when diagnosis was documented in both MDM as applicable and the Disposition within this note     Time User Action Codes Description Comment    2/7/2021  7:06 PM Hyacinth Talavera Add [D64 9] Anemia     2/7/2021  7:06 PM Hyacinth Talavera Add [I21 4] NSTEMI (non-ST elevated myocardial infarction) ([facility] )     2/7/2021  7:06 PM Hyacinth Talavera Add [N17 9] JORDAN (acute kidney injury) [facility])       ED Disposition     ED Disposition Condition Date/Time Comment    Admit Stable Sun Feb 7, 2021  7:07 PM Case was discussed with SOD and the patient's admission status was agreed to be Admission Status: observation status   Follow-up Information    None       Patient's Medications   Discharge Prescriptions    No medications on file     No discharge procedures on file  Prior to Admission Medications   Prescriptions Last Dose Informant Patient Reported? Taking?    Calcium-Magnesium-Vitamin D ER (Trent Vargase[address] 8) 600- MG-MG-UNIT TB24 Past Week at Unknown time Self Yes Yes   Sig: Take by mouth   Cholecalciferol (VITAMIN D-3) 1000 units CAPS 2/6/2021 at Unknown time Self Yes Yes   Sig: Take by mouth   Krill Oil 1000 MG CAPS Past Week at Unknown time Self Yes Yes   Sig: Take by mouth   Magnesium 250 MG TABS 2/6/2021 at Unknown time Self Yes Yes   Sig: Take 250 mg by mouth daily   Multiple Vitamins-Minerals (CENTRUM SILVER 50+WOMEN PO) Past Month at Unknown time Self Yes Yes   Sig: Take by mouth   Omega-3 1000 MG CAPS Past Month at Unknown time Self Yes Yes   Sig: Take 4 capsules by mouth daily    VENTOLIN  (90 Base) MCG/ACT inhaler 2/7/2021 at Unknown time Self Yes Yes   acetaminophen (TYLENOL) 325 mg tablet Past Week at Unknown time  No Yes   Sig: Take 2 tablets (650 mg total) by mouth every 6 (six) hours as needed for mild pain   atorvastatin (LIPITOR) 40 mg tablet 2/6/2021 at Unknown time Self Yes Yes   Sig: Take by mouth   cefuroxime (CEFTIN) 250 mg tablet Unknown at Unknown time Self Yes No   cetirizine (ZyrTEC) 5 MG tablet 2/6/2021 at Unknown time Self Yes Yes   Sig: Take 5 mg by mouth daily   clopidogrel (PLAVIX) 75 mg tablet 2/6/2021 at Unknown time  No Yes   Sig: TAKE 1 TABLET(75 MG) BY MOUTH DAILY   fluticasone (FLONASE) 50 mcg/act nasal spray Unknown at Unknown time Self Yes No   Sig: into each nostril   levothyroxine 100 mcg tablet 2/6/2021 at Unknown time Self Yes Yes   losartan (COZAAR) 50 mg tablet 2/6/2021 at Unknown time  Yes Yes   Sig: Take 1 tablet (50 mg total) by mouth daily   metFORMIN (GLUCOPHAGE) 500 mg tablet 2/6/2021 at Unknown time  Yes Yes   Sig: Take 0 5 tablets (250 mg total) by mouth daily with breakfast   methocarbamol (ROBAXIN) 500 mg tablet Not Taking at Unknown time Self Yes No   Sig: TK 1 T PO BID PRN   niacin 500 mg ER capsule 2/6/2021 at Unknown time Self Yes Yes   Sig: Take 500 mg by mouth daily at bedtime   omeprazole (PriLOSEC) 20 mg delayed release capsule 2/6/2021 at Unknown time Self Yes Yes   Sig: Take by mouth      Facility-Administered Medications: None       Portions of the record may have been created with voice recognition software  Occasional wrong word or "sound a like" substitutions may have occurred due to the inherent limitations of voice recognition software  Read the chart carefully and recognize, using context, where substitutions have occurred      Electronically signed by:  Wild Barber, PGY 2, MD Adam Lares MD  02/07/21 Via Yoan Trammell MD  02/07/21 [id number]

## 2021-02-07 NOTE — ED ATTENDING ATTESTATION
2/7/2021  [place] MD, saw and evaluated the patient  I have discussed the patient with the resident/non-physician practitioner and agree with the resident's/non-physician practitioner's findings, Plan of Care, and MDM as documented in the resident's/non-physician practitioner's note, except where noted  All available labs and Radiology studies were reviewed  I was present for key portions of any procedure(s) performed by the resident/non-physician practitioner and I was immediately available to provide assistance  At this point I agree with the current assessment done in the Emergency Department  I have conducted an independent evaluation of this patient a history and physical is as follows:    ED Course         Critical Care Time  Procedures    80 yo female with luq abdominal pain and sob for the last few weeks, intermittently lasting few days at a time  Pt with no cp  Pt with hx of choleycystitis, cad, laryngeal ca, trach, ckd  No fever, no n/v/d  Vss, afebrile, tachy, lungs with coarse sounds, crackles, no wheezes, rrr, abdomen soft tender luq, no rebound, no guarding    Labs, ct a/p, urine, ekg,

## 2021-02-08 VITALS
SYSTOLIC BLOOD PRESSURE: 112 MMHG | RESPIRATION RATE: 18 BRPM | OXYGEN SATURATION: 93 % | HEART RATE: 105 BPM | DIASTOLIC BLOOD PRESSURE: 55 MMHG | WEIGHT: 142 LBS | TEMPERATURE: 98.1 F | BODY MASS INDEX: 28.68 KG/M2

## 2021-02-08 DIAGNOSIS — D64.9 ANEMIA: ICD-10-CM

## 2021-02-08 LAB
ABO GROUP BLD BPU: NORMAL
ANION GAP SERPL CALCULATED.3IONS-SCNC: 6 MMOL/L (ref 4–13)
ATRIAL RATE: 109 BPM
ATRIAL RATE: 97 BPM
BPU ID: NORMAL
BUN SERPL-MCNC: 25 MG/DL (ref 5–25)
CALCIUM SERPL-MCNC: 9.2 MG/DL (ref 8.3–10.1)
CHLORIDE SERPL-SCNC: 112 MMOL/L (ref 100–108)
CO2 SERPL-SCNC: 26 MMOL/L (ref 21–32)
CREAT SERPL-MCNC: 1.63 MG/DL (ref 0.6–1.3)
CROSSMATCH: NORMAL
ERYTHROCYTE [DISTWIDTH] IN BLOOD BY AUTOMATED COUNT: 20.2 % (ref 11.6–15.1)
GFR SERPL CREATININE-BSD FRML MDRD: 29 ML/MIN/1.73SQ M
GLUCOSE P FAST SERPL-MCNC: 107 MG/DL (ref 65–99)
GLUCOSE SERPL-MCNC: 107 MG/DL (ref 65–140)
HCT VFR BLD AUTO: 28.3 % (ref 34.8–46.1)
HGB BLD-MCNC: 8.1 G/DL (ref 11.5–15.4)
INR PPP: 1.12 (ref 0.84–1.19)
MAGNESIUM SERPL-MCNC: 1.7 MG/DL (ref 1.6–2.6)
MCH RBC QN AUTO: 19.3 PG (ref 26.8–34.3)
MCHC RBC AUTO-ENTMCNC: 28.6 G/DL (ref 31.4–37.4)
MCV RBC AUTO: 68 FL (ref 82–98)
P AXIS: 68 DEGREES
P AXIS: 85 DEGREES
PLATELET # BLD AUTO: 204 THOUSANDS/UL (ref 149–390)
PMV BLD AUTO: 10 FL (ref 8.9–12.7)
POTASSIUM SERPL-SCNC: 3.8 MMOL/L (ref 3.5–5.3)
PR INTERVAL: 138 MS
PR INTERVAL: 150 MS
PROCALCITONIN SERPL-MCNC: 0.1 NG/ML
PROTHROMBIN TIME: 14.4 SECONDS (ref 11.6–14.5)
QRS AXIS: -30 DEGREES
QRS AXIS: -33 DEGREES
QRSD INTERVAL: 66 MS
QRSD INTERVAL: 70 MS
QT INTERVAL: 316 MS
QT INTERVAL: 336 MS
QTC INTERVAL: 425 MS
QTC INTERVAL: 426 MS
RBC # BLD AUTO: 4.19 MILLION/UL (ref 3.81–5.12)
SODIUM SERPL-SCNC: 144 MMOL/L (ref 136–145)
T WAVE AXIS: 53 DEGREES
T WAVE AXIS: 93 DEGREES
TROPONIN I SERPL-MCNC: 0.25 NG/ML
TROPONIN I SERPL-MCNC: 0.26 NG/ML
UNIT DISPENSE STATUS: NORMAL
UNIT PRODUCT CODE: NORMAL
UNIT RH: NORMAL
VENTRICULAR RATE: 109 BPM
VENTRICULAR RATE: 97 BPM
WBC # BLD AUTO: 6.15 THOUSAND/UL (ref 4.31–10.16)

## 2021-02-08 PROCEDURE — 99223 1ST HOSP IP/OBS HIGH 75: CPT | Performed by: INTERNAL MEDICINE

## 2021-02-08 PROCEDURE — 85610 PROTHROMBIN TIME: CPT | Performed by: STUDENT IN AN ORGANIZED HEALTH CARE EDUCATION/TRAINING PROGRAM

## 2021-02-08 PROCEDURE — 84484 ASSAY OF TROPONIN QUANT: CPT | Performed by: INTERNAL MEDICINE

## 2021-02-08 PROCEDURE — 84145 PROCALCITONIN (PCT): CPT | Performed by: EMERGENCY MEDICINE

## 2021-02-08 PROCEDURE — 99222 1ST HOSP IP/OBS MODERATE 55: CPT | Performed by: INTERNAL MEDICINE

## 2021-02-08 PROCEDURE — 80048 BASIC METABOLIC PNL TOTAL CA: CPT | Performed by: STUDENT IN AN ORGANIZED HEALTH CARE EDUCATION/TRAINING PROGRAM

## 2021-02-08 PROCEDURE — NC001 PR NO CHARGE: Performed by: INTERNAL MEDICINE

## 2021-02-08 PROCEDURE — C9113 INJ PANTOPRAZOLE SODIUM, VIA: HCPCS | Performed by: STUDENT IN AN ORGANIZED HEALTH CARE EDUCATION/TRAINING PROGRAM

## 2021-02-08 PROCEDURE — 85027 COMPLETE CBC AUTOMATED: CPT | Performed by: STUDENT IN AN ORGANIZED HEALTH CARE EDUCATION/TRAINING PROGRAM

## 2021-02-08 PROCEDURE — 84484 ASSAY OF TROPONIN QUANT: CPT | Performed by: STUDENT IN AN ORGANIZED HEALTH CARE EDUCATION/TRAINING PROGRAM

## 2021-02-08 PROCEDURE — 36415 COLL VENOUS BLD VENIPUNCTURE: CPT | Performed by: STUDENT IN AN ORGANIZED HEALTH CARE EDUCATION/TRAINING PROGRAM

## 2021-02-08 PROCEDURE — 93010 ELECTROCARDIOGRAM REPORT: CPT | Performed by: INTERNAL MEDICINE

## 2021-02-08 PROCEDURE — 83735 ASSAY OF MAGNESIUM: CPT | Performed by: STUDENT IN AN ORGANIZED HEALTH CARE EDUCATION/TRAINING PROGRAM

## 2021-02-08 RX ORDER — MAGNESIUM SULFATE 1 G/100ML
1 INJECTION INTRAVENOUS ONCE
Status: COMPLETED | OUTPATIENT
Start: 2021-02-08 | End: 2021-02-08

## 2021-02-08 RX ORDER — PANTOPRAZOLE SODIUM 40 MG/1
40 TABLET, DELAYED RELEASE ORAL DAILY
Qty: 30 TABLET | Refills: 0 | Status: SHIPPED | OUTPATIENT
Start: 2021-02-08 | End: 2021-02-09

## 2021-02-08 RX ORDER — LOSARTAN POTASSIUM 50 MG/1
50 TABLET ORAL DAILY
Qty: 30 TABLET | Refills: 0 | Status: SHIPPED | OUTPATIENT
Start: 2021-02-10

## 2021-02-08 RX ORDER — POTASSIUM CHLORIDE 20 MEQ/1
40 TABLET, EXTENDED RELEASE ORAL ONCE
Status: COMPLETED | OUTPATIENT
Start: 2021-02-08 | End: 2021-02-08

## 2021-02-08 RX ADMIN — CLOPIDOGREL BISULFATE 75 MG: 75 TABLET ORAL at 09:09

## 2021-02-08 RX ADMIN — MAGNESIUM SULFATE HEPTAHYDRATE 1 G: 1 INJECTION, SOLUTION INTRAVENOUS at 09:09

## 2021-02-08 RX ADMIN — POTASSIUM CHLORIDE 40 MEQ: 1500 TABLET, EXTENDED RELEASE ORAL at 14:21

## 2021-02-08 RX ADMIN — PANTOPRAZOLE SODIUM 40 MG: 40 INJECTION, POWDER, FOR SOLUTION INTRAVENOUS at 09:09

## 2021-02-08 RX ADMIN — ATORVASTATIN CALCIUM 40 MG: 40 TABLET, FILM COATED ORAL at 16:56

## 2021-02-08 RX ADMIN — LORATADINE 5 MG: 10 TABLET ORAL at 09:09

## 2021-02-08 RX ADMIN — LEVOTHYROXINE SODIUM 100 MCG: 100 TABLET ORAL at 06:10

## 2021-02-08 NOTE — ASSESSMENT & PLAN NOTE
Patient found to have critical near occlusive R ICA stenosis, s/p stenting in 07/2020  Currently stable and on Plavix   -Spoke with GI regarding whether or not to continue Plavix given acute blood loss anemia and likely EGD/colonoscopy tomorrow  They recommended continuing plavix  Will continue plavix for now

## 2021-02-08 NOTE — ASSESSMENT & PLAN NOTE
MINER since November, worsening SOB with exertion and at rest x 3 days, as well as fatigue  Found to have Hgb 6 4 on admission  Patient was given inhaler in November by PCP as PCP felt patient may have COPD from 20 pack year smoking history  SOB on admission very likely 2/2 symptomatic anemia  No chest pain on admission  Patient did have some wheezes and crackles on exam    -Improved s/p 1u pRBC in the ED    -Home inhaler ordered   -respiratory protocol ordered given patient with some wheezes and on 2L NC (which appears to be mostly for comfort)  -Monitor respiratory status

## 2021-02-08 NOTE — ASSESSMENT & PLAN NOTE
Well controlled at home on Losartan 50mg daily   -Will hold for now given JORDAN  -Monitor BP, can add prn antihypertensive if patient is hypertensive

## 2021-02-08 NOTE — ASSESSMENT & PLAN NOTE
Patient presenting with Hgb 6 4, with worsening SOB and fatigue for 3 days, in setting of mild MINER for a few months  Patient denied any melena, hematochezia, hematemesis, hematuria, hemoptysis (had some hemoptysis back in November - felt to be due to dryness from trach), no new bruising  Patient says she thinks she had a colonoscopy >5 years ago (though nothing in chart), which she believed to be normal  Does not use NSAIDs  Has some LUQ tenderness  Tachycardic on exam, likely 2/2 severe anemia  CT showed diverticulosis without diverticulitis  Unclear source of bleed, though feel it is most likely GI source  Received 1u pRBC in ED   -Protonix BID IV  -GI consulted  Discussed patient with them; they recommend NPO at midnight and possible EGD/colonscopy tomorrow  Patient is on plavix due to prior carotid stent  They recommend continuing the plavix   -Clear liquid diet, NPO at midnight    -Plan for EGD only tomorrow as patient is refusing bowel prep  Outpatient Cologuard upon discharge  -Hgb improved to 8 1 following transfusion  -Iron panel consistent with iron deficiency anemia  -Transfuse Hgb <7  -Monitor hemodynamic status

## 2021-02-08 NOTE — ASSESSMENT & PLAN NOTE
Cr 1 79 on admission   Baseline appears to be 1 2-1 5    -Improved following transfusion  -continue to monitor

## 2021-02-08 NOTE — PROGRESS NOTES
INTERNAL MEDICINE RESIDENCY PROGRESS NOTE     Name: John Vincent   Age & Sex: 81 y o  female   MRN: [id number]  Unit/Bed#: ED 21   Encounter: [id number]  Team: SOD Team C     PATIENT INFORMATION     Name: John Vincent   Age & Sex: 81 y o  female   MRN: [id number]  Hospital Stay Days: 0    ASSESSMENT/PLAN     Principal Problem:    Acute blood loss anemia  Active Problems:    Asymptomatic carotid artery stenosis    Hyperlipidemia    Hypertension    Laryngeal carcinoma (HCC)    CKD (chronic kidney disease) stage 3, GFR 30-59 ml/min    SOB (shortness of breath)    JORDAN (acute kidney injury) ([facility] [address] )    Elevated troponin    Acute on chronic kidney failure ([facility] [address] )      Acute on chronic kidney failure [facility])  Assessment & Plan  Lab Results   Component Value Date    EGFR 29 02/08/2021    EGFR 26 02/07/2021    EGFR 52 07/23/2020    CREATININE 1 63 (H) 02/08/2021    CREATININE 1 79 (H) 02/07/2021    CREATININE 1 01 07/23/2020   Patient known to have CKD 3, with baseline Cr on labs appearing to be about 1 1-1 4, however, there is an outpatient Nephro note from 05/2020 that suggests baseline Cr is likely around 1 45  Presenting today with Cr 1 79 in the setting of symptomatic anemia  JORDAN likely pre-renal 2/2 anemia with Hgb 6 4   -Patient received 1u pRBC in the ED  Did have some crackles in lungs, so will only off on giving any fluids for now  Cr improved with transfusion  -Encourage good PO intake  -Avoid nephrotoxic agents  Losartan on hold  Elevated troponin  Assessment & Plan  Patient presented with worsening SOB, fatigue x3 days, has also had mild MINER for a couple months  No CP or palpitations  Found to have Hgb 6 4 on admission  Trop 0 19>0 24  EKG with sinus tach  Elevated troponin likely represent type 2 MI in setting on supply/demand mismatch given severe anemia    -Trops peaked at 0 28  -Will monitor for cardiac symptoms     -Expect resolution of type 2 MI with blood transfusion, which she received in the ED     JORDAN (acute kidney injury) ([facility] [address] )  Assessment & Plan  Cr 1 79 on admission  Baseline appears to be 1 2-1 5    -Improved following transfusion  -continue to monitor    SOB (shortness of breath)  Assessment & Plan  MINER since November, worsening SOB with exertion and at rest x 3 days, as well as fatigue  Found to have Hgb 6 4 on admission  Patient was given inhaler in November by PCP as PCP felt patient may have COPD from 20 pack year smoking history  SOB on admission very likely 2/2 symptomatic anemia  No chest pain on admission  Patient did have some wheezes and crackles on exam    -Improved s/p 1u pRBC in the ED    -Home inhaler ordered   -respiratory protocol ordered given patient with some wheezes and on 2L NC (which appears to be mostly for comfort)  -Monitor respiratory status  CKD (chronic kidney disease) stage 3, GFR 30-59 ml/min  Assessment & Plan  Lab Results   Component Value Date    EGFR 29 02/08/2021    EGFR 26 02/07/2021    EGFR 52 07/23/2020    CREATININE 1 63 (H) 02/08/2021    CREATININE 1 79 (H) 02/07/2021    CREATININE 1 01 07/23/2020       Laryngeal carcinoma ([facility] [address] )  Assessment & Plan  S/p laryngenctomy and trach, as well as oral chemotherapy and radiation in 2002    -Currently stable and patient stable with trach  Nor currently receiving any therapies   -Hypothyroidism and carotid changes possibly 2/2 to radiation to neck  Hypertension  Assessment & Plan  Well controlled at home on Losartan 50mg daily   -Will hold for now given JORDAN  -Monitor BP, can add prn antihypertensive if patient is hypertensive  Hyperlipidemia  Assessment & Plan  On atorvastatin 40mg qhs and niacin at home   -Continue atorvastatin   -Continue Niacin  Asymptomatic carotid artery stenosis  Assessment & Plan  Patient found to have critical near occlusive R ICA stenosis, s/p stenting in 07/2020   Currently stable and on Plavix   -Spoke with GI regarding whether or not to continue Plavix given acute blood loss anemia and likely EGD/colonoscopy tomorrow  They recommended continuing plavix  Will continue plavix for now  * Acute blood loss anemia  Assessment & Plan  Patient presenting with Hgb 6 4, with worsening SOB and fatigue for 3 days, in setting of mild MINER for a few months  Patient denied any melena, hematochezia, hematemesis, hematuria, hemoptysis (had some hemoptysis back in November - felt to be due to dryness from trach), no new bruising  Patient says she thinks she had a colonoscopy >5 years ago (though nothing in chart), which she believed to be normal  Does not use NSAIDs  Has some LUQ tenderness  Tachycardic on exam, likely 2/2 severe anemia  CT showed diverticulosis without diverticulitis  Unclear source of bleed, though feel it is most likely GI source  Received 1u pRBC in ED   -Protonix BID IV  -GI consulted  Discussed patient with them; they recommend NPO at midnight and possible EGD/colonscopy tomorrow  Patient is on plavix due to prior carotid stent  They recommend continuing the plavix   -Clear liquid diet, NPO at midnight    -Plan for EGD only tomorrow as patient is refusing bowel prep  Outpatient Cologuard upon discharge  -Hgb improved to 8 1 following transfusion  -Iron panel consistent with iron deficiency anemia  -Transfuse Hgb <7  -Monitor hemodynamic status  Disposition: EGD only tomorrow  NPO at midnight  SUBJECTIVE     Patient seen and examined  No acute events overnight  Patient reports her SOB is significantly better now and is requesting to go home  Patient informed that she may have GI bleed and that EGD/colonoscopy is recommended  She refuses bowel prep but is amenable to EGD  Will plan for EGD tomorrow with outpatient Cologuard following discharge  Otherwise denies chest pain, SOB, abdominal pain, hematemesis, hematochezia, melena, nausea, vomiting       OBJECTIVE     Vitals:    02/08/21 0035 02/08/21 0315 02/08/21 0600 02/08/21 0919   BP: 108/51 122/55 153/67 140/68   BP Location:  Right arm Right arm Right arm   Pulse: 104 98 96 99   Resp: 19 18 18 17   Temp:       TempSrc:       SpO2: 95% 97% 98% 100%   Weight:          Temperature:   Temp (24hrs), Av 2 °F (36 8 °C), Min:98 °F (36 7 °C), Max:98 6 °F (37 °C)    Temperature: 98 1 °F (36 7 °C)  Intake & Output:  I/O        07 -  07 07 -  07 -  07    Blood  355 8     IV Piggyback   100    Total Intake(mL/kg)  355 8 (5 5) 100 (1 6)    Net  +355 8 +100               Weights:   IBW: -92 5 kg    Body mass index is 28 68 kg/m²  Weight (last 2 days)     Date/Time   Weight    21 1505   64 4 (142)            Physical Exam  Constitutional:       General: She is not in acute distress  Appearance: Normal appearance  HENT:      Head: Normocephalic and atraumatic  Right Ear: External ear normal       Left Ear: External ear normal       Nose: Nose normal       Mouth/Throat:      Mouth: Mucous membranes are moist       Pharynx: Oropharynx is clear  Eyes:      Conjunctiva/sclera: Conjunctivae normal    Neck:      Musculoskeletal: Normal range of motion and neck supple  Comments: trach  Cardiovascular:      Rate and Rhythm: Normal rate and regular rhythm  Pulses: Normal pulses  Heart sounds: Normal heart sounds  Pulmonary:      Effort: Pulmonary effort is normal       Breath sounds: Normal breath sounds  Abdominal:      General: Abdomen is flat  Palpations: Abdomen is soft  Tenderness: There is no abdominal tenderness  Musculoskeletal:      Right lower leg: No edema  Left lower leg: No edema  Skin:     General: Skin is warm and dry  Neurological:      Mental Status: She is alert and oriented to person, place, and time  Mental status is at baseline  Psychiatric:         Mood and Affect: Mood normal          Behavior: Behavior normal        LABORATORY DATA     Labs: I have personally reviewed pertinent reports    Results from last 7 days   Lab Units 02/08/21  0432 02/07/21  1610   WBC Thousand/uL 6 15 5 58   HEMOGLOBIN g/dL 8 1* 6 4*   HEMATOCRIT % 28 3* 23 7*   PLATELETS Thousands/uL 204 254   NEUTROS PCT %  --  71   MONOS PCT %  --  8      Results from last 7 days   Lab Units 02/08/21  0432 02/07/21  1610   POTASSIUM mmol/L 3 8 4 0   CHLORIDE mmol/L 112* 110*   CO2 mmol/L 26 23   BUN mg/dL 25 27*   CREATININE mg/dL 1 63* 1 79*   CALCIUM mg/dL 9 2 9 8   ALK PHOS U/L  --  83   ALT U/L  --  22   AST U/L  --  26     Results from last 7 days   Lab Units 02/08/21  0432   MAGNESIUM mg/dL 1 7          Results from last 7 days   Lab Units 02/08/21  0432   INR  1 12         Results from last 7 days   Lab Units 02/08/21  0432 02/08/21  0111 02/07/21  2208   TROPONIN I ng/mL 0 25* 0 26* 0 28*       IMAGING & DIAGNOSTIC TESTING     Radiology Results: I have personally reviewed pertinent reports  Ct Abdomen Pelvis Wo Contrast    Result Date: 2/7/2021  Impression: Colonic diverticulosis without evidence for diverticulitis  No evidence for bowel obstruction  Small bilateral pleural effusions  Workstation performed: [id number]     Xr Chest 2 Views    Result Date: 2/8/2021  Impression: Vascular congestion with small bilateral pleural effusions  Workstation performed: [id number]     Other Diagnostic Testing: I have personally reviewed pertinent reports      ACTIVE MEDICATIONS     Current Facility-Administered Medications   Medication Dose Route Frequency    albuterol (PROVENTIL HFA,VENTOLIN HFA) inhaler 2 puff  2 puff Inhalation Q4H PRN    atorvastatin (LIPITOR) tablet 40 mg  40 mg Oral Daily With Dinner    clopidogrel (PLAVIX) tablet 75 mg  75 mg Oral Daily    levothyroxine tablet 100 mcg  100 mcg Oral Early Morning    loratadine (CLARITIN) tablet 5 mg  5 mg Oral Daily    niacin tablet 100 mg  100 mg Oral HS    pantoprazole (PROTONIX) injection 40 mg  40 mg Intravenous Q12H [address]       VTE Pharmacologic Prophylaxis: Sequential compression device Korin Loco)   VTE Mechanical Prophylaxis: sequential compression device    Portions of the record may have been created with voice recognition software  Occasional wrong word or "sound a like" substitutions may have occurred due to the inherent limitations of voice recognition software    Read the chart carefully and recognize, using context, where substitutions have occurred   ==  Marietta Chambers MD  [address] [facility]  Internal Medicine Residency PGY-1

## 2021-02-08 NOTE — UTILIZATION REVIEW
Initial Clinical Review    Admission: Date/Time/Statement: 2/7/21  [address] TO INPATIENT 2/8/21 AT 1718    02/08/21 1719  Inpatient Admission Once     Question Answer Comment   Level of Care Med Surg    Estimated length of stay More than 2 Midnights    Certification I certify that inpatient services are medically necessary for this patient for a duration of greater than two midnights  See H&P and MD Progress Notes for additional information about the patient's course of treatment  02/08/21 1718     ED Arrival Information     Expected Arrival Acuity Means of Arrival Escorted By Service Admission Type    - 2/7/2021 14:58 Urgent Ambulance [facility] General Medicine Urgent    Arrival Complaint    SOB     Chief Complaint   Patient presents with    Shortness of Breath     pt c/o MINER for the "last couple of days" pt denies CP but complains of pain under her left ribs     denies CP but complains of pain under her left ribs      HISTORY OF PRESENT ILLNESS      78yo F with PMH significant for laryngeal carcinoma s/p radiation and trach, HTN, HLD, CKD (baseline appears to be 1 1-1 45), hypothyroidism, L carotid artery atresia s/p stent in 07/2020 on plavix who presents for 3 days of fatigue and SOB  Patient states that since November 2020 she has had some mild MINER as well as dyspnea at rest, but no there symptoms  She saw her PCP who suggested patient may have COPD given 20 pack year smoking history, and gave the patient an inhaler, which helped only minimally  She also noticed about 1 month ago that she was experiencing intermittent, mild, non-radiating, 6/10 LUQ abdominal pain  This pain was not associated with eating and came at random  Over the last 3 days prior to admission, patient says she has been having worsening fatigue, MINER, and dyspnea at rest  She also endorsed some lightheadedness when she stands up, which quickly resolves   She denies any associated chest pain, palpitations, dizziness, N/V, headaches, fevers, chills, cough, hemoptysis, hematemesis, melena, hematochezia, diarrhea, constipation, dysuria, hematuria, bruising, or any other symptoms  She was seen in the [facility] ED in November for SOB and 3 days of hemoptysis  Work up was mostly negative and she saw ENT as an outpatient who felt hemoptysis was likely 2/2 dryness related to her trach  She did undergo colonoscopy according to her >5 years ago (nothing in chart) and that was normal to her knowledge      In the ED, she was afebrile, tachycardic to 109, RR 20, -160, saturating 96-99% on room air to 2L (which appears to be for comfort)  Labs were significant for Cr 1 79, Hgb 6 4, procal 0 10, trop 0 19>0  24  CXR wet read looks unremarkable, CT abdomen pelvis showed colonic diverticulosis without diverticulitis, as well as small bilateral pleural effusions  She was given 1u pRBC in the ED  ASSESSMENT/PLAN      Acute blood loss anemia  Assessment & Plan  Patient presenting with Hgb 6 4, with worsening SOB and fatigue for 3 days, in setting of mild MINER for a few months  Patient denied any melena, hematochezia, hematemesis, hematuria, hemoptysis (had some hemoptysis back in November - felt to be due to dryness from trach), no new bruising  Patient says she thinks she had a colonoscopy >5 years ago (though nothing in chart), which she believed to be normal  Does not use NSAIDs  Has some LUQ tenderness  Tachycardic on exam, likely 2/2 severe anemia  CT showed diverticulosis without diverticulitis  Unclear source of bleed, though feel it is most likely GI source  Received 1u pRBC in ED   -Protonix BID IV  -GI consulted  Discussed patient with them; they recommend NPO at midnight and possible EGD/colonscopy tomorrow  Patient is on plavix due to prior carotid stent   They recommend continuing the plavix   -Clear liquid diet, NPO at midnight    -Last CBC at 4pm on admission, blood still transfusing as of 9pm  Will check Hgb with CBC on morning labs at 4am    -Iron panel pending   -Transfuse Hgb <7  -Monitor hemodynamic status       Acute on chronic kidney failure [facility])  Assessment & Plan  Lab Results   Component Value Date     EGFR 26 02/07/2021     EGFR 52 07/23/2020     CREATININE 1 79 (H) 02/07/2021     CREATININE 1 01 07/23/2020     CREATININE 1 52 (H) 05/02/2018     Patient known to have CKD 3, with baseline Cr on labs appearing to be about 1 1-1 4, however, there is an outpatient Nephro note from 05/2020 that suggests baseline Cr is likely around 1 45  Presenting today with Cr 1 79 in the setting of symptomatic anemia  JORDAN likely pre-renal 2/2 anemia with Hgb 6 4   -Patient received 1u pRBC in the ED  Did have some crackles in lungs, so will only off on giving any fluids for now  Do expect Cr to improve with transfusion   -Encourage good PO intake  -Avoid nephrotoxic agents  Losartan on hold       Elevated troponin  Assessment & Plan  Patient presented with worsening SOB, fatigue x3 days, has also had mild MINER for a couple months  No CP or palpitations  Found to have Hgb 6 4 on admission  Trop 0 19>0 24  EKG with sinus tach  Elevated troponin likely represent type 2 MI in setting on supply/demand mismatch given severe anemia    -Will trend trops until they peak  -Will monitor for cardiac symptoms  -Expect resolution of type 2 MI with blood transfusion, which she received in the ED      Laryngeal carcinoma [facility])  Assessment & Plan  S/p laryngenctomy and trach, as well as oral chemotherapy and radiation in 2002    -Currently stable and patient stable with trach   Nor currently receiving any therapies   -Hypothyroidism and carotid changes possibly 2/2 to radiation to neck      VTE Pharmacologic Prophylaxis: RX contraindicated due to: acute blood loss anemia  VTE Mechanical Prophylaxis: sequential compression device     ED Triage Vitals   Temperature Pulse Respirations Blood Pressure SpO2   02/07/21 1505 02/07/21 1505 02/07/21 1505 02/07/21 1505 02/07/21 1505   98 6 °F (37 °C) (!) 18 20 119/54 96 % w/ Trach Mask      Temp Source Heart Rate Source Patient Position - Orthostatic VS BP Location FiO2 (%)   02/07/21 1505 02/07/21 1505 02/07/21 1505 02/07/21 1505 --   Oral Monitor Lying Right arm       Pain Score       02/07/21 1609       8          Wt Readings from Last 1 Encounters:   02/07/21 64 4 kg (142 lb)     Additional Vital Signs:   02/08/21 0600  --  96  18  153/67  97  98 %  --  5 L/min  --  Trach mask  Lying   02/08/21 0315  --  98  18  122/55  79  97 %  --  5 L/min  --  Trach mask  Lying   02/08/21 0035  --  104  19  108/51  --  95 %  --  --  --  --  --   02/07/21 2210  --  --  18  --  --  --  --  --  --  --  --   02/07/21 1910  98 1 °F (36 7 °C)  100  20  160/67  --  99 %  28  --  2 L/min  Nasal cannula  --   02/07/21 1813  --  98  20  144/63  --  99 %  --  --  --  None (Room air)  Sitting   02/07/21 1731  --  106Abnormal   20  --  --  97 %  --  --  --  --          02/07 0701 02/08 0700   Blood 355 8   Total Intake(mL/kg) 355 8 (5 5)   Net +355 8     Pertinent Labs/Diagnostic Test Results:     Results from last 7 days   Lab Units 02/08/21  0432 02/07/21  1610   WBC Thousand/uL 6 15 5 58   HEMOGLOBIN g/dL 8 1* 6 4*   HEMATOCRIT % 28 3* 23 7*   PLATELETS Thousands/uL 204 254   NEUTROS ABS Thousands/µL  --  4 01     Results from last 7 days   Lab Units 02/08/21  0432 02/07/21  1610   SODIUM mmol/L 144 142   POTASSIUM mmol/L 3 8 4 0   CHLORIDE mmol/L 112* 110*   CO2 mmol/L 26 23   ANION GAP mmol/L 6 9   BUN mg/dL 25 27*   CREATININE mg/dL 1 63* 1 79*   EGFR ml/min/1 73sq m 29 26   CALCIUM mg/dL 9 2 9 8   MAGNESIUM mg/dL 1 7  --      Results from last 7 days   Lab Units 02/07/21  1610   AST U/L 26   ALT U/L 22   ALK PHOS U/L 83   TOTAL PROTEIN g/dL 7 6   ALBUMIN g/dL 3 3*   TOTAL BILIRUBIN mg/dL 0 45   BILIRUBIN DIRECT mg/dL 0 13     Results from last 7 days   Lab Units 02/08/21  0432 02/07/21  1610   GLUCOSE RANDOM mg/dL 107 100 Results from last 7 days   Lab Units 02/08/21  0432 02/08/21  0111 02/07/21  2208 02/07/21  1901 02/07/21  1610   TROPONIN I ng/mL 0 25* 0 26* 0 28* 0 24* 0 19*     Results from last 7 days   Lab Units 02/08/21  0432   PROTIME seconds 14 4   INR  1 12     Results from last 7 days   Lab Units 02/08/21  0617 02/07/21  1610   PROCALCITONIN ng/ml 0 10 0 10     Results from last 7 days   Lab Units 02/07/21  1610   FERRITIN ng/mL 8     Results from last 7 days   Lab Units 02/07/21  1610   LIPASE u/L 84     Results from last 7 days   Lab Units 02/07/21  2211   CLARITY UA  Clear   COLOR UA  Yellow   SPEC GRAV UA  1 015   PH UA  6 0   GLUCOSE UA mg/dl Negative   KETONES UA mg/dl 15 (1+)*   BLOOD UA  Negative   PROTEIN UA mg/dl Negative   NITRITE UA  Negative   BILIRUBIN UA  Negative   UROBILINOGEN UA E U /dl 0 2   LEUKOCYTES UA  Negative     CT ABDOMEN AND PELVIS WITHOUT IV CONTRAST   Colonic diverticulosis without evidence for diverticulitis   No evidence for bowel obstruction  Small bilateral pleural effusions       ED Treatment:   Medication Administration from 02/07/2021 1458 to 02/08/2021 0912       Date/Time Order Dose Route Action     02/07/2021 1609 fentanyl citrate (PF) 100 MCG/2ML 50 mcg 50 mcg Intravenous Given     02/07/2021 1905 aspirin chewable tablet 324 mg 324 mg Oral Given     02/07/2021 2058 atorvastatin (LIPITOR) tablet 40 mg 40 mg Oral Given     02/08/2021 0909 loratadine (CLARITIN) tablet 5 mg 5 mg Oral Given     02/08/2021 0610 levothyroxine tablet 100 mcg 100 mcg Oral Given     02/07/2021 2234 niacin tablet 100 mg 100 mg Oral Given     02/08/2021 0909 pantoprazole (PROTONIX) injection 40 mg 40 mg Intravenous Given     02/07/2021 2058 pantoprazole (PROTONIX) injection 40 mg 40 mg Intravenous Given     02/08/2021 0909 clopidogrel (PLAVIX) tablet 75 mg 75 mg Oral Given     02/07/2021 2152 clopidogrel (PLAVIX) tablet 75 mg 75 mg Oral Given     02/08/2021 0909 magnesium sulfate IVPB (premix) SOLN 1 g 1 g Intravenous New Bag     Past Medical History:   Diagnosis Date    Aortoiliac occlusive disease ([facility] )     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 (HCC)     Carotid artery stenosis     Hyperlipidemia     Hypertension     [facility]ipheral [facility]     Throat cancer ([facility] )      Present on Admission:   Hyperlipidemia   Hypertension   Laryngeal carcinoma (HCC)   CKD (chronic kidney disease) stage 3, GFR 30-59 ml/min   Asymptomatic carotid artery stenosis    Admitting Diagnosis: SOB (shortness of breath) [R06 02]    Age/Sex: 81 y o  female    Admission Orders:  Telemetry  VS q4hrs  Trach Mask - O2 at 2 L/min - Titrate SpO2 > 92 %  Continuous Pulse Oximetry  Transfuse 1 Unit PRBC's  CBC in am  Up with assistance  NPO  Daily weight  I+O q shift  PT + OT Evals    Scheduled Medications:  atorvastatin, 40 mg, Oral, Daily With Dinner  clopidogrel, 75 mg, Oral, Daily  levothyroxine, 100 mcg, Oral, Early Morning  loratadine, 5 mg, Oral, Daily  IV magnesium sulfate, 1 g, Intravenous, Once  niacin, 100 mg, Oral, HS  IV pantoprazole, 40 mg, Intravenous, Q12H H[place]IS     PRN Meds:  albuterol, 2 puff, Inhalation, Q4H PRN    IP CONSULT TO GASTROENTEROLOGY  IP CONSULT TO CASE MANAGEMENT    Network Utilization Review Department  ATTENTION: Please call with any questions or concerns to [phone] and carefully listen to the prompts so that you are directed to the right person  All voicemails are confidential   Jacquelyn Magallon all requests for admission clinical reviews, approved or denied determinations and any other requests to dedicated fax number below belonging to the campus where the patient is receiving treatment   List of dedicated fax numbers for the Facilities:  [address] DENIALS (Administrative/Medical Necessity) [phone]   [address] (Maternity/NICU/Pediatrics) 261 [facility] [facility] [phone]   Otis Owens [address]  [phone]   [address]) [address] [place] [address] [address]sie [address] [phone]   [place] [address] [phone]

## 2021-02-08 NOTE — RESPIRATORY THERAPY NOTE
RT Protocol Note  Nolan  81 y o  female MRN: [id number]  Unit/Bed#: ED 21 Encounter: [id number]    Assessment    Principal Problem:    Acute blood loss anemia  Active Problems:    Asymptomatic carotid artery stenosis    Hyperlipidemia    Hypertension    Laryngeal carcinoma (HCC)    CKD (chronic kidney disease) stage 3, GFR 30-59 ml/min    SOB (shortness of breath)    JORDAN (acute kidney injury) ([place] [address] )    Elevated troponin    Acute on chronic kidney failure (HCC)      Home Pulmonary Medications:  Albuterol HFA MDI    Home Devices/Therapy: Home O2(Trach collar)    Past Medical History:   Diagnosis Date    Aortoiliac occlusive disease ([place] [address] )     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 ([place] [address] )     Carotid artery stenosis     Hyperlipidemia     Hypertension     PVD (peripheral vascular disease) ([place] [address] )     Throat cancer ([place] [address] )      Social History     Socioeconomic History    Marital status:       Spouse name: None    Number of children: None    Years of education: None    Highest education level: None   Occupational History    None   Social Needs    Financial resource strain: None    Food insecurity     Worry: None     Inability: None    Transportation needs     Medical: None     Non-medical: None   Tobacco Use    Smoking status: Former Smoker     Quit date:      Years since quittin     Smokeless tobacco: Never Used   Substance and Sexual Activity    Alcohol use: Never     Frequency: Never    Drug use: Never    Sexual activity: None   Lifestyle    Physical activity     Days per week: None     Minutes per session: None    Stress: None   Relationships    Social connections     Talks on phone: None     Gets together: None     Attends [employer] service: None     Active member of club or organization: None     Attends meetings of clubs or organizations: None     Relationship status: None    Intimate partner violence     Fear of current or ex partner: None Emotionally abused: None     Physically abused: None     Forced sexual activity: None   Other Topics Concern    None   Social History Narrative    None       Subjective    Subjective Data: Pt has stomal ring in place  Objective    Physical Exam:   Assessment Type: Assess only  General Appearance: Awake, Alert  Respiratory Pattern: Spontaneous, Normal  Chest Assessment: Chest expansion asymmetrical, Trachea midline  Bilateral Breath Sounds: Diminished  Cough: None  O2 Device: Trach mask    Vitals:  Blood pressure 108/51, pulse 104, temperature 98 1 °F (36 7 °C), resp  rate 19, weight 64 4 kg (142 lb), SpO2 95 %, not currently breastfeeding  Imaging and other studies: I have personally reviewed pertinent reports  O2 Device: Trach mask     Plan    Respiratory Plan: No distress/Pulmonary history        Resp Comments: Pt assessed as per protocol; observed resting in bed with a trachea stoma held open with a flexible silicon ring  RN had nasal cannula bleeding into stoma area; RT replaced with 28% trach mask for humidity  Pt has a remote pulmonary hx, she states she does not use anything; chart lists PRN MDI  CT noted for small bilateral pleural effusions  BBS decreased, no cough present  Will add PRN udn for SOB, will continue to monitor as per protocol

## 2021-02-08 NOTE — CONSULTS
GASTROENTEROLOGY CONSULT NOTE     PATIENT INFORMATION     Name: Marty Blankenship   Age & Sex: 81 y o  female   MRN: [id number]  Hospital Stay Days: 0  Unit/Bed#: ED 21   Encounter: [id number]    ASSESSMENT/PLAN     Principal Problem:    Acute blood loss anemia  Active Problems:    Asymptomatic carotid artery stenosis    Hyperlipidemia    Hypertension    Laryngeal carcinoma (HCC)    CKD (chronic kidney disease) stage 3, GFR 30-59 ml/min    SOB (shortness of breath)    JORDAN (acute kidney injury) ([facility] [address] )    Elevated troponin    Acute on chronic kidney failure ([facility] [address] )    1  Iron Deficiency Anemia  Patient presenting with Hgb 6 4 with baseline 8-10  Iron panel significant for Ferritin 8, Iron Sat 2%, Iron 10, and TIBC 483  Reports she was never informed of previous diagnosis of anemia/iron deficiency and never on iron supplementation  Denies hematemesis, hematochezia, and melena at this time  No use of NSAIDS  Previous smoker  No current alcohol/rec drug use  No history of ulcers  Does have history for GERD, for which she takes Omeprazole 20mg daily  Unsure if she has had EGD done, but does report a procedure she had done in order to "stretch her esophagus" due to dysphagia 2/2 radiation for laryngeal cancer  Reports colonoscopy done 5 years ago, however, unable to find record with chart review  · Plan for EGD/Colonoscopy tomorrow  · Clear liquid diet today and NPO at midnight  · Continue IV Protonix 40mg BID - can likely transition to PO once diet resumed  · T/C iron supplementation therapy - further management per primary team  · Continue to monitor daily CBC  · Transfuse for Hgb <7 0     2  GERD  Patient on Omeprazole 20mg daily for many years at home  Unable to quantify how many years     · Continue Protonix 40mg BID pending EGD results    VTE Pharmacologic Prophylaxis: held 2/2 anemia  VTE Mechanical Prophylaxis: sequential compression device    HISTORY OF PRESENT ILLNESS   Reason for Admission / Rachele López Problem: Acute Blood Loss Anemia  Reason for Consult: Anemia    Bonita Whatley 81 y o  female with PMH significant for laryngeal cancer s/p radiation and tracheostomy, hypertension, CKD Stage 3, and left carotid artery stent placement (7/2020) on plavix who presented to the ED for concern of 3 days of SOB, fatigue, and lightheadedness  Patient reports that on Friday, she got first COVID-19 vaccine  Saturday morning she woke up and was feeling short of breath at rest with associated fatigue and lightheadedness  She reports that although she occasionally gets MINER, she had been short of breath at rest, which was new for her  On arrival to ED she was found to have Hgb of 6 4 with a baseline of 8-10, Cr 1 79 with baseline 1 4-1 6, and elevation of troponin to 0  19  CT abdomen/pelvis done in ED showed colonic diverticulosis without diverticulitis  Patient received 1 unit PRBC with improvement of Hgb to 8 4 and troponin peak at 0 28  Patient reports no history of GI bleed in the past  She states that she has had no hematemesis, melena, or hematochezia  Denies any use of NSAIDS  Previous smoker  No alcohol or recreational drug use  Does report history of GERD, for which she has taken omeprazole for many years (unable to quantify), and denies any current reflux/dyspepsia symptoms  Also denies any history of ulcers  Reports she may have had an EGD done about 2 years ago, when she underwent what she describes as "stretching of her esophagus" as she was having difficulty swallowing 2/2 stricture resulting from radiation  Patient also reports that she had colonoscopy about 5 years ago, however, nothing in chart indicating these procedures  States that she sees a GI physician at [facility], but cannot recall his name  Does report history of hemoptysis 2/2 issues with trach resulting from inhaler use  SUBJECTIVE     Patient seen and evaluated at bedside   Reports that symptoms leading to presentation have resolved  She no longer feels short of breath or lightheaded  Continues to endorse no hematochezia, melena, or hematemesis  Does endorse intermittent LUQ abdominal pain over the past month, which she reports is a dull gnawing pain, and describes it as more of a nuisance than a true pain  Denies any reflux symptoms  REVIEW OF SYSTEMS   Review of Systems   Constitutional: Positive for fatigue  Negative for activity change, appetite change, chills and fever  HENT: Negative for rhinorrhea, sneezing, sore throat, tinnitus and trouble swallowing  Eyes: Negative for pain and visual disturbance  Respiratory: Negative for cough, shortness of breath and wheezing  Cardiovascular: Negative for chest pain, palpitations and leg swelling  Gastrointestinal: Positive for constipation (chronic)  Negative for abdominal pain, blood in stool, diarrhea, nausea and vomiting  Genitourinary: Negative for difficulty urinating and dysuria  Musculoskeletal: Positive for back pain  Negative for arthralgias  Skin: Negative for color change and rash  Neurological: Positive for light-headedness (improved)  Negative for dizziness, weakness and headaches  Hematological: Bruises/bleeds easily  Psychiatric/Behavioral: Negative for confusion  The patient is not nervous/anxious  OBJECTIVE     Vitals:    21 2210 21 0035 21 0315 21 0600   BP:  108/51 122/55 153/67   BP Location:   Right arm Right arm   Pulse:  104 98 96   Resp: 18 19 18 18   Temp:       TempSrc:       SpO2:  95% 97% 98%   Weight:          Temperature:   Temp (24hrs), Av 2 °F (36 8 °C), Min:98 °F (36 7 °C), Max:98 6 °F (37 °C)    Temperature: 98 1 °F (36 7 °C)  Intake & Output:  I/O        07 -  0700  07 -  0700  07 -  0700    Blood  355 8     Total Intake(mL/kg)  355 8 (5 5)     Net  +355 8                Weights:   IBW: -92 5 kg    Body mass index is 28 68 kg/m²    Weight (last 2 days) Date/Time   Weight    02/07/21 1505   64 4 (142)            Physical Exam  Vitals signs reviewed  Constitutional:       General: She is not in acute distress  Appearance: Normal appearance  She is not ill-appearing or diaphoretic  HENT:      Head: Normocephalic and atraumatic  Right Ear: External ear normal       Left Ear: External ear normal       Nose: Nose normal       Mouth/Throat:      Mouth: Mucous membranes are moist       Pharynx: Oropharynx is clear  Eyes:      General:         Right eye: No discharge  Left eye: No discharge  Conjunctiva/sclera: Conjunctivae normal    Neck:      Musculoskeletal: No muscular tenderness  Comments: Tracheostomy in place  Cardiovascular:      Rate and Rhythm: Regular rhythm  Tachycardia present  Pulses: Normal pulses  Heart sounds: Murmur present  Pulmonary:      Effort: Pulmonary effort is normal  No respiratory distress  Breath sounds: Normal breath sounds  No wheezing  Abdominal:      General: Bowel sounds are normal  There is no distension  Palpations: Abdomen is soft  Tenderness: There is no abdominal tenderness  Comments: Negative ROEL/Hemoccult   Musculoskeletal: Normal range of motion  General: No swelling or tenderness  Right lower leg: No edema  Left lower leg: No edema  Skin:     General: Skin is warm and dry  Neurological:      General: No focal deficit present  Mental Status: She is alert and oriented to person, place, and time  Cranial Nerves: No cranial nerve deficit  Motor: No weakness  Psychiatric:         Mood and Affect: Mood normal          Behavior: Behavior normal          Thought Content:  Thought content normal        PAST MEDICAL HISTORY     Past Medical History:   Diagnosis Date    [facility]ac occlusive [facility] [address] )     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 ([facility] [address] )     Carotid artery stenosis     Hyperlipidemia     Hypertension     PVD (peripheral vascular disease) (HCC)     Throat cancer ([address] )      PAST SURGICAL HISTORY     Past Surgical History:   Procedure Laterality Date    GALLBLADDER SURGERY  2019    ILIAC ARTERY STENT Left     IR CEREBRAL ANGIOGRAPHY / INTERVENTION  2020    LARYNX SURGERY  2002    LEG SURGERY  07/10/2012     SOCIAL & FAMILY HISTORY     Social History     Substance and Sexual Activity   Alcohol Use Never    Frequency: Never     Social History     Substance and Sexual Activity   Drug Use Never     Social History     Tobacco Use   Smoking Status Former Smoker    Quit date:     Years since quittin 1   Smokeless Tobacco Never Used     Family History   Problem Relation Age of Onset    Heart disease Brother     No Known Problems Mother     No Known Problems Father     Breast cancer Sister 71    No Known Problems Daughter     No Known Problems Maternal Grandmother     No Known Problems Maternal Grandfather     No Known Problems Paternal Grandmother     No Known Problems Paternal Grandfather     No Known Problems Sister     No Known Problems Maternal Aunt     No Known Problems Maternal Aunt     No Known Problems Maternal Aunt      LABORATORY DATA     Labs: I have personally reviewed pertinent reports      Results from last 7 days   Lab Units 21  0432 21  1610   WBC Thousand/uL 6 15 5 58   HEMOGLOBIN g/dL 8 1* 6 4*   HEMATOCRIT % 28 3* 23 7*   PLATELETS Thousands/uL 204 254   NEUTROS PCT %  --  71   MONOS PCT %  --  8      Results from last 7 days   Lab Units 21  0432 21  1610   POTASSIUM mmol/L 3 8 4 0   CHLORIDE mmol/L 112* 110*   CO2 mmol/L 26 23   BUN mg/dL 25 27*   CREATININE mg/dL 1 63* 1 79*   CALCIUM mg/dL 9 2 9 8   ALK PHOS U/L  --  83   ALT U/L  --  22   AST U/L  --  26     Results from last 7 days   Lab Units 21  0432   MAGNESIUM mg/dL 1 7          Results from last 7 days   Lab Units 21  0432   INR  1 12         Results from last 7 days   Lab Units 02/08/21  0432 02/08/21  0111 02/07/21  2208   TROPONIN I ng/mL 0 25* 0 26* 0 28*     Micro:  No results found for: BLOODCX, Renell Kali, WOUNDCULT, SPUTUMCULTUR  IMAGING & DIAGNOSTIC TESTS     Imaging: I have personally reviewed pertinent reports  Ct Abdomen Pelvis Wo Contrast    Result Date: 2/7/2021  Impression: Colonic diverticulosis without evidence for diverticulitis  No evidence for bowel obstruction  Small bilateral pleural effusions  Workstation performed: [id number]     EKG, Pathology, and Other Studies: I have personally reviewed pertinent reports  ALLERGIES   No Known Allergies  MEDICATIONS PRIOR TO ARRIVAL     Prior to Admission medications    Medication Sig Start Date End Date Taking?  Authorizing Provider   acetaminophen (TYLENOL) 325 mg tablet Take 2 tablets (650 mg total) by mouth every 6 (six) hours as needed for mild pain 7/23/20  Yes Urmila Jesus PA-C   atorvastatin (LIPITOR) 40 mg tablet Take by mouth   Yes Historical Provider, MD   Calcium-Magnesium-Vitamin D ER ([facility]) 600- MG-MG-UNIT TB24 Take by mouth   Yes Historical Provider, MD   cetirizine (ZyrTEC) 5 MG tablet Take 5 mg by mouth daily   Yes Historical Provider, MD   Cholecalciferol (VITAMIN D-3) 1000 units CAPS Take by mouth   Yes Historical Provider, MD   clopidogrel (PLAVIX) 75 mg tablet TAKE 1 TABLET(75 MG) BY MOUTH DAILY 3/30/20  Yes MD Rich Henriquezyla Dross Oil 1000 MG CAPS Take by mouth   Yes Historical Provider, MD   levothyroxine 100 mcg tablet  1/7/19  Yes Historical Provider, MD   losartan (COZAAR) 50 mg tablet Take 1 tablet (50 mg total) by mouth daily 7/24/20  Yes Urmila Jesus PA-C   Magnesium 250 MG TABS Take 250 mg by mouth daily   Yes Historical Provider, MD   metFORMIN (GLUCOPHAGE) 500 mg tablet Take 0 5 tablets (250 mg total) by mouth daily with breakfast 7/24/20  Yes Urmila Jesus PA-C   Multiple Vitamins-Minerals (CENTRUM SILVER 50+WOMEN PO) Take by mouth   Yes Historical Provider, MD   niacin 500 mg ER capsule Take 500 mg by mouth daily at bedtime   Yes Historical Provider, MD   [place]-3 1000 MG CAPS Take 4 capsules by mouth daily    Yes Historical Provider, MD   omeprazole (PriLOSEC) 20 mg delayed release capsule Take by mouth 2/9/16  Yes Historical Provider, MD   VENTOLIN  (90 Base) MCG/ACT inhaler  2/16/18  Yes Historical Provider, MD   cefuroxime (CEFTIN) 250 mg tablet  12/26/18   Historical Provider, MD   fluticasone (FLONASE) 50 mcg/act nasal spray into each nostril 3/30/16   Historical Provider, MD   methocarbamol (ROBAXIN) 500 mg tablet TK 1 T PO BID PRN 1/17/18   Historical Provider, MD     MEDICATIONS ADMINISTERED IN LAST 24 HOURS     Medication Administration - last 24 hours from 02/07/2021 0810 to 02/08/2021 0810       Date/Time Order Dose Route Action Action by     02/07/2021 1609 fentanyl citrate (PF) 100 MCG/2ML 50 mcg 50 mcg Intravenous Given Clarnce Sleeper, RN     02/07/2021 1905 aspirin chewable tablet 324 mg 324 mg [facility]     02/07/2021 2058 atorvastatin (LIPITOR) tablet 40 mg 40 mg Oral [facility], RN     02/08/2021 5126 levothyroxine tablet 100 mcg 100 mcg Oral Given Dilip Alexus, RN     02/07/2021 2234 niacin tablet 100 mg 100 mg Oral Given [facility] S[facility], RN     02/07/2021 2058 pantoprazole (PROTONIX) injection 40 mg 40 mg [facility]ous [facility]     02/07/2021 2152 clopidogrel (PLAVIX) tablet 75 mg 75 mg Oral Given Clarnce Sleepdeonna, RN        CURRENT MEDICATIONS     Current Facility-Administered Medications   Medication Dose Route Frequency Provider Last Rate    albuterol  2 puff Inhalation Q4H PRN Ginna Lamb MD      atorvastatin  40 mg Oral Daily With Yessy Montano MD      clopidogrel  75 mg Oral Daily Ginna Lamb MD      levothyroxine  100 mcg Oral Early Morning Ginna Lamb MD      loratadine  5 mg Oral Daily MD Robinson Potter magnesium sulfate  1 g Intravenous Once Marina Cid MD      niacin  100 mg Oral HS Griffin Renteria MD      pantoprazole  40 mg Intravenous Q12H [facility] Griffin Renteria MD          albuterol, 2 puff, Q4H PRN        Portions of the record may have been created with voice recognition software  Occasional wrong word or "sound a like" substitutions may have occurred due to the inherent limitations of voice recognition software    Read the chart carefully and recognize, using context, where substitutions have occurred     ==  Snow Leon, [address] [facility]  Internal Medicine Residency

## 2021-02-08 NOTE — ASSESSMENT & PLAN NOTE
Patient presented with worsening SOB, fatigue x3 days, has also had mild MINER for a couple months  No CP or palpitations  Found to have Hgb 6 4 on admission  Trop 0 19>0 24  EKG with sinus tach  Elevated troponin likely represent type 2 MI in setting on supply/demand mismatch given severe anemia    -Trops peaked at 0 28  -Will monitor for cardiac symptoms  -Expect resolution of type 2 MI with blood transfusion, which she received in the ED

## 2021-02-08 NOTE — ASSESSMENT & PLAN NOTE
S/p laryngenctomy and trach, as well as oral chemotherapy and radiation in 2002    -Currently stable and patient stable with trach  Nor currently receiving any therapies   -Hypothyroidism and carotid changes possibly 2/2 to radiation to neck

## 2021-02-08 NOTE — ASSESSMENT & PLAN NOTE
Lab Results   Component Value Date    EGFR 29 02/08/2021    EGFR 26 02/07/2021    EGFR 52 07/23/2020    CREATININE 1 63 (H) 02/08/2021    CREATININE 1 79 (H) 02/07/2021    CREATININE 1 01 07/23/2020

## 2021-02-08 NOTE — PLAN OF CARE
Problem: Potential for Falls  Goal: Patient will remain free of falls  Description: INTERVENTIONS:  - Assess patient frequently for physical needs  -  Identify cognitive and physical deficits and behaviors that affect risk of falls    -  [place] fall precautions as indicated by assessment   - Educate patient/family on patient safety including physical limitations  - Instruct patient to call for assistance with activity based on assessment  - Modify environment to reduce risk of injury  - Consider OT/PT consult to assist with strengthening/mobility  Outcome: Progressing     Problem: PAIN - ADULT  Goal: Verbalizes/displays adequate comfort level or baseline comfort level  Description: Interventions:  - Encourage patient to monitor pain and request assistance  - Assess pain using appropriate pain scale  - Administer analgesics based on type and severity of pain and evaluate response  - Implement non-pharmacological measures as appropriate and evaluate response  - Consider cultural and social influences on pain and pain management  - Notify physician/advanced practitioner if interventions unsuccessful or patient reports new pain  Outcome: Progressing     Problem: INFECTION - ADULT  Goal: Absence or prevention of progression during hospitalization  Description: INTERVENTIONS:  - Assess and monitor for signs and symptoms of infection  - Monitor lab/diagnostic results  - Monitor all insertion sites, i e  indwelling lines, tubes, and drains  - Monitor endotracheal if appropriate and nasal secretions for changes in amount and color  - [place] appropriate cooling/warming therapies per order  - Administer medications as ordered  - Instruct and encourage patient and family to use good hand hygiene technique  - Identify and instruct in appropriate isolation precautions for identified infection/condition  Outcome: Progressing  Goal: Absence of fever/infection during neutropenic period  Description: INTERVENTIONS:  - Monitor WBC    Outcome: Progressing     Problem: SAFETY ADULT  Goal: Patient will remain free of falls  Description: INTERVENTIONS:  - Assess patient frequently for physical needs  -  Identify cognitive and physical deficits and behaviors that affect risk of falls    -  [place] fall precautions as indicated by assessment   - Educate patient/family on patient safety including physical limitations  - Instruct patient to call for assistance with activity based on assessment  - Modify environment to reduce risk of injury  - Consider OT/PT consult to assist with strengthening/mobility  Outcome: Progressing  Goal: Maintain or return to baseline ADL function  Description: INTERVENTIONS:  -  Assess patient's ability to carry out ADLs; assess patient's baseline for ADL function and identify physical deficits which impact ability to perform ADLs (bathing, care of mouth/teeth, toileting, grooming, dressing, etc )  - Assess/evaluate cause of self-care deficits   - Assess range of motion  - Assess patient's mobility; develop plan if impaired  - Assess patient's need for assistive devices and provide as appropriate  - Encourage maximum independence but intervene and supervise when necessary  - Involve family in performance of ADLs  - Assess for home care needs following discharge   - Consider OT consult to assist with ADL evaluation and planning for discharge  - Provide patient education as appropriate  Outcome: Progressing  Goal: Maintain or return mobility status to optimal level  Description: INTERVENTIONS:  - Assess patient's baseline mobility status (ambulation, transfers, stairs, etc )    - Identify cognitive and physical deficits and behaviors that affect mobility  - Identify mobility aids required to assist with transfers and/or ambulation (gait belt, sit-to-stand, lift, walker, cane, etc )  - [place] fall precautions as indicated by assessment  - Record patient progress and toleration of activity level on Mobility SBAR; progress patient to next Phase/Stage  - Instruct patient to call for assistance with activity based on assessment  - Consider rehabilitation consult to assist with strengthening/weightbearing, etc   Outcome: Progressing     Problem: DISCHARGE PLANNING  Goal: Discharge to home or other facility with appropriate resources  Description: INTERVENTIONS:  - Identify barriers to discharge w/patient and caregiver  - Arrange for needed discharge resources and transportation as appropriate  - Identify discharge learning needs (meds, wound care, etc )  - Arrange for interpretive services to assist at discharge as needed  - Refer to Case Management Department for coordinating discharge planning if the patient needs post-hospital services based on physician/advanced practitioner order or complex needs related to functional status, cognitive ability, or social support system  Outcome: Progressing     Problem: Knowledge Deficit  Goal: Patient/family/caregiver demonstrates understanding of disease process, treatment plan, medications, and discharge instructions  Description: Complete learning assessment and assess knowledge base    Interventions:  - Provide teaching at level of understanding  - Provide teaching via preferred learning methods  Outcome: Progressing

## 2021-02-08 NOTE — H&P
INTERNAL MEDICINE RESIDENCY ADMISSION H&P     Name: Cornell Martinez   Age & Sex: 81 y o  female   MRN: [id number]  Unit/Bed#: CJ   Encounter: [id number]  Primary Care Provider: Hansa Gama MD    Code Status: Level 3 - DNAR and DNI  Admission Status: OBSERVATION  Disposition: Patient requires Med/Surg    Admit to team: SOD Team C     ASSESSMENT/PLAN     Active Problems:    Asymptomatic carotid artery stenosis    Hyperlipidemia    Hypertension    Laryngeal carcinoma ([place] [address] )    CKD (chronic kidney disease) stage 3, GFR 30-59 ml/min    SOB (shortness of breath)    JORDAN (acute kidney injury) ([facility] 75 )    Acute blood loss anemia    Elevated troponin    Acute on chronic kidney failure ([place] [address] )      Acute on chronic kidney failure [facility])  Assessment & Plan  Lab Results   Component Value Date    EGFR 26 02/07/2021    EGFR 52 07/23/2020    CREATININE 1 79 (H) 02/07/2021    CREATININE 1 01 07/23/2020    CREATININE 1 52 (H) 05/02/2018   Patient known to have CKD 3, with baseline Cr on labs appearing to be about 1 1-1 4, however, there is an outpatient Nephro note from 05/2020 that suggests baseline Cr is likely around 1 45  Presenting today with Cr 1 79 in the setting of symptomatic anemia  JORDAN likely pre-renal 2/2 anemia with Hgb 6 4   -Patient received 1u pRBC in the ED  Did have some crackles in lungs, so will only off on giving any fluids for now  Do expect Cr to improve with transfusion   -Encourage good PO intake  -Avoid nephrotoxic agents  Losartan on hold  Elevated troponin  Assessment & Plan  Patient presented with worsening SOB, fatigue x3 days, has also had mild MINER for a couple months  No CP or palpitations  Found to have Hgb 6 4 on admission  Trop 0 19>0 24  EKG with sinus tach  Elevated troponin likely represent type 2 MI in setting on supply/demand mismatch given severe anemia    -Will trend trops until they peak  -Will monitor for cardiac symptoms     -Expect resolution of type 2 MI with blood transfusion, which she received in the ED  Acute blood loss anemia  Assessment & Plan  Patient presenting with Hgb 6 4, with worsening SOB and fatigue for 3 days, in setting of mild MINER for a few months  Patient denied any melena, hematochezia, hematemesis, hematuria, hemoptysis (had some hemoptysis back in November - felt to be due to dryness from trach), no new bruising  Patient says she thinks she had a colonoscopy >5 years ago (though nothing in chart), which she believed to be normal  Does not use NSAIDs  Has some LUQ tenderness  Tachycardic on exam, likely 2/2 severe anemia  CT showed diverticulosis without diverticulitis  Unclear source of bleed, though feel it is most likely GI source  Received 1u pRBC in ED   -Protonix BID IV  -GI consulted  Discussed patient with them; they recommend NPO at midnight and possible EGD/colonscopy tomorrow  Patient is on plavix due to prior carotid stent  They recommend continuing the plavix   -Clear liquid diet, NPO at midnight    -Last CBC at 4pm on admission, blood still transfusing as of 9pm  Will check Hgb with CBC on morning labs at 4am    -Iron panel pending   -Transfuse Hgb <7  -Monitor hemodynamic status  SOB (shortness of breath)  Assessment & Plan  MINER since November, worsening SOB with exertion and at rest x 3 days, as well as fatigue  Found to have Hgb 6 4 on admission  Patient was given inhaler in November by PCP as PCP felt patient may have COPD from 20 pack year smoking history  SOB on admission very likely 2/2 symptomatic anemia  No chest pain on admission  Patient did have some wheezes and crackles on exam    -Expect SOB to improve with treatment of anemia  Received 1u pRBC in the ED   -Home inhaler ordered   -respiratory protocol ordered given patient with some wheezes and on 2L NC (which appears to be mostly for comfort)  -Monitor respiratory status      Laryngeal carcinoma [facility])  Assessment & Plan  S/p laryngenctomy and trach, as well as oral chemotherapy and radiation in 2002    -Currently stable and patient stable with trach  Nor currently receiving any therapies   -Hypothyroidism and carotid changes possibly 2/2 to radiation to neck  Hypertension  Assessment & Plan  Well controlled at home on Losartan 50mg daily   -Will hold for now given JORDAN  -Monitor BP, can add prn antihypertensive if patient is hypertensive  Hyperlipidemia  Assessment & Plan  On atorvastatin 40mg qhs and niacin at home   -Continue atorvastatin   -Continue Niacin  Asymptomatic carotid artery stenosis  Assessment & Plan  Patient found to have critical near occlusive R ICA stenosis, s/p stenting in 07/2020  Currently stable and on Plavix   -Spoke with GI regarding whether or not to continue Plavix given acute blood loss anemia and likely EGD/colonoscopy tomorrow  They recommended continuing plavix  Will continue plavix for now  VTE Pharmacologic Prophylaxis: RX contraindicated due to: acute blood loss anemia  VTE Mechanical Prophylaxis: sequential compression device    CHIEF COMPLAINT     Chief Complaint   Patient presents with    Shortness of Breath     pt c/o MINER for the "last couple of days" pt denies CP but complains of pain under her left ribs      HISTORY OF PRESENT ILLNESS     Christoph Cornelius is a 78yo F with PMH significant for laryngeal carcinoma s/p radiation and trach, HTN, HLD, CKD (baseline appears to be 1 1-1 45), hypothyroidism, L carotid artery atresia s/p stent in 07/2020 on plavix who presents for 3 days of fatigue and SOB  Patient states that since November 2020 she has had some mild MINER as well as dyspnea at rest, but no there symptoms  She saw her PCP who suggested patient may have COPD given 20 pack year smoking history, and gave the patient an inhaler, which helped only minimally  She also noticed about 1 month ago that she was experiencing intermittent, mild, non-radiating, 6/10 LUQ abdominal pain  This pain was not associated with eating and came at random  Over the last 3 days prior to admission, patient says she has been having worsening fatigue, MINER, and dyspnea at rest  She also endorsed some lightheadedness when she stands up, which quickly resolves  She denies any associated chest pain, palpitations, dizziness, N/V, headaches, fevers, chills, cough, hemoptysis, hematemesis, melena, hematochezia, diarrhea, constipation, dysuria, hematuria, bruising, or any other symptoms  She was seen in the [facility] ED in November for SOB and 3 days of hemoptysis  Work up was mostly negative and she saw ENT as an outpatient who felt hemoptysis was likely 2/2 dryness related to her trach  She did undergo colonoscopy according to her >5 years ago (nothing in chart) and that was normal to her knowledge  In the ED, she was afebrile, tachycardic to 109, RR 20, -160, saturating 96-99% on room air to 2L (which appears to be for comfort)  Labs were significant for Cr 1 79, Hgb 6 4, procal 0 10, trop 0 19>0  24  CXR wet read looks unremarkable, CT abdomen pelvis showed colonic diverticulosis without diverticulitis, as well as small bilateral pleural effusions  She was given 1u pRBC in the ED  REVIEW OF SYSTEMS     Review of Systems   Constitutional: Positive for fatigue  Negative for activity change, appetite change, chills, diaphoresis and fever  HENT: Negative for congestion, nosebleeds, postnasal drip, rhinorrhea, sneezing and sore throat  Respiratory: Positive for shortness of breath  Negative for cough, choking, chest tightness, wheezing and stridor  Cardiovascular: Negative for chest pain, palpitations and leg swelling  Gastrointestinal: Positive for abdominal pain  Negative for abdominal distention, blood in stool, constipation, diarrhea, nausea and vomiting  Genitourinary: Negative for difficulty urinating, dysuria, flank pain and hematuria  Musculoskeletal: Negative for back pain and myalgias  Neurological: Positive for light-headedness   Negative for dizziness, syncope, weakness, numbness and headaches  Psychiatric/Behavioral: Negative for confusion  OBJECTIVE     Vitals:    21 1731 21 1813 21 1856 21 1910   BP:  144/63 156/67 160/67   BP Location:  Left arm     Pulse: (!) 106 98 104 100   Resp: 20 20 20 20   Temp:   98 °F (36 7 °C) 98 1 °F (36 7 °C)   TempSrc:       SpO2: 97% 99%  99%   Weight:          Temperature:   Temp (24hrs), Av 2 °F (36 8 °C), Min:98 °F (36 7 °C), Max:98 6 °F (37 °C)    Temperature: 98 1 °F (36 7 °C)  Intake & Output:  I/O     None        Weights:   IBW: -92 5 kg    Body mass index is 28 68 kg/m²  Weight (last 2 days)     Date/Time   Weight    21 1505   64 4 (142)            Physical Exam  Constitutional:       General: She is not in acute distress  Appearance: She is not ill-appearing or diaphoretic  Comments: Tired appearing   HENT:      Head: Normocephalic and atraumatic  Mouth/Throat:      Mouth: Mucous membranes are dry  Eyes:      Extraocular Movements: Extraocular movements intact  Pupils: Pupils are equal, round, and reactive to light  Comments: Conjunctival pallor   Cardiovascular:      Rate and Rhythm: Regular rhythm  Tachycardia present  Pulses: Normal pulses  Heart sounds: Normal heart sounds  No murmur  No friction rub  No gallop  Pulmonary:      Effort: Pulmonary effort is normal  No respiratory distress  Breath sounds: No stridor  Wheezing and rales present  No rhonchi  Comments: Scattered bilateral wheezing  Diffuse b/l crackles   Abdominal:      General: Abdomen is flat  Bowel sounds are normal  There is no distension  Palpations: Abdomen is soft  Tenderness: There is abdominal tenderness  There is no guarding or rebound  Comments: Mild LUQ TTP   Musculoskeletal:      Right lower leg: No edema  Left lower leg: No edema  Skin:     General: Skin is warm and dry  Findings: No bruising     Neurological: General: No focal deficit present  Mental Status: She is alert and oriented to person, place, and time  Mental status is at baseline  Psychiatric:         Mood and Affect: Mood normal        PAST MEDICAL HISTORY     Past Medical History:   Diagnosis Date    Aortoiliac occlusive disease ([facility] )     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 (HCC)     Carotid artery stenosis     Hyperlipidemia     Hypertension     PVD (peripheral vascular disease) (HCC)     [facility] )      PAST SURGICAL HISTORY     Past Surgical History:   Procedure Laterality Date    GALLBLADDER SURGERY  2019    ILIAC ARTERY STENT Left     IR CEREBRAL ANGIOGRAPHY / INTERVENTION  2020    LARYNX SURGERY  2002    LEG SURGERY  07/10/2012     SOCIAL & FAMILY HISTORY     Social History     Substance and Sexual Activity   Alcohol Use Never    Frequency: Never     Substance and Sexual Activity   Alcohol Use Never    Frequency: Never        Substance and Sexual Activity   Drug Use Never     Social History     Tobacco Use   Smoking Status Former Smoker    Quit date:     Years since quittin 1   Smokeless Tobacco Never Used     Family History   Problem Relation Age of Onset    Heart disease Brother     No Known Problems Mother     No Known Problems Father     Breast cancer Sister 71    No Known Problems Daughter     No Known Problems Maternal Grandmother     No Known Problems Maternal Grandfather     No Known Problems Paternal Grandmother     No Known Problems Paternal Grandfather     No Known Problems Sister     No Known Problems Maternal Aunt     No Known Problems Maternal Aunt     No Known Problems Maternal Aunt      LABORATORY DATA     Labs: I have personally reviewed pertinent reports      Results from last 7 days   Lab Units 21  1610   WBC Thousand/uL 5 58   HEMOGLOBIN g/dL 6 4*   HEMATOCRIT % 23 7*   PLATELETS Thousands/uL 254   NEUTROS PCT % 71   MONOS PCT % 8 Results from last 7 days   Lab Units 02/07/21  1610   POTASSIUM mmol/L 4 0   CHLORIDE mmol/L 110*   CO2 mmol/L 23   BUN mg/dL 27*   CREATININE mg/dL 1 79*   CALCIUM mg/dL 9 8   ALK PHOS U/L 83   ALT U/L 22   AST U/L 26                      Results from last 7 days   Lab Units 02/07/21  1901 02/07/21  1610   TROPONIN I ng/mL 0 24* 0 19*     Micro:  No results found for: BLOODCX, Mariana Dex, WOUNDCULT, SPUTUMCULTUR  IMAGING & DIAGNOSTIC TESTS     Imaging: I have personally reviewed pertinent reports  Ct Abdomen Pelvis Wo Contrast    Result Date: 2/7/2021  Impression: Colonic diverticulosis without evidence for diverticulitis  No evidence for bowel obstruction  Small bilateral pleural effusions  Workstation performed: [id number]     EKG, Pathology, and Other Studies: I have personally reviewed pertinent reports  ALLERGIES   No Known Allergies  MEDICATIONS PRIOR TO ARRIVAL     Prior to Admission medications    Medication Sig Start Date End Date Taking?  Authorizing Provider   acetaminophen (TYLENOL) 325 mg tablet Take 2 tablets (650 mg total) by mouth every 6 (six) hours as needed for mild pain 7/23/20  Yes Freida Maldonado PA-C   atorvastatin (LIPITOR) 40 mg tablet Take by mouth   Yes Historical Provider, MD   Calcium-Magnesium-Vitamin D ER ([facility]) 600- MG-MG-UNIT TB24 Take by mouth   Yes Historical Provider, MD   cetirizine (ZyrTEC) 5 MG tablet Take 5 mg by mouth daily   Yes Historical Provider, MD   Cholecalciferol (VITAMIN D-3) 1000 units CAPS Take by mouth   Yes Historical Provider, MD   clopidogrel (PLAVIX) 75 mg tablet TAKE 1 TABLET(75 MG) BY MOUTH DAILY 3/30/20  Yes MD Arleen Whitaker [place] Oil 1000 MG CAPS Take by mouth   Yes Historical Provider, MD   levothyroxine 100 mcg tablet  1/7/19  Yes Historical Provider, MD   losartan (COZAAR) 50 mg tablet Take 1 tablet (50 mg total) by mouth daily 7/24/20  Yes Freida Maldonado PA-C   Magnesium 250 MG TABS Take 250 mg by mouth daily   Yes Historical Provider, MD   metFORMIN (GLUCOPHAGE) 500 mg tablet Take 0 5 tablets (250 mg total) by mouth daily with breakfast 7/24/20  Yes Nataliia Bedolla PA-C   Multiple Vitamins-Minerals (CENTRUM SILVER 50+WOMEN PO) Take by mouth   Yes Historical Provider, MD   niacin 500 mg ER capsule Take 500 mg by mouth daily at bedtime   Yes Historical Provider, MD   [place]-3 1000 MG CAPS Take 4 capsules by mouth daily    Yes Historical Provider, MD   omeprazole (PriLOSEC) 20 mg delayed release capsule Take by mouth 2/9/16  Yes Historical Provider, MD   VENTOLIN  (90 Base) MCG/ACT inhaler  2/16/18  Yes Historical Provider, MD   levothyroxine 88 mcg tablet Take 88 mcg by mouth daily  8/28/17 2/7/21 Yes Historical Provider, MD   cefuroxime (CEFTIN) 250 mg tablet  12/26/18   Historical Provider, MD   fluticasone (FLONASE) 50 mcg/act nasal spray into each nostril 3/30/16   Historical Provider, MD   methocarbamol (ROBAXIN) 500 mg tablet TK 1 T PO BID PRN 1/17/18   Historical Provider, MD     MEDICATIONS ADMINISTERED IN LAST 24 HOURS     Medication Administration - last 24 hours from 02/06/2021 2135 to 02/07/2021 2135       Date/Time Order Dose Route Action Action by     02/07/2021 1609 fentanyl citrate (PF) 100 MCG/2ML 50 mcg 50 mcg Intravenous Given Margarita Nuñez RN     02/07/2021 1905 aspirin chewable tablet 324 mg 324 mg Oral Given Margarita Nuñez RN     02/07/2021 2058 atorvastatin (LIPITOR) tablet 40 mg 40 mg Oral Given Margarita Nuñez RN     02/07/2021 2058 pantoprazole (PROTONIX) injection 40 mg 40 mg Intravenous Given Margarita Nuñez RN        CURRENT MEDICATIONS     Current Facility-Administered Medications   Medication Dose Route Frequency Provider Last Rate    albuterol  2 puff Inhalation Q4H PRN Farzana Skinner MD      atorvastatin  40 mg Oral Daily With Dahlia Issa MD      clopidogrel  75 mg Oral Daily Farzana Skinner MD      [START ON 2/8/2021] levothyroxine  100 mcg Oral Early Morning Samantha Sera, MD  Denver Beal ON 2/8/2021] loratadine  5 mg Oral Daily Farzana Skinner MD      niacin  100 mg Oral HS Farzana Skinner MD      pantoprazole  40 mg Intravenous Q12H [facility] Farzana Skinner MD          albuterol, 2 puff, Q4H PRN        Admission Time  I spent 1 hour admitting the patient  This involved direct patient contact where I performed a full history and physical, reviewing previous records, and reviewing laboratory and other diagnostic studies  Portions of the record may have been created with voice recognition software  Occasional wrong word or "sound a like" substitutions may have occurred due to the inherent limitations of voice recognition software    Read the chart carefully and recognize, using context, where substitutions have occurred     ==  James Patton MD  [address]  Internal Medicine Residency PGY-1

## 2021-02-08 NOTE — ASSESSMENT & PLAN NOTE
Lab Results   Component Value Date    EGFR 29 02/08/2021    EGFR 26 02/07/2021    EGFR 52 07/23/2020    CREATININE 1 63 (H) 02/08/2021    CREATININE 1 79 (H) 02/07/2021    CREATININE 1 01 07/23/2020   Patient known to have CKD 3, with baseline Cr on labs appearing to be about 1 1-1 4, however, there is an outpatient Nephro note from 05/2020 that suggests baseline Cr is likely around 1 45  Presenting today with Cr 1 79 in the setting of symptomatic anemia  JORDAN likely pre-renal 2/2 anemia with Hgb 6 4   -Patient received 1u pRBC in the ED  Did have some crackles in lungs, so will only off on giving any fluids for now  Cr improved with transfusion  -Encourage good PO intake  -Avoid nephrotoxic agents  Losartan on hold

## 2021-02-08 NOTE — PROGRESS NOTES
INTERNAL MEDICINE RESIDENCY SENIOR ADMISSION NOTE     Name: Debbie Padgett   Age & Sex: 81 y o  female   MRN: [id number]  Unit/Bed#: QCA   Encounter: [id number]  Primary Care Provider: Joan Rosenbaum MD    Admit to team: SOD Team C     Patient seen and examined  Reviewed H&P per Dr Alvaro Kaba  Agree with the assessment and plan with any exception/addition as noted below:    Principal Problem:    Acute blood loss anemia  Active Problems:    Asymptomatic carotid artery stenosis    Hyperlipidemia    Hypertension    Laryngeal carcinoma (HCC)    CKD (chronic kidney disease) stage 3, GFR 30-59 ml/min    SOB (shortness of breath)    JORDAN (acute kidney injury) (HCC)    Elevated troponin    Acute on chronic kidney failure (HCC)      Debbie Padgett is 81 y o  patient with pmhx of laryngeal cancer s/p radiation and tracheostomy, per tension, CKD stage 3, left carotid artery stent in 07/2020 on Plavix admitted with symptomatic anemia  CBC showed microcytic hypchromic anemia Hemoglobin on presentation 6 4 from a baseline of 8-10, creatinine 1 79 (most recent baseline 1-1 4)  She received CT abdomen and pelvis which showed chronic diverticulosis without diverticulitis and small bilateral pleural effusions  No identifiable source of bleeding at this time  No NSAID use, no prior EGD but pt reported unremarable colonoscopy 5 years ago  Differential diagnosis iron deficiency anemia 2/2 slow GI loss vs anemia of chronic disease  Plan:  -check iron panel   -start Protonix 40 mg IV b i d   -monitor hemoglobin, transfuse as needed   -will continue Plavix due to recent stent, hold off DVT prophylaxis  -hold losartan in the setting of JORDAN and acute anemia  -GI consult, clear liquids for today, NPO at midnight for possible EGD/colonoscopy        Code Status: Level 3 - DNAR and DNI  Admission Status: OBSERVATION  Disposition: Patient requires Med/Surg  Expected Length of Stay: < 2 midnights

## 2021-02-09 RX ORDER — PANTOPRAZOLE SODIUM 40 MG/1
TABLET, DELAYED RELEASE ORAL
Qty: 90 TABLET | Refills: 0 | Status: SHIPPED | OUTPATIENT
Start: 2021-02-09 | End: 2021-05-12

## 2021-02-09 NOTE — DISCHARGE INSTRUCTIONS
Please take all medications as prescribed  Acute kidney injury secondary to blood loss likely from a GI bleed  Please continue to hold losartan, do not restart until 02/10/2021  Repeat blood work on 02/10/2021 and follow with primary care doctor at that time  Please follow-up with your GI doctor outpatient within the next 2 weeks schedule an outpatient EGD and colonoscopy  Continue taking Protonix 40 mg once daily  Gastrointestinal Bleeding   WHAT YOU NEED TO KNOW:   Gastrointestinal (GI) bleeding may occur in any part of your digestive tract  This includes your esophagus, stomach, intestines, rectum, or anus  Bleeding may be mild to severe  Your bleeding may begin suddenly, or start slowly and last for a longer period of time  Bleeding that lasts for a longer period of time is called chronic GI bleeding  DISCHARGE INSTRUCTIONS:   Call 911 for any of the following:   · You have shortness of breath or trouble breathing  · You faint or lose consciousness  · You have chest pain  Return to the emergency department if:   · You feel dizzy or are too weak to stand  · Your heart is beating faster than usual      · You vomit blood, or your vomit looks like coffee grounds  · You have blood in your bowel movement  · You have abdominal pain or swelling  Contact your healthcare provider if:   · You have bowel movements that are tarry or black  · You have nausea or are vomiting  · You have heartburn  · You have questions or concerns about your condition or care  Activity:  Rest as directed  Ask when you can return to your usual activities, such as work  Slowly do more each day  Nutrition:  Ask if you need to be on a special diet  A special diet can help treat GI conditions and prevent problems such as GI bleeding  Eat small meals more often while your digestive system heals  Avoid or limit caffeine and spicy foods   Also avoid foods that cause heartburn, nausea, or diarrhea  Prevent GI bleeding:   · Manage GI conditions as directed  Examples of GI conditions include gastroesophageal reflux, peptic ulcer disease, and ulcerative colitis  Take all medicines for these conditions as directed  · Limit or do not take NSAIDs  Ask your healthcare provider if it is safe for you to take NSAIDs  NSAIDs can increase your risk for ulcers and GI bleeding  · Do not drink alcohol  Alcohol can cause ulcers and esophageal varices  Esophageal varices are swollen blood vessels in your esophagus  Over time the blood vessels become weak and may bleed  · Do not smoke  Nicotine and other chemicals in cigarettes and cigars can increase your risk for ulcers  Ask your healthcare provider for information if you currently smoke and need help to quit  E-cigarettes or smokeless tobacco still contain nicotine  Talk to your healthcare provider before you use these products  Follow up with your healthcare provider as directed: You may need to return for a colonoscopy, endoscopy, or other tests  These tests can make sure you do not have more bleeding  Write down your questions so you remember to ask them during your visits  © Copyright [employer] Information is for End User's use only and may not be sold, redistributed or otherwise used for commercial purposes  All illustrations and images included in CareNotes® are the copyrighted property of A D A M , [employer]  or [address] [place] [address]  The above information is an  only  It is not intended as medical advice for individual conditions or treatments  Talk to your doctor, nurse or pharmacist before following any medical regimen to see if it is safe and effective for you  Acute Kidney Injury   AMBULATORY CARE:   Acute kidney injury (JORDAN) is also called acute kidney failure, or acute renal failure  JORDAN happens when your kidneys suddenly stop working correctly   Normally, the kidneys remove fluid, chemicals, and waste from your blood  These wastes are turned into urine by your kidneys  JORDAN usually happens over hours or days  When you have JORDAN, your kidneys do not remove the waste, chemicals, or extra fluid from your body  A normal amount of urine is not produced  JORDAN is usually temporary, but it may become a chronic kidney condition  Causes of JORDAN:   · Decreased blood flow to the kidney, such as from hypercalcemia (high blood calcium level) or severe heart disease     · A disease or condition that affects the kidneys, such as hypertension (high blood pressure) or diabetes     · A blockage in the kidney or ureter, such as a kidney or bladder stone, enlarged prostate, or tumor    Common symptoms include the following: You may not have any symptoms with early or mild JORDAN  As JORDAN progresses, you may have any of the following:  · Decrease in the amount of urine or no urination    · Swelling in your arms, legs, or feet     · Weakness, drowsiness, or no appetite    · Nausea, flank pain, muscle twitching or muscle cramps    · Itchy skin, or your, breath or body smells like urine    · Behavior changes, confusion, disorientation, or seizures    Call 911 if:   · You have sudden chest pain or trouble breathing  Seek care immediately if:   · Your symptoms get worse  Contact your healthcare provider if:   · Your symptoms return  · Your blood sugar or blood pressure level is not within the range your healthcare provider recommends  · You have questions or concerns about your condition or care  Treatment for JORDAN  depends upon the cause of your acute kidney injury and how severe it is  Usually, JORDAN will be monitored in the hospital  If you have mild JORDAN, you may be able to go home to recover  Your healthcare providers will treat the cause of your JORDAN  You may need IV fluids if your JORDAN was caused by little or no fluid in your body  You may need dialysis to remove waste and extra fluid from your body     Nutrition:  Your healthcare provider may tell you to eat food low in sodium (salt), potassium, phosphorus, or protein  A dietitian can help you plan your meals  Drink liquids as directed: Your healthcare provider may recommend that you drink a certain amount of liquids  This will help your kidneys work better and decrease your risk for dehydration  Ask how much liquid to drink each day and which liquids are best for you  What you can do to manage and prevent JORDAN:   · Monitor and manage other health conditions  such as diabetes, high blood pressure, or heart disease  These conditions increase your risk for acute kidney injury  Take your medicines for these conditions as directed  Also, monitor your blood sugar and blood pressure levels as directed  Contact your healthcare provider if your levels are not in the range he or she says it should be  · Talk to your healthcare provider before you take over-the-counter-medicine  NSAIDs, stomach medicine, or laxatives may harm your kidneys and increase your risk for acute kidney injury  If it is okay to take the medicine, follow the directions on the package  Do not take more than directed  · Tell healthcare providers you have had acute kidney injury  before you get contrast liquid for an x-ray or CT scan  Your healthcare provider may give you medicine to prevent kidney problems caused by the liquid  Follow up with your healthcare provider as directed:  Write down your questions so you remember to ask them during your visits  © Copyright [address] Information is for End User's use only and may not be sold, redistributed or otherwise used for commercial purposes  All illustrations and images included in CareNotes® are the copyrighted property of [employer] M , Inc  or [facility] Jordon Reynolds   The above information is an  only  It is not intended as medical advice for individual conditions or treatments   Talk to your doctor, nurse or pharmacist before following any medical regimen to see if it is safe and effective for you

## 2021-02-18 NOTE — PHYSICIAN ADVISOR
Current patient class: Inpatient  The patient is currently on Hospital Day: 2 at [facility]        The patient was admitted to the hospital  on 2/8/21 at 1718 for the following diagnosis:  SOB (shortness of breath) [R06 02]  Anemia [D64 9]  NSTEMI (non-ST elevated myocardial infarction) ([facility] [address] ) [I21 4]  JORDAN (acute kidney injury) ([facility] ) [N17 9]       There is documentation in the medical record of an expected length of stay of at least 2 midnights  The patient is therefore expected to satisfy the 2 midnight benchmark and given the 2 midnight presumption is appropriate for INPATIENT ADMISSION  Given this expectation of a satisfying stay, CMS instructs us that the patient is most often appropriate for inpatient admission under part A provided medical necessity is documented in the chart  After review of the relevant documentation, labs, vital signs and test results, the patient is appropriate for INPATIENT ADMISSION  Admission to the hospital as an inpatient is a complex decision making process which requires the practitioner to consider the patients presenting complaint, history and physical examination and all relevant testing  With this in mind, in this case, the patient was deemed appropriate for INPATIENT ADMISSION  After review of the documentation and testing available at the time of the admission I concur with this clinical determination of medical necessity  The patient does have an inpatient admission within the previous 30 days  The patient was admitted on 2/7/21 and discharged on 2/8/21 as an inpatient  The patient therefore required readmission review  In this case the patient should be considered a SEPARATE and UNRELATED INPATIENT ADMISSION  The patient had been discharged in stable condition with a completed care plan   There were no unresolved acute medical issues at the time of discharged which would have reasonably been expected to prompt this readmission  Rationale is as follows: The patient is a 81 yrs   Female who presented to the ED at 2/7/2021  2:59 PM with a chief complaint of Shortness of Breath (pt c/o MINER for the "last couple of days" pt denies CP but complains of pain under her left ribs)   Patient was noted to be anemic and was transfused 1 unit of PRBCs  The patient was seen by GI in the plan was to do an EGD  The rest of the documentation is not available at this time but it seems the patient was discharged next day  Given the lack of documentation with no intervention or diagnostic studies seen in the chart,  Patient was most appropriate for observation status  This case is provider liable      The patients vitals on arrival were   ED Triage Vitals   Temperature Pulse Respirations Blood Pressure SpO2   02/07/21 1505 02/07/21 1505 02/07/21 1505 02/07/21 1505 02/07/21 1505   98 6 °F (37 °C) (!) 18 20 119/54 96 %      Temp Source Heart Rate Source Patient Position - Orthostatic VS BP Location FiO2 (%)   02/07/21 1505 02/07/21 1505 02/07/21 1505 02/07/21 1505 --   Oral Monitor Lying Right arm       Pain Score       02/07/21 1609       8           Past Medical History:   Diagnosis Date    Aortoiliac occlusive disease ([facility] [address] )     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 (HCC)     Carotid artery stenosis     Hyperlipidemia     Hypertension     PVD (peripheral vascular disease) ([facility] [address] )     [facility]      Past Surgical History:   Procedure Laterality Date    GALLBLADDER SURGERY  03/30/2019    ILIAC ARTERY STENT Left     IR CEREBRAL ANGIOGRAPHY / INTERVENTION  7/22/2020    LARYNX SURGERY  2002    LEG SURGERY  07/10/2012           Consults have been placed to:   IP CONSULT TO GASTROENTEROLOGY    Vitals:    02/08/21 1400 02/08/21 1626 02/08/21 1632 02/08/21 1908   BP: 113/55 106/64  112/55   BP Location: Right arm      Pulse: 100 103  105   Resp: 22 18  18   Temp:  98 3 °F (36 8 °C)  98 1 °F (36 7 °C)   TempSrc:       SpO2: 97% 90% 92% 93%   Weight:           Most recent labs:    No results for input(s): WBC, HGB, HCT, PLT, K, NA, CALCIUM, BUN, CREATININE, LIPASE, AMYLASE, INR, TROPONINI, CKTOTAL, AST, ALT, ALKPHOS, BILITOT in the last 72 hours  Scheduled Meds:  Continuous Infusions:No current facility-administered medications for this encounter  PRN Meds:      Surgical procedures (if appropriate):

## 2021-02-26 ENCOUNTER — TRANSCRIBE ORDERS (OUTPATIENT)
Dept: ADMINISTRATIVE | Age: 86
End: 2021-02-26

## 2021-02-26 ENCOUNTER — LAB (OUTPATIENT)
Dept: LAB | Age: 86
End: 2021-02-26
Payer: MEDICARE

## 2021-02-26 DIAGNOSIS — N18.30 STAGE 3 CHRONIC KIDNEY DISEASE, UNSPECIFIED WHETHER STAGE 3A OR 3B CKD (HCC): ICD-10-CM

## 2021-02-26 DIAGNOSIS — I10 ESSENTIAL HYPERTENSION, MALIGNANT: ICD-10-CM

## 2021-02-26 DIAGNOSIS — E78.00 PURE HYPERCHOLESTEROLEMIA: ICD-10-CM

## 2021-02-26 DIAGNOSIS — E78.00 PURE HYPERCHOLESTEROLEMIA: Primary | ICD-10-CM

## 2021-02-26 DIAGNOSIS — E06.3 CHRONIC LYMPHOCYTIC THYROIDITIS: ICD-10-CM

## 2021-02-26 LAB
ALBUMIN SERPL BCP-MCNC: 3.7 G/DL (ref 3.5–5)
ALP SERPL-CCNC: 84 U/L (ref 46–116)
ALT SERPL W P-5'-P-CCNC: 22 U/L (ref 12–78)
ANION GAP SERPL CALCULATED.3IONS-SCNC: 6 MMOL/L (ref 4–13)
AST SERPL W P-5'-P-CCNC: 21 U/L (ref 5–45)
BACTERIA UR QL AUTO: ABNORMAL /HPF
BASOPHILS # BLD AUTO: 0.09 THOUSANDS/ΜL (ref 0–0.1)
BASOPHILS NFR BLD AUTO: 1 % (ref 0–1)
BILIRUB SERPL-MCNC: 0.23 MG/DL (ref 0.2–1)
BILIRUB UR QL STRIP: NEGATIVE
BUN SERPL-MCNC: 24 MG/DL (ref 5–25)
CALCIUM SERPL-MCNC: 9.8 MG/DL (ref 8.3–10.1)
CHLORIDE SERPL-SCNC: 108 MMOL/L (ref 100–108)
CHOLEST SERPL-MCNC: 130 MG/DL (ref 50–200)
CLARITY UR: CLEAR
CO2 SERPL-SCNC: 28 MMOL/L (ref 21–32)
COLOR UR: YELLOW
CREAT SERPL-MCNC: 1.19 MG/DL (ref 0.6–1.3)
CREAT UR-MCNC: 72.2 MG/DL
EOSINOPHIL # BLD AUTO: 0.15 THOUSAND/ΜL (ref 0–0.61)
EOSINOPHIL NFR BLD AUTO: 2 % (ref 0–6)
ERYTHROCYTE [DISTWIDTH] IN BLOOD BY AUTOMATED COUNT: 25.6 % (ref 11.6–15.1)
GFR SERPL CREATININE-BSD FRML MDRD: 42 ML/MIN/1.73SQ M
GLUCOSE P FAST SERPL-MCNC: 120 MG/DL (ref 65–99)
GLUCOSE UR STRIP-MCNC: NEGATIVE MG/DL
HCT VFR BLD AUTO: 35.7 % (ref 34.8–46.1)
HDLC SERPL-MCNC: 49 MG/DL
HGB BLD-MCNC: 9.7 G/DL (ref 11.5–15.4)
HGB UR QL STRIP.AUTO: NEGATIVE
HYALINE CASTS #/AREA URNS LPF: ABNORMAL /LPF
IMM GRANULOCYTES # BLD AUTO: 0.02 THOUSAND/UL (ref 0–0.2)
IMM GRANULOCYTES NFR BLD AUTO: 0 % (ref 0–2)
KETONES UR STRIP-MCNC: NEGATIVE MG/DL
LDLC SERPL CALC-MCNC: 54 MG/DL (ref 0–100)
LDLC SERPL DIRECT ASSAY-MCNC: 63 MG/DL (ref 0–100)
LEUKOCYTE ESTERASE UR QL STRIP: ABNORMAL
LYMPHOCYTES # BLD AUTO: 1.16 THOUSANDS/ΜL (ref 0.6–4.47)
LYMPHOCYTES NFR BLD AUTO: 18 % (ref 14–44)
MCH RBC QN AUTO: 20 PG (ref 26.8–34.3)
MCHC RBC AUTO-ENTMCNC: 27.2 G/DL (ref 31.4–37.4)
MCV RBC AUTO: 74 FL (ref 82–98)
MICROALBUMIN UR-MCNC: 27.7 MG/L (ref 0–20)
MICROALBUMIN/CREAT 24H UR: 38 MG/G CREATININE (ref 0–30)
MONOCYTES # BLD AUTO: 0.59 THOUSAND/ΜL (ref 0.17–1.22)
MONOCYTES NFR BLD AUTO: 9 % (ref 4–12)
NEUTROPHILS # BLD AUTO: 4.28 THOUSANDS/ΜL (ref 1.85–7.62)
NEUTS SEG NFR BLD AUTO: 70 % (ref 43–75)
NITRITE UR QL STRIP: NEGATIVE
NON-SQ EPI CELLS URNS QL MICRO: ABNORMAL /HPF
NONHDLC SERPL-MCNC: 81 MG/DL
NRBC BLD AUTO-RTO: 0 /100 WBCS
PH UR STRIP.AUTO: 7 [PH]
PLATELET # BLD AUTO: 230 THOUSANDS/UL (ref 149–390)
POTASSIUM SERPL-SCNC: 4.1 MMOL/L (ref 3.5–5.3)
PROT SERPL-MCNC: 7.9 G/DL (ref 6.4–8.2)
PROT UR STRIP-MCNC: NEGATIVE MG/DL
RBC # BLD AUTO: 4.85 MILLION/UL (ref 3.81–5.12)
RBC #/AREA URNS AUTO: ABNORMAL /HPF
SODIUM SERPL-SCNC: 142 MMOL/L (ref 136–145)
SP GR UR STRIP.AUTO: 1.01 (ref 1–1.03)
TRIGL SERPL-MCNC: 134 MG/DL
TSH SERPL DL<=0.05 MIU/L-ACNC: 2.71 UIU/ML (ref 0.36–3.74)
UROBILINOGEN UR QL STRIP.AUTO: 0.2 E.U./DL
WBC # BLD AUTO: 6.29 THOUSAND/UL (ref 4.31–10.16)
WBC #/AREA URNS AUTO: ABNORMAL /HPF

## 2021-02-26 PROCEDURE — 82043 UR ALBUMIN QUANTITATIVE: CPT | Performed by: INTERNAL MEDICINE

## 2021-02-26 PROCEDURE — 80061 LIPID PANEL: CPT

## 2021-02-26 PROCEDURE — 84443 ASSAY THYROID STIM HORMONE: CPT

## 2021-02-26 PROCEDURE — 36415 COLL VENOUS BLD VENIPUNCTURE: CPT

## 2021-02-26 PROCEDURE — 85025 COMPLETE CBC W/AUTO DIFF WBC: CPT

## 2021-02-26 PROCEDURE — 82570 ASSAY OF URINE CREATININE: CPT | Performed by: INTERNAL MEDICINE

## 2021-02-26 PROCEDURE — 81001 URINALYSIS AUTO W/SCOPE: CPT | Performed by: INTERNAL MEDICINE

## 2021-02-26 PROCEDURE — 83721 ASSAY OF BLOOD LIPOPROTEIN: CPT

## 2021-02-26 PROCEDURE — 80053 COMPREHEN METABOLIC PANEL: CPT

## 2021-03-26 DIAGNOSIS — I65.23 ASYMPTOMATIC BILATERAL CAROTID ARTERY STENOSIS: ICD-10-CM

## 2021-03-29 RX ORDER — CLOPIDOGREL BISULFATE 75 MG/1
TABLET ORAL
Qty: 90 TABLET | Refills: 3 | Status: SHIPPED | OUTPATIENT
Start: 2021-03-29 | End: 2021-12-20

## 2021-03-30 ENCOUNTER — TELEPHONE (OUTPATIENT)
Dept: VASCULAR SURGERY | Facility: CLINIC | Age: 86
End: 2021-03-30

## 2021-03-30 DIAGNOSIS — I65.23 CAROTID STENOSIS, BILATERAL: Primary | ICD-10-CM

## 2021-03-30 NOTE — TELEPHONE ENCOUNTER
Attempted to contact patient to schedule 9 month carotid doppler  Requested patient call [phone] option 3 to schedule appointment(s)  Surgeon - Doreen Bravo (NPI: [id number])  Date of Surgery - 07/22/2020  All appointments must be scheduled by, 02/25/2022  !!!! Patient must be scheduled for their 9-month office visit before the follow-up window close date !!!!    [facility]/ [facility] VQI Protocol  patients must be scheduled for the following    [] 3-month Doppler -     [x] 9-month Doppler Expected - due on or after 04/17/2021    [x] 9-month OV after Doppler - due on or after 04/17/2021          Scheduling Reminders  1  Insurance requires, 9-month doppler to be scheduled 6-months and 2-days after the 3-month doppler  2  Appointment notes MUST be entered in the following format:  a  VQI FORMS NEEDED VQI LTFU ([facility] or [facility]) (Date of Surgery) CV Duplex (Date of Doppler) (Location of Doppler)  3  If unable to schedule, a recall MUST be entered  >>[facility], and Nato Melgoza  <<

## 2021-04-27 ENCOUNTER — HOSPITAL ENCOUNTER (OUTPATIENT)
Dept: NON INVASIVE DIAGNOSTICS | Facility: CLINIC | Age: 86
Discharge: HOME | End: 2021-04-27
Payer: MEDICARE

## 2021-04-27 DIAGNOSIS — I65.23 CAROTID STENOSIS, BILATERAL: ICD-10-CM

## 2021-04-27 PROCEDURE — 93880 EXTRACRANIAL BILAT STUDY: CPT

## 2021-04-28 PROCEDURE — 93880 EXTRACRANIAL BILAT STUDY: CPT | Performed by: SURGERY

## 2021-04-29 ENCOUNTER — OFFICE VISIT (OUTPATIENT)
Dept: VASCULAR SURGERY | Facility: CLINIC | Age: 86
End: 2021-04-29
Payer: MEDICARE

## 2021-04-29 VITALS
TEMPERATURE: 96.7 F | DIASTOLIC BLOOD PRESSURE: 62 MMHG | BODY MASS INDEX: 31.72 KG/M2 | HEIGHT: 56 IN | SYSTOLIC BLOOD PRESSURE: 102 MMHG | WEIGHT: 141 LBS | RESPIRATION RATE: 17 BRPM | HEART RATE: 97 BPM

## 2021-04-29 DIAGNOSIS — I74.09 AORTOILIAC OCCLUSIVE DISEASE (HCC): Chronic | ICD-10-CM

## 2021-04-29 DIAGNOSIS — I70.219 ATHEROSCLEROSIS OF ARTERY OF EXTREMITY WITH INTERMITTENT CLAUDICATION (HCC): Chronic | ICD-10-CM

## 2021-04-29 DIAGNOSIS — I65.23 ASYMPTOMATIC BILATERAL CAROTID ARTERY STENOSIS: Primary | ICD-10-CM

## 2021-04-29 PROCEDURE — 99214 OFFICE O/P EST MOD 30 MIN: CPT | Performed by: SURGERY

## 2021-04-29 RX ORDER — SERTRALINE HYDROCHLORIDE 25 MG/1
TABLET, FILM COATED ORAL
COMMUNITY
Start: 2021-04-21

## 2021-04-29 RX ORDER — GLUCOSAMINE/MSM/CHONDROITIN A 500-83-400
TABLET ORAL
COMMUNITY

## 2021-04-29 NOTE — PATIENT INSTRUCTIONS
Asymptomatic carotid artery stenosis    History of bilateral carotid artery stenosis secondary to radiation therapy treated with bilateral carotid artery stenting  We did discuss the duplex findings which have shown some residual stenosis beyond the stents but patency of bilateral common carotid artery and proximal internal carotid artery stents  At this point we will continue conservative management for which she continue her Plavix and statin therapy  We will also continue close follow-up with duplex imaging in 6 months  We did discuss the signs and symptoms of stroke/ TIA in the importance of immediate evaluation and treatment  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 (HCC)    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

## 2021-04-29 NOTE — ASSESSMENT & PLAN NOTE
History of bilateral carotid artery stenosis secondary to radiation therapy treated with bilateral carotid artery stenting  We did discuss the duplex findings which have shown some residual stenosis beyond the stents but patency of bilateral common carotid artery and proximal internal carotid artery stents  At this point we will continue conservative management for which she continue her Plavix and statin therapy  We will also continue close follow-up with duplex imaging in 6 months  We did discuss the signs and symptoms of stroke/ TIA in the importance of immediate evaluation and treatment

## 2021-04-29 NOTE — ASSESSMENT & PLAN NOTE
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

## 2021-04-29 NOTE — PROGRESS NOTES
Assessment/Plan:    Asymptomatic carotid artery stenosis    History of bilateral carotid artery stenosis secondary to radiation therapy treated with bilateral carotid artery stenting  We did discuss the duplex findings which have shown some residual stenosis beyond the stents but patency of bilateral common carotid artery and proximal internal carotid artery stents  At this point we will continue conservative management for which she continue her Plavix and statin therapy  We will also continue close follow-up with duplex imaging in 6 months  We did discuss the signs and symptoms of stroke/ TIA in the importance of immediate evaluation and treatment  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 (HCC)    Aortoiliac and infrainguinal arterial occlusive disease with mild claudication  At this time no further intervention is necessary but we will continue surveillance on an annual basis  She will continue her current antiplatelet and statin therapy  We did discuss the importance of a regular walking program        Diagnoses and all orders for this visit:    Asymptomatic bilateral carotid artery stenosis  -     VAS carotid complete study; Future    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 (HCC)  -     VAS lower limb arterial duplex, complete bilateral; Future    Aortoiliac occlusive disease (HCC)  -     VAS abdominal aorta/iliacs; complete study; Future    Other orders  -     sertraline (ZOLOFT) 25 mg tablet  -     co-enzyme Q-10 30 mg; Take by mouth          Subjective:      Patient ID: Chelsea Mcgee is a 81 y o  female  Patient presents to office for carotid artery stenosis and to review the CV done on 4/27/21  Patient denies any TIA/CVA symptoms  Patient is currently taking Atorvastatin and Plavix        81-year-old former smoker with diabetes and known peripheral arterial occlusive disease to include carotid occlusive disease with history of laryngeal cancer status post resection and radiation therapy  She now presents in follow-up approximately 9 months following right carotid artery angioplasty and stenting  On evaluation today she notes no new neurologic symptoms which would be consistent with TIA, CVA or amaurosis fugax  She states she remains active with no major limitations  She denies any significant claudication symptoms but states her activity has been somewhat limited especially during the pandemic  Carotid duplex 04/27/2021 with patency of bilateral distal common and proximal internal carotid artery stents without evidence of in stent restenosis  There are elevated velocities in the mid to more distal internal carotid artery at 333/68 centimeters/second on the right and 264/43 centimeters/second on the left  There is a right subclavian artery stenosis with a 22 mm blood pressure gradient  Arterial duplex 11/04/2020 with right greater than 75% superficial femoral artery stenosis  On the left there is atherosclerotic disease without focal stenosis  Ankle-brachial indices are diminished at 0 66 on the right and 0 65 on the left  Of note previous aortic duplex in 2019 was limited secondary to overlying bowel gas but the patient does have a history of a left iliac stent  The following portions of the patient's history were reviewed and updated as appropriate: allergies, current medications, past family history, past medical history, past social history, past surgical history and problem list     I have reviewed and made appropriate changes to the review of systems input by the medical assistant      Vitals:    04/29/21 1030 04/29/21 1032   BP: 108/64 102/62   BP Location: Left arm Right arm   Patient Position: Sitting Sitting   Cuff Size: Adult Adult   Pulse: 97    Resp: 17    Temp: (!) 96 7 °F (35 9 °C)    TempSrc: Tympanic    Weight: 64 kg (141 lb)    Height: 4' 8" (1 422 m)        Patient Active Problem List   Diagnosis    Asymptomatic carotid artery stenosis    Aortoiliac occlusive disease (HCC)    Hyperlipidemia    Hypertension    Acute anterior circulation transient ischemic attack    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 (HCC)    [facility] care [facility] )    Laryngeal carcinoma ([facility] )    CKD (chronic kidney disease) stage 3, GFR 30-59 ml/min    Renovascular hypertension    Tracheitis    SOB (shortness of breath)    JORDAN (acute kidney injury) ([facility] )    Acute blood loss anemia    Elevated troponin    Acute on chronic kidney failure [facility])       Past Surgical History:   Procedure Laterality Date    GALLBLADDER SURGERY  2019    ILIAC ARTERY STENT Left     IR CEREBRAL ANGIOGRAPHY / INTERVENTION  2020    LARYNX SURGERY  2002    LEG SURGERY  07/10/2012       Family History   Problem Relation Age of Onset    Heart disease Brother     No Known Problems Mother     No Known Problems Father     Breast cancer Sister 71    No Known Problems Daughter     No Known Problems Maternal Grandmother     No Known Problems Maternal Grandfather     No Known Problems Paternal Grandmother     No Known Problems Paternal Grandfather     No Known Problems Sister     No Known Problems Maternal Aunt     No Known Problems Maternal Aunt     No Known Problems Maternal Aunt        Social History     Socioeconomic History    Marital status:       Spouse name: Not on file    Number of children: Not on file    Years of education: Not on file    Highest education level: Not on file   Occupational History    Not on file   Social Needs    Financial resource strain: Not on file    Food insecurity     Worry: Not on file     Inability: Not on file    Transportation needs     Medical: Not on file     Non-medical: Not on file   Tobacco Use    Smoking status: Former Smoker     Quit date:      Years since quittin 3    Smokeless tobacco: Never Used   Substance and Sexual Activity    Alcohol use: Never     Frequency: Never    Drug use: Never    Sexual activity: Not on file   Lifestyle    Physical activity     Days per week: Not on file     Minutes per session: Not on file    Stress: Not on file   Relationships    Social connections     Talks on phone: Not on file     Gets together: Not on file     Attends Islam service: Not on file     Active member of club or organization: Not on file     Attends meetings of clubs or organizations: Not on file     Relationship status: Not on file    Intimate partner violence     Fear of current or ex partner: Not on file     Emotionally abused: Not on file     Physically abused: Not on file     Forced sexual activity: Not on file   Other Topics Concern    Not on file   Social History Narrative    Not on file       No Known Allergies      Current Outpatient Medications:     acetaminophen (TYLENOL) 325 mg tablet, Take 2 tablets (650 mg total) by mouth every 6 (six) hours as needed for mild pain, Disp: , Rfl:     atorvastatin (LIPITOR) 40 mg tablet, Take by mouth, Disp: , Rfl:     Calcium-Magnesium-Vitamin D ER (CITRACAL SLOW RELEASE) 600- MG-MG-UNIT TB24, Take by mouth, Disp: , Rfl:     cefuroxime (CEFTIN) 250 mg tablet, , Disp: , Rfl:     cetirizine (ZyrTEC) 5 MG tablet, Take 5 mg by mouth daily, Disp: , Rfl:     Cholecalciferol (VITAMIN D-3) 1000 units CAPS, Take by mouth, Disp: , Rfl:     clopidogrel (PLAVIX) 75 mg tablet, TAKE 1 TABLET(75 MG) BY MOUTH DAILY, Disp: 90 tablet, Rfl: 3    co-enzyme Q-10 30 mg, Take by mouth, Disp: , Rfl:     fluticasone (FLONASE) 50 mcg/act nasal spray, into each nostril, Disp: , Rfl:     levothyroxine 100 mcg tablet, , Disp: , Rfl:     losartan (COZAAR) 50 mg tablet, Take 1 tablet (50 mg total) by mouth daily, Disp: 30 tablet, Rfl: 0    Magnesium 250 MG TABS, Take 250 mg by mouth daily, Disp: , Rfl:     metFORMIN (GLUCOPHAGE) 500 mg tablet, Take 0 5 tablets (250 mg total) by mouth daily with breakfast, Disp: , Rfl:     methocarbamol (ROBAXIN) 500 mg tablet, TK 1 T PO BID PRN, Disp: , Rfl: 1    Multiple Vitamins-Minerals (CENTRUM SILVER 50+WOMEN PO), Take by mouth, Disp: , Rfl:     niacin 500 mg ER capsule, Take 500 mg by mouth daily at bedtime, Disp: , Rfl:     Omega-3 1000 MG CAPS, Take 4 capsules by mouth daily , Disp: , Rfl:     pantoprazole (PROTONIX) 40 mg tablet, TAKE 1 TABLET(40 MG) BY MOUTH DAILY, Disp: 90 tablet, Rfl: 0    sertraline (ZOLOFT) 25 mg tablet, , Disp: , Rfl:     Krill Oil 1000 MG CAPS, Take by mouth, Disp: , Rfl:     VENTOLIN  (90 Base) MCG/ACT inhaler, , Disp: , Rfl:        Review of Systems   Constitutional: Negative  HENT: Negative  Eyes: Negative  Respiratory: Negative  Cardiovascular: Negative  Gastrointestinal: Negative  Endocrine: Negative  Genitourinary: Negative  Musculoskeletal: Positive for back pain  Skin: Negative  Allergic/Immunologic: Positive for environmental allergies  Neurological: Negative  Hematological: Bruises/bleeds easily  Psychiatric/Behavioral: Negative  Objective:      /62 (BP Location: Right arm, Patient Position: Sitting, Cuff Size: Adult)   Pulse 97   Temp (!) 96 7 °F (35 9 °C) (Tympanic)   Resp 17   Ht 4' 8" (1 422 m)   Wt 64 kg (141 lb)   BMI 31 61 kg/m²          Physical Exam  Constitutional:       Appearance: Normal appearance  She is well-developed  HENT:      Head: Normocephalic and atraumatic  Eyes:      Conjunctiva/sclera: Conjunctivae normal    Neck:      Vascular: Carotid bruit (  Bilateral) present  No JVD  Comments: Skin changes throughout the neck consistent with previous neck surgery and irradiation with tracheal stoma with appropriate appliance  Cardiovascular:      Rate and Rhythm: Normal rate and regular rhythm  Pulses:           Carotid pulses are 2+ on the right side with bruit and 2+ on the left side with bruit         Radial pulses are 2+ on the right side and 2+ on the left side  Femoral pulses are 2+ on the right side and 1+ on the left side  Popliteal pulses are 0 on the right side and 0 on the left side  Dorsalis pedis pulses are 1+ on the right side and 0 on the left side  Posterior tibial pulses are 0 on the right side and 0 on the left side  Heart sounds: Normal heart sounds, S1 normal and S2 normal  No murmur  Pulmonary:      Effort: Pulmonary effort is normal       Breath sounds: Normal breath sounds  Abdominal:      General: There is no abdominal bruit  Palpations: Abdomen is soft  There is no pulsatile mass  Tenderness: There is no abdominal tenderness  Hernia: No hernia is present  Musculoskeletal: Normal range of motion  General: No tenderness or deformity  Skin:     General: Skin is warm and dry  Coloration: Skin is not pale  Neurological:      Mental Status: She is alert and oriented to person, place, and time  Sensory: No sensory deficit     Psychiatric:         Speech: Speech normal          Behavior: Behavior normal

## 2021-05-08 DIAGNOSIS — D64.9 ANEMIA: ICD-10-CM

## 2021-05-12 RX ORDER — PANTOPRAZOLE SODIUM 40 MG/1
TABLET, DELAYED RELEASE ORAL
Qty: 90 TABLET | Refills: 0 | Status: SHIPPED | OUTPATIENT
Start: 2021-05-12

## 2021-05-19 ENCOUNTER — HOSPITAL ENCOUNTER (OUTPATIENT)
Dept: NON INVASIVE DIAGNOSTICS | Facility: CLINIC | Age: 86
Discharge: HOME | End: 2021-05-19
Payer: MEDICARE

## 2021-05-19 DIAGNOSIS — I74.09 AORTOILIAC OCCLUSIVE DISEASE (HCC): Chronic | ICD-10-CM

## 2021-05-19 PROCEDURE — 93978 VASCULAR STUDY: CPT | Performed by: SURGERY

## 2021-05-19 PROCEDURE — 93978 VASCULAR STUDY: CPT

## 2021-07-03 ENCOUNTER — APPOINTMENT (OUTPATIENT)
Dept: LAB | Age: 86
End: 2021-07-03
Payer: MEDICARE

## 2021-07-03 DIAGNOSIS — E06.3 CHRONIC LYMPHOCYTIC THYROIDITIS: ICD-10-CM

## 2021-07-03 DIAGNOSIS — I10 HYPERTENSION, ESSENTIAL: ICD-10-CM

## 2021-07-03 DIAGNOSIS — R73.9 BLOOD GLUCOSE ELEVATED: ICD-10-CM

## 2021-07-03 DIAGNOSIS — D50.9 IRON DEFICIENCY ANEMIA, UNSPECIFIED IRON DEFICIENCY ANEMIA TYPE: ICD-10-CM

## 2021-07-03 DIAGNOSIS — E78.00 PURE HYPERCHOLESTEROLEMIA: ICD-10-CM

## 2021-07-03 LAB
ANION GAP SERPL CALCULATED.3IONS-SCNC: 7 MMOL/L (ref 4–13)
BASOPHILS # BLD AUTO: 0.06 THOUSANDS/ΜL (ref 0–0.1)
BASOPHILS NFR BLD AUTO: 1 % (ref 0–1)
BUN SERPL-MCNC: 35 MG/DL (ref 5–25)
CALCIUM SERPL-MCNC: 9.2 MG/DL (ref 8.3–10.1)
CHLORIDE SERPL-SCNC: 107 MMOL/L (ref 100–108)
CHOLEST SERPL-MCNC: 154 MG/DL (ref 50–200)
CO2 SERPL-SCNC: 26 MMOL/L (ref 21–32)
CREAT SERPL-MCNC: 1.37 MG/DL (ref 0.6–1.3)
EOSINOPHIL # BLD AUTO: 0.2 THOUSAND/ΜL (ref 0–0.61)
EOSINOPHIL NFR BLD AUTO: 3 % (ref 0–6)
ERYTHROCYTE [DISTWIDTH] IN BLOOD BY AUTOMATED COUNT: 15.7 % (ref 11.6–15.1)
EST. AVERAGE GLUCOSE BLD GHB EST-MCNC: 123 MG/DL
FERRITIN SERPL-MCNC: 29 NG/ML (ref 8–388)
GFR SERPL CREATININE-BSD FRML MDRD: 35 ML/MIN/1.73SQ M
GLUCOSE P FAST SERPL-MCNC: 115 MG/DL (ref 65–99)
HBA1C MFR BLD: 5.9 %
HCT VFR BLD AUTO: 39.8 % (ref 34.8–46.1)
HDLC SERPL-MCNC: 62 MG/DL
HGB BLD-MCNC: 12.1 G/DL (ref 11.5–15.4)
IMM GRANULOCYTES # BLD AUTO: 0.02 THOUSAND/UL (ref 0–0.2)
IMM GRANULOCYTES NFR BLD AUTO: 0 % (ref 0–2)
LDLC SERPL CALC-MCNC: 67 MG/DL (ref 0–100)
LYMPHOCYTES # BLD AUTO: 1.12 THOUSANDS/ΜL (ref 0.6–4.47)
LYMPHOCYTES NFR BLD AUTO: 19 % (ref 14–44)
MCH RBC QN AUTO: 28.1 PG (ref 26.8–34.3)
MCHC RBC AUTO-ENTMCNC: 30.4 G/DL (ref 31.4–37.4)
MCV RBC AUTO: 92 FL (ref 82–98)
MONOCYTES # BLD AUTO: 0.73 THOUSAND/ΜL (ref 0.17–1.22)
MONOCYTES NFR BLD AUTO: 12 % (ref 4–12)
NEUTROPHILS # BLD AUTO: 3.83 THOUSANDS/ΜL (ref 1.85–7.62)
NEUTS SEG NFR BLD AUTO: 65 % (ref 43–75)
NRBC BLD AUTO-RTO: 0 /100 WBCS
PLATELET # BLD AUTO: 159 THOUSANDS/UL (ref 149–390)
PMV BLD AUTO: 11.1 FL (ref 8.9–12.7)
POTASSIUM SERPL-SCNC: 4.5 MMOL/L (ref 3.5–5.3)
RBC # BLD AUTO: 4.31 MILLION/UL (ref 3.81–5.12)
SODIUM SERPL-SCNC: 140 MMOL/L (ref 136–145)
TRIGL SERPL-MCNC: 124 MG/DL
TSH SERPL DL<=0.05 MIU/L-ACNC: 1.67 UIU/ML (ref 0.36–3.74)
WBC # BLD AUTO: 5.96 THOUSAND/UL (ref 4.31–10.16)

## 2021-07-03 PROCEDURE — 80048 BASIC METABOLIC PNL TOTAL CA: CPT

## 2021-07-03 PROCEDURE — 82728 ASSAY OF FERRITIN: CPT

## 2021-07-03 PROCEDURE — 80061 LIPID PANEL: CPT

## 2021-07-03 PROCEDURE — 83036 HEMOGLOBIN GLYCOSYLATED A1C: CPT

## 2021-07-03 PROCEDURE — 84443 ASSAY THYROID STIM HORMONE: CPT

## 2021-07-03 PROCEDURE — 85025 COMPLETE CBC W/AUTO DIFF WBC: CPT

## 2021-07-03 PROCEDURE — 36415 COLL VENOUS BLD VENIPUNCTURE: CPT

## 2021-09-08 DIAGNOSIS — Z43.0 TRACHEOSTOMY CARE (HCC): Primary | ICD-10-CM

## 2021-09-28 ENCOUNTER — HOSPITAL ENCOUNTER (OUTPATIENT)
Dept: NON INVASIVE DIAGNOSTICS | Facility: CLINIC | Age: 86
Discharge: HOME | End: 2021-09-28
Payer: MEDICARE

## 2021-09-28 DIAGNOSIS — I65.23 BILATERAL CAROTID ARTERY OCCLUSION: ICD-10-CM

## 2021-09-28 PROCEDURE — 93306 TTE W/DOPPLER COMPLETE: CPT | Performed by: INTERNAL MEDICINE

## 2021-09-28 PROCEDURE — 93306 TTE W/DOPPLER COMPLETE: CPT

## 2021-11-01 ENCOUNTER — HOSPITAL ENCOUNTER (OUTPATIENT)
Dept: NON INVASIVE DIAGNOSTICS | Facility: CLINIC | Age: 86
Discharge: HOME | End: 2021-11-01
Payer: MEDICARE

## 2021-11-01 DIAGNOSIS — I65.23 ASYMPTOMATIC BILATERAL CAROTID ARTERY STENOSIS: ICD-10-CM

## 2021-11-01 PROCEDURE — 93880 EXTRACRANIAL BILAT STUDY: CPT | Performed by: SURGERY

## 2021-11-01 PROCEDURE — 93880 EXTRACRANIAL BILAT STUDY: CPT

## 2021-11-06 ENCOUNTER — APPOINTMENT (OUTPATIENT)
Dept: LAB | Age: 86
End: 2021-11-06
Payer: MEDICARE

## 2021-11-06 DIAGNOSIS — R73.9 BLOOD GLUCOSE ELEVATED: ICD-10-CM

## 2021-11-06 DIAGNOSIS — D50.9 IRON DEFICIENCY ANEMIA, UNSPECIFIED IRON DEFICIENCY ANEMIA TYPE: ICD-10-CM

## 2021-11-06 DIAGNOSIS — I10 PRIMARY HYPERTENSION: ICD-10-CM

## 2021-11-06 DIAGNOSIS — E78.00 PURE HYPERCHOLESTEROLEMIA: ICD-10-CM

## 2021-11-06 DIAGNOSIS — E06.3 CHRONIC LYMPHOCYTIC THYROIDITIS: ICD-10-CM

## 2021-11-06 LAB
ALBUMIN SERPL BCP-MCNC: 3.7 G/DL (ref 3.5–5)
ALP SERPL-CCNC: 81 U/L (ref 46–116)
ALT SERPL W P-5'-P-CCNC: 34 U/L (ref 12–78)
ANION GAP SERPL CALCULATED.3IONS-SCNC: 2 MMOL/L (ref 4–13)
AST SERPL W P-5'-P-CCNC: 46 U/L (ref 5–45)
BASOPHILS # BLD AUTO: 0.05 THOUSANDS/ΜL (ref 0–0.1)
BASOPHILS NFR BLD AUTO: 1 % (ref 0–1)
BILIRUB SERPL-MCNC: 0.37 MG/DL (ref 0.2–1)
BUN SERPL-MCNC: 34 MG/DL (ref 5–25)
CALCIUM SERPL-MCNC: 10.1 MG/DL (ref 8.3–10.1)
CHLORIDE SERPL-SCNC: 105 MMOL/L (ref 100–108)
CHOLEST SERPL-MCNC: 173 MG/DL (ref 50–200)
CO2 SERPL-SCNC: 29 MMOL/L (ref 21–32)
CREAT SERPL-MCNC: 2.3 MG/DL (ref 0.6–1.3)
EOSINOPHIL # BLD AUTO: 0.13 THOUSAND/ΜL (ref 0–0.61)
EOSINOPHIL NFR BLD AUTO: 2 % (ref 0–6)
ERYTHROCYTE [DISTWIDTH] IN BLOOD BY AUTOMATED COUNT: 15.3 % (ref 11.6–15.1)
FERRITIN SERPL-MCNC: 25 NG/ML (ref 8–388)
GFR SERPL CREATININE-BSD FRML MDRD: 19 ML/MIN/1.73SQ M
GLUCOSE P FAST SERPL-MCNC: 118 MG/DL (ref 65–99)
HCT VFR BLD AUTO: 38.5 % (ref 34.8–46.1)
HDLC SERPL-MCNC: 63 MG/DL
HGB BLD-MCNC: 12 G/DL (ref 11.5–15.4)
IMM GRANULOCYTES # BLD AUTO: 0.02 THOUSAND/UL (ref 0–0.2)
IMM GRANULOCYTES NFR BLD AUTO: 0 % (ref 0–2)
LDLC SERPL CALC-MCNC: 77 MG/DL (ref 0–100)
LYMPHOCYTES # BLD AUTO: 1.28 THOUSANDS/ΜL (ref 0.6–4.47)
LYMPHOCYTES NFR BLD AUTO: 23 % (ref 14–44)
MCH RBC QN AUTO: 29.6 PG (ref 26.8–34.3)
MCHC RBC AUTO-ENTMCNC: 31.2 G/DL (ref 31.4–37.4)
MCV RBC AUTO: 95 FL (ref 82–98)
MONOCYTES # BLD AUTO: 0.44 THOUSAND/ΜL (ref 0.17–1.22)
MONOCYTES NFR BLD AUTO: 8 % (ref 4–12)
NEUTROPHILS # BLD AUTO: 3.58 THOUSANDS/ΜL (ref 1.85–7.62)
NEUTS SEG NFR BLD AUTO: 66 % (ref 43–75)
NRBC BLD AUTO-RTO: 0 /100 WBCS
PLATELET # BLD AUTO: 146 THOUSANDS/UL (ref 149–390)
PMV BLD AUTO: 11.2 FL (ref 8.9–12.7)
POTASSIUM SERPL-SCNC: 4.8 MMOL/L (ref 3.5–5.3)
PROT SERPL-MCNC: 7.6 G/DL (ref 6.4–8.2)
RBC # BLD AUTO: 4.06 MILLION/UL (ref 3.81–5.12)
SODIUM SERPL-SCNC: 136 MMOL/L (ref 136–145)
TRIGL SERPL-MCNC: 163 MG/DL
TSH SERPL DL<=0.05 MIU/L-ACNC: 95.9 UIU/ML (ref 0.36–3.74)
WBC # BLD AUTO: 5.5 THOUSAND/UL (ref 4.31–10.16)

## 2021-11-06 PROCEDURE — 80053 COMPREHEN METABOLIC PANEL: CPT

## 2021-11-06 PROCEDURE — 80061 LIPID PANEL: CPT

## 2021-11-06 PROCEDURE — 84443 ASSAY THYROID STIM HORMONE: CPT

## 2021-11-06 PROCEDURE — 82728 ASSAY OF FERRITIN: CPT

## 2021-11-06 PROCEDURE — 36415 COLL VENOUS BLD VENIPUNCTURE: CPT

## 2021-11-06 PROCEDURE — 85025 COMPLETE CBC W/AUTO DIFF WBC: CPT

## 2021-11-11 ENCOUNTER — APPOINTMENT (OUTPATIENT)
Dept: LAB | Age: 86
End: 2021-11-11
Payer: MEDICARE

## 2021-11-11 DIAGNOSIS — R39.15 URGENCY OF URINATION: ICD-10-CM

## 2021-11-11 PROCEDURE — 87086 URINE CULTURE/COLONY COUNT: CPT

## 2021-11-12 LAB — BACTERIA UR CULT: NORMAL

## 2021-11-29 ENCOUNTER — HOSPITAL ENCOUNTER (OUTPATIENT)
Dept: NON INVASIVE DIAGNOSTICS | Facility: CLINIC | Age: 86
Discharge: HOME | End: 2021-11-29
Payer: MEDICARE

## 2021-11-29 DIAGNOSIS — I70.219 ATHEROSCLEROSIS OF ARTERY OF EXTREMITY WITH INTERMITTENT CLAUDICATION (HCC): Chronic | ICD-10-CM

## 2021-11-29 PROCEDURE — 93925 LOWER EXTREMITY STUDY: CPT | Performed by: SURGERY

## 2021-11-29 PROCEDURE — 93925 LOWER EXTREMITY STUDY: CPT

## 2021-11-29 PROCEDURE — 93922 UPR/L XTREMITY ART 2 LEVELS: CPT | Performed by: SURGERY

## 2021-11-29 PROCEDURE — 93923 UPR/LXTR ART STDY 3+ LVLS: CPT

## 2021-12-20 DIAGNOSIS — I65.23 ASYMPTOMATIC BILATERAL CAROTID ARTERY STENOSIS: ICD-10-CM

## 2021-12-20 RX ORDER — CLOPIDOGREL BISULFATE 75 MG/1
TABLET ORAL
Qty: 90 TABLET | Refills: 3 | Status: SHIPPED | OUTPATIENT
Start: 2021-12-20

## 2022-01-01 ENCOUNTER — TELEPHONE (OUTPATIENT)
Dept: NEPHROLOGY | Facility: CLINIC | Age: 87
End: 2022-01-01

## 2022-01-01 ENCOUNTER — HOME HEALTH ADMISSION (OUTPATIENT)
Dept: HOME HEALTH SERVICES | Facility: HOME HEALTHCARE | Age: 87
End: 2022-01-01

## 2022-01-01 ENCOUNTER — APPOINTMENT (INPATIENT)
Dept: NON INVASIVE DIAGNOSTICS | Facility: HOSPITAL | Age: 87
End: 2022-01-01

## 2022-01-01 ENCOUNTER — APPOINTMENT (EMERGENCY)
Dept: RADIOLOGY | Facility: HOSPITAL | Age: 87
End: 2022-01-01

## 2022-01-01 ENCOUNTER — APPOINTMENT (INPATIENT)
Dept: RADIOLOGY | Facility: HOSPITAL | Age: 87
End: 2022-01-01

## 2022-01-01 ENCOUNTER — APPOINTMENT (INPATIENT)
Dept: GASTROENTEROLOGY | Facility: HOSPITAL | Age: 87
End: 2022-01-01

## 2022-01-01 ENCOUNTER — TRANSCRIBE ORDERS (OUTPATIENT)
Dept: HOME HEALTH SERVICES | Facility: HOME HEALTHCARE | Age: 87
End: 2022-01-01

## 2022-01-01 ENCOUNTER — HOSPITAL ENCOUNTER (INPATIENT)
Facility: HOSPITAL | Age: 87
LOS: 16 days | End: 2022-12-01
Attending: EMERGENCY MEDICINE | Admitting: INTERNAL MEDICINE

## 2022-01-01 VITALS
SYSTOLIC BLOOD PRESSURE: 100 MMHG | OXYGEN SATURATION: 95 % | BODY MASS INDEX: 29.46 KG/M2 | RESPIRATION RATE: 24 BRPM | DIASTOLIC BLOOD PRESSURE: 45 MMHG | HEIGHT: 56 IN | WEIGHT: 130.95 LBS | TEMPERATURE: 99.1 F | HEART RATE: 124 BPM

## 2022-01-01 DIAGNOSIS — Z51.5 HOSPICE CARE: ICD-10-CM

## 2022-01-01 DIAGNOSIS — R09.02 HYPOXEMIA REQUIRING SUPPLEMENTAL OXYGEN: ICD-10-CM

## 2022-01-01 DIAGNOSIS — Z43.0 TRACHEOSTOMY CARE (HCC): ICD-10-CM

## 2022-01-01 DIAGNOSIS — K85.90 PANCREATITIS: ICD-10-CM

## 2022-01-01 DIAGNOSIS — A41.9 SEPSIS (HCC): ICD-10-CM

## 2022-01-01 DIAGNOSIS — J85.0 NECROTIZING PNEUMONIA (HCC): Primary | ICD-10-CM

## 2022-01-01 DIAGNOSIS — C32.9 LARYNGEAL CARCINOMA (HCC): ICD-10-CM

## 2022-01-01 DIAGNOSIS — R78.81 POSITIVE BLOOD CULTURE: ICD-10-CM

## 2022-01-01 DIAGNOSIS — J90 PLEURAL EFFUSION, RIGHT: ICD-10-CM

## 2022-01-01 DIAGNOSIS — R53.1 GENERALIZED WEAKNESS: ICD-10-CM

## 2022-01-01 DIAGNOSIS — I65.23 ASYMPTOMATIC BILATERAL CAROTID ARTERY STENOSIS: Primary | ICD-10-CM

## 2022-01-01 DIAGNOSIS — J90 PLEURAL EFFUSION ON RIGHT: ICD-10-CM

## 2022-01-01 DIAGNOSIS — A41.9 SEPSIS, DUE TO UNSPECIFIED ORGANISM, UNSPECIFIED WHETHER ACUTE ORGAN DYSFUNCTION PRESENT (HCC): Primary | ICD-10-CM

## 2022-01-01 DIAGNOSIS — R10.9 ABDOMINAL PAIN: ICD-10-CM

## 2022-01-01 DIAGNOSIS — E83.52 HYPERCALCEMIA: ICD-10-CM

## 2022-01-01 DIAGNOSIS — R91.8 RIGHT LOWER LOBE LUNG MASS: ICD-10-CM

## 2022-01-01 DIAGNOSIS — Z99.81 HYPOXEMIA REQUIRING SUPPLEMENTAL OXYGEN: ICD-10-CM

## 2022-01-01 LAB
1,25(OH)2D3 SERPL-MCNC: 49.4 PG/ML (ref 24.8–81.5)
25(OH)D3 SERPL-MCNC: 77.2 NG/ML (ref 30–100)
2HR DELTA HS TROPONIN: -1 NG/L
2HR DELTA HS TROPONIN: 4 NG/L
4HR DELTA HS TROPONIN: 1 NG/L
4HR DELTA HS TROPONIN: 3 NG/L
ALBUMIN FLD-MCNC: 1.2 G/DL
ALBUMIN SERPL BCP-MCNC: 1.6 G/DL (ref 3.5–5)
ALBUMIN SERPL BCP-MCNC: 1.7 G/DL (ref 3.5–5)
ALBUMIN SERPL BCP-MCNC: 1.8 G/DL (ref 3.5–5)
ALBUMIN SERPL BCP-MCNC: 1.9 G/DL (ref 3.5–5)
ALBUMIN SERPL BCP-MCNC: 2 G/DL (ref 3.5–5)
ALBUMIN SERPL BCP-MCNC: 2.1 G/DL (ref 3.5–5)
ALBUMIN SERPL ELPH-MCNC: 2.4 G/DL (ref 3.5–5)
ALBUMIN SERPL ELPH-MCNC: 40 % (ref 52–65)
ALBUMIN UR ELPH-MCNC: 100 %
ALL TARGETS: NOT DETECTED
ALP SERPL-CCNC: 108 U/L (ref 46–116)
ALP SERPL-CCNC: 109 U/L (ref 46–116)
ALP SERPL-CCNC: 113 U/L (ref 46–116)
ALP SERPL-CCNC: 114 U/L (ref 46–116)
ALP SERPL-CCNC: 122 U/L (ref 46–116)
ALP SERPL-CCNC: 137 U/L (ref 46–116)
ALP SERPL-CCNC: 151 U/L (ref 46–116)
ALP SERPL-CCNC: 167 U/L (ref 46–116)
ALP SERPL-CCNC: 73 U/L (ref 46–116)
ALP SERPL-CCNC: 76 U/L (ref 46–116)
ALP SERPL-CCNC: 80 U/L (ref 46–116)
ALP SERPL-CCNC: 85 U/L (ref 46–116)
ALP SERPL-CCNC: 86 U/L (ref 46–116)
ALP SERPL-CCNC: 87 U/L (ref 46–116)
ALP SERPL-CCNC: 88 U/L (ref 46–116)
ALP SERPL-CCNC: 97 U/L (ref 46–116)
ALP SERPL-CCNC: 99 U/L (ref 46–116)
ALPHA1 GLOB MFR UR ELPH: 0 %
ALPHA1 GLOB SERPL ELPH-MCNC: 0.66 G/DL (ref 0.1–0.4)
ALPHA1 GLOB SERPL ELPH-MCNC: 11 % (ref 2.5–5)
ALPHA2 GLOB MFR UR ELPH: 0 %
ALPHA2 GLOB SERPL ELPH-MCNC: 1.1 G/DL (ref 0.4–1.2)
ALPHA2 GLOB SERPL ELPH-MCNC: 18.4 % (ref 7–13)
ALT SERPL W P-5'-P-CCNC: 16 U/L (ref 12–78)
ALT SERPL W P-5'-P-CCNC: 17 U/L (ref 12–78)
ALT SERPL W P-5'-P-CCNC: 17 U/L (ref 12–78)
ALT SERPL W P-5'-P-CCNC: 18 U/L (ref 12–78)
ALT SERPL W P-5'-P-CCNC: 18 U/L (ref 12–78)
ALT SERPL W P-5'-P-CCNC: 19 U/L (ref 12–78)
ALT SERPL W P-5'-P-CCNC: 20 U/L (ref 12–78)
ALT SERPL W P-5'-P-CCNC: 21 U/L (ref 12–78)
ALT SERPL W P-5'-P-CCNC: 22 U/L (ref 12–78)
ALT SERPL W P-5'-P-CCNC: 23 U/L (ref 12–78)
ALT SERPL W P-5'-P-CCNC: 25 U/L (ref 12–78)
ALT SERPL W P-5'-P-CCNC: 27 U/L (ref 12–78)
ANION GAP SERPL CALCULATED.3IONS-SCNC: 10 MMOL/L (ref 4–13)
ANION GAP SERPL CALCULATED.3IONS-SCNC: 13 MMOL/L (ref 4–13)
ANION GAP SERPL CALCULATED.3IONS-SCNC: 5 MMOL/L (ref 4–13)
ANION GAP SERPL CALCULATED.3IONS-SCNC: 6 MMOL/L (ref 4–13)
ANION GAP SERPL CALCULATED.3IONS-SCNC: 6 MMOL/L (ref 4–13)
ANION GAP SERPL CALCULATED.3IONS-SCNC: 7 MMOL/L (ref 4–13)
ANION GAP SERPL CALCULATED.3IONS-SCNC: 8 MMOL/L (ref 4–13)
ANION GAP SERPL CALCULATED.3IONS-SCNC: 9 MMOL/L (ref 4–13)
ANISOCYTOSIS BLD QL SMEAR: PRESENT
AORTIC ROOT: 2.3 CM
AORTIC VALVE MEAN VELOCITY: 25.1 M/S
APICAL FOUR CHAMBER EJECTION FRACTION: 66 %
APPEARANCE FLD: CLEAR
APTT PPP: 31 SECONDS (ref 23–37)
ASCENDING AORTA: 2.5 CM
AST SERPL W P-5'-P-CCNC: 17 U/L (ref 5–45)
AST SERPL W P-5'-P-CCNC: 19 U/L (ref 5–45)
AST SERPL W P-5'-P-CCNC: 21 U/L (ref 5–45)
AST SERPL W P-5'-P-CCNC: 22 U/L (ref 5–45)
AST SERPL W P-5'-P-CCNC: 23 U/L (ref 5–45)
AST SERPL W P-5'-P-CCNC: 24 U/L (ref 5–45)
AST SERPL W P-5'-P-CCNC: 24 U/L (ref 5–45)
AST SERPL W P-5'-P-CCNC: 25 U/L (ref 5–45)
AST SERPL W P-5'-P-CCNC: 25 U/L (ref 5–45)
AST SERPL W P-5'-P-CCNC: 26 U/L (ref 5–45)
AST SERPL W P-5'-P-CCNC: 27 U/L (ref 5–45)
AST SERPL W P-5'-P-CCNC: 32 U/L (ref 5–45)
AST SERPL W P-5'-P-CCNC: 36 U/L (ref 5–45)
ATRIAL RATE: 0 BPM
ATRIAL RATE: 113 BPM
ATRIAL RATE: 114 BPM
ATRIAL RATE: 119 BPM
ATRIAL RATE: 122 BPM
ATRIAL RATE: 125 BPM
ATRIAL RATE: 126 BPM
ATRIAL RATE: 128 BPM
AV AREA BY CONTINUOUS VTI: 1.5 CM2
AV AREA PEAK VELOCITY: 1.3 CM2
AV LVOT MEAN GRADIENT: 6.4 MMHG
AV LVOT PEAK GRADIENT: 11.4 MMHG
AV MEAN GRADIENT: 22.1 MMHG
AV PEAK GRADIENT: 43.8 MMHG
AV VALVE AREA: 1.34 CM2
AV VELOCITY RATIO: 0.52
B-GLOBULIN MFR UR ELPH: 0 %
BACTERIA BLD CULT: ABNORMAL
BACTERIA BLD CULT: ABNORMAL
BACTERIA BLD CULT: NORMAL
BACTERIA SPEC BFLD CULT: NO GROWTH
BACTERIA SPEC BFLD CULT: NO GROWTH
BACTERIA SPT RESP CULT: ABNORMAL
BACTERIA SPT RESP CULT: ABNORMAL
BACTERIA SPT RESP CULT: NORMAL
BASOPHILS # BLD AUTO: 0.01 THOUSANDS/ÂΜL (ref 0–0.1)
BASOPHILS # BLD AUTO: 0.02 THOUSANDS/ÂΜL (ref 0–0.1)
BASOPHILS # BLD AUTO: 0.03 THOUSANDS/ÂΜL (ref 0–0.1)
BASOPHILS # BLD MANUAL: 0 THOUSAND/UL (ref 0–0.1)
BASOPHILS NFR BLD AUTO: 0 % (ref 0–1)
BASOPHILS NFR MAR MANUAL: 0 % (ref 0–1)
BETA GLOB ABNORMAL SERPL ELPH-MCNC: 0.26 G/DL (ref 0.4–0.8)
BETA1 GLOB SERPL ELPH-MCNC: 4.4 % (ref 5–13)
BETA2 GLOB SERPL ELPH-MCNC: 7.2 % (ref 2–8)
BETA2+GAMMA GLOB SERPL ELPH-MCNC: 0.43 G/DL (ref 0.2–0.5)
BILIRUB SERPL-MCNC: 0.23 MG/DL (ref 0.2–1)
BILIRUB SERPL-MCNC: 0.29 MG/DL (ref 0.2–1)
BILIRUB SERPL-MCNC: 0.3 MG/DL (ref 0.2–1)
BILIRUB SERPL-MCNC: 0.32 MG/DL (ref 0.2–1)
BILIRUB SERPL-MCNC: 0.34 MG/DL (ref 0.2–1)
BILIRUB SERPL-MCNC: 0.35 MG/DL (ref 0.2–1)
BILIRUB SERPL-MCNC: 0.35 MG/DL (ref 0.2–1)
BILIRUB SERPL-MCNC: 0.37 MG/DL (ref 0.2–1)
BILIRUB SERPL-MCNC: 0.38 MG/DL (ref 0.2–1)
BILIRUB SERPL-MCNC: 0.39 MG/DL (ref 0.2–1)
BILIRUB SERPL-MCNC: 0.4 MG/DL (ref 0.2–1)
BILIRUB SERPL-MCNC: 0.41 MG/DL (ref 0.2–1)
BILIRUB SERPL-MCNC: 0.42 MG/DL (ref 0.2–1)
BILIRUB SERPL-MCNC: 0.47 MG/DL (ref 0.2–1)
BILIRUB SERPL-MCNC: 0.47 MG/DL (ref 0.2–1)
BILIRUB UR QL STRIP: NEGATIVE
BUN SERPL-MCNC: 10 MG/DL (ref 5–25)
BUN SERPL-MCNC: 11 MG/DL (ref 5–25)
BUN SERPL-MCNC: 13 MG/DL (ref 5–25)
BUN SERPL-MCNC: 14 MG/DL (ref 5–25)
BUN SERPL-MCNC: 19 MG/DL (ref 5–25)
BUN SERPL-MCNC: 19 MG/DL (ref 5–25)
BUN SERPL-MCNC: 21 MG/DL (ref 5–25)
BUN SERPL-MCNC: 23 MG/DL (ref 5–25)
BUN SERPL-MCNC: 24 MG/DL (ref 5–25)
BUN SERPL-MCNC: 25 MG/DL (ref 5–25)
BUN SERPL-MCNC: 34 MG/DL (ref 5–25)
C DIFF TOX GENS STL QL NAA+PROBE: NEGATIVE
CA-I BLD-SCNC: 1.25 MMOL/L (ref 1.12–1.32)
CALCIUM ALBUM COR SERPL-MCNC: 10.1 MG/DL (ref 8.3–10.1)
CALCIUM ALBUM COR SERPL-MCNC: 10.2 MG/DL (ref 8.3–10.1)
CALCIUM ALBUM COR SERPL-MCNC: 10.2 MG/DL (ref 8.3–10.1)
CALCIUM ALBUM COR SERPL-MCNC: 10.4 MG/DL (ref 8.3–10.1)
CALCIUM ALBUM COR SERPL-MCNC: 10.7 MG/DL (ref 8.3–10.1)
CALCIUM ALBUM COR SERPL-MCNC: 11.3 MG/DL (ref 8.3–10.1)
CALCIUM ALBUM COR SERPL-MCNC: 11.9 MG/DL (ref 8.3–10.1)
CALCIUM ALBUM COR SERPL-MCNC: 12.7 MG/DL (ref 8.3–10.1)
CALCIUM ALBUM COR SERPL-MCNC: 12.7 MG/DL (ref 8.3–10.1)
CALCIUM ALBUM COR SERPL-MCNC: 13.3 MG/DL (ref 8.3–10.1)
CALCIUM ALBUM COR SERPL-MCNC: 13.4 MG/DL (ref 8.3–10.1)
CALCIUM ALBUM COR SERPL-MCNC: 13.5 MG/DL (ref 8.3–10.1)
CALCIUM ALBUM COR SERPL-MCNC: 13.8 MG/DL (ref 8.3–10.1)
CALCIUM ALBUM COR SERPL-MCNC: 14.3 MG/DL (ref 8.3–10.1)
CALCIUM ALBUM COR SERPL-MCNC: 9.3 MG/DL (ref 8.3–10.1)
CALCIUM ALBUM COR SERPL-MCNC: 9.4 MG/DL (ref 8.3–10.1)
CALCIUM ALBUM COR SERPL-MCNC: 9.6 MG/DL (ref 8.3–10.1)
CALCIUM SERPL-MCNC: 10.3 MG/DL (ref 8.3–10.1)
CALCIUM SERPL-MCNC: 10.9 MG/DL (ref 8.3–10.1)
CALCIUM SERPL-MCNC: 10.9 MG/DL (ref 8.3–10.1)
CALCIUM SERPL-MCNC: 11.2 MG/DL (ref 8.3–10.1)
CALCIUM SERPL-MCNC: 11.5 MG/DL (ref 8.3–10.1)
CALCIUM SERPL-MCNC: 11.6 MG/DL (ref 8.3–10.1)
CALCIUM SERPL-MCNC: 12 MG/DL (ref 8.3–10.1)
CALCIUM SERPL-MCNC: 12.1 MG/DL (ref 8.3–10.1)
CALCIUM SERPL-MCNC: 12.8 MG/DL (ref 8.3–10.1)
CALCIUM SERPL-MCNC: 7.5 MG/DL (ref 8.3–10.1)
CALCIUM SERPL-MCNC: 7.6 MG/DL (ref 8.3–10.1)
CALCIUM SERPL-MCNC: 7.9 MG/DL (ref 8.3–10.1)
CALCIUM SERPL-MCNC: 8.3 MG/DL (ref 8.3–10.1)
CALCIUM SERPL-MCNC: 8.4 MG/DL (ref 8.3–10.1)
CALCIUM SERPL-MCNC: 8.6 MG/DL (ref 8.3–10.1)
CALCIUM SERPL-MCNC: 8.6 MG/DL (ref 8.3–10.1)
CALCIUM SERPL-MCNC: 9.2 MG/DL (ref 8.3–10.1)
CALCIUM SERPL-MCNC: 9.7 MG/DL (ref 8.3–10.1)
CARDIAC TROPONIN I PNL SERPL HS: 25 NG/L
CARDIAC TROPONIN I PNL SERPL HS: 28 NG/L
CARDIAC TROPONIN I PNL SERPL HS: 28 NG/L
CARDIAC TROPONIN I PNL SERPL HS: 29 NG/L
CARDIAC TROPONIN I PNL SERPL HS: 29 NG/L
CARDIAC TROPONIN I PNL SERPL HS: 30 NG/L
CHLORIDE SERPL-SCNC: 105 MMOL/L (ref 96–108)
CHLORIDE SERPL-SCNC: 106 MMOL/L (ref 96–108)
CHLORIDE SERPL-SCNC: 106 MMOL/L (ref 96–108)
CHLORIDE SERPL-SCNC: 107 MMOL/L (ref 96–108)
CHLORIDE SERPL-SCNC: 107 MMOL/L (ref 96–108)
CHLORIDE SERPL-SCNC: 108 MMOL/L (ref 96–108)
CHLORIDE SERPL-SCNC: 110 MMOL/L (ref 96–108)
CHLORIDE SERPL-SCNC: 110 MMOL/L (ref 96–108)
CHLORIDE SERPL-SCNC: 111 MMOL/L (ref 96–108)
CHLORIDE SERPL-SCNC: 111 MMOL/L (ref 96–108)
CHLORIDE SERPL-SCNC: 112 MMOL/L (ref 96–108)
CHLORIDE SERPL-SCNC: 113 MMOL/L (ref 96–108)
CHLORIDE SERPL-SCNC: 114 MMOL/L (ref 96–108)
CHLORIDE SERPL-SCNC: 114 MMOL/L (ref 96–108)
CLARITY UR: NORMAL
CO2 SERPL-SCNC: 18 MMOL/L (ref 21–32)
CO2 SERPL-SCNC: 18 MMOL/L (ref 21–32)
CO2 SERPL-SCNC: 19 MMOL/L (ref 21–32)
CO2 SERPL-SCNC: 19 MMOL/L (ref 21–32)
CO2 SERPL-SCNC: 20 MMOL/L (ref 21–32)
CO2 SERPL-SCNC: 20 MMOL/L (ref 21–32)
CO2 SERPL-SCNC: 23 MMOL/L (ref 21–32)
CO2 SERPL-SCNC: 24 MMOL/L (ref 21–32)
CO2 SERPL-SCNC: 24 MMOL/L (ref 21–32)
CO2 SERPL-SCNC: 27 MMOL/L (ref 21–32)
CO2 SERPL-SCNC: 28 MMOL/L (ref 21–32)
CO2 SERPL-SCNC: 29 MMOL/L (ref 21–32)
COLOR FLD: YELLOW
COLOR UR: YELLOW
CREAT SERPL-MCNC: 0.88 MG/DL (ref 0.6–1.3)
CREAT SERPL-MCNC: 0.93 MG/DL (ref 0.6–1.3)
CREAT SERPL-MCNC: 0.95 MG/DL (ref 0.6–1.3)
CREAT SERPL-MCNC: 0.96 MG/DL (ref 0.6–1.3)
CREAT SERPL-MCNC: 1.01 MG/DL (ref 0.6–1.3)
CREAT SERPL-MCNC: 1.09 MG/DL (ref 0.6–1.3)
CREAT SERPL-MCNC: 1.11 MG/DL (ref 0.6–1.3)
CREAT SERPL-MCNC: 1.11 MG/DL (ref 0.6–1.3)
CREAT SERPL-MCNC: 1.15 MG/DL (ref 0.6–1.3)
CREAT SERPL-MCNC: 1.16 MG/DL (ref 0.6–1.3)
CREAT SERPL-MCNC: 1.19 MG/DL (ref 0.6–1.3)
CREAT SERPL-MCNC: 1.2 MG/DL (ref 0.6–1.3)
CREAT SERPL-MCNC: 1.22 MG/DL (ref 0.6–1.3)
CREAT SERPL-MCNC: 1.27 MG/DL (ref 0.6–1.3)
CREAT SERPL-MCNC: 1.28 MG/DL (ref 0.6–1.3)
CREAT SERPL-MCNC: 1.29 MG/DL (ref 0.6–1.3)
CREAT SERPL-MCNC: 1.36 MG/DL (ref 0.6–1.3)
CREAT SERPL-MCNC: 1.47 MG/DL (ref 0.6–1.3)
D DIMER PPP FEU-MCNC: 2.78 UG/ML FEU
DOP CALC AO PEAK VEL: 3.3 M/S
DOP CALC AO VTI: 59.7 CM
DOP CALC LVOT AREA: 2.27 CM2
DOP CALC LVOT DIAMETER: 1.7 CM
DOP CALC LVOT PEAK VEL VTI: 35.3 CM
DOP CALC LVOT PEAK VEL: 1.7 M/S
DOP CALC LVOT STROKE INDEX: 67.8 ML/M2
DOP CALC LVOT STROKE VOLUME: 80.08 CM3
E WAVE DECELERATION TIME: 238 MS
EOSINOPHIL # BLD AUTO: 0.01 THOUSAND/ÂΜL (ref 0–0.61)
EOSINOPHIL # BLD AUTO: 0.03 THOUSAND/ÂΜL (ref 0–0.61)
EOSINOPHIL # BLD AUTO: 0.08 THOUSAND/ÂΜL (ref 0–0.61)
EOSINOPHIL # BLD AUTO: 0.11 THOUSAND/ÂΜL (ref 0–0.61)
EOSINOPHIL # BLD AUTO: 0.11 THOUSAND/ÂΜL (ref 0–0.61)
EOSINOPHIL # BLD AUTO: 0.12 THOUSAND/ÂΜL (ref 0–0.61)
EOSINOPHIL # BLD AUTO: 0.12 THOUSAND/ÂΜL (ref 0–0.61)
EOSINOPHIL # BLD AUTO: 0.13 THOUSAND/ÂΜL (ref 0–0.61)
EOSINOPHIL # BLD AUTO: 0.15 THOUSAND/ÂΜL (ref 0–0.61)
EOSINOPHIL # BLD AUTO: 0.16 THOUSAND/ÂΜL (ref 0–0.61)
EOSINOPHIL # BLD AUTO: 0.16 THOUSAND/ÂΜL (ref 0–0.61)
EOSINOPHIL # BLD AUTO: 0.17 THOUSAND/ÂΜL (ref 0–0.61)
EOSINOPHIL # BLD MANUAL: 0 THOUSAND/UL (ref 0–0.4)
EOSINOPHIL # BLD MANUAL: 0.16 THOUSAND/UL (ref 0–0.4)
EOSINOPHIL # BLD MANUAL: 0.27 THOUSAND/UL (ref 0–0.4)
EOSINOPHIL NFR BLD AUTO: 0 % (ref 0–6)
EOSINOPHIL NFR BLD AUTO: 0 % (ref 0–6)
EOSINOPHIL NFR BLD AUTO: 1 % (ref 0–6)
EOSINOPHIL NFR BLD AUTO: 2 % (ref 0–6)
EOSINOPHIL NFR BLD MANUAL: 0 % (ref 0–6)
EOSINOPHIL NFR BLD MANUAL: 1 % (ref 0–6)
EOSINOPHIL NFR BLD MANUAL: 2 % (ref 0–6)
ERYTHROCYTE [DISTWIDTH] IN BLOOD BY AUTOMATED COUNT: 18.2 % (ref 11.6–15.1)
ERYTHROCYTE [DISTWIDTH] IN BLOOD BY AUTOMATED COUNT: 18.3 % (ref 11.6–15.1)
ERYTHROCYTE [DISTWIDTH] IN BLOOD BY AUTOMATED COUNT: 18.4 % (ref 11.6–15.1)
ERYTHROCYTE [DISTWIDTH] IN BLOOD BY AUTOMATED COUNT: 18.6 % (ref 11.6–15.1)
ERYTHROCYTE [DISTWIDTH] IN BLOOD BY AUTOMATED COUNT: 18.9 % (ref 11.6–15.1)
ERYTHROCYTE [DISTWIDTH] IN BLOOD BY AUTOMATED COUNT: 19.3 % (ref 11.6–15.1)
ERYTHROCYTE [DISTWIDTH] IN BLOOD BY AUTOMATED COUNT: 20.1 % (ref 11.6–15.1)
ERYTHROCYTE [DISTWIDTH] IN BLOOD BY AUTOMATED COUNT: 20.3 % (ref 11.6–15.1)
ERYTHROCYTE [DISTWIDTH] IN BLOOD BY AUTOMATED COUNT: 20.9 % (ref 11.6–15.1)
ERYTHROCYTE [DISTWIDTH] IN BLOOD BY AUTOMATED COUNT: 21.4 % (ref 11.6–15.1)
ERYTHROCYTE [DISTWIDTH] IN BLOOD BY AUTOMATED COUNT: 21.5 % (ref 11.6–15.1)
ERYTHROCYTE [DISTWIDTH] IN BLOOD BY AUTOMATED COUNT: 21.6 % (ref 11.6–15.1)
ERYTHROCYTE [DISTWIDTH] IN BLOOD BY AUTOMATED COUNT: 22.2 % (ref 11.6–15.1)
ERYTHROCYTE [DISTWIDTH] IN BLOOD BY AUTOMATED COUNT: 22.2 % (ref 11.6–15.1)
ERYTHROCYTE [DISTWIDTH] IN BLOOD BY AUTOMATED COUNT: 22.4 % (ref 11.6–15.1)
FERRITIN SERPL-MCNC: 59 NG/ML (ref 8–388)
FLUAV RNA RESP QL NAA+PROBE: NEGATIVE
FLUBV RNA RESP QL NAA+PROBE: NEGATIVE
FRACTIONAL SHORTENING: 35 % (ref 28–44)
FUNGUS SPEC CULT: NORMAL
GAMMA GLOB ABNORMAL SERPL ELPH-MCNC: 1.14 G/DL (ref 0.5–1.6)
GAMMA GLOB MFR UR ELPH: 0 %
GAMMA GLOB SERPL ELPH-MCNC: 19 % (ref 12–22)
GFR SERPL CREATININE-BSD FRML MDRD: 32 ML/MIN/1.73SQ M
GFR SERPL CREATININE-BSD FRML MDRD: 35 ML/MIN/1.73SQ M
GFR SERPL CREATININE-BSD FRML MDRD: 37 ML/MIN/1.73SQ M
GFR SERPL CREATININE-BSD FRML MDRD: 37 ML/MIN/1.73SQ M
GFR SERPL CREATININE-BSD FRML MDRD: 38 ML/MIN/1.73SQ M
GFR SERPL CREATININE-BSD FRML MDRD: 40 ML/MIN/1.73SQ M
GFR SERPL CREATININE-BSD FRML MDRD: 41 ML/MIN/1.73SQ M
GFR SERPL CREATININE-BSD FRML MDRD: 41 ML/MIN/1.73SQ M
GFR SERPL CREATININE-BSD FRML MDRD: 42 ML/MIN/1.73SQ M
GFR SERPL CREATININE-BSD FRML MDRD: 43 ML/MIN/1.73SQ M
GFR SERPL CREATININE-BSD FRML MDRD: 45 ML/MIN/1.73SQ M
GFR SERPL CREATININE-BSD FRML MDRD: 45 ML/MIN/1.73SQ M
GFR SERPL CREATININE-BSD FRML MDRD: 46 ML/MIN/1.73SQ M
GFR SERPL CREATININE-BSD FRML MDRD: 50 ML/MIN/1.73SQ M
GFR SERPL CREATININE-BSD FRML MDRD: 53 ML/MIN/1.73SQ M
GFR SERPL CREATININE-BSD FRML MDRD: 54 ML/MIN/1.73SQ M
GFR SERPL CREATININE-BSD FRML MDRD: 55 ML/MIN/1.73SQ M
GFR SERPL CREATININE-BSD FRML MDRD: 59 ML/MIN/1.73SQ M
GIANT PLATELETS BLD QL SMEAR: PRESENT
GLUCOSE FLD-MCNC: 128 MG/DL
GLUCOSE FLD-MCNC: 83 MG/DL
GLUCOSE SERPL-MCNC: 101 MG/DL (ref 65–140)
GLUCOSE SERPL-MCNC: 102 MG/DL (ref 65–140)
GLUCOSE SERPL-MCNC: 103 MG/DL (ref 65–140)
GLUCOSE SERPL-MCNC: 103 MG/DL (ref 65–140)
GLUCOSE SERPL-MCNC: 118 MG/DL (ref 65–140)
GLUCOSE SERPL-MCNC: 123 MG/DL (ref 65–140)
GLUCOSE SERPL-MCNC: 123 MG/DL (ref 65–140)
GLUCOSE SERPL-MCNC: 132 MG/DL (ref 65–140)
GLUCOSE SERPL-MCNC: 136 MG/DL (ref 65–140)
GLUCOSE SERPL-MCNC: 136 MG/DL (ref 65–140)
GLUCOSE SERPL-MCNC: 141 MG/DL (ref 65–140)
GLUCOSE SERPL-MCNC: 193 MG/DL (ref 65–140)
GLUCOSE SERPL-MCNC: 75 MG/DL (ref 65–140)
GLUCOSE SERPL-MCNC: 83 MG/DL (ref 65–140)
GLUCOSE SERPL-MCNC: 84 MG/DL (ref 65–140)
GLUCOSE SERPL-MCNC: 86 MG/DL (ref 65–140)
GLUCOSE SERPL-MCNC: 89 MG/DL (ref 65–140)
GLUCOSE SERPL-MCNC: 95 MG/DL (ref 65–140)
GLUCOSE UR STRIP-MCNC: NEGATIVE MG/DL
GRAM STN SPEC: ABNORMAL
GRAM STN SPEC: NORMAL
HCT VFR BLD AUTO: 29.9 % (ref 34.8–46.1)
HCT VFR BLD AUTO: 30.8 % (ref 34.8–46.1)
HCT VFR BLD AUTO: 31.1 % (ref 34.8–46.1)
HCT VFR BLD AUTO: 31.4 % (ref 34.8–46.1)
HCT VFR BLD AUTO: 31.5 % (ref 34.8–46.1)
HCT VFR BLD AUTO: 31.7 % (ref 34.8–46.1)
HCT VFR BLD AUTO: 31.7 % (ref 34.8–46.1)
HCT VFR BLD AUTO: 31.8 % (ref 34.8–46.1)
HCT VFR BLD AUTO: 31.9 % (ref 34.8–46.1)
HCT VFR BLD AUTO: 32.2 % (ref 34.8–46.1)
HCT VFR BLD AUTO: 32.2 % (ref 34.8–46.1)
HCT VFR BLD AUTO: 32.7 % (ref 34.8–46.1)
HCT VFR BLD AUTO: 33 % (ref 34.8–46.1)
HCT VFR BLD AUTO: 35.4 % (ref 34.8–46.1)
HCT VFR BLD AUTO: 36.2 % (ref 34.8–46.1)
HEMOCCULT STL QL: NEGATIVE
HEMOCCULT STL QL: NORMAL
HEMOCCULT STL QL: NORMAL
HGB BLD-MCNC: 10.1 G/DL (ref 11.5–15.4)
HGB BLD-MCNC: 10.3 G/DL (ref 11.5–15.4)
HGB BLD-MCNC: 11.1 G/DL (ref 11.5–15.4)
HGB BLD-MCNC: 8.8 G/DL (ref 11.5–15.4)
HGB BLD-MCNC: 8.9 G/DL (ref 11.5–15.4)
HGB BLD-MCNC: 8.9 G/DL (ref 11.5–15.4)
HGB BLD-MCNC: 9.1 G/DL (ref 11.5–15.4)
HGB BLD-MCNC: 9.2 G/DL (ref 11.5–15.4)
HGB BLD-MCNC: 9.2 G/DL (ref 11.5–15.4)
HGB BLD-MCNC: 9.3 G/DL (ref 11.5–15.4)
HGB BLD-MCNC: 9.3 G/DL (ref 11.5–15.4)
HGB BLD-MCNC: 9.4 G/DL (ref 11.5–15.4)
HGB BLD-MCNC: 9.4 G/DL (ref 11.5–15.4)
HGB BLD-MCNC: 9.6 G/DL (ref 11.5–15.4)
HGB BLD-MCNC: 9.6 G/DL (ref 11.5–15.4)
HGB UR QL STRIP.AUTO: NEGATIVE
HISTIOCYTES NFR FLD: 10 %
HYPERCHROMIA BLD QL SMEAR: PRESENT
IGG/ALB SER: 0.67 {RATIO} (ref 1.1–1.8)
IMM GRANULOCYTES # BLD AUTO: 0.04 THOUSAND/UL (ref 0–0.2)
IMM GRANULOCYTES # BLD AUTO: 0.06 THOUSAND/UL (ref 0–0.2)
IMM GRANULOCYTES # BLD AUTO: 0.07 THOUSAND/UL (ref 0–0.2)
IMM GRANULOCYTES # BLD AUTO: 0.08 THOUSAND/UL (ref 0–0.2)
IMM GRANULOCYTES # BLD AUTO: 0.09 THOUSAND/UL (ref 0–0.2)
IMM GRANULOCYTES # BLD AUTO: 0.1 THOUSAND/UL (ref 0–0.2)
IMM GRANULOCYTES # BLD AUTO: 0.1 THOUSAND/UL (ref 0–0.2)
IMM GRANULOCYTES # BLD AUTO: 0.11 THOUSAND/UL (ref 0–0.2)
IMM GRANULOCYTES # BLD AUTO: 0.12 THOUSAND/UL (ref 0–0.2)
IMM GRANULOCYTES NFR BLD AUTO: 0 % (ref 0–2)
IMM GRANULOCYTES NFR BLD AUTO: 1 % (ref 0–2)
INR PPP: 1.01 (ref 0.84–1.19)
INTERPRETATION UR IFE-IMP: NORMAL
INTERVENTRICULAR SEPTUM IN DIASTOLE (PARASTERNAL SHORT AXIS VIEW): 1.4 CM
INTERVENTRICULAR SEPTUM: 1.4 CM (ref 0.6–1.1)
IRON SATN MFR SERPL: 6 % (ref 15–50)
IRON SERPL-MCNC: 15 UG/DL (ref 50–170)
KETONES UR STRIP-MCNC: NEGATIVE MG/DL
L PNEUMO1 AG UR QL IA.RAPID: NEGATIVE
LAAS-AP2: 17.6 CM2
LAAS-AP4: 12.8 CM2
LACTATE SERPL-SCNC: 1.1 MMOL/L (ref 0.5–2)
LDH FLD L TO P-CCNC: 176 U/L
LDH FLD L TO P-CCNC: 92 U/L
LDH SERPL-CCNC: 184 U/L (ref 81–234)
LDH SERPL-CCNC: 335 U/L (ref 81–234)
LEFT ATRIUM SIZE: 3.6 CM
LEFT INTERNAL DIMENSION IN SYSTOLE: 1.7 CM (ref 2.1–4)
LEFT VENTRICULAR INTERNAL DIMENSION IN DIASTOLE: 2.6 CM (ref 3.5–6)
LEFT VENTRICULAR POSTERIOR WALL IN END DIASTOLE: 0.9 CM
LEFT VENTRICULAR STROKE VOLUME: 16 ML
LEUKOCYTE ESTERASE UR QL STRIP: NEGATIVE
LIPASE SERPL-CCNC: 2028 U/L (ref 73–393)
LVSV (TEICH): 16 ML
LYMPHOCYTES # BLD AUTO: 0 % (ref 14–44)
LYMPHOCYTES # BLD AUTO: 0 THOUSAND/UL (ref 0.6–4.47)
LYMPHOCYTES # BLD AUTO: 0.14 THOUSAND/UL (ref 0.6–4.47)
LYMPHOCYTES # BLD AUTO: 0.26 THOUSAND/UL (ref 0.6–4.47)
LYMPHOCYTES # BLD AUTO: 0.3 THOUSANDS/ÂΜL (ref 0.6–4.47)
LYMPHOCYTES # BLD AUTO: 0.39 THOUSANDS/ÂΜL (ref 0.6–4.47)
LYMPHOCYTES # BLD AUTO: 0.4 THOUSANDS/ÂΜL (ref 0.6–4.47)
LYMPHOCYTES # BLD AUTO: 0.41 THOUSANDS/ÂΜL (ref 0.6–4.47)
LYMPHOCYTES # BLD AUTO: 0.41 THOUSANDS/ÂΜL (ref 0.6–4.47)
LYMPHOCYTES # BLD AUTO: 0.43 THOUSANDS/ÂΜL (ref 0.6–4.47)
LYMPHOCYTES # BLD AUTO: 0.45 THOUSANDS/ÂΜL (ref 0.6–4.47)
LYMPHOCYTES # BLD AUTO: 0.57 THOUSANDS/ÂΜL (ref 0.6–4.47)
LYMPHOCYTES # BLD AUTO: 0.61 THOUSANDS/ÂΜL (ref 0.6–4.47)
LYMPHOCYTES # BLD AUTO: 0.63 THOUSANDS/ÂΜL (ref 0.6–4.47)
LYMPHOCYTES # BLD AUTO: 0.67 THOUSANDS/ÂΜL (ref 0.6–4.47)
LYMPHOCYTES # BLD AUTO: 0.76 THOUSANDS/ÂΜL (ref 0.6–4.47)
LYMPHOCYTES # BLD AUTO: 1 % (ref 14–44)
LYMPHOCYTES # BLD AUTO: 2 % (ref 14–44)
LYMPHOCYTES NFR BLD AUTO: 10 %
LYMPHOCYTES NFR BLD AUTO: 17 %
LYMPHOCYTES NFR BLD AUTO: 2 % (ref 14–44)
LYMPHOCYTES NFR BLD AUTO: 3 % (ref 14–44)
LYMPHOCYTES NFR BLD AUTO: 4 % (ref 14–44)
LYMPHOCYTES NFR BLD AUTO: 5 % (ref 14–44)
LYMPHOCYTES NFR BLD AUTO: 6 % (ref 14–44)
LYMPHOCYTES NFR BLD AUTO: 6 % (ref 14–44)
MACROCYTES BLD QL AUTO: PRESENT
MAGNESIUM SERPL-MCNC: 1.5 MG/DL (ref 1.6–2.6)
MAGNESIUM SERPL-MCNC: 1.5 MG/DL (ref 1.6–2.6)
MAGNESIUM SERPL-MCNC: 1.6 MG/DL (ref 1.6–2.6)
MAGNESIUM SERPL-MCNC: 1.7 MG/DL (ref 1.6–2.6)
MAGNESIUM SERPL-MCNC: 1.8 MG/DL (ref 1.6–2.6)
MAGNESIUM SERPL-MCNC: 2.3 MG/DL (ref 1.6–2.6)
MCH RBC QN AUTO: 25.5 PG (ref 26.8–34.3)
MCH RBC QN AUTO: 25.7 PG (ref 26.8–34.3)
MCH RBC QN AUTO: 25.7 PG (ref 26.8–34.3)
MCH RBC QN AUTO: 25.9 PG (ref 26.8–34.3)
MCH RBC QN AUTO: 26 PG (ref 26.8–34.3)
MCH RBC QN AUTO: 26.1 PG (ref 26.8–34.3)
MCH RBC QN AUTO: 26.1 PG (ref 26.8–34.3)
MCH RBC QN AUTO: 26.4 PG (ref 26.8–34.3)
MCH RBC QN AUTO: 26.6 PG (ref 26.8–34.3)
MCH RBC QN AUTO: 26.6 PG (ref 26.8–34.3)
MCH RBC QN AUTO: 26.7 PG (ref 26.8–34.3)
MCH RBC QN AUTO: 26.8 PG (ref 26.8–34.3)
MCH RBC QN AUTO: 26.8 PG (ref 26.8–34.3)
MCH RBC QN AUTO: 27 PG (ref 26.8–34.3)
MCH RBC QN AUTO: 28.1 PG (ref 26.8–34.3)
MCHC RBC AUTO-ENTMCNC: 27.9 G/DL (ref 31.4–37.4)
MCHC RBC AUTO-ENTMCNC: 28.2 G/DL (ref 31.4–37.4)
MCHC RBC AUTO-ENTMCNC: 28.5 G/DL (ref 31.4–37.4)
MCHC RBC AUTO-ENTMCNC: 28.8 G/DL (ref 31.4–37.4)
MCHC RBC AUTO-ENTMCNC: 28.9 G/DL (ref 31.4–37.4)
MCHC RBC AUTO-ENTMCNC: 29 G/DL (ref 31.4–37.4)
MCHC RBC AUTO-ENTMCNC: 29.2 G/DL (ref 31.4–37.4)
MCHC RBC AUTO-ENTMCNC: 29.3 G/DL (ref 31.4–37.4)
MCHC RBC AUTO-ENTMCNC: 29.4 G/DL (ref 31.4–37.4)
MCHC RBC AUTO-ENTMCNC: 29.6 G/DL (ref 31.4–37.4)
MCHC RBC AUTO-ENTMCNC: 29.7 G/DL (ref 31.4–37.4)
MCHC RBC AUTO-ENTMCNC: 29.8 G/DL (ref 31.4–37.4)
MCHC RBC AUTO-ENTMCNC: 30.2 G/DL (ref 31.4–37.4)
MCHC RBC AUTO-ENTMCNC: 30.7 G/DL (ref 31.4–37.4)
MCHC RBC AUTO-ENTMCNC: 32 G/DL (ref 31.4–37.4)
MCV RBC AUTO: 85 FL (ref 82–98)
MCV RBC AUTO: 86 FL (ref 82–98)
MCV RBC AUTO: 87 FL (ref 82–98)
MCV RBC AUTO: 87 FL (ref 82–98)
MCV RBC AUTO: 88 FL (ref 82–98)
MCV RBC AUTO: 89 FL (ref 82–98)
MCV RBC AUTO: 90 FL (ref 82–98)
MCV RBC AUTO: 91 FL (ref 82–98)
MCV RBC AUTO: 92 FL (ref 82–98)
MCV RBC AUTO: 94 FL (ref 82–98)
MCV RBC AUTO: 95 FL (ref 82–98)
MONO+MESO NFR FLD MANUAL: 1 %
MONO+MESO NFR FLD MANUAL: 2 %
MONOCYTES # BLD AUTO: 0.27 THOUSAND/UL (ref 0–1.22)
MONOCYTES # BLD AUTO: 0.39 THOUSAND/UL (ref 0–1.22)
MONOCYTES # BLD AUTO: 0.67 THOUSAND/ÂΜL (ref 0.17–1.22)
MONOCYTES # BLD AUTO: 0.68 THOUSAND/ÂΜL (ref 0.17–1.22)
MONOCYTES # BLD AUTO: 0.76 THOUSAND/ÂΜL (ref 0.17–1.22)
MONOCYTES # BLD AUTO: 0.85 THOUSAND/ÂΜL (ref 0.17–1.22)
MONOCYTES # BLD AUTO: 0.85 THOUSAND/ÂΜL (ref 0.17–1.22)
MONOCYTES # BLD AUTO: 0.87 THOUSAND/ÂΜL (ref 0.17–1.22)
MONOCYTES # BLD AUTO: 0.87 THOUSAND/ÂΜL (ref 0.17–1.22)
MONOCYTES # BLD AUTO: 0.88 THOUSAND/ÂΜL (ref 0.17–1.22)
MONOCYTES # BLD AUTO: 0.89 THOUSAND/ÂΜL (ref 0.17–1.22)
MONOCYTES # BLD AUTO: 0.93 THOUSAND/UL (ref 0–1.22)
MONOCYTES # BLD AUTO: 0.93 THOUSAND/ÂΜL (ref 0.17–1.22)
MONOCYTES # BLD AUTO: 1.02 THOUSAND/ÂΜL (ref 0.17–1.22)
MONOCYTES # BLD AUTO: 1.02 THOUSAND/ÂΜL (ref 0.17–1.22)
MONOCYTES NFR BLD AUTO: 33 %
MONOCYTES NFR BLD AUTO: 33 %
MONOCYTES NFR BLD AUTO: 5 % (ref 4–12)
MONOCYTES NFR BLD AUTO: 6 % (ref 4–12)
MONOCYTES NFR BLD AUTO: 7 % (ref 4–12)
MONOCYTES NFR BLD: 2 % (ref 4–12)
MONOCYTES NFR BLD: 3 % (ref 4–12)
MONOCYTES NFR BLD: 6 % (ref 4–12)
MRSA NOSE QL CULT: NORMAL
MV PEAK A VEL: 1.49 M/S
MV PEAK E VEL: 167 CM/S
MV STENOSIS PRESSURE HALF TIME: 69 MS
MV VALVE AREA P 1/2 METHOD: 3.19 CM2
MYCOBACTERIUM SPEC CULT: NORMAL
MYELOCYTES NFR BLD MANUAL: 1 % (ref 0–1)
NEUTROPHILS # BLD AUTO: 10.05 THOUSANDS/ÂΜL (ref 1.85–7.62)
NEUTROPHILS # BLD AUTO: 10.79 THOUSANDS/ÂΜL (ref 1.85–7.62)
NEUTROPHILS # BLD AUTO: 11.03 THOUSANDS/ÂΜL (ref 1.85–7.62)
NEUTROPHILS # BLD AUTO: 11.04 THOUSANDS/ÂΜL (ref 1.85–7.62)
NEUTROPHILS # BLD AUTO: 11.16 THOUSANDS/ÂΜL (ref 1.85–7.62)
NEUTROPHILS # BLD AUTO: 11.77 THOUSANDS/ÂΜL (ref 1.85–7.62)
NEUTROPHILS # BLD AUTO: 11.81 THOUSANDS/ÂΜL (ref 1.85–7.62)
NEUTROPHILS # BLD AUTO: 12.93 THOUSANDS/ÂΜL (ref 1.85–7.62)
NEUTROPHILS # BLD AUTO: 12.97 THOUSANDS/ÂΜL (ref 1.85–7.62)
NEUTROPHILS # BLD AUTO: 13.19 THOUSANDS/ÂΜL (ref 1.85–7.62)
NEUTROPHILS # BLD AUTO: 13.94 THOUSANDS/ÂΜL (ref 1.85–7.62)
NEUTROPHILS # BLD AUTO: 8.63 THOUSANDS/ÂΜL (ref 1.85–7.62)
NEUTROPHILS # BLD MANUAL: 12.38 THOUSAND/UL (ref 1.85–7.62)
NEUTROPHILS # BLD MANUAL: 12.81 THOUSAND/UL (ref 1.85–7.62)
NEUTROPHILS # BLD MANUAL: 14.18 THOUSAND/UL (ref 1.85–7.62)
NEUTS BAND NFR BLD MANUAL: 2 % (ref 0–8)
NEUTS BAND NFR BLD MANUAL: 8 % (ref 0–8)
NEUTS BAND NFR FLD MANUAL: 1 %
NEUTS SEG NFR BLD AUTO: 45 %
NEUTS SEG NFR BLD AUTO: 48 %
NEUTS SEG NFR BLD AUTO: 83 % (ref 43–75)
NEUTS SEG NFR BLD AUTO: 85 % (ref 43–75)
NEUTS SEG NFR BLD AUTO: 85 % (ref 43–75)
NEUTS SEG NFR BLD AUTO: 86 % (ref 43–75)
NEUTS SEG NFR BLD AUTO: 86 % (ref 43–75)
NEUTS SEG NFR BLD AUTO: 87 % (ref 43–75)
NEUTS SEG NFR BLD AUTO: 88 % (ref 43–75)
NEUTS SEG NFR BLD AUTO: 89 % (ref 43–75)
NEUTS SEG NFR BLD AUTO: 91 % (ref 43–75)
NEUTS SEG NFR BLD AUTO: 93 % (ref 43–75)
NEUTS SEG NFR BLD AUTO: 94 % (ref 43–75)
NITRITE UR QL STRIP: NEGATIVE
NRBC BLD AUTO-RTO: 0 /100 WBCS
NRBC BLD AUTO-RTO: 1 /100 WBCS
NRBC BLD AUTO-RTO: 2 /100 WBC (ref 0–2)
P AXIS: 60 DEGREES
P AXIS: 60 DEGREES
P AXIS: 62 DEGREES
P AXIS: 65 DEGREES
P AXIS: 65 DEGREES
P AXIS: 70 DEGREES
P AXIS: 77 DEGREES
PH BODY FLUID: 7.5
PH BODY FLUID: 7.6
PH UR STRIP.AUTO: 5 [PH] (ref 4.5–8)
PHOSPHATE SERPL-MCNC: 1.4 MG/DL (ref 2.3–4.1)
PHOSPHATE SERPL-MCNC: 1.5 MG/DL (ref 2.3–4.1)
PHOSPHATE SERPL-MCNC: 1.7 MG/DL (ref 2.3–4.1)
PHOSPHATE SERPL-MCNC: 2.1 MG/DL (ref 2.3–4.1)
PLATELET # BLD AUTO: 177 THOUSANDS/UL (ref 149–390)
PLATELET # BLD AUTO: 196 THOUSANDS/UL (ref 149–390)
PLATELET # BLD AUTO: 199 THOUSANDS/UL (ref 149–390)
PLATELET # BLD AUTO: 228 THOUSANDS/UL (ref 149–390)
PLATELET # BLD AUTO: 229 THOUSANDS/UL (ref 149–390)
PLATELET # BLD AUTO: 239 THOUSANDS/UL (ref 149–390)
PLATELET # BLD AUTO: 253 THOUSANDS/UL (ref 149–390)
PLATELET # BLD AUTO: 258 THOUSANDS/UL (ref 149–390)
PLATELET # BLD AUTO: 266 THOUSANDS/UL (ref 149–390)
PLATELET # BLD AUTO: 313 THOUSANDS/UL (ref 149–390)
PLATELET # BLD AUTO: 316 THOUSANDS/UL (ref 149–390)
PLATELET # BLD AUTO: 340 THOUSANDS/UL (ref 149–390)
PLATELET # BLD AUTO: 342 THOUSANDS/UL (ref 149–390)
PLATELET # BLD AUTO: 383 THOUSANDS/UL (ref 149–390)
PLATELET # BLD AUTO: 388 THOUSANDS/UL (ref 149–390)
PLATELET BLD QL SMEAR: ADEQUATE
PMV BLD AUTO: 10 FL (ref 8.9–12.7)
PMV BLD AUTO: 10.1 FL (ref 8.9–12.7)
PMV BLD AUTO: 10.1 FL (ref 8.9–12.7)
PMV BLD AUTO: 10.4 FL (ref 8.9–12.7)
PMV BLD AUTO: 10.4 FL (ref 8.9–12.7)
PMV BLD AUTO: 10.5 FL (ref 8.9–12.7)
PMV BLD AUTO: 10.6 FL (ref 8.9–12.7)
PMV BLD AUTO: 10.7 FL (ref 8.9–12.7)
PMV BLD AUTO: 10.8 FL (ref 8.9–12.7)
PMV BLD AUTO: 10.9 FL (ref 8.9–12.7)
PMV BLD AUTO: 11.1 FL (ref 8.9–12.7)
PMV BLD AUTO: 11.2 FL (ref 8.9–12.7)
PMV BLD AUTO: 11.7 FL (ref 8.9–12.7)
PMV BLD AUTO: 11.7 FL (ref 8.9–12.7)
PMV BLD AUTO: 9.9 FL (ref 8.9–12.7)
POIKILOCYTOSIS BLD QL SMEAR: PRESENT
POIKILOCYTOSIS BLD QL SMEAR: PRESENT
POLYCHROMASIA BLD QL SMEAR: PRESENT
POLYCHROMASIA BLD QL SMEAR: PRESENT
POTASSIUM SERPL-SCNC: 2.8 MMOL/L (ref 3.5–5.3)
POTASSIUM SERPL-SCNC: 3.2 MMOL/L (ref 3.5–5.3)
POTASSIUM SERPL-SCNC: 3.3 MMOL/L (ref 3.5–5.3)
POTASSIUM SERPL-SCNC: 3.3 MMOL/L (ref 3.5–5.3)
POTASSIUM SERPL-SCNC: 3.4 MMOL/L (ref 3.5–5.3)
POTASSIUM SERPL-SCNC: 3.5 MMOL/L (ref 3.5–5.3)
POTASSIUM SERPL-SCNC: 3.6 MMOL/L (ref 3.5–5.3)
POTASSIUM SERPL-SCNC: 3.7 MMOL/L (ref 3.5–5.3)
POTASSIUM SERPL-SCNC: 3.7 MMOL/L (ref 3.5–5.3)
POTASSIUM SERPL-SCNC: 3.8 MMOL/L (ref 3.5–5.3)
POTASSIUM SERPL-SCNC: 3.9 MMOL/L (ref 3.5–5.3)
POTASSIUM SERPL-SCNC: 4.1 MMOL/L (ref 3.5–5.3)
PR INTERVAL: 112 MS
PR INTERVAL: 124 MS
PR INTERVAL: 125 MS
PR INTERVAL: 130 MS
PR INTERVAL: 132 MS
PR INTERVAL: 132 MS
PR INTERVAL: 136 MS
PROCALCITONIN SERPL-MCNC: 0.32 NG/ML
PROCALCITONIN SERPL-MCNC: 34.58 NG/ML
PROCALCITONIN SERPL-MCNC: 49.95 NG/ML
PROT FLD-MCNC: 2.9 G/DL
PROT FLD-MCNC: 3.3 G/DL
PROT PATTERN SERPL ELPH-IMP: ABNORMAL
PROT PATTERN UR ELPH-IMP: ABNORMAL
PROT SERPL-MCNC: 5.5 G/DL (ref 6.4–8.4)
PROT SERPL-MCNC: 5.7 G/DL (ref 6.4–8.4)
PROT SERPL-MCNC: 5.8 G/DL (ref 6.4–8.4)
PROT SERPL-MCNC: 6 G/DL (ref 6.4–8.2)
PROT SERPL-MCNC: 6 G/DL (ref 6.4–8.4)
PROT SERPL-MCNC: 6.1 G/DL (ref 6.4–8.4)
PROT SERPL-MCNC: 6.1 G/DL (ref 6.4–8.4)
PROT SERPL-MCNC: 6.3 G/DL (ref 6.4–8.4)
PROT SERPL-MCNC: 6.5 G/DL (ref 6.4–8.4)
PROT SERPL-MCNC: 6.7 G/DL (ref 6.4–8.4)
PROT SERPL-MCNC: 6.7 G/DL (ref 6.4–8.4)
PROT SERPL-MCNC: 6.9 G/DL (ref 6.4–8.4)
PROT SERPL-MCNC: 7.2 G/DL (ref 6.4–8.4)
PROT SERPL-MCNC: 7.7 G/DL (ref 6.4–8.4)
PROT UR STRIP-MCNC: NEGATIVE MG/DL
PROT UR-MCNC: 54 MG/DL
PROTHROMBIN TIME: 13.5 SECONDS (ref 11.6–14.5)
PTH RELATED PROT SERPL-SCNC: <2 PMOL/L
PTH-INTACT SERPL-MCNC: <6.3 PG/ML (ref 18.4–80.1)
QRS AXIS: -19 DEGREES
QRS AXIS: -54 DEGREES
QRS AXIS: -6 DEGREES
QRS AXIS: -71 DEGREES
QRS AXIS: -9 DEGREES
QRS AXIS: 0 DEGREES
QRS AXIS: 46 DEGREES
QRS AXIS: 8 DEGREES
QRSD INTERVAL: 0 MS
QRSD INTERVAL: 66 MS
QRSD INTERVAL: 70 MS
QRSD INTERVAL: 71 MS
QRSD INTERVAL: 72 MS
QRSD INTERVAL: 72 MS
QRSD INTERVAL: 74 MS
QRSD INTERVAL: 74 MS
QT INTERVAL: 0 MS
QT INTERVAL: 292 MS
QT INTERVAL: 298 MS
QT INTERVAL: 302 MS
QT INTERVAL: 306 MS
QT INTERVAL: 310 MS
QT INTERVAL: 313 MS
QT INTERVAL: 342 MS
QTC INTERVAL: 0 MS
QTC INTERVAL: 424 MS
QTC INTERVAL: 426 MS
QTC INTERVAL: 430 MS
QTC INTERVAL: 436 MS
QTC INTERVAL: 437 MS
QTC INTERVAL: 441 MS
QTC INTERVAL: 471 MS
RA PRESSURE ESTIMATED: 8 MMHG
RBC # BLD AUTO: 3.37 MILLION/UL (ref 3.81–5.12)
RBC # BLD AUTO: 3.42 MILLION/UL (ref 3.81–5.12)
RBC # BLD AUTO: 3.45 MILLION/UL (ref 3.81–5.12)
RBC # BLD AUTO: 3.45 MILLION/UL (ref 3.81–5.12)
RBC # BLD AUTO: 3.48 MILLION/UL (ref 3.81–5.12)
RBC # BLD AUTO: 3.51 MILLION/UL (ref 3.81–5.12)
RBC # BLD AUTO: 3.52 MILLION/UL (ref 3.81–5.12)
RBC # BLD AUTO: 3.54 MILLION/UL (ref 3.81–5.12)
RBC # BLD AUTO: 3.54 MILLION/UL (ref 3.81–5.12)
RBC # BLD AUTO: 3.58 MILLION/UL (ref 3.81–5.12)
RBC # BLD AUTO: 3.61 MILLION/UL (ref 3.81–5.12)
RBC # BLD AUTO: 3.67 MILLION/UL (ref 3.81–5.12)
RBC # BLD AUTO: 3.7 MILLION/UL (ref 3.81–5.12)
RBC # BLD AUTO: 3.77 MILLION/UL (ref 3.81–5.12)
RBC # BLD AUTO: 4.25 MILLION/UL (ref 3.81–5.12)
RBC MORPH BLD: PRESENT
RHODAMINE-AURAMINE STN SPEC: NORMAL
RIGHT VENTRICLE ID DIMENSION: 3.2 CM
RSV RNA RESP QL NAA+PROBE: NEGATIVE
RV PSP: 79 MMHG
S PNEUM AG UR QL: NEGATIVE
SARS-COV-2 RNA RESP QL NAA+PROBE: NEGATIVE
SITE: NORMAL
SITE: NORMAL
SL CV LEFT ATRIUM LENGTH A2C: 4.9 CM
SL CV LV EF: 75
SL CV PED ECHO LEFT VENTRICLE DIASTOLIC VOLUME (MOD BIPLANE) 2D: 25 ML
SL CV PED ECHO LEFT VENTRICLE SYSTOLIC VOLUME (MOD BIPLANE) 2D: 9 ML
SODIUM SERPL-SCNC: 138 MMOL/L (ref 135–147)
SODIUM SERPL-SCNC: 139 MMOL/L (ref 135–147)
SODIUM SERPL-SCNC: 139 MMOL/L (ref 135–147)
SODIUM SERPL-SCNC: 140 MMOL/L (ref 135–147)
SODIUM SERPL-SCNC: 141 MMOL/L (ref 135–147)
SODIUM SERPL-SCNC: 142 MMOL/L (ref 135–147)
SODIUM SERPL-SCNC: 143 MMOL/L (ref 135–147)
SODIUM SERPL-SCNC: 144 MMOL/L (ref 135–147)
SP GR UR STRIP.AUTO: 1.02 (ref 1–1.03)
T WAVE AXIS: 0 DEGREES
T WAVE AXIS: 38 DEGREES
T WAVE AXIS: 41 DEGREES
T WAVE AXIS: 42 DEGREES
T WAVE AXIS: 54 DEGREES
T WAVE AXIS: 55 DEGREES
T WAVE AXIS: 62 DEGREES
T WAVE AXIS: 85 DEGREES
TARGETS BLD QL SMEAR: PRESENT
TIBC SERPL-MCNC: 260 UG/DL (ref 250–450)
TOTAL CELLS COUNTED SPEC: 100
TOTAL CELLS COUNTED SPEC: 100
TR MAX PG: 71 MMHG
TR PEAK VELOCITY: 4.2 M/S
TRICUSPID VALVE PEAK REGURGITATION VELOCITY: 4.22 M/S
TRIGL SERPL-MCNC: 116 MG/DL
TSH SERPL DL<=0.05 MIU/L-ACNC: 2.11 UIU/ML (ref 0.45–4.5)
URATE SERPL-MCNC: 10.4 MG/DL (ref 2–7.5)
URATE SERPL-MCNC: 9.6 MG/DL (ref 2–7.5)
UROBILINOGEN UR QL STRIP.AUTO: 0.2 E.U./DL
VARIANT LYMPHS # BLD AUTO: 2 %
VENTRICULAR RATE: 0 BPM
VENTRICULAR RATE: 113 BPM
VENTRICULAR RATE: 114 BPM
VENTRICULAR RATE: 119 BPM
VENTRICULAR RATE: 122 BPM
VENTRICULAR RATE: 125 BPM
VENTRICULAR RATE: 126 BPM
VENTRICULAR RATE: 128 BPM
WBC # BLD AUTO: 10.15 THOUSAND/UL (ref 4.31–10.16)
WBC # BLD AUTO: 11.93 THOUSAND/UL (ref 4.31–10.16)
WBC # BLD AUTO: 12.5 THOUSAND/UL (ref 4.31–10.16)
WBC # BLD AUTO: 12.55 THOUSAND/UL (ref 4.31–10.16)
WBC # BLD AUTO: 12.56 THOUSAND/UL (ref 4.31–10.16)
WBC # BLD AUTO: 12.57 THOUSAND/UL (ref 4.31–10.16)
WBC # BLD AUTO: 13.03 THOUSAND/UL (ref 4.31–10.16)
WBC # BLD AUTO: 13.27 THOUSAND/UL (ref 4.31–10.16)
WBC # BLD AUTO: 13.63 THOUSAND/UL (ref 4.31–10.16)
WBC # BLD AUTO: 13.71 THOUSAND/UL (ref 4.31–10.16)
WBC # BLD AUTO: 14.43 THOUSAND/UL (ref 4.31–10.16)
WBC # BLD AUTO: 14.44 THOUSAND/UL (ref 4.31–10.16)
WBC # BLD AUTO: 14.56 THOUSAND/UL (ref 4.31–10.16)
WBC # BLD AUTO: 15.53 THOUSAND/UL (ref 4.31–10.16)
WBC # BLD AUTO: 15.58 THOUSAND/UL (ref 4.31–10.16)
WBC # FLD MANUAL: 495 /UL
WBC # FLD MANUAL: 88 /UL

## 2022-01-01 PROCEDURE — 0W993ZX DRAINAGE OF RIGHT PLEURAL CAVITY, PERCUTANEOUS APPROACH, DIAGNOSTIC: ICD-10-PCS | Performed by: RADIOLOGY

## 2022-01-01 PROCEDURE — 0W993ZX DRAINAGE OF RIGHT PLEURAL CAVITY, PERCUTANEOUS APPROACH, DIAGNOSTIC: ICD-10-PCS | Performed by: INTERNAL MEDICINE

## 2022-01-01 RX ORDER — POTASSIUM CHLORIDE 20 MEQ/1
40 TABLET, EXTENDED RELEASE ORAL 2 TIMES DAILY
Status: COMPLETED | OUTPATIENT
Start: 2022-01-01 | End: 2022-01-01

## 2022-01-01 RX ORDER — SODIUM CHLORIDE, SODIUM GLUCONATE, SODIUM ACETATE, POTASSIUM CHLORIDE, MAGNESIUM CHLORIDE, SODIUM PHOSPHATE, DIBASIC, AND POTASSIUM PHOSPHATE .53; .5; .37; .037; .03; .012; .00082 G/100ML; G/100ML; G/100ML; G/100ML; G/100ML; G/100ML; G/100ML
100 INJECTION, SOLUTION INTRAVENOUS CONTINUOUS
Status: DISCONTINUED | OUTPATIENT
Start: 2022-01-01 | End: 2022-01-01

## 2022-01-01 RX ORDER — OXYCODONE HYDROCHLORIDE 5 MG/1
5 TABLET ORAL EVERY 6 HOURS PRN
Status: DISCONTINUED | OUTPATIENT
Start: 2022-01-01 | End: 2022-01-01

## 2022-01-01 RX ORDER — GLYCOPYRROLATE 0.2 MG/ML
0.1 INJECTION INTRAMUSCULAR; INTRAVENOUS EVERY 4 HOURS PRN
Status: DISCONTINUED | OUTPATIENT
Start: 2022-01-01 | End: 2022-01-01 | Stop reason: HOSPADM

## 2022-01-01 RX ORDER — ACETYLCYSTEINE 200 MG/ML
3 SOLUTION ORAL; RESPIRATORY (INHALATION)
Status: DISCONTINUED | OUTPATIENT
Start: 2022-01-01 | End: 2022-01-01

## 2022-01-01 RX ORDER — CLOPIDOGREL BISULFATE 75 MG/1
75 TABLET ORAL DAILY
Status: DISCONTINUED | OUTPATIENT
Start: 2022-01-01 | End: 2022-01-01

## 2022-01-01 RX ORDER — OXYCODONE HYDROCHLORIDE 5 MG/1
2.5 TABLET ORAL EVERY 6 HOURS PRN
Status: DISCONTINUED | OUTPATIENT
Start: 2022-01-01 | End: 2022-01-01

## 2022-01-01 RX ORDER — GLYCOPYRROLATE 1 MG/1
1 TABLET ORAL 3 TIMES DAILY
Status: DISCONTINUED | OUTPATIENT
Start: 2022-01-01 | End: 2022-01-01

## 2022-01-01 RX ORDER — SODIUM CHLORIDE 9 MG/ML
150 INJECTION, SOLUTION INTRAVENOUS CONTINUOUS
Status: DISPENSED | OUTPATIENT
Start: 2022-01-01 | End: 2022-01-01

## 2022-01-01 RX ORDER — SODIUM CHLORIDE, SODIUM GLUCONATE, SODIUM ACETATE, POTASSIUM CHLORIDE, MAGNESIUM CHLORIDE, SODIUM PHOSPHATE, DIBASIC, AND POTASSIUM PHOSPHATE .53; .5; .37; .037; .03; .012; .00082 G/100ML; G/100ML; G/100ML; G/100ML; G/100ML; G/100ML; G/100ML
1000 INJECTION, SOLUTION INTRAVENOUS ONCE
Status: COMPLETED | OUTPATIENT
Start: 2022-01-01 | End: 2022-01-01

## 2022-01-01 RX ORDER — OXYCODONE HYDROCHLORIDE 5 MG/1
5 TABLET ORAL EVERY 8 HOURS
Status: DISCONTINUED | OUTPATIENT
Start: 2022-01-01 | End: 2022-01-01 | Stop reason: HOSPADM

## 2022-01-01 RX ORDER — BISACODYL 10 MG
10 SUPPOSITORY, RECTAL RECTAL DAILY PRN
Status: DISCONTINUED | OUTPATIENT
Start: 2022-01-01 | End: 2022-01-01 | Stop reason: HOSPADM

## 2022-01-01 RX ORDER — HYDROMORPHONE HCL IN WATER/PF 6 MG/30 ML
0.2 PATIENT CONTROLLED ANALGESIA SYRINGE INTRAVENOUS EVERY 6 HOURS PRN
Status: DISCONTINUED | OUTPATIENT
Start: 2022-01-01 | End: 2022-01-01

## 2022-01-01 RX ORDER — FENTANYL CITRATE 50 UG/ML
25 INJECTION, SOLUTION INTRAMUSCULAR; INTRAVENOUS ONCE
Status: COMPLETED | OUTPATIENT
Start: 2022-01-01 | End: 2022-01-01

## 2022-01-01 RX ORDER — METOPROLOL TARTRATE 5 MG/5ML
5 INJECTION INTRAVENOUS ONCE
Status: DISCONTINUED | OUTPATIENT
Start: 2022-01-01 | End: 2022-01-01

## 2022-01-01 RX ORDER — METHOCARBAMOL 500 MG/1
500 TABLET, FILM COATED ORAL EVERY 6 HOURS PRN
Status: DISCONTINUED | OUTPATIENT
Start: 2022-01-01 | End: 2022-01-01

## 2022-01-01 RX ORDER — MAGNESIUM SULFATE 1 G/100ML
1 INJECTION INTRAVENOUS ONCE
Status: COMPLETED | OUTPATIENT
Start: 2022-01-01 | End: 2022-01-01

## 2022-01-01 RX ORDER — SODIUM CHLORIDE FOR INHALATION 0.9 %
3 VIAL, NEBULIZER (ML) INHALATION
Status: DISCONTINUED | OUTPATIENT
Start: 2022-01-01 | End: 2022-01-01

## 2022-01-01 RX ORDER — HEPARIN SODIUM 5000 [USP'U]/ML
5000 INJECTION, SOLUTION INTRAVENOUS; SUBCUTANEOUS EVERY 8 HOURS SCHEDULED
Status: DISCONTINUED | OUTPATIENT
Start: 2022-01-01 | End: 2022-01-01

## 2022-01-01 RX ORDER — LORAZEPAM 0.5 MG/1
0.5 TABLET ORAL EVERY 6 HOURS PRN
Status: DISCONTINUED | OUTPATIENT
Start: 2022-01-01 | End: 2022-01-01

## 2022-01-01 RX ORDER — ALBUTEROL SULFATE 2.5 MG/3ML
2.5 SOLUTION RESPIRATORY (INHALATION) EVERY 4 HOURS PRN
Status: DISCONTINUED | OUTPATIENT
Start: 2022-01-01 | End: 2022-01-01

## 2022-01-01 RX ORDER — GLYCOPYRROLATE 0.2 MG/ML
0.1 INJECTION INTRAMUSCULAR; INTRAVENOUS 2 TIMES DAILY
Status: DISCONTINUED | OUTPATIENT
Start: 2022-01-01 | End: 2022-01-01 | Stop reason: HOSPADM

## 2022-01-01 RX ORDER — ACETAMINOPHEN 325 MG/1
975 TABLET ORAL ONCE
Status: DISCONTINUED | OUTPATIENT
Start: 2022-01-01 | End: 2022-01-01

## 2022-01-01 RX ORDER — ACETAMINOPHEN 325 MG/1
650 TABLET ORAL EVERY 6 HOURS PRN
Status: DISCONTINUED | OUTPATIENT
Start: 2022-01-01 | End: 2022-01-01

## 2022-01-01 RX ORDER — CALCITONIN SALMON 200 [USP'U]/ML
4 INJECTION, SOLUTION INTRAMUSCULAR; SUBCUTANEOUS 2 TIMES DAILY
Status: COMPLETED | OUTPATIENT
Start: 2022-01-01 | End: 2022-01-01

## 2022-01-01 RX ORDER — ALBUTEROL SULFATE 2.5 MG/3ML
2.5 SOLUTION RESPIRATORY (INHALATION) EVERY 6 HOURS PRN
Status: DISCONTINUED | OUTPATIENT
Start: 2022-01-01 | End: 2022-01-01

## 2022-01-01 RX ORDER — HALOPERIDOL 5 MG/ML
0.5 INJECTION INTRAMUSCULAR EVERY 2 HOUR PRN
Status: DISCONTINUED | OUTPATIENT
Start: 2022-01-01 | End: 2022-01-01 | Stop reason: HOSPADM

## 2022-01-01 RX ORDER — METOPROLOL TARTRATE 5 MG/5ML
5 INJECTION INTRAVENOUS ONCE
Status: COMPLETED | OUTPATIENT
Start: 2022-01-01 | End: 2022-01-01

## 2022-01-01 RX ORDER — ATORVASTATIN CALCIUM 40 MG/1
40 TABLET, FILM COATED ORAL
Status: DISCONTINUED | OUTPATIENT
Start: 2022-01-01 | End: 2022-01-01

## 2022-01-01 RX ORDER — HYDROMORPHONE HCL/PF 1 MG/ML
0.5 SYRINGE (ML) INJECTION
Status: DISCONTINUED | OUTPATIENT
Start: 2022-01-01 | End: 2022-01-01

## 2022-01-01 RX ORDER — HYDROMORPHONE HCL/PF 1 MG/ML
1 SYRINGE (ML) INJECTION EVERY 2 HOUR PRN
Status: DISCONTINUED | OUTPATIENT
Start: 2022-01-01 | End: 2022-01-01 | Stop reason: HOSPADM

## 2022-01-01 RX ORDER — LORAZEPAM 2 MG/ML
0.5 INJECTION INTRAMUSCULAR EVERY 6 HOURS PRN
Status: DISCONTINUED | OUTPATIENT
Start: 2022-01-01 | End: 2022-01-01 | Stop reason: HOSPADM

## 2022-01-01 RX ORDER — FUROSEMIDE 10 MG/ML
20 INJECTION INTRAMUSCULAR; INTRAVENOUS ONCE
Status: COMPLETED | OUTPATIENT
Start: 2022-01-01 | End: 2022-01-01

## 2022-01-01 RX ORDER — HEPARIN SODIUM 5000 [USP'U]/ML
5000 INJECTION, SOLUTION INTRAVENOUS; SUBCUTANEOUS EVERY 8 HOURS SCHEDULED
Status: COMPLETED | OUTPATIENT
Start: 2022-01-01 | End: 2022-01-01

## 2022-01-01 RX ORDER — HYDROXYZINE HYDROCHLORIDE 25 MG/1
25 TABLET, FILM COATED ORAL EVERY 6 HOURS PRN
Status: DISCONTINUED | OUTPATIENT
Start: 2022-01-01 | End: 2022-01-01

## 2022-01-01 RX ORDER — LORATADINE 10 MG/1
5 TABLET ORAL DAILY
Status: DISCONTINUED | OUTPATIENT
Start: 2022-01-01 | End: 2022-01-01

## 2022-01-01 RX ORDER — LORAZEPAM 0.5 MG/1
0.5 TABLET ORAL EVERY 6 HOURS PRN
Qty: 15 TABLET | Refills: 0 | Status: SHIPPED | OUTPATIENT
Start: 2022-01-01 | End: 2022-01-01

## 2022-01-01 RX ORDER — POTASSIUM CHLORIDE 20 MEQ/1
40 TABLET, EXTENDED RELEASE ORAL ONCE
Status: COMPLETED | OUTPATIENT
Start: 2022-01-01 | End: 2022-01-01

## 2022-01-01 RX ORDER — OXYCODONE HYDROCHLORIDE 5 MG/1
5 TABLET ORAL EVERY 2 HOUR PRN
Status: DISCONTINUED | OUTPATIENT
Start: 2022-01-01 | End: 2022-01-01

## 2022-01-01 RX ORDER — LORAZEPAM 2 MG/ML
0.5 INJECTION INTRAMUSCULAR EVERY 12 HOURS
Status: DISCONTINUED | OUTPATIENT
Start: 2022-01-01 | End: 2022-01-01 | Stop reason: HOSPADM

## 2022-01-01 RX ORDER — HYDROMORPHONE HCL/PF 1 MG/ML
1 SYRINGE (ML) INJECTION ONCE
Status: COMPLETED | OUTPATIENT
Start: 2022-01-01 | End: 2022-01-01

## 2022-01-01 RX ORDER — SERTRALINE HYDROCHLORIDE 25 MG/1
25 TABLET, FILM COATED ORAL DAILY
Status: DISCONTINUED | OUTPATIENT
Start: 2022-01-01 | End: 2022-01-01 | Stop reason: HOSPADM

## 2022-01-01 RX ORDER — AZITHROMYCIN 250 MG/1
500 TABLET, FILM COATED ORAL EVERY 24 HOURS
Status: COMPLETED | OUTPATIENT
Start: 2022-01-01 | End: 2022-01-01

## 2022-01-01 RX ORDER — LIDOCAINE 50 MG/G
1 PATCH TOPICAL DAILY
Status: DISCONTINUED | OUTPATIENT
Start: 2022-01-01 | End: 2022-01-01 | Stop reason: HOSPADM

## 2022-01-01 RX ORDER — CEFAZOLIN SODIUM 2 G/50ML
2000 SOLUTION INTRAVENOUS EVERY 8 HOURS
Status: COMPLETED | OUTPATIENT
Start: 2022-01-01 | End: 2022-01-01

## 2022-01-01 RX ORDER — SODIUM CHLORIDE 9 MG/ML
100 INJECTION, SOLUTION INTRAVENOUS CONTINUOUS
Status: DISCONTINUED | OUTPATIENT
Start: 2022-01-01 | End: 2022-01-01

## 2022-01-01 RX ORDER — HALOPERIDOL 2 MG/ML
1 SOLUTION ORAL 3 TIMES DAILY PRN
Qty: 30 ML | Refills: 0 | Status: SHIPPED | OUTPATIENT
Start: 2022-01-01 | End: 2022-01-01

## 2022-01-01 RX ORDER — GUAIFENESIN/DEXTROMETHORPHAN 100-10MG/5
10 SYRUP ORAL EVERY 6 HOURS PRN
Status: DISCONTINUED | OUTPATIENT
Start: 2022-01-01 | End: 2022-01-01

## 2022-01-01 RX ORDER — POTASSIUM CHLORIDE 20MEQ/15ML
20 LIQUID (ML) ORAL ONCE
Status: DISCONTINUED | OUTPATIENT
Start: 2022-01-01 | End: 2022-01-01

## 2022-01-01 RX ORDER — SODIUM CHLORIDE 9 MG/ML
100 INJECTION, SOLUTION INTRAVENOUS CONTINUOUS
Status: DISPENSED | OUTPATIENT
Start: 2022-01-01 | End: 2022-01-01

## 2022-01-01 RX ORDER — OXYCODONE HYDROCHLORIDE 5 MG/1
5 TABLET ORAL EVERY 2 HOUR PRN
Qty: 30 TABLET | Refills: 0 | Status: SHIPPED | OUTPATIENT
Start: 2022-01-01 | End: 2022-01-01

## 2022-01-01 RX ORDER — LEVOTHYROXINE SODIUM 0.1 MG/1
100 TABLET ORAL
Status: DISCONTINUED | OUTPATIENT
Start: 2022-01-01 | End: 2022-01-01 | Stop reason: HOSPADM

## 2022-01-01 RX ORDER — POTASSIUM CHLORIDE 14.9 MG/ML
20 INJECTION INTRAVENOUS ONCE
Status: DISCONTINUED | OUTPATIENT
Start: 2022-01-01 | End: 2022-01-01

## 2022-01-01 RX ORDER — GLYCOPYRROLATE 0.2 MG/ML
0.1 INJECTION INTRAMUSCULAR; INTRAVENOUS ONCE
Status: COMPLETED | OUTPATIENT
Start: 2022-01-01 | End: 2022-01-01

## 2022-01-01 RX ORDER — POTASSIUM CHLORIDE 20MEQ/15ML
40 LIQUID (ML) ORAL ONCE
Status: COMPLETED | OUTPATIENT
Start: 2022-01-01 | End: 2022-01-01

## 2022-01-01 RX ORDER — LEVALBUTEROL 1.25 MG/.5ML
1.25 SOLUTION, CONCENTRATE RESPIRATORY (INHALATION)
Status: DISCONTINUED | OUTPATIENT
Start: 2022-01-01 | End: 2022-01-01

## 2022-01-01 RX ADMIN — LEVOTHYROXINE SODIUM 100 MCG: 100 TABLET ORAL at 05:54

## 2022-01-01 RX ADMIN — ACETYLCYSTEINE 600 MG: 200 SOLUTION ORAL; RESPIRATORY (INHALATION) at 07:33

## 2022-01-01 RX ADMIN — LEVOTHYROXINE SODIUM 100 MCG: 100 TABLET ORAL at 05:36

## 2022-01-01 RX ADMIN — HEPARIN SODIUM 5000 UNITS: 5000 INJECTION INTRAVENOUS; SUBCUTANEOUS at 21:29

## 2022-01-01 RX ADMIN — CEFTRIAXONE SODIUM 1000 MG: 10 INJECTION, POWDER, FOR SOLUTION INTRAVENOUS at 20:56

## 2022-01-01 RX ADMIN — ACETYLCYSTEINE 600 MG: 200 SOLUTION ORAL; RESPIRATORY (INHALATION) at 19:56

## 2022-01-01 RX ADMIN — HEPARIN SODIUM 5000 UNITS: 5000 INJECTION INTRAVENOUS; SUBCUTANEOUS at 21:15

## 2022-01-01 RX ADMIN — DIBASIC SODIUM PHOSPHATE, MONOBASIC POTASSIUM PHOSPHATE AND MONOBASIC SODIUM PHOSPHATE 1 TABLET: 852; 155; 130 TABLET ORAL at 08:22

## 2022-01-01 RX ADMIN — OXYCODONE HYDROCHLORIDE 5 MG: 5 TABLET ORAL at 22:28

## 2022-01-01 RX ADMIN — CEFTRIAXONE SODIUM 1000 MG: 10 INJECTION, POWDER, FOR SOLUTION INTRAVENOUS at 17:34

## 2022-01-01 RX ADMIN — HEPARIN SODIUM 5000 UNITS: 5000 INJECTION INTRAVENOUS; SUBCUTANEOUS at 05:26

## 2022-01-01 RX ADMIN — OXYCODONE HYDROCHLORIDE 5 MG: 5 TABLET ORAL at 03:01

## 2022-01-01 RX ADMIN — ACETYLCYSTEINE 600 MG: 200 SOLUTION ORAL; RESPIRATORY (INHALATION) at 19:40

## 2022-01-01 RX ADMIN — ACETYLCYSTEINE 600 MG: 200 SOLUTION ORAL; RESPIRATORY (INHALATION) at 13:44

## 2022-01-01 RX ADMIN — LORATADINE 5 MG: 10 TABLET ORAL at 09:09

## 2022-01-01 RX ADMIN — CLOPIDOGREL BISULFATE 75 MG: 75 TABLET, FILM COATED ORAL at 08:51

## 2022-01-01 RX ADMIN — OXYCODONE HYDROCHLORIDE 5 MG: 5 TABLET ORAL at 20:13

## 2022-01-01 RX ADMIN — LORATADINE 5 MG: 10 TABLET ORAL at 08:49

## 2022-01-01 RX ADMIN — OXYCODONE HYDROCHLORIDE 5 MG: 5 TABLET ORAL at 04:08

## 2022-01-01 RX ADMIN — ATORVASTATIN CALCIUM 40 MG: 40 TABLET, FILM COATED ORAL at 17:47

## 2022-01-01 RX ADMIN — ACETAMINOPHEN 650 MG: 325 TABLET ORAL at 07:45

## 2022-01-01 RX ADMIN — AZITHROMYCIN MONOHYDRATE 500 MG: 250 TABLET ORAL at 11:46

## 2022-01-01 RX ADMIN — HEPARIN SODIUM 5000 UNITS: 5000 INJECTION INTRAVENOUS; SUBCUTANEOUS at 21:00

## 2022-01-01 RX ADMIN — ATORVASTATIN CALCIUM 40 MG: 40 TABLET, FILM COATED ORAL at 17:21

## 2022-01-01 RX ADMIN — HEPARIN SODIUM 5000 UNITS: 5000 INJECTION INTRAVENOUS; SUBCUTANEOUS at 21:24

## 2022-01-01 RX ADMIN — LEVALBUTEROL 1.25 MG: 1.25 SOLUTION, CONCENTRATE RESPIRATORY (INHALATION) at 07:15

## 2022-01-01 RX ADMIN — LORATADINE 5 MG: 10 TABLET ORAL at 08:51

## 2022-01-01 RX ADMIN — OXYCODONE HYDROCHLORIDE 5 MG: 5 TABLET ORAL at 22:42

## 2022-01-01 RX ADMIN — ATORVASTATIN CALCIUM 40 MG: 40 TABLET, FILM COATED ORAL at 17:09

## 2022-01-01 RX ADMIN — LEVOTHYROXINE SODIUM 100 MCG: 100 TABLET ORAL at 06:00

## 2022-01-01 RX ADMIN — MAGNESIUM SULFATE HEPTAHYDRATE 1 G: 1 INJECTION, SOLUTION INTRAVENOUS at 09:10

## 2022-01-01 RX ADMIN — ACETYLCYSTEINE 600 MG: 200 SOLUTION ORAL; RESPIRATORY (INHALATION) at 13:29

## 2022-01-01 RX ADMIN — LORATADINE 5 MG: 10 TABLET ORAL at 08:02

## 2022-01-01 RX ADMIN — LEVALBUTEROL 1.25 MG: 1.25 SOLUTION, CONCENTRATE RESPIRATORY (INHALATION) at 07:48

## 2022-01-01 RX ADMIN — OXYCODONE HYDROCHLORIDE 5 MG: 5 TABLET ORAL at 10:17

## 2022-01-01 RX ADMIN — HEPARIN SODIUM 5000 UNITS: 5000 INJECTION INTRAVENOUS; SUBCUTANEOUS at 06:26

## 2022-01-01 RX ADMIN — VANCOMYCIN HYDROCHLORIDE 1250 MG: 10 INJECTION, POWDER, LYOPHILIZED, FOR SOLUTION INTRAVENOUS at 23:08

## 2022-01-01 RX ADMIN — HEPARIN SODIUM 5000 UNITS: 5000 INJECTION INTRAVENOUS; SUBCUTANEOUS at 13:57

## 2022-01-01 RX ADMIN — LEVALBUTEROL 1.25 MG: 1.25 SOLUTION, CONCENTRATE RESPIRATORY (INHALATION) at 19:40

## 2022-01-01 RX ADMIN — SODIUM CHLORIDE 3 MG: 900 INJECTION, SOLUTION INTRAVENOUS at 10:26

## 2022-01-01 RX ADMIN — GLYCOPYRROLATE 0.1 MG: 0.2 INJECTION, SOLUTION INTRAMUSCULAR; INTRAVENOUS at 13:19

## 2022-01-01 RX ADMIN — LEVALBUTEROL 1.25 MG: 1.25 SOLUTION, CONCENTRATE RESPIRATORY (INHALATION) at 14:04

## 2022-01-01 RX ADMIN — MAGNESIUM SULFATE HEPTAHYDRATE 1 G: 1 INJECTION, SOLUTION INTRAVENOUS at 08:42

## 2022-01-01 RX ADMIN — LEVOTHYROXINE SODIUM 100 MCG: 100 TABLET ORAL at 06:09

## 2022-01-01 RX ADMIN — HYDROMORPHONE HYDROCHLORIDE 0.2 MG: 0.2 INJECTION, SOLUTION INTRAMUSCULAR; INTRAVENOUS; SUBCUTANEOUS at 16:19

## 2022-01-01 RX ADMIN — SODIUM CHLORIDE, SODIUM GLUCONATE, SODIUM ACETATE, POTASSIUM CHLORIDE AND MAGNESIUM CHLORIDE 1000 ML: 526; 502; 368; 37; 30 INJECTION, SOLUTION INTRAVENOUS at 15:39

## 2022-01-01 RX ADMIN — SODIUM CHLORIDE 100 ML/HR: 0.9 INJECTION, SOLUTION INTRAVENOUS at 08:36

## 2022-01-01 RX ADMIN — POTASSIUM CHLORIDE 40 MEQ: 1500 TABLET, EXTENDED RELEASE ORAL at 17:57

## 2022-01-01 RX ADMIN — SODIUM CHLORIDE, SODIUM GLUCONATE, SODIUM ACETATE, POTASSIUM CHLORIDE, MAGNESIUM CHLORIDE, SODIUM PHOSPHATE, DIBASIC, AND POTASSIUM PHOSPHATE 1000 ML: .53; .5; .37; .037; .03; .012; .00082 INJECTION, SOLUTION INTRAVENOUS at 19:14

## 2022-01-01 RX ADMIN — Medication 12.5 MG: at 09:12

## 2022-01-01 RX ADMIN — OXYCODONE HYDROCHLORIDE 5 MG: 5 TABLET ORAL at 12:15

## 2022-01-01 RX ADMIN — MORPHINE SULFATE 2 MG: 2 INJECTION, SOLUTION INTRAMUSCULAR; INTRAVENOUS at 16:21

## 2022-01-01 RX ADMIN — Medication 12.5 MG: at 08:48

## 2022-01-01 RX ADMIN — CEFTRIAXONE 1000 MG: 1 INJECTION, POWDER, FOR SOLUTION INTRAMUSCULAR; INTRAVENOUS at 15:07

## 2022-01-01 RX ADMIN — HEPARIN SODIUM 5000 UNITS: 5000 INJECTION INTRAVENOUS; SUBCUTANEOUS at 13:47

## 2022-01-01 RX ADMIN — LIDOCAINE 5% 1 PATCH: 700 PATCH TOPICAL at 17:37

## 2022-01-01 RX ADMIN — SODIUM CHLORIDE 150 ML/HR: 0.9 INJECTION, SOLUTION INTRAVENOUS at 04:41

## 2022-01-01 RX ADMIN — HEPARIN SODIUM 5000 UNITS: 5000 INJECTION INTRAVENOUS; SUBCUTANEOUS at 04:40

## 2022-01-01 RX ADMIN — CLOPIDOGREL BISULFATE 75 MG: 75 TABLET, FILM COATED ORAL at 08:02

## 2022-01-01 RX ADMIN — SERTRALINE HYDROCHLORIDE 25 MG: 25 TABLET ORAL at 08:51

## 2022-01-01 RX ADMIN — SODIUM CHLORIDE 100 ML/HR: 0.9 INJECTION, SOLUTION INTRAVENOUS at 21:09

## 2022-01-01 RX ADMIN — POTASSIUM & SODIUM PHOSPHATES POWDER PACK 280-160-250 MG 2 PACKET: 280-160-250 PACK at 08:49

## 2022-01-01 RX ADMIN — HEPARIN SODIUM 5000 UNITS: 5000 INJECTION INTRAVENOUS; SUBCUTANEOUS at 22:29

## 2022-01-01 RX ADMIN — CEFTRIAXONE SODIUM 1000 MG: 10 INJECTION, POWDER, FOR SOLUTION INTRAVENOUS at 18:17

## 2022-01-01 RX ADMIN — ACETYLCYSTEINE 600 MG: 200 SOLUTION ORAL; RESPIRATORY (INHALATION) at 20:42

## 2022-01-01 RX ADMIN — LIDOCAINE 5% 1 PATCH: 700 PATCH TOPICAL at 08:53

## 2022-01-01 RX ADMIN — LEVALBUTEROL 1.25 MG: 1.25 SOLUTION, CONCENTRATE RESPIRATORY (INHALATION) at 13:35

## 2022-01-01 RX ADMIN — LEVALBUTEROL 1.25 MG: 1.25 SOLUTION, CONCENTRATE RESPIRATORY (INHALATION) at 08:39

## 2022-01-01 RX ADMIN — LEVALBUTEROL 1.25 MG: 1.25 SOLUTION, CONCENTRATE RESPIRATORY (INHALATION) at 13:30

## 2022-01-01 RX ADMIN — HEPARIN SODIUM 5000 UNITS: 5000 INJECTION INTRAVENOUS; SUBCUTANEOUS at 06:03

## 2022-01-01 RX ADMIN — LEVOTHYROXINE SODIUM 100 MCG: 100 TABLET ORAL at 05:26

## 2022-01-01 RX ADMIN — LEVOTHYROXINE SODIUM 100 MCG: 100 TABLET ORAL at 05:56

## 2022-01-01 RX ADMIN — SERTRALINE HYDROCHLORIDE 25 MG: 25 TABLET ORAL at 08:23

## 2022-01-01 RX ADMIN — LORATADINE 5 MG: 10 TABLET ORAL at 10:00

## 2022-01-01 RX ADMIN — ACETYLCYSTEINE 600 MG: 200 SOLUTION ORAL; RESPIRATORY (INHALATION) at 07:15

## 2022-01-01 RX ADMIN — POTASSIUM CHLORIDE 40 MEQ: 1500 TABLET, EXTENDED RELEASE ORAL at 10:28

## 2022-01-01 RX ADMIN — SERTRALINE HYDROCHLORIDE 25 MG: 25 TABLET ORAL at 07:33

## 2022-01-01 RX ADMIN — SODIUM CHLORIDE 100 ML/HR: 0.9 INJECTION, SOLUTION INTRAVENOUS at 16:10

## 2022-01-01 RX ADMIN — ACETAMINOPHEN 650 MG: 325 TABLET ORAL at 13:49

## 2022-01-01 RX ADMIN — HEPARIN SODIUM 5000 UNITS: 5000 INJECTION INTRAVENOUS; SUBCUTANEOUS at 21:42

## 2022-01-01 RX ADMIN — SODIUM CHLORIDE 200 MG: 900 INJECTION, SOLUTION INTRAVENOUS at 08:00

## 2022-01-01 RX ADMIN — HEPARIN SODIUM 5000 UNITS: 5000 INJECTION INTRAVENOUS; SUBCUTANEOUS at 13:42

## 2022-01-01 RX ADMIN — POTASSIUM PHOSPHATE, MONOBASIC AND POTASSIUM PHOSPHATE, DIBASIC 12 MMOL: 224; 236 INJECTION, SOLUTION, CONCENTRATE INTRAVENOUS at 13:57

## 2022-01-01 RX ADMIN — DIBASIC SODIUM PHOSPHATE, MONOBASIC POTASSIUM PHOSPHATE AND MONOBASIC SODIUM PHOSPHATE 1 TABLET: 852; 155; 130 TABLET ORAL at 09:09

## 2022-01-01 RX ADMIN — HEPARIN SODIUM 5000 UNITS: 5000 INJECTION INTRAVENOUS; SUBCUTANEOUS at 17:50

## 2022-01-01 RX ADMIN — HEPARIN SODIUM 5000 UNITS: 5000 INJECTION INTRAVENOUS; SUBCUTANEOUS at 14:21

## 2022-01-01 RX ADMIN — SODIUM CHLORIDE 150 ML/HR: 0.9 INJECTION, SOLUTION INTRAVENOUS at 20:33

## 2022-01-01 RX ADMIN — HEPARIN SODIUM 5000 UNITS: 5000 INJECTION INTRAVENOUS; SUBCUTANEOUS at 05:56

## 2022-01-01 RX ADMIN — LEVOTHYROXINE SODIUM 100 MCG: 100 TABLET ORAL at 05:14

## 2022-01-01 RX ADMIN — SODIUM CHLORIDE, SODIUM GLUCONATE, SODIUM ACETATE, POTASSIUM CHLORIDE, MAGNESIUM CHLORIDE, SODIUM PHOSPHATE, DIBASIC, AND POTASSIUM PHOSPHATE 100 ML/HR: .53; .5; .37; .037; .03; .012; .00082 INJECTION, SOLUTION INTRAVENOUS at 06:33

## 2022-01-01 RX ADMIN — LORATADINE 5 MG: 10 TABLET ORAL at 08:42

## 2022-01-01 RX ADMIN — POTASSIUM CHLORIDE 40 MEQ: 1500 TABLET, EXTENDED RELEASE ORAL at 08:23

## 2022-01-01 RX ADMIN — HEPARIN SODIUM 5000 UNITS: 5000 INJECTION INTRAVENOUS; SUBCUTANEOUS at 21:02

## 2022-01-01 RX ADMIN — CLOPIDOGREL BISULFATE 75 MG: 75 TABLET, FILM COATED ORAL at 07:34

## 2022-01-01 RX ADMIN — HYDROMORPHONE HYDROCHLORIDE 1 MG: 1 INJECTION, SOLUTION INTRAMUSCULAR; INTRAVENOUS; SUBCUTANEOUS at 12:05

## 2022-01-01 RX ADMIN — OXYCODONE HYDROCHLORIDE 5 MG: 5 TABLET ORAL at 02:04

## 2022-01-01 RX ADMIN — SODIUM CHLORIDE, SODIUM GLUCONATE, SODIUM ACETATE, POTASSIUM CHLORIDE, MAGNESIUM CHLORIDE, SODIUM PHOSPHATE, DIBASIC, AND POTASSIUM PHOSPHATE 100 ML/HR: .53; .5; .37; .037; .03; .012; .00082 INJECTION, SOLUTION INTRAVENOUS at 20:41

## 2022-01-01 RX ADMIN — CLOPIDOGREL BISULFATE 75 MG: 75 TABLET, FILM COATED ORAL at 08:11

## 2022-01-01 RX ADMIN — CLOPIDOGREL BISULFATE 75 MG: 75 TABLET, FILM COATED ORAL at 08:22

## 2022-01-01 RX ADMIN — ACETYLCYSTEINE 600 MG: 200 SOLUTION ORAL; RESPIRATORY (INHALATION) at 07:24

## 2022-01-01 RX ADMIN — ATORVASTATIN CALCIUM 40 MG: 40 TABLET, FILM COATED ORAL at 16:10

## 2022-01-01 RX ADMIN — SERTRALINE HYDROCHLORIDE 25 MG: 25 TABLET ORAL at 10:00

## 2022-01-01 RX ADMIN — IOHEXOL 85 ML: 350 INJECTION, SOLUTION INTRAVENOUS at 17:33

## 2022-01-01 RX ADMIN — DIBASIC SODIUM PHOSPHATE, MONOBASIC POTASSIUM PHOSPHATE AND MONOBASIC SODIUM PHOSPHATE 1 TABLET: 852; 155; 130 TABLET ORAL at 08:00

## 2022-01-01 RX ADMIN — HYDROXYZINE HYDROCHLORIDE 25 MG: 25 TABLET, FILM COATED ORAL at 09:12

## 2022-01-01 RX ADMIN — MAGNESIUM SULFATE HEPTAHYDRATE 1 G: 1 INJECTION, SOLUTION INTRAVENOUS at 09:57

## 2022-01-01 RX ADMIN — LORATADINE 5 MG: 10 TABLET ORAL at 08:23

## 2022-01-01 RX ADMIN — CALCITONIN SALMON 242 UNITS: 200 INJECTION, SOLUTION INTRAMUSCULAR; SUBCUTANEOUS at 15:01

## 2022-01-01 RX ADMIN — SODIUM CHLORIDE 200 MG: 900 INJECTION, SOLUTION INTRAVENOUS at 17:19

## 2022-01-01 RX ADMIN — ATORVASTATIN CALCIUM 40 MG: 40 TABLET, FILM COATED ORAL at 18:01

## 2022-01-01 RX ADMIN — HEPARIN SODIUM 5000 UNITS: 5000 INJECTION INTRAVENOUS; SUBCUTANEOUS at 05:41

## 2022-01-01 RX ADMIN — SODIUM CHLORIDE 500 ML: 0.9 INJECTION, SOLUTION INTRAVENOUS at 21:08

## 2022-01-01 RX ADMIN — SERTRALINE HYDROCHLORIDE 25 MG: 25 TABLET ORAL at 09:13

## 2022-01-01 RX ADMIN — CLOPIDOGREL BISULFATE 75 MG: 75 TABLET, FILM COATED ORAL at 08:48

## 2022-01-01 RX ADMIN — DIBASIC SODIUM PHOSPHATE, MONOBASIC POTASSIUM PHOSPHATE AND MONOBASIC SODIUM PHOSPHATE 1 TABLET: 852; 155; 130 TABLET ORAL at 06:28

## 2022-01-01 RX ADMIN — HEPARIN SODIUM 5000 UNITS: 5000 INJECTION INTRAVENOUS; SUBCUTANEOUS at 21:22

## 2022-01-01 RX ADMIN — MORPHINE SULFATE 2 MG: 2 INJECTION, SOLUTION INTRAMUSCULAR; INTRAVENOUS at 19:51

## 2022-01-01 RX ADMIN — CLOPIDOGREL BISULFATE 75 MG: 75 TABLET, FILM COATED ORAL at 08:33

## 2022-01-01 RX ADMIN — ATORVASTATIN CALCIUM 40 MG: 40 TABLET, FILM COATED ORAL at 17:58

## 2022-01-01 RX ADMIN — CEFAZOLIN SODIUM 2000 MG: 2 SOLUTION INTRAVENOUS at 01:14

## 2022-01-01 RX ADMIN — POTASSIUM CHLORIDE 40 MEQ: 1500 TABLET, EXTENDED RELEASE ORAL at 21:15

## 2022-01-01 RX ADMIN — SERTRALINE HYDROCHLORIDE 25 MG: 25 TABLET ORAL at 09:09

## 2022-01-01 RX ADMIN — LEVALBUTEROL 1.25 MG: 1.25 SOLUTION, CONCENTRATE RESPIRATORY (INHALATION) at 19:56

## 2022-01-01 RX ADMIN — METOROPROLOL TARTRATE 5 MG: 5 INJECTION, SOLUTION INTRAVENOUS at 18:34

## 2022-01-01 RX ADMIN — HYDROMORPHONE HYDROCHLORIDE 1 MG: 1 INJECTION, SOLUTION INTRAMUSCULAR; INTRAVENOUS; SUBCUTANEOUS at 14:27

## 2022-01-01 RX ADMIN — SODIUM CHLORIDE 200 MG: 900 INJECTION, SOLUTION INTRAVENOUS at 09:13

## 2022-01-01 RX ADMIN — LORATADINE 5 MG: 10 TABLET ORAL at 08:22

## 2022-01-01 RX ADMIN — SODIUM CHLORIDE 200 MG: 900 INJECTION, SOLUTION INTRAVENOUS at 08:38

## 2022-01-01 RX ADMIN — LEVOTHYROXINE SODIUM 100 MCG: 100 TABLET ORAL at 06:34

## 2022-01-01 RX ADMIN — OXYCODONE HYDROCHLORIDE 5 MG: 5 TABLET ORAL at 12:05

## 2022-01-01 RX ADMIN — HEPARIN SODIUM 5000 UNITS: 5000 INJECTION INTRAVENOUS; SUBCUTANEOUS at 22:06

## 2022-01-01 RX ADMIN — LORAZEPAM 0.5 MG: 2 INJECTION INTRAMUSCULAR; INTRAVENOUS at 12:06

## 2022-01-01 RX ADMIN — LEVOTHYROXINE SODIUM 100 MCG: 100 TABLET ORAL at 05:41

## 2022-01-01 RX ADMIN — CEFTRIAXONE SODIUM 1000 MG: 10 INJECTION, POWDER, FOR SOLUTION INTRAVENOUS at 18:00

## 2022-01-01 RX ADMIN — HEPARIN SODIUM 5000 UNITS: 5000 INJECTION INTRAVENOUS; SUBCUTANEOUS at 14:01

## 2022-01-01 RX ADMIN — OXYCODONE HYDROCHLORIDE 5 MG: 5 TABLET ORAL at 15:11

## 2022-01-01 RX ADMIN — OXYCODONE HYDROCHLORIDE 5 MG: 5 TABLET ORAL at 17:09

## 2022-01-01 RX ADMIN — ATORVASTATIN CALCIUM 40 MG: 40 TABLET, FILM COATED ORAL at 17:37

## 2022-01-01 RX ADMIN — HEPARIN SODIUM 5000 UNITS: 5000 INJECTION INTRAVENOUS; SUBCUTANEOUS at 06:34

## 2022-01-01 RX ADMIN — ATORVASTATIN CALCIUM 40 MG: 40 TABLET, FILM COATED ORAL at 18:18

## 2022-01-01 RX ADMIN — LORATADINE 5 MG: 10 TABLET ORAL at 09:57

## 2022-01-01 RX ADMIN — GLYCERIN 1 DROP: .002; .002; .01 SOLUTION/ DROPS OPHTHALMIC at 13:49

## 2022-01-01 RX ADMIN — HEPARIN SODIUM 5000 UNITS: 5000 INJECTION INTRAVENOUS; SUBCUTANEOUS at 22:45

## 2022-01-01 RX ADMIN — HYDROMORPHONE HYDROCHLORIDE 0.2 MG: 0.2 INJECTION, SOLUTION INTRAMUSCULAR; INTRAVENOUS; SUBCUTANEOUS at 01:13

## 2022-01-01 RX ADMIN — CEFAZOLIN SODIUM 2000 MG: 2 SOLUTION INTRAVENOUS at 08:53

## 2022-01-01 RX ADMIN — LEVOTHYROXINE SODIUM 100 MCG: 100 TABLET ORAL at 06:31

## 2022-01-01 RX ADMIN — SODIUM CHLORIDE 200 MG: 900 INJECTION, SOLUTION INTRAVENOUS at 09:44

## 2022-01-01 RX ADMIN — LORAZEPAM 0.5 MG: 0.5 TABLET ORAL at 16:22

## 2022-01-01 RX ADMIN — Medication 12.5 MG: at 21:23

## 2022-01-01 RX ADMIN — ATORVASTATIN CALCIUM 40 MG: 40 TABLET, FILM COATED ORAL at 17:34

## 2022-01-01 RX ADMIN — IOHEXOL 74 ML: 350 INJECTION, SOLUTION INTRAVENOUS at 15:13

## 2022-01-01 RX ADMIN — ACETYLCYSTEINE 600 MG: 200 SOLUTION ORAL; RESPIRATORY (INHALATION) at 07:48

## 2022-01-01 RX ADMIN — SERTRALINE HYDROCHLORIDE 25 MG: 25 TABLET ORAL at 08:11

## 2022-01-01 RX ADMIN — ALBUTEROL SULFATE 2.5 MG: 2.5 SOLUTION RESPIRATORY (INHALATION) at 09:52

## 2022-01-01 RX ADMIN — SERTRALINE HYDROCHLORIDE 25 MG: 25 TABLET ORAL at 08:48

## 2022-01-01 RX ADMIN — POTASSIUM CHLORIDE 40 MEQ: 20 SOLUTION ORAL at 08:49

## 2022-01-01 RX ADMIN — Medication 12.5 MG: at 08:32

## 2022-01-01 RX ADMIN — CLOPIDOGREL BISULFATE 75 MG: 75 TABLET, FILM COATED ORAL at 10:00

## 2022-01-01 RX ADMIN — HEPARIN SODIUM 5000 UNITS: 5000 INJECTION INTRAVENOUS; SUBCUTANEOUS at 13:49

## 2022-01-01 RX ADMIN — SERTRALINE HYDROCHLORIDE 25 MG: 25 TABLET ORAL at 09:25

## 2022-01-01 RX ADMIN — HYDROMORPHONE HYDROCHLORIDE 1 MG: 1 INJECTION, SOLUTION INTRAMUSCULAR; INTRAVENOUS; SUBCUTANEOUS at 17:44

## 2022-01-01 RX ADMIN — LEVOTHYROXINE SODIUM 100 MCG: 100 TABLET ORAL at 06:03

## 2022-01-01 RX ADMIN — LEVALBUTEROL 1.25 MG: 1.25 SOLUTION, CONCENTRATE RESPIRATORY (INHALATION) at 07:33

## 2022-01-01 RX ADMIN — LEVOTHYROXINE SODIUM 100 MCG: 100 TABLET ORAL at 05:16

## 2022-01-01 RX ADMIN — ISODIUM CHLORIDE 3 ML: 0.03 SOLUTION RESPIRATORY (INHALATION) at 13:30

## 2022-01-01 RX ADMIN — FUROSEMIDE 20 MG: 10 INJECTION, SOLUTION INTRAMUSCULAR; INTRAVENOUS at 12:15

## 2022-01-01 RX ADMIN — ATORVASTATIN CALCIUM 40 MG: 40 TABLET, FILM COATED ORAL at 16:33

## 2022-01-01 RX ADMIN — POTASSIUM & SODIUM PHOSPHATES POWDER PACK 280-160-250 MG 2 PACKET: 280-160-250 PACK at 08:02

## 2022-01-01 RX ADMIN — SERTRALINE HYDROCHLORIDE 25 MG: 25 TABLET ORAL at 08:02

## 2022-01-01 RX ADMIN — SERTRALINE HYDROCHLORIDE 25 MG: 25 TABLET ORAL at 08:22

## 2022-01-01 RX ADMIN — ACETYLCYSTEINE 600 MG: 200 SOLUTION ORAL; RESPIRATORY (INHALATION) at 14:04

## 2022-01-01 RX ADMIN — HEPARIN SODIUM 5000 UNITS: 5000 INJECTION INTRAVENOUS; SUBCUTANEOUS at 14:08

## 2022-01-01 RX ADMIN — ATORVASTATIN CALCIUM 40 MG: 40 TABLET, FILM COATED ORAL at 18:38

## 2022-01-01 RX ADMIN — LEVALBUTEROL 1.25 MG: 1.25 SOLUTION, CONCENTRATE RESPIRATORY (INHALATION) at 13:44

## 2022-01-01 RX ADMIN — CLOPIDOGREL BISULFATE 75 MG: 75 TABLET, FILM COATED ORAL at 09:18

## 2022-01-01 RX ADMIN — HYDROMORPHONE HYDROCHLORIDE 0.2 MG: 0.2 INJECTION, SOLUTION INTRAMUSCULAR; INTRAVENOUS; SUBCUTANEOUS at 07:31

## 2022-01-01 RX ADMIN — LORATADINE 5 MG: 10 TABLET ORAL at 07:35

## 2022-01-01 RX ADMIN — LEVOTHYROXINE SODIUM 100 MCG: 100 TABLET ORAL at 04:40

## 2022-01-01 RX ADMIN — Medication 12.5 MG: at 20:54

## 2022-01-01 RX ADMIN — HEPARIN SODIUM 5000 UNITS: 5000 INJECTION INTRAVENOUS; SUBCUTANEOUS at 21:19

## 2022-01-01 RX ADMIN — CEFAZOLIN SODIUM 2000 MG: 2 SOLUTION INTRAVENOUS at 17:09

## 2022-01-01 RX ADMIN — OXYCODONE HYDROCHLORIDE 5 MG: 5 TABLET ORAL at 06:43

## 2022-01-01 RX ADMIN — ATORVASTATIN CALCIUM 40 MG: 40 TABLET, FILM COATED ORAL at 16:28

## 2022-01-01 RX ADMIN — SODIUM CHLORIDE, SODIUM GLUCONATE, SODIUM ACETATE, POTASSIUM CHLORIDE, MAGNESIUM CHLORIDE, SODIUM PHOSPHATE, DIBASIC, AND POTASSIUM PHOSPHATE 100 ML/HR: .53; .5; .37; .037; .03; .012; .00082 INJECTION, SOLUTION INTRAVENOUS at 03:38

## 2022-01-01 RX ADMIN — LEVOTHYROXINE SODIUM 100 MCG: 100 TABLET ORAL at 06:26

## 2022-01-01 RX ADMIN — SODIUM CHLORIDE 200 MG: 900 INJECTION, SOLUTION INTRAVENOUS at 08:36

## 2022-01-01 RX ADMIN — HEPARIN SODIUM 5000 UNITS: 5000 INJECTION INTRAVENOUS; SUBCUTANEOUS at 15:00

## 2022-01-01 RX ADMIN — Medication 12.5 MG: at 09:09

## 2022-01-01 RX ADMIN — CLOPIDOGREL BISULFATE 75 MG: 75 TABLET, FILM COATED ORAL at 08:23

## 2022-01-01 RX ADMIN — SERTRALINE HYDROCHLORIDE 25 MG: 25 TABLET ORAL at 07:35

## 2022-01-01 RX ADMIN — SERTRALINE HYDROCHLORIDE 25 MG: 25 TABLET ORAL at 08:33

## 2022-01-01 RX ADMIN — SODIUM CHLORIDE 3 G: 900 INJECTION, SOLUTION INTRAVENOUS at 19:23

## 2022-01-01 RX ADMIN — SODIUM CHLORIDE 500 ML: 0.9 INJECTION, SOLUTION INTRAVENOUS at 04:09

## 2022-01-01 RX ADMIN — DIBASIC SODIUM PHOSPHATE, MONOBASIC POTASSIUM PHOSPHATE AND MONOBASIC SODIUM PHOSPHATE 1 TABLET: 852; 155; 130 TABLET ORAL at 08:33

## 2022-01-01 RX ADMIN — LIDOCAINE 5% 1 PATCH: 700 PATCH TOPICAL at 08:03

## 2022-01-01 RX ADMIN — AZITHROMYCIN MONOHYDRATE 500 MG: 250 TABLET ORAL at 12:26

## 2022-01-01 RX ADMIN — CEFTRIAXONE SODIUM 1000 MG: 10 INJECTION, POWDER, FOR SOLUTION INTRAVENOUS at 17:58

## 2022-01-01 RX ADMIN — LORATADINE 5 MG: 10 TABLET ORAL at 09:12

## 2022-01-01 RX ADMIN — LORATADINE 5 MG: 10 TABLET ORAL at 07:33

## 2022-01-01 RX ADMIN — LORATADINE 5 MG: 10 TABLET ORAL at 09:25

## 2022-01-01 RX ADMIN — SERTRALINE HYDROCHLORIDE 25 MG: 25 TABLET ORAL at 08:42

## 2022-01-01 RX ADMIN — Medication 12.5 MG: at 09:56

## 2022-01-01 RX ADMIN — LEVALBUTEROL 1.25 MG: 1.25 SOLUTION, CONCENTRATE RESPIRATORY (INHALATION) at 13:29

## 2022-01-01 RX ADMIN — HEPARIN SODIUM 5000 UNITS: 5000 INJECTION INTRAVENOUS; SUBCUTANEOUS at 05:54

## 2022-01-01 RX ADMIN — DIBASIC SODIUM PHOSPHATE, MONOBASIC POTASSIUM PHOSPHATE AND MONOBASIC SODIUM PHOSPHATE 1 TABLET: 852; 155; 130 TABLET ORAL at 09:56

## 2022-01-01 RX ADMIN — HEPARIN SODIUM 5000 UNITS: 5000 INJECTION INTRAVENOUS; SUBCUTANEOUS at 22:42

## 2022-01-01 RX ADMIN — ACETYLCYSTEINE 600 MG: 200 SOLUTION ORAL; RESPIRATORY (INHALATION) at 13:36

## 2022-01-01 RX ADMIN — LEVALBUTEROL 1.25 MG: 1.25 SOLUTION, CONCENTRATE RESPIRATORY (INHALATION) at 20:42

## 2022-01-01 RX ADMIN — MORPHINE SULFATE 2 MG: 2 INJECTION, SOLUTION INTRAMUSCULAR; INTRAVENOUS at 11:06

## 2022-01-01 RX ADMIN — LEVALBUTEROL 1.25 MG: 1.25 SOLUTION, CONCENTRATE RESPIRATORY (INHALATION) at 07:24

## 2022-01-01 RX ADMIN — SODIUM CHLORIDE 3 G: 900 INJECTION, SOLUTION INTRAVENOUS at 08:10

## 2022-01-01 RX ADMIN — CLOPIDOGREL BISULFATE 75 MG: 75 TABLET, FILM COATED ORAL at 09:25

## 2022-01-01 RX ADMIN — OXYCODONE HYDROCHLORIDE 5 MG: 5 TABLET ORAL at 10:31

## 2022-01-01 RX ADMIN — HEPARIN SODIUM 5000 UNITS: 5000 INJECTION INTRAVENOUS; SUBCUTANEOUS at 14:48

## 2022-01-01 RX ADMIN — CALCITONIN SALMON 242 UNITS: 200 INJECTION, SOLUTION INTRAMUSCULAR; SUBCUTANEOUS at 22:29

## 2022-01-01 RX ADMIN — SERTRALINE HYDROCHLORIDE 25 MG: 25 TABLET ORAL at 09:56

## 2022-01-01 RX ADMIN — LORATADINE 5 MG: 10 TABLET ORAL at 08:33

## 2022-01-01 RX ADMIN — ACETYLCYSTEINE 600 MG: 200 SOLUTION ORAL; RESPIRATORY (INHALATION) at 08:39

## 2022-01-01 RX ADMIN — OXYCODONE HYDROCHLORIDE 5 MG: 5 TABLET ORAL at 16:33

## 2022-01-01 RX ADMIN — CEFTRIAXONE 1000 MG: 1 INJECTION, POWDER, FOR SOLUTION INTRAMUSCULAR; INTRAVENOUS at 14:21

## 2022-01-01 RX ADMIN — LORATADINE 5 MG: 10 TABLET ORAL at 08:11

## 2022-01-01 RX ADMIN — FUROSEMIDE 20 MG: 10 INJECTION, SOLUTION INTRAMUSCULAR; INTRAVENOUS at 10:00

## 2022-01-01 RX ADMIN — HEPARIN SODIUM 5000 UNITS: 5000 INJECTION INTRAVENOUS; SUBCUTANEOUS at 05:16

## 2022-01-01 RX ADMIN — METOROPROLOL TARTRATE 5 MG: 5 INJECTION, SOLUTION INTRAVENOUS at 06:39

## 2022-01-01 RX ADMIN — HEPARIN SODIUM 5000 UNITS: 5000 INJECTION INTRAVENOUS; SUBCUTANEOUS at 06:00

## 2022-01-01 RX ADMIN — HEPARIN SODIUM 5000 UNITS: 5000 INJECTION INTRAVENOUS; SUBCUTANEOUS at 06:31

## 2022-01-01 RX ADMIN — CLOPIDOGREL BISULFATE 75 MG: 75 TABLET, FILM COATED ORAL at 09:57

## 2022-01-01 RX ADMIN — CEFTRIAXONE SODIUM 1000 MG: 10 INJECTION, POWDER, FOR SOLUTION INTRAVENOUS at 17:57

## 2022-01-01 RX ADMIN — ATORVASTATIN CALCIUM 40 MG: 40 TABLET, FILM COATED ORAL at 17:50

## 2022-01-01 RX ADMIN — CEFTRIAXONE 1000 MG: 1 INJECTION, POWDER, FOR SOLUTION INTRAMUSCULAR; INTRAVENOUS at 16:10

## 2022-01-01 RX ADMIN — FENTANYL CITRATE 25 MCG: 50 INJECTION INTRAMUSCULAR; INTRAVENOUS at 16:54

## 2022-01-01 RX ADMIN — CLOPIDOGREL BISULFATE 75 MG: 75 TABLET, FILM COATED ORAL at 09:09

## 2022-01-01 RX ADMIN — AZITHROMYCIN MONOHYDRATE 500 MG: 250 TABLET ORAL at 11:43

## 2022-01-04 ENCOUNTER — OFFICE VISIT (OUTPATIENT)
Dept: OBGYN CLINIC | Facility: HOSPITAL | Age: 87
End: 2022-01-04
Payer: MEDICARE

## 2022-01-04 ENCOUNTER — HOSPITAL ENCOUNTER (OUTPATIENT)
Dept: RADIOLOGY | Facility: HOSPITAL | Age: 87
Discharge: HOME | End: 2022-01-04
Payer: MEDICARE

## 2022-01-04 VITALS
HEART RATE: 125 BPM | SYSTOLIC BLOOD PRESSURE: 126 MMHG | BODY MASS INDEX: 32.39 KG/M2 | HEIGHT: 56 IN | DIASTOLIC BLOOD PRESSURE: 70 MMHG | WEIGHT: 144 LBS

## 2022-01-04 DIAGNOSIS — M25.551 PAIN IN RIGHT HIP: Primary | ICD-10-CM

## 2022-01-04 DIAGNOSIS — M25.551 PAIN IN RIGHT HIP: ICD-10-CM

## 2022-01-04 PROCEDURE — 99203 OFFICE O/P NEW LOW 30 MIN: CPT | Performed by: PHYSICIAN ASSISTANT

## 2022-01-04 PROCEDURE — 73502 X-RAY EXAM HIP UNI 2-3 VIEWS: CPT

## 2022-01-04 NOTE — PROGRESS NOTES
Assessment & Plan:      The patient presents today with a mild fall over two weeks ago onto her right lower extremity  She was able to get up on her own power and ambulate without any issues  Over the last few weeks, her symptoms have subsequently resolved  On examination no concerning findings today  X-rays are negative for acute fracture  From our standpoint she can resume normal activities to tolerance  She is instructed to let us know she has any questions or issues in the future  Problem List Items Addressed This Visit        Other    Pain in right hip - Primary                   Subjective:     Patient ID:  Socorro Monahan is a 81 y o  female    HPI    The patient is an 81-year-old female presenting for evaluation of her right hip  She states over two weeks ago she was hopping over from her bed to the living room and had a mild fall onto her right lower extremity  She was able to get up on her own and bear weight without any significant issues  Initially the mid lateral thigh region with sore however she started taking a muscle relaxer medication as well as Tylenol and this improved her symptoms  Today, she states she is walking without any assistance and she has no pain in her right lower extremity  She states she was debating cancelling her appointment but thought she should get an x-ray  She has no weight-bearing limitations or pain in the right hip today  She has not taken Tylenol or muscle relaxant medications in the last few days due to not having any pain  The following portions of the patient's history were reviewed and updated as appropriate: allergies, current medications, past family history, past medical history, past social history, past surgical history and problem list     Review of Systems     Objective:    Imaging:  Right hip x-rays demonstrate no acute fracture or dislocation  There are degenerative changes evident        Vitals:    01/04/22 1051   BP: 126/70   Pulse: (!) 125           Physical Exam     Orthopedic Examination:  Right hip    Inspection:  No open wound or erythema  There is no shortening or internal rotation of the right hip  Palpation:  Nontender to palpation    Range-of-motion:  She is able to fully flex the hip from the supine position and extend back without any pain or restriction    Strength:  5/5 strength hip flexion knee extension, ankle plantar dorsiflexion    Sensation:  Intact    Special Tests:  Negative logroll  No pain with internal or external rotation of the hip  Extensor mechanism intact    Knee joints stable to varus and valgus stress

## 2022-01-14 ENCOUNTER — APPOINTMENT (OUTPATIENT)
Dept: LAB | Age: 87
End: 2022-01-14
Payer: MEDICARE

## 2022-01-14 DIAGNOSIS — E03.9 HYPOTHYROIDISM, UNSPECIFIED TYPE: ICD-10-CM

## 2022-01-14 LAB — TSH SERPL DL<=0.05 MIU/L-ACNC: 0.34 UIU/ML (ref 0.36–3.74)

## 2022-01-14 PROCEDURE — 36415 COLL VENOUS BLD VENIPUNCTURE: CPT

## 2022-01-14 PROCEDURE — 84443 ASSAY THYROID STIM HORMONE: CPT

## 2022-03-08 ENCOUNTER — APPOINTMENT (OUTPATIENT)
Dept: LAB | Age: 87
End: 2022-03-08
Payer: MEDICARE

## 2022-03-08 DIAGNOSIS — I10 ESSENTIAL HYPERTENSION, BENIGN: ICD-10-CM

## 2022-03-08 DIAGNOSIS — E06.3 CHRONIC LYMPHOCYTIC THYROIDITIS: ICD-10-CM

## 2022-03-08 DIAGNOSIS — R73.9 BLOOD GLUCOSE ELEVATED: ICD-10-CM

## 2022-03-08 DIAGNOSIS — E78.00 PURE HYPERCHOLESTEROLEMIA: ICD-10-CM

## 2022-03-08 LAB
ANION GAP SERPL CALCULATED.3IONS-SCNC: 3 MMOL/L (ref 4–13)
BUN SERPL-MCNC: 28 MG/DL (ref 5–25)
CALCIUM SERPL-MCNC: 9.8 MG/DL (ref 8.3–10.1)
CHLORIDE SERPL-SCNC: 107 MMOL/L (ref 100–108)
CHOLEST SERPL-MCNC: 119 MG/DL
CO2 SERPL-SCNC: 30 MMOL/L (ref 21–32)
CREAT SERPL-MCNC: 1.16 MG/DL (ref 0.6–1.3)
EST. AVERAGE GLUCOSE BLD GHB EST-MCNC: 120 MG/DL
GFR SERPL CREATININE-BSD FRML MDRD: 43 ML/MIN/1.73SQ M
GLUCOSE P FAST SERPL-MCNC: 107 MG/DL (ref 65–99)
HBA1C MFR BLD: 5.8 %
HDLC SERPL-MCNC: 48 MG/DL
LDLC SERPL CALC-MCNC: 48 MG/DL (ref 0–100)
LDLC SERPL DIRECT ASSAY-MCNC: 66 MG/DL (ref 0–100)
NONHDLC SERPL-MCNC: 71 MG/DL
POTASSIUM SERPL-SCNC: 4.7 MMOL/L (ref 3.5–5.3)
SODIUM SERPL-SCNC: 140 MMOL/L (ref 136–145)
TRIGL SERPL-MCNC: 115 MG/DL
TSH SERPL DL<=0.05 MIU/L-ACNC: 0.19 UIU/ML (ref 0.36–3.74)

## 2022-03-08 PROCEDURE — 83721 ASSAY OF BLOOD LIPOPROTEIN: CPT

## 2022-03-08 PROCEDURE — 83036 HEMOGLOBIN GLYCOSYLATED A1C: CPT

## 2022-03-08 PROCEDURE — 80061 LIPID PANEL: CPT

## 2022-03-08 PROCEDURE — 36415 COLL VENOUS BLD VENIPUNCTURE: CPT

## 2022-03-08 PROCEDURE — 84443 ASSAY THYROID STIM HORMONE: CPT

## 2022-03-08 PROCEDURE — 80048 BASIC METABOLIC PNL TOTAL CA: CPT

## 2022-05-26 ENCOUNTER — APPOINTMENT (OUTPATIENT)
Dept: LAB | Age: 87
End: 2022-05-26
Payer: MEDICARE

## 2022-05-26 DIAGNOSIS — E06.3 CHRONIC LYMPHOCYTIC THYROIDITIS: ICD-10-CM

## 2022-05-26 LAB — TSH SERPL DL<=0.05 MIU/L-ACNC: 0.99 UIU/ML (ref 0.45–4.5)

## 2022-05-26 PROCEDURE — 36415 COLL VENOUS BLD VENIPUNCTURE: CPT

## 2022-05-26 PROCEDURE — 84443 ASSAY THYROID STIM HORMONE: CPT

## 2022-08-26 ENCOUNTER — TELEPHONE (OUTPATIENT)
Dept: VASCULAR SURGERY | Facility: CLINIC | Age: 87
End: 2022-08-26

## 2022-08-26 NOTE — TELEPHONE ENCOUNTER
Attempted to contact patient to schedule appointment(s) listed below  Requested patient call [phone] option 3 to schedule appointment(s)  Patient's appointment(s) are due now      Dopplers  [] Abdominal Aorta Iliac (AOIL)  [x] Carotid (CV)   [] Celiac and/or Mesenteric  [] Endovascular Aortic Repair (EVAR)   [] Hemodialysis Access (HD)   [] Lower Limb Arterial (KERRY)  [] Lower Limb Venous Duplex with Reflux (LEVDR)  [] Renal Artery  [] Upper Limb Arterial (UEA)    [] Upper Limb Venous (UEV)    Advanced Imaging   [] CTA abdomen    [] CTA abdomen & pelvis    [] CT abdomen with/ without contrast  [] CT abdomen with contrast  [] CT abdomen without contrast    [] CT abdomen & pelvis with/ without contrast  [] CT abdomen & pelvis with contrast  [] CT abdomen & pelvis without contrast    Office Visit   [] New patient, patient last seen over 3 years ago  [] Risk factor modification (RFM)   [x] Follow up

## 2022-08-29 ENCOUNTER — APPOINTMENT (OUTPATIENT)
Dept: LAB | Age: 87
End: 2022-08-29
Payer: MEDICARE

## 2022-08-29 DIAGNOSIS — I10 ESSENTIAL HYPERTENSION, MALIGNANT: ICD-10-CM

## 2022-08-29 DIAGNOSIS — E78.5 HYPERLIPIDEMIA, UNSPECIFIED HYPERLIPIDEMIA TYPE: ICD-10-CM

## 2022-08-29 DIAGNOSIS — E06.3 CHRONIC LYMPHOCYTIC THYROIDITIS: ICD-10-CM

## 2022-08-29 LAB
ALBUMIN SERPL BCP-MCNC: 2.9 G/DL (ref 3.5–5)
ALP SERPL-CCNC: 89 U/L (ref 46–116)
ALT SERPL W P-5'-P-CCNC: 14 U/L (ref 12–78)
ANION GAP SERPL CALCULATED.3IONS-SCNC: 8 MMOL/L (ref 4–13)
AST SERPL W P-5'-P-CCNC: 16 U/L (ref 5–45)
BASOPHILS # BLD AUTO: 0.08 THOUSANDS/ΜL (ref 0–0.1)
BASOPHILS NFR BLD AUTO: 1 % (ref 0–1)
BILIRUB SERPL-MCNC: 0.27 MG/DL (ref 0.2–1)
BUN SERPL-MCNC: 32 MG/DL (ref 5–25)
CALCIUM ALBUM COR SERPL-MCNC: 14.2 MG/DL (ref 8.3–10.1)
CALCIUM SERPL-MCNC: 13.3 MG/DL (ref 8.3–10.1)
CHLORIDE SERPL-SCNC: 105 MMOL/L (ref 96–108)
CHOLEST SERPL-MCNC: 111 MG/DL
CO2 SERPL-SCNC: 26 MMOL/L (ref 21–32)
CREAT SERPL-MCNC: 1.64 MG/DL (ref 0.6–1.3)
EOSINOPHIL # BLD AUTO: 0.38 THOUSAND/ΜL (ref 0–0.61)
EOSINOPHIL NFR BLD AUTO: 4 % (ref 0–6)
ERYTHROCYTE [DISTWIDTH] IN BLOOD BY AUTOMATED COUNT: 14 % (ref 11.6–15.1)
GFR SERPL CREATININE-BSD FRML MDRD: 28 ML/MIN/1.73SQ M
GLUCOSE P FAST SERPL-MCNC: 122 MG/DL (ref 65–99)
HCT VFR BLD AUTO: 39.2 % (ref 34.8–46.1)
HDLC SERPL-MCNC: 41 MG/DL
HGB BLD-MCNC: 11.3 G/DL (ref 11.5–15.4)
IMM GRANULOCYTES # BLD AUTO: 0.07 THOUSAND/UL (ref 0–0.2)
IMM GRANULOCYTES NFR BLD AUTO: 1 % (ref 0–2)
LDLC SERPL CALC-MCNC: 39 MG/DL (ref 0–100)
LDLC SERPL DIRECT ASSAY-MCNC: 56 MG/DL (ref 0–100)
LYMPHOCYTES # BLD AUTO: 1.33 THOUSANDS/ΜL (ref 0.6–4.47)
LYMPHOCYTES NFR BLD AUTO: 14 % (ref 14–44)
MCH RBC QN AUTO: 26.5 PG (ref 26.8–34.3)
MCHC RBC AUTO-ENTMCNC: 28.8 G/DL (ref 31.4–37.4)
MCV RBC AUTO: 92 FL (ref 82–98)
MONOCYTES # BLD AUTO: 0.8 THOUSAND/ΜL (ref 0.17–1.22)
MONOCYTES NFR BLD AUTO: 8 % (ref 4–12)
NEUTROPHILS # BLD AUTO: 6.94 THOUSANDS/ΜL (ref 1.85–7.62)
NEUTS SEG NFR BLD AUTO: 72 % (ref 43–75)
NONHDLC SERPL-MCNC: 70 MG/DL
NRBC BLD AUTO-RTO: 0 /100 WBCS
PLATELET # BLD AUTO: 268 THOUSANDS/UL (ref 149–390)
PMV BLD AUTO: 11.1 FL (ref 8.9–12.7)
POTASSIUM SERPL-SCNC: 4.6 MMOL/L (ref 3.5–5.3)
PROT SERPL-MCNC: 7.8 G/DL (ref 6.4–8.4)
RBC # BLD AUTO: 4.26 MILLION/UL (ref 3.81–5.12)
SODIUM SERPL-SCNC: 139 MMOL/L (ref 135–147)
TRIGL SERPL-MCNC: 157 MG/DL
TSH SERPL DL<=0.05 MIU/L-ACNC: 4.22 UIU/ML (ref 0.45–4.5)
WBC # BLD AUTO: 9.6 THOUSAND/UL (ref 4.31–10.16)

## 2022-08-29 PROCEDURE — 36415 COLL VENOUS BLD VENIPUNCTURE: CPT

## 2022-08-29 PROCEDURE — 85025 COMPLETE CBC W/AUTO DIFF WBC: CPT

## 2022-08-29 PROCEDURE — 80061 LIPID PANEL: CPT

## 2022-08-29 PROCEDURE — 83721 ASSAY OF BLOOD LIPOPROTEIN: CPT

## 2022-08-29 PROCEDURE — 80053 COMPREHEN METABOLIC PANEL: CPT

## 2022-08-29 PROCEDURE — 84443 ASSAY THYROID STIM HORMONE: CPT

## 2022-09-08 ENCOUNTER — APPOINTMENT (OUTPATIENT)
Dept: LAB | Age: 87
End: 2022-09-08
Payer: MEDICARE

## 2022-09-08 DIAGNOSIS — E83.52 HYPERCALCEMIA: ICD-10-CM

## 2022-09-08 LAB
CALCIUM SERPL-MCNC: 12 MG/DL (ref 8.3–10.1)
PHOSPHATE SERPL-MCNC: 3 MG/DL (ref 2.3–4.1)

## 2022-09-08 PROCEDURE — 82310 ASSAY OF CALCIUM: CPT

## 2022-09-08 PROCEDURE — 36415 COLL VENOUS BLD VENIPUNCTURE: CPT

## 2022-09-08 PROCEDURE — 84100 ASSAY OF PHOSPHORUS: CPT

## 2022-09-15 ENCOUNTER — HOSPITAL ENCOUNTER (OUTPATIENT)
Dept: NON INVASIVE DIAGNOSTICS | Facility: CLINIC | Age: 87
Discharge: HOME | End: 2022-09-15
Payer: MEDICARE

## 2022-09-15 DIAGNOSIS — I65.23 ASYMPTOMATIC BILATERAL CAROTID ARTERY STENOSIS: ICD-10-CM

## 2022-09-15 PROCEDURE — 93880 EXTRACRANIAL BILAT STUDY: CPT | Performed by: SURGERY

## 2022-09-15 PROCEDURE — 93880 EXTRACRANIAL BILAT STUDY: CPT

## 2022-09-21 ENCOUNTER — APPOINTMENT (OUTPATIENT)
Dept: LAB | Age: 87
End: 2022-09-21
Payer: MEDICARE

## 2022-09-21 DIAGNOSIS — E83.52 HYPERCALCEMIA: ICD-10-CM

## 2022-09-21 LAB
CA-I BLD-SCNC: 1.56 MMOL/L (ref 1.12–1.32)
CALCIUM SERPL-MCNC: 12.5 MG/DL (ref 8.3–10.1)
PTH-INTACT SERPL-MCNC: <6.3 PG/ML (ref 18.4–80.1)

## 2022-09-21 PROCEDURE — 83970 ASSAY OF PARATHORMONE: CPT

## 2022-09-21 PROCEDURE — 82310 ASSAY OF CALCIUM: CPT

## 2022-09-21 PROCEDURE — 82330 ASSAY OF CALCIUM: CPT

## 2022-09-21 PROCEDURE — 36415 COLL VENOUS BLD VENIPUNCTURE: CPT

## 2022-09-29 ENCOUNTER — APPOINTMENT (OUTPATIENT)
Dept: LAB | Age: 87
End: 2022-09-29
Payer: MEDICARE

## 2022-09-29 DIAGNOSIS — E83.52 HYPERCALCEMIA: ICD-10-CM

## 2022-09-29 DIAGNOSIS — I10 ESSENTIAL HYPERTENSION: ICD-10-CM

## 2022-09-29 LAB
ANION GAP SERPL CALCULATED.3IONS-SCNC: 6 MMOL/L (ref 4–13)
BUN SERPL-MCNC: 22 MG/DL (ref 5–25)
CA-I BLD-SCNC: 1.59 MMOL/L (ref 1.12–1.32)
CALCIUM SERPL-MCNC: 11.8 MG/DL (ref 8.3–10.1)
CHLORIDE SERPL-SCNC: 107 MMOL/L (ref 96–108)
CO2 SERPL-SCNC: 25 MMOL/L (ref 21–32)
CREAT SERPL-MCNC: 1.2 MG/DL (ref 0.6–1.3)
GFR SERPL CREATININE-BSD FRML MDRD: 41 ML/MIN/1.73SQ M
GLUCOSE P FAST SERPL-MCNC: 113 MG/DL (ref 65–99)
POTASSIUM SERPL-SCNC: 4.3 MMOL/L (ref 3.5–5.3)
SODIUM SERPL-SCNC: 138 MMOL/L (ref 135–147)

## 2022-09-29 PROCEDURE — 36415 COLL VENOUS BLD VENIPUNCTURE: CPT

## 2022-09-29 PROCEDURE — 82330 ASSAY OF CALCIUM: CPT

## 2022-09-29 PROCEDURE — 80048 BASIC METABOLIC PNL TOTAL CA: CPT

## 2022-10-05 NOTE — PROGRESS NOTES
Assessment/Plan:    Asymptomatic carotid artery stenosis  Patient is an 43-year-old female, former smoker, with past medical history significant for laryngeal carcinoma, CKD stage 3, HLD, HTN, aortoiliac occlusive disease, PVD with intermittent claudication, asymptomatic bilateral carotid artery stenosis 2/2 radiation s/p bilateral TCAR  She presents today for results review of CV duplex done on 09/15/2022 for surveillance  She is currently asymptomatic  Denies recent TIA/CVA symptoms  -CV duplex demonstrates diffuse plaque in the right ICA/stent with elevated velocities in the proximal to mid ICA (Julio 230/41)  There is noted >50% R subclavian artery stenosis  Diffuse disease noted in left ICA/stent with elevated velocities in the proximal to mid ICA  -Hx of bilateral carotid artery stenting with >50% stent stenosis bilaterally    -Discussed the pathophysiology of carotid stenosis, available treatment options, and indications for intervention   -We will continue close surveillance imaging with CV duplex in 6 months    -Continue statin  -Recommend restarting antiplatelet agent such as ASA 81 mg or plavix  Patient states she would like to talk to her nephrologist first before starting due to CKD  -Educated on s/sx of worsening condition, including TIA/Stroke-like symptoms, and to call 911 or go to the ED if symptoms occur  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 (HCC)  -Hx of aortoiliac and infrainguinal arterial occlusive disease with mild claudication sp L iliac stenting    -Asymptomatic  Denies rest pain, claudication, tissue loss  -Continue routine surveillance imaging    -Continue statin   Recommend starting ASA 81 mg    -Educated patient on pathophysiology of peripheral arterial disease, available treatment options, and indications for treatment    -Discussed risk factor modification, including adequate glycemic and lipid control, smoking cessation, blood pressure, and lifestyle modifications    -Recommend starting a structured walking program 3-5 times/week for 30 minutes  Patient should walk until claudication symptoms occur, rest for 5-10 minutes, then continue to walk  Patient should increase distance each week  -Plan as outlined above  Hypertension  -BP stable in office today   -Continue adequate BP control and monitoring   -Medical management per PCP       Hyperlipidemia  -Stable   -Encourage low cholesterol, low fat diet  -Continue with statin therapy  -Medical Management per PCP          Diagnoses and all orders for this visit:    Asymptomatic bilateral carotid artery stenosis  -     VAS carotid complete study; Future    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 (HCC)  -     VAS lower limb arterial duplex, complete bilateral; Future    Hypertension, unspecified type    Hyperlipidemia, unspecified hyperlipidemia type          Subjective:      Patient ID: Aniceto Chinchilla is a 81 y o  female  HPI  Patient is an 46-year-old female, former smoker, with past medical history significant for laryngeal carcinoma, CKD stage 3, HLD, HTN, aortoiliac occlusive disease, PVD with intermittent claudication, asymptomatic bilateral carotid artery stenosis 2/2 radiation s/p bilateral TCAR  She presents today for results review of CV duplex done on 09/15/2022 for surveillance  She is currently asymptomatic from a vascular standpoint  Denies recent TIA/CVA symptoms including amaurosis fugax, facial drooping, garbled speech, or one-sided weakness  She states her BP is controlled and typically runs in the low 189J systolic  She denies any leg complaints including claudication, rest pain or tissue loss  She is taking atorvastatin  She has not been taking any antiplatelet medications due to her kidney function  I discussed the importance of anti-platelet medications in arterial disease and prevention of stent restenosis   She states she has an upcoming appointment with her nephrologist in November and would like to discuss if further then  The following portions of the patient's history were reviewed and updated as appropriate: allergies, current medications, past family history, past medical history, past social history, past surgical history and problem list     Review of Systems   Constitutional: Negative  HENT: Negative  Eyes: Negative  Respiratory: Negative  Cardiovascular: Negative  Gastrointestinal: Negative  Endocrine: Negative  Genitourinary: Negative  Musculoskeletal: Negative  Skin: Negative  Allergic/Immunologic: Negative  Neurological: Negative  Hematological: Negative  Psychiatric/Behavioral: Negative  I have personally reviewed and made appropriate changes to the ROS that was input by the medical assistant       Objective:      Vitals:    10/06/22 0907   BP: 96/50   BP Location: Left arm   Patient Position: Sitting   Cuff Size: Adult   Pulse: (!) 106   Weight: 60 3 kg (133 lb)   Height: 4' 8" (1 422 m)       Patient Active Problem List   Diagnosis    Asymptomatic carotid artery stenosis    Aortoiliac occlusive disease (HCC)    Hyperlipidemia    Hypertension    Acute anterior circulation transient ischemic attack    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 (HCC)    Tracheostomy care ([address]    Laryngeal carcinoma ([address] )    CKD (chronic kidney disease) stage 3, GFR 30-59 ml/min    Renovascular hypertension    Tracheitis    SOB (shortness of breath)    JORDAN (acute kidney injury) ([address] )    Acute blood loss anemia    Elevated troponin    Acute on chronic kidney failure (HCC)    Pain in right hip       Past Surgical History:   Procedure Laterality Date    GALLBLADDER SURGERY  03/30/2019    ILIAC ARTERY STENT Left     IR CEREBRAL ANGIOGRAPHY / INTERVENTION  7/22/2020    LARYNX SURGERY  2002    LEG SURGERY  07/10/2012       Family History   Problem Relation Age of Onset    Heart disease Brother     No Known Problems Mother     No Known Problems Father     Breast cancer Sister 71    No Known Problems Daughter     No Known Problems Maternal Grandmother     No Known Problems Maternal Grandfather     No Known Problems Paternal Grandmother     No Known Problems Paternal Grandfather     No Known Problems Sister     No Known Problems Maternal Aunt     No Known Problems Maternal Aunt     No Known Problems Maternal Aunt        Social History     Socioeconomic History    Marital status:       Spouse name: Not on file    Number of children: Not on file    Years of education: Not on file    Highest education level: Not on file   Occupational History    Not on file   Tobacco Use    Smoking status: Former Smoker     Quit date:      Years since quittin     Smokeless tobacco: Never Used   Substance and Sexual Activity    Alcohol use: Never    Drug use: Never    Sexual activity: Not on file   Other Topics Concern    Not on file   Social History Narrative    Not on file     Social Determinants of Health     Financial Resource Strain: Not on file   Food Insecurity: Not on file   Transportation Needs: Not on file   Physical Activity: Not on file   Stress: Not on file   Social Connections: Not on file   Intimate Partner Violence: Not on file   Housing Stability: Not on file       Allergies   Allergen Reactions    Benazepril Cough    Solifenacin Other (See Comments)    Olmesartan Rash         Current Outpatient Medications:     acetaminophen (TYLENOL) 325 mg tablet, Take 2 tablets (650 mg total) by mouth every 6 (six) hours as needed for mild pain, Disp: , Rfl:     atorvastatin (LIPITOR) 40 mg tablet, Take by mouth, Disp: , Rfl:     Calcium-Magnesium-Vitamin D - MG-MG-UNIT TB24, Take by mouth, Disp: , Rfl:     cefuroxime (CEFTIN) 250 mg tablet, , Disp: , Rfl:     cetirizine (ZyrTEC) 5 MG tablet, Take 5 mg by mouth daily, Disp: , Rfl:     Cholecalciferol (VITAMIN D-3) 1000 units CAPS, Take by mouth, Disp: , Rfl:     co-enzyme Q-10 30 mg, Take by mouth, Disp: , Rfl:     fluticasone (FLONASE) 50 mcg/act nasal spray, into each nostril, Disp: , Rfl:     Krill Oil 1000 MG CAPS, Take by mouth, Disp: , Rfl:     levothyroxine 100 mcg tablet, , Disp: , Rfl:     losartan (COZAAR) 50 mg tablet, Take 1 tablet (50 mg total) by mouth daily, Disp: 30 tablet, Rfl: 0    Magnesium 250 MG TABS, Take 250 mg by mouth daily, Disp: , Rfl:     metFORMIN (GLUCOPHAGE) 500 mg tablet, Take 0 5 tablets (250 mg total) by mouth daily with breakfast, Disp: , Rfl:     methocarbamol (ROBAXIN) 500 mg tablet, TK 1 T PO BID PRN, Disp: , Rfl: 1    Multiple Vitamins-Minerals (CENTRUM SILVER 50+WOMEN PO), Take by mouth, Disp: , Rfl:     niacin 500 mg ER capsule, Take 500 mg by mouth daily at bedtime, Disp: , Rfl:     nystatin (MYCOSTATIN) 500,000 units/5 mL suspension, Apply to provox site twice daily, Disp: 500 mL, Rfl: 6    Omega-3 1000 MG CAPS, Take 4 capsules by mouth daily , Disp: , Rfl:     pantoprazole (PROTONIX) 40 mg tablet, TAKE 1 TABLET(40 MG) BY MOUTH DAILY, Disp: 90 tablet, Rfl: 0    sertraline (ZOLOFT) 25 mg tablet, , Disp: , Rfl:     VENTOLIN  (90 Base) MCG/ACT inhaler, , Disp: , Rfl:     clopidogrel (PLAVIX) 75 mg tablet, TAKE 1 TABLET EVERY DAY, Disp: 90 tablet, Rfl: 3      BP 96/50 (BP Location: Left arm, Patient Position: Sitting, Cuff Size: Adult)   Pulse (!) 106   Ht 4' 8" (1 422 m)   Wt 60 3 kg (133 lb)   BMI 29 82 kg/m²          Physical Exam  Vitals and nursing note reviewed  Constitutional:       Appearance: Normal appearance  HENT:      Head: Normocephalic and atraumatic  Neck:      Vascular: Carotid bruit present  Cardiovascular:      Rate and Rhythm: Normal rate and regular rhythm  Pulses:           Carotid pulses are 2+ on the right side with bruit and 2+ on the left side with bruit         Radial pulses are 2+ on the right side and 2+ on the left side  Femoral pulses are 2+ on the right side and 2+ on the left side  Dorsalis pedis pulses are 2+ on the right side and 2+ on the left side  Heart sounds: Normal heart sounds  Comments: No carotid bruit   Pulmonary:      Effort: Pulmonary effort is normal  No respiratory distress  Breath sounds: Normal breath sounds  Abdominal:      General: Bowel sounds are normal  There is no distension  Palpations: Abdomen is soft  Comments: No abdominal bruit or pulsatile masses  Musculoskeletal:         General: No swelling or tenderness  Normal range of motion  Cervical back: Normal range of motion and neck supple  Right lower leg: No edema  Left lower leg: No edema  Skin:     General: Skin is warm  Capillary Refill: Capillary refill takes less than 2 seconds  Coloration: Skin is not pale  Findings: No erythema  Neurological:      General: No focal deficit present  Mental Status: She is alert and oriented to person, place, and time  Psychiatric:         Mood and Affect: Mood normal          Behavior: Behavior normal        Delilah De La Torre PA-C  [facility]  [phone]    This text is generated with voice recognition software  There may be translation, syntax, or grammatical errors  If have any questions, please contact the dictating provider

## 2022-10-06 ENCOUNTER — OFFICE VISIT (OUTPATIENT)
Dept: VASCULAR SURGERY | Facility: CLINIC | Age: 87
End: 2022-10-06
Payer: MEDICARE

## 2022-10-06 VITALS
SYSTOLIC BLOOD PRESSURE: 96 MMHG | HEIGHT: 56 IN | BODY MASS INDEX: 29.92 KG/M2 | WEIGHT: 133 LBS | DIASTOLIC BLOOD PRESSURE: 50 MMHG | HEART RATE: 106 BPM

## 2022-10-06 DIAGNOSIS — I10 HYPERTENSION, UNSPECIFIED TYPE: ICD-10-CM

## 2022-10-06 DIAGNOSIS — I65.23 ASYMPTOMATIC BILATERAL CAROTID ARTERY STENOSIS: Primary | ICD-10-CM

## 2022-10-06 DIAGNOSIS — E78.5 HYPERLIPIDEMIA, UNSPECIFIED HYPERLIPIDEMIA TYPE: ICD-10-CM

## 2022-10-06 DIAGNOSIS — I70.219 ATHEROSCLEROSIS OF ARTERY OF EXTREMITY WITH INTERMITTENT CLAUDICATION (HCC): ICD-10-CM

## 2022-10-06 PROCEDURE — 99213 OFFICE O/P EST LOW 20 MIN: CPT

## 2022-10-06 NOTE — ASSESSMENT & PLAN NOTE
-BP stable in office today   -Continue adequate BP control and monitoring   -Medical management per PCP

## 2022-10-06 NOTE — ASSESSMENT & PLAN NOTE
Patient is an 61-year-old female, former smoker, with past medical history significant for laryngeal carcinoma, CKD stage 3, HLD, HTN, aortoiliac occlusive disease, PVD with intermittent claudication, asymptomatic bilateral carotid artery stenosis 2/2 radiation s/p bilateral TCAR  She presents today for results review of CV duplex done on 09/15/2022 for surveillance  She is currently asymptomatic  Denies recent TIA/CVA symptoms  -CV duplex demonstrates diffuse plaque in the right ICA/stent with elevated velocities in the proximal to mid ICA (Julio 230/41)  There is noted >50% R subclavian artery stenosis  Diffuse disease noted in left ICA/stent with elevated velocities in the proximal to mid ICA  -Hx of bilateral carotid artery stenting with >50% stent stenosis bilaterally    -Discussed the pathophysiology of carotid stenosis, available treatment options, and indications for intervention   -We will continue close surveillance imaging with CV duplex in 6 months    -Continue statin  -Recommend starting ASA 81 mg  Patient states she would like to talk to her nephrologist first before starting due to CKD  -Educated on s/sx of worsening condition, including TIA/Stroke-like symptoms, and to call 911 or go to the ED if symptoms occur

## 2022-10-06 NOTE — ASSESSMENT & PLAN NOTE
-Stable   -Encourage low cholesterol, low fat diet  -Continue with statin therapy  -Medical Management per PCP

## 2022-10-06 NOTE — ASSESSMENT & PLAN NOTE
-Hx of aortoiliac and infrainguinal arterial occlusive disease with mild claudication sp L iliac stenting    -Asymptomatic  Denies rest pain, claudication, tissue loss  -Continue routine surveillance imaging    -Continue statin  Recommend starting ASA 81 mg    -Educated patient on pathophysiology of peripheral arterial disease, available treatment options, and indications for treatment    -Discussed risk factor modification, including adequate glycemic and lipid control, smoking cessation, blood pressure, and lifestyle modifications    -Recommend starting a structured walking program 3-5 times/week for 30 minutes  Patient should walk until claudication symptoms occur, rest for 5-10 minutes, then continue to walk  Patient should increase distance each week  -Plan as outlined above

## 2022-10-06 NOTE — PATIENT INSTRUCTIONS
Carotid artery disease  -Your recent study shows >50% blockage in both stents in the carotid arteries   -Continue to monitor your blood pressure  -Continue on atrovastatin  I recommend starting a daily baby aspirin (81mg)  Please talk to your nephrologist, Dr Lieutenant Blancas regarding this  -If you start to have stroke like symptoms, please call 911 immediately  -We will continue to monitor your carotid arteries every 6 months  Peripheral arterial disease  -We will continue to monitor for progression of disease in the legs with yearly ultrasound  You are due in November 2022  -If you start to experience pain with walking , at rest or wound please notify the office  Carotid Artery Disease   AMBULATORY CARE:   Carotid artery disease (CAD)  means the major blood vessels in your neck are narrowed or becoming blocked  These 2 major blood vessels are called the carotid arteries  They supply your brain with blood  The narrow or blocked blood vessels increase your risk for a stroke  CAD is also called carotid artery stenosis  Have someone call your local emergency number (911 in the [address] [place] [address],[address]) if:   You have any of the following signs of a stroke:      Numbness or drooping on one side of your face     Weakness in an arm or leg    Confusion or difficulty speaking    Dizziness, a severe headache, or vision loss    You have any of the following signs of a heart attack:      Squeezing, pressure, or pain in your chest    You may  also have any of the following:     Discomfort or pain in your back, neck, jaw, stomach, or arm    Shortness of breath    Nausea or vomiting    Lightheadedness or a sudden cold sweat    Call your doctor if:   You have questions or concerns about your condition or care  Common signs and symptoms:  CAD develops slowly  You may have no signs or symptoms until you have a mini-stroke, or transient ischemic attack (TIA)  A TIA is a temporary lack of blood flow to your brain   A TIA goes away quickly and does not cause permanent damage  A TIA may be a warning sign that you are about to have a stroke  If you have any symptoms of a TIA or stroke, seek care immediately  Warning signs of a stroke: The words BE FAST can help you remember and recognize warning signs of a stroke:  B = Balance:  Sudden loss of balance    E = Eyes:  Loss of vision in one or both eyes    F = Face:  Face droops on one side    A = Arms:  Arm drops when both arms are raised    S = Speech:  Speech is slurred or sounds different    T = Time:  Time to get help immediately       Treatment  depends on how narrow your arteries have become  Treatment also depends on your symptoms and your general health  The goal of treatment is to lower your risk for a stroke  You may need any of the following:  Medicines:      Aspirin,  or other blood thinner, may be recommended  These will help prevent blood clots from forming in your carotid arteries  If your healthcare provider wants you to take aspirin, do not take acetaminophen or ibuprofen instead  Cholesterol medicine  lowers your cholesterol level  Blood pressure medicine  lowers or helps control your blood pressure  Procedures  can help open blocked arteries:     Carotid endarterectomy (CEA)  is used to cut and remove plaque buildup from your arteries  Carotid angioplasty and stenting (KANDIS)  is used to push the plaque against the artery wall with a balloon device  Then, a stent is placed to keep the artery open  A stent is a small metal mesh tube  Prevent a stroke:   Do not smoke, and avoid secondhand smoke  Nicotine and other chemicals in cigarettes and cigars increase your risk for a stroke  Ask your healthcare provider for information if you currently smoke and need help to quit  E-cigarettes or smokeless tobacco still contain nicotine  Talk to your healthcare provider before you use these products  Eat a variety of healthy foods    Healthy foods include fruit, vegetables, whole-grain breads, low-fat dairy products, chicken, and fish  Choose fish that are high in omega-3 fatty acids, such as salmon and fresh tuna  Ask your healthcare provider for more information on a heart healthy diet and the DASH eating plan  Limit sodium (salt)  Sodium may increase your blood pressure  Add less table salt to your foods  Read food labels and choose foods that are low in sodium  Your healthcare provider may suggest you follow a low sodium diet  Reach or maintain a healthy weight  Extra weight makes your heart work harder  Ask your healthcare provider what a healthy weight is for you  He or she can help you create a safe weight loss plan  Even a weight loss of 10% of your extra body weight can help your heart function better  Exercise regularly  Exercise helps improve heart function and can help you manage your weight  Exercise can also help lower your cholesterol and blood sugar levels  Try to get at least 30 minutes of exercise 5 times each week  Try to be physically active every day  This may include walking, riding a bicycle, or swimming  Your healthcare provider can help you create an exercise plan that works best for you  Limit alcohol  Alcohol can increase your blood pressure and triglyceride levels  A drink of alcohol is 12 ounces of beer, 5 ounces of wine, or 1½ ounces of liquor  Follow up with your doctor as directed:  Write down your questions so you remember to ask them during your visits  © Copyright [employer] 2022 Information is for End User's use only and may not be sold, redistributed or otherwise used for commercial purposes  All illustrations and images included in CareNotes® are the copyrighted property of [employer]  or [facility] Jordon Reynolds   The above information is an  only  It is not intended as medical advice for individual conditions or treatments   Talk to your doctor, nurse or pharmacist before following any medical regimen to see if it is safe and effective for you

## 2022-10-12 DIAGNOSIS — I65.23 ASYMPTOMATIC BILATERAL CAROTID ARTERY STENOSIS: ICD-10-CM

## 2022-10-12 RX ORDER — CLOPIDOGREL BISULFATE 75 MG/1
TABLET ORAL
Qty: 90 TABLET | Refills: 3 | Status: SHIPPED | OUTPATIENT
Start: 2022-10-12

## 2022-11-15 ENCOUNTER — TELEPHONE (OUTPATIENT)
Dept: NEPHROLOGY | Facility: CLINIC | Age: 87
End: 2022-11-15

## 2022-11-15 DIAGNOSIS — I15.0 RENOVASCULAR HYPERTENSION: ICD-10-CM

## 2022-11-15 DIAGNOSIS — N17.9 AKI (ACUTE KIDNEY INJURY) (HCC): ICD-10-CM

## 2022-11-15 DIAGNOSIS — N18.9 ACUTE RENAL FAILURE SUPERIMPOSED ON CHRONIC KIDNEY DISEASE, UNSPECIFIED CKD STAGE, UNSPECIFIED ACUTE RENAL FAILURE TYPE (HCC): ICD-10-CM

## 2022-11-15 DIAGNOSIS — N17.9 ACUTE RENAL FAILURE SUPERIMPOSED ON CHRONIC KIDNEY DISEASE, UNSPECIFIED CKD STAGE, UNSPECIFIED ACUTE RENAL FAILURE TYPE (HCC): ICD-10-CM

## 2022-11-15 DIAGNOSIS — N18.30 STAGE 3 CHRONIC KIDNEY DISEASE, UNSPECIFIED WHETHER STAGE 3A OR 3B CKD (HCC): Primary | ICD-10-CM

## 2022-11-15 DIAGNOSIS — I10 HYPERTENSION, UNSPECIFIED TYPE: ICD-10-CM

## 2022-11-15 PROBLEM — R91.8 RIGHT LOWER LOBE LUNG MASS: Status: ACTIVE | Noted: 2022-11-15

## 2022-11-15 PROBLEM — J90 PLEURAL EFFUSION, RIGHT: Status: ACTIVE | Noted: 2022-01-01

## 2022-11-15 PROBLEM — A41.9 SEPSIS WITH ACUTE HYPOXIC RESPIRATORY FAILURE (HCC): Status: ACTIVE | Noted: 2022-11-15

## 2022-11-15 PROBLEM — E83.52 HYPERCALCEMIA: Status: ACTIVE | Noted: 2022-11-15

## 2022-11-15 PROBLEM — K85.90 PANCREATITIS: Status: ACTIVE | Noted: 2022-01-01

## 2022-11-15 PROBLEM — R79.89 ELEVATED SERUM CREATININE: Status: ACTIVE | Noted: 2022-01-01

## 2022-11-15 PROBLEM — R65.20 SEPSIS WITH ACUTE HYPOXIC RESPIRATORY FAILURE (HCC): Status: ACTIVE | Noted: 2022-11-15

## 2022-11-15 PROBLEM — J85.0 NECROTIZING PNEUMONIA (HCC): Status: ACTIVE | Noted: 2022-01-01

## 2022-11-15 PROBLEM — D63.8 ANEMIA OF CHRONIC DISEASE: Status: ACTIVE | Noted: 2022-01-01

## 2022-11-15 PROBLEM — R91.1 PULMONARY NODULE: Status: ACTIVE | Noted: 2022-11-15

## 2022-11-15 PROBLEM — J96.01 SEPSIS WITH ACUTE HYPOXIC RESPIRATORY FAILURE (HCC): Status: ACTIVE | Noted: 2022-11-15

## 2022-11-15 NOTE — TELEPHONE ENCOUNTER
----- Message from Annie Estrella DO sent at 11/15/2022  1:16 PM EST -----  CBC, renal function panel, uric acid, PTH and urine for microalbumin to creatinine ratio  Thank you   ----- Message -----  From: Nori Chuck  Sent: 11/15/2022  10:38 AM EST  To: Annie Estrella, DO    What labs would you like done for the follow up appointment?

## 2022-11-15 NOTE — TELEPHONE ENCOUNTER
Called patient and left a voicemail asking to have blood owrk drawn before the follow up appointment  Labs have been mailed to the patients home address

## 2022-11-15 NOTE — ED ATTENDING ATTESTATION
11/15/2022  I, Eitan Agarwal MD, saw and evaluated the patient  I have discussed the patient with the resident/non-physician practitioner and agree with the resident's/non-physician practitioner's findings, Plan of Care, and MDM as documented in the resident's/non-physician practitioner's note, except where noted  All available labs and Radiology studies were reviewed  I was present for key portions of any procedure(s) performed by the resident/non-physician practitioner and I was immediately available to provide assistance  At this point I agree with the current assessment done in the Emergency Department  I have conducted an independent evaluation of this patient a history and physical is as follows: This is evaluation of an 25-year-old female who presents to the emergency department complaining of abdominal and back pain as well as intermittent headache and occasional shortness of breath  Patient has history notable for CAD, hypertension, hyperlipidemia, PVD as well as history of throat cancer status post trach placement  She denies any new cough fevers or chills  Says she has a “suctioning machine” at home and has not noted any change in her secretions or need for increased suctioning  Denies chest pain but does note occasional palpitations  No nausea no vomiting  On exam patient is pale appearing and chronically ill-appearing but nontoxic  She is tachycardic  Patient with pulse ox in the low 90s on room air  HEENT is normocephalic and atraumatic with mildly dry and pale conjunctiva  Clear sclera and conjunctiva otherwise  Neck is supple full range of motion  Insert trach site that is clean dry no signs of erythema edema or drainage or bleeding  Heart is tachycardic with systolic ejection murmur  Lungs decreased bilaterally right greater than left  No wheezing  Abdomen soft positive bowel sounds mild diffuse tenderness palpation    No lower extremity edema no calf tenderness to palpation  Intact distal pulses capillary refill less than 2 seconds  Awake alert oriented appropriate  Assessment and plan 81year-old female with complaints of low back  Achy in sugars to print  Broad workup including imaging CT head chest abdomen pelvis  Will maintain on telemetry  Give supplemental oxygen is needed  Will do infectious workup  Do cardiopulmonary evaluation  Will treat accordingly  Portions of the record may have been created with voice recognition software  Occasional wrong word or “sound-a-like" substitutions may have occurred due to the inherent limitations of voice recognition software  Review chart carefully and recognize, using context, where substitutions have occurred      ED Course         Critical Care Time  Procedures

## 2022-11-15 NOTE — ED PROVIDER NOTES
History  Chief Complaint   Patient presents with   • Weakness - Generalized     Pt reports having pain in stomach, back, and HA  Pt has been having generalized weakness and dizziness  Pt is an 80yo F who presents for abdominal and back pain  Patient reports that approximately 1-2 months ago her primary care provider told her to stop taking vitamin-C  Patient states since she stopped her vitamin-C she has been having bilateral abdominal and back pain  Patient states she also started having headaches around that time as well  She states it is currently on the left but is sometimes on the right  Patient denies any associated vision changes  Patient also reporting constipation with her last bowel movement 3 days ago but denies any nausea, vomiting, or diarrhea  Patient states she does not believe she is taking any of her daily medications since she was told to stop the vitamin-C  Patient also reporting chest pain and intermittent shortness of breath  Patient states she is not short of breath currently  Patient denies any palpitations  Patient denies any recent illness including fevers or chills  Patient does live in a congregate care setting  Patient also reporting intermittent lightheadedness and stays occasionally she almost falls  Patient denies any true falls and denies any head strike  Per chart review, patient is supposed to be on Plavix  Patient also reporting decreased appetite and weight change over this time frame  Patient reports she has lost 20-30 lb  Prior to Admission Medications   Prescriptions Last Dose Informant Patient Reported? Taking?    Calcium-Magnesium-Vitamin D - MG-MG-UNIT TB24  Self Yes No   Sig: Take by mouth   Cholecalciferol (VITAMIN D-3) 1000 units CAPS  Self Yes No   Sig: Take by mouth   Krill Oil 1000 MG CAPS  Self Yes No   Sig: Take by mouth   Magnesium 250 MG TABS  Self Yes No   Sig: Take 250 mg by mouth daily   Multiple Vitamins-Minerals (CENTRUM SILVER 50+WOMEN PO)  Self Yes No   Sig: Take by mouth   Omega-3 1000 MG CAPS  Self Yes No   Sig: Take 4 capsules by mouth daily    VENTOLIN  (90 Base) MCG/ACT inhaler  Self Yes No   acetaminophen (TYLENOL) 325 mg tablet   No No   Sig: Take 2 tablets (650 mg total) by mouth every 6 (six) hours as needed for mild pain   atorvastatin (LIPITOR) 40 mg tablet  Self Yes No   Sig: Take by mouth   cefuroxime (CEFTIN) 250 mg tablet  Self Yes No   cetirizine (ZyrTEC) 5 MG tablet  Self Yes No   Sig: Take 5 mg by mouth daily   clopidogrel (PLAVIX) 75 mg tablet   No No   Sig: TAKE 1 TABLET EVERY DAY   co-enzyme Q-10 30 mg   Yes No   Sig: Take by mouth   fluticasone (FLONASE) 50 mcg/act nasal spray  Self Yes No   Sig: into each nostril   levothyroxine 100 mcg tablet  Self Yes No   losartan (COZAAR) 50 mg tablet   No No   Sig: Take 1 tablet (50 mg total) by mouth daily   metFORMIN (GLUCOPHAGE) 500 mg tablet   Yes No   Sig: Take 0 5 tablets (250 mg total) by mouth daily with breakfast   methocarbamol (ROBAXIN) 500 mg tablet  Self Yes No   Sig: TK 1 T PO BID PRN   niacin 500 mg ER capsule  Self Yes No   Sig: Take 500 mg by mouth daily at bedtime   nystatin (MYCOSTATIN) 500,000 units/5 mL suspension   No No   Sig: Apply to provox site twice daily   pantoprazole (PROTONIX) 40 mg tablet   No No   Sig: TAKE 1 TABLET(40 MG) BY MOUTH DAILY   sertraline (ZOLOFT) 25 mg tablet   Yes No      Facility-Administered Medications: None       Past Medical History:   Diagnosis Date   • Aortoiliac occlusive disease (HCC)    • 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 (HCC)    • Carotid artery stenosis    • Hyperlipidemia    • Hypertension    • PVD (peripheral vascular disease) ([facility])    • Throat cancer [facility])        Past Surgical History:   Procedure Laterality Date   • GALLBLADDER SURGERY  03/30/2019   • ILIAC ARTERY STENT Left    • IR CEREBRAL ANGIOGRAPHY / INTERVENTION  7/22/2020   • LARYNX SURGERY  2002 • LEG SURGERY  07/10/2012       Family History   Problem Relation Age of Onset   • Heart disease Brother    • No Known Problems Mother    • No Known Problems Father    • Breast cancer Sister 71   • No Known Problems Daughter    • No Known Problems Maternal Grandmother    • No Known Problems Maternal Grandfather    • No Known Problems Paternal Grandmother    • No Known Problems Paternal Grandfather    • No Known Problems Sister    • No Known Problems Maternal Aunt    • No Known Problems Maternal Aunt    • No Known Problems Maternal Aunt      I have reviewed and agree with the history as documented  E-Cigarette/Vaping     E-Cigarette/Vaping Substances     Social History     Tobacco Use   • Smoking status: Former Smoker     Quit date:      Years since quittin 8   • Smokeless tobacco: Never Used   Substance Use Topics   • Alcohol use: Never   • Drug use: Never        Review of Systems   Constitutional: Positive for appetite change (decreased) and fatigue  Respiratory: Positive for shortness of breath (intermittent; not currently)  Cardiovascular: Positive for chest pain  Gastrointestinal: Positive for abdominal pain and constipation  Musculoskeletal: Positive for back pain  Neurological: Positive for weakness (generalized), light-headedness and headaches  All other systems reviewed and are negative        Physical Exam  ED Triage Vitals   Temperature Pulse Respirations Blood Pressure SpO2   11/15/22 1333 11/15/22 1333 11/15/22 1333 11/15/22 1333 11/15/22 1333   98 7 °F (37 1 °C) (!) 122 20 93/53 92 %      Temp Source Heart Rate Source Patient Position - Orthostatic VS BP Location FiO2 (%)   11/15/22 1333 11/15/22 1333 11/15/22 1645 11/15/22 1645 11/15/22 1757   Tympanic Monitor Sitting Right arm 28      Pain Score       11/15/22 1654       10 - Worst Possible Pain             Orthostatic Vital Signs  Vitals:    11/15/22 1333 11/15/22 1443 11/15/22 1600 11/15/22 1645   BP: 93/53 98/55 134/59 123/58   Pulse: (!) 122 (!) 120 (!) 118 (!) 120   Patient Position - Orthostatic VS:    Sitting       Physical Exam  Vitals reviewed  Constitutional:       General: She is not in acute distress  Appearance: She is well-developed  She is not toxic-appearing or diaphoretic  HENT:      Head: Normocephalic and atraumatic  Right Ear: External ear normal       Left Ear: External ear normal       Nose: Nose normal       Mouth/Throat:      Pharynx: Oropharynx is clear  Eyes:      Extraocular Movements: Extraocular movements intact  Conjunctiva/sclera: Conjunctivae normal       Pupils: Pupils are equal, round, and reactive to light  Neck:      Comments: Trach with voice box in place  Cardiovascular:      Rate and Rhythm: Regular rhythm  Tachycardia present  Heart sounds: Normal heart sounds  Pulmonary:      Effort: Pulmonary effort is normal  No respiratory distress  Breath sounds: Normal breath sounds  No wheezing, rhonchi or rales  Abdominal:      General: There is no distension  Palpations: Abdomen is soft  Tenderness: There is abdominal tenderness (diffuse; worst LUQ)  There is no right CVA tenderness, left CVA tenderness, guarding or rebound  Musculoskeletal:         General: Normal range of motion  Cervical back: Normal range of motion and neck supple  No tenderness or bony tenderness  Thoracic back: No tenderness or bony tenderness  Lumbar back: Tenderness (bilateral paraspinal) present  No bony tenderness  Negative right straight leg raise test and negative left straight leg raise test       Right lower leg: No edema  Left lower leg: No edema  Skin:     General: Skin is warm and dry  Capillary Refill: Capillary refill takes less than 2 seconds  Coloration: Skin is not pale  Findings: No erythema or rash  Neurological:      General: No focal deficit present  Mental Status: She is alert and oriented to person, place, and time  Cranial Nerves: No cranial nerve deficit  Sensory: No sensory deficit  Motor: No weakness  Comments: Intermittent confusion   Psychiatric:         Speech: Speech normal          Behavior: Behavior is cooperative  Wt Readings from Last 3 Encounters:   10/18/22 60 3 kg (133 lb)   10/06/22 60 3 kg (133 lb)   06/07/22 63 kg (139 lb)         ED Medications  Medications   multi-electrolyte (ISOLYTE-S PH 7 4) bolus 1,000 mL (has no administration in time range)   ampicillin-sulbactam (UNASYN) 3 g in sodium chloride 0 9 % 100 mL IVPB (has no administration in time range)   multi-electrolyte (ISOLYTE-S PH 7 4) bolus 1,000 mL (0 mL Intravenous Stopped 11/15/22 1700)   fentanyl citrate (PF) 100 MCG/2ML 25 mcg (25 mcg Intravenous Given 11/15/22 1654)   iohexol (OMNIPAQUE) 350 MG/ML injection (SINGLE-DOSE) 100 mL (85 mL Intravenous Given 11/15/22 1733)       Diagnostic Studies  Results Reviewed     Procedure Component Value Units Date/Time    Blood culture #1 [[id number]]     Lab Status: No result Specimen: Blood     Blood culture #2 [[id number]]     Lab Status: No result Specimen: Blood     Lactic acid [[id number]]     Lab Status: No result Specimen: Blood     Procalcitonin [[id number]]     Lab Status: No result Specimen: Blood     Protime-INR [[id number]]     Lab Status: No result Specimen: Blood     APTT [[id number]]     Lab Status: No result Specimen: Blood     HS Troponin I 4hr [[id number]] Collected: 11/15/22 1835    Lab Status:  In process Specimen: Blood from Arm, Left Updated: 11/15/22 1838    HS Troponin I 2hr [[id number]]  (Normal) Collected: 11/15/22 1633    Lab Status: Final result Specimen: Blood from Arm, Left Updated: 11/15/22 1718     hs TnI 2hr 28 ng/L      Delta 2hr hsTnI -1 ng/L     TSH, 3rd generation with Free T4 reflex [[id number]]  (Normal) Collected: 11/15/22 1427    Lab Status: Final result Specimen: Blood from Arm, Left Updated: 11/15/22 1613     TSH 3RD GENERATON 2 110 uIU/mL Narrative:      Patients undergoing fluorescein dye angiography may retain small amounts of fluorescein in the body for 48-72 hours post procedure  Samples containing fluorescein can produce falsely depressed TSH values  If the patient had this procedure,a specimen should be resubmitted post fluorescein clearance  Comprehensive metabolic panel [[id number]]  (Abnormal) Collected: 11/15/22 1427    Lab Status: Final result Specimen: Blood from Arm, Left Updated: 11/15/22 1534     Sodium 138 mmol/L      Potassium 3 9 mmol/L      Chloride 107 mmol/L      CO2 18 mmol/L      ANION GAP 13 mmol/L      BUN 34 mg/dL      Creatinine 1 47 mg/dL      Glucose 86 mg/dL      Calcium 12 8 mg/dL      Corrected Calcium 14 3 mg/dL      AST 19 U/L      ALT 19 U/L      Alkaline Phosphatase 167 U/L      Total Protein 7 7 g/dL      Albumin 2 1 g/dL      Total Bilirubin 0 42 mg/dL      eGFR 32 ml/min/1 73sq m     Narrative:      [place] guidelines for Chronic Kidney Disease (CKD):   •  Stage 1 with normal or high GFR (GFR > 90 mL/min/1 73 square meters)  •  Stage 2 Mild CKD (GFR = 60-89 mL/min/1 73 square meters)  •  Stage 3A Moderate CKD (GFR = 45-59 mL/min/1 73 square meters)  •  Stage 3B Moderate CKD (GFR = 30-44 mL/min/1 73 square meters)  •  Stage 4 Severe CKD (GFR = 15-29 mL/min/1 73 square meters)  •  Stage 5 End Stage CKD (GFR <15 mL/min/1 73 square meters)  Note: GFR calculation is accurate only with a steady state creatinine    FLU/RSV/COVID - if FLU/RSV clinically relevant [[id number]]  (Normal) Collected: 11/15/22 1427    Lab Status: Final result Specimen: Nares from Nose Updated: 11/15/22 1522     SARS-CoV-2 Negative     INFLUENZA A PCR Negative     INFLUENZA B PCR Negative     RSV PCR Negative    Narrative:      FOR PEDIATRIC PATIENTS - copy/paste COVID Guidelines URL to browser: [email or web address]    SARS-CoV-2 assay is a Nucleic Acid Amplification assay intended for the  qualitative detection of nucleic acid from SARS-CoV-2 in nasopharyngeal  swabs  Results are for the presumptive identification of SARS-CoV-2 RNA  Positive results are indicative of infection with SARS-CoV-2, the virus  causing COVID-19, but do not rule out bacterial infection or co-infection  with other viruses  Laboratories within the [place] and its  territories are required to report all positive results to the appropriate  public health authorities  Negative results do not preclude SARS-CoV-2  infection and should not be used as the sole basis for treatment or other  patient management decisions  Negative results must be combined with  clinical observations, patient history, and epidemiological information  This test has not been FDA cleared or approved  This test has been authorized by FDA under an Emergency Use Authorization  (EUA)  This test is only authorized for the duration of time the  declaration that circumstances exist justifying the authorization of the  emergency use of an in vitro diagnostic tests for detection of SARS-CoV-2  virus and/or diagnosis of COVID-19 infection under section 564(b)(1) of  the Act, 21 U  S C  312TML-9(K)(7), unless the authorization is terminated  or revoked sooner  The test has been validated but independent review by FDA  and CLIA is pending  Test performed using [employer] Gene[employer]pert: This RT-PCR assay targets N2,  a region unique to SARS-CoV-2  A conserved region in the E-gene was chosen  for pan-Sarbecovirus detection which includes SARS-CoV-2  According to CMS-2020-01-R, this platform meets the definition of high-throughput technology      Lipase [[id number]]  (Abnormal) Collected: 11/15/22 1427    Lab Status: Final result Specimen: Blood from Arm, Left Updated: 11/15/22 1512     Lipase 2,028 u/L     CBC and differential [[id number]]  (Abnormal) Collected: 11/15/22 1427    Lab Status: Final result Specimen: Blood from Arm, Left Updated: 11/15/22 1507     WBC 15 58 Thousand/uL      RBC 4 25 Million/uL      Hemoglobin 11 1 g/dL      Hematocrit 36 2 %      MCV 85 fL      MCH 26 1 pg      MCHC 30 7 g/dL      RDW 18 2 %      MPV 10 0 fL      Platelets 909 Thousands/uL     Narrative: This is an appended report  These results have been appended to a previously verified report      Manual Differential(PHLEBS Do Not Order) [[id number]]  (Abnormal) Collected: 11/15/22 1427    Lab Status: Final result Specimen: Blood from Arm, Left Updated: 11/15/22 1507     Segmented % 83 %      Bands % 8 %      Lymphocytes % 0 %      Monocytes % 6 %      Eosinophils, % 1 %      Basophils % 0 %      Atypical Lymphocytes % 2 %      Absolute Neutrophils 14 18 Thousand/uL      Lymphocytes Absolute 0 00 Thousand/uL      Monocytes Absolute 0 93 Thousand/uL      Eosinophils Absolute 0 16 Thousand/uL      Basophils Absolute 0 00 Thousand/uL      Total Counted --     RBC Morphology Present     Anisocytosis Present     Macrocytes Present     Poikilocytes Present     Polychromasia Present     Platelet Estimate Adequate     Giant PLTs Present    HS Troponin 0hr (reflex protocol) [[id number]]  (Normal) Collected: 11/15/22 1427    Lab Status: Final result Specimen: Blood from Arm, Left Updated: 11/15/22 1504     hs TnI 0hr 29 ng/L     Urine Macroscopic, POC [[id number]] Collected: 11/15/22 1447    Lab Status: Final result Specimen: Urine Updated: 11/15/22 1448     Color, UA Yellow     Clarity, UA Slightly Cloudy     pH, UA 5 0     Leukocytes, UA Negative     Nitrite, UA Negative     Protein, UA Negative mg/dl      Glucose, UA Negative mg/dl      Ketones, UA Negative mg/dl      Urobilinogen, UA 0 2 E U /dl      Bilirubin, UA Negative     Occult Blood, UA Negative     Specific Gravity, UA 1 025    Narrative:      CLINITEK RESULT                 PE Study with CT Abdomen and Pelvis with contrast   Final Result by Emi Wills MD (11/15 1833)      No central, lobar, segmental or proximal subsegmental pulmonary embolism  Evaluation of the distal subsegmental pulmonary arteries is limited  Large, heterogeneous airspace consolidation in the right lower lobe, with a 6 3 x 5 2 x 4 8 cm somewhat lobulated hypodense component with a small droplet of air  This likely represents a necrotizing aspiration pneumonia, given mucous plugging    throughout the right lower lobe and retained secretions/debris in the right mainstem bronchus and bronchus intermedius  Recommend follow-up chest CT in approximately 3 months to ensure resolution as an underlying neoplasm may have a similar appearance  Nonspecific 7 x 6 mm lobulated left lower lobe nodule  Attention on follow-up  Moderate right pleural effusion  No enhancement of the pleura to suggest empyema  Mediastinal and right hilar lymphadenopathy, likely reactive, however metastatic disease is not entirely excluded  Mild stranding around the proximal pancreas suggestive of acute interstitial pancreatitis  Recommend correlation with lipase level  No organized peripancreatic fluid collections, loss of parenchymal enhancement to suggest necrosis, or vascular    complications of pancreatitis  No other acute abdominal or pelvic pathology  Multiple additional findings as above  The study was marked in [facility] for immediate notification  Workstation performed: [id number]         CT head wo contrast   Final Result by Jodee Kim MD (11/15 1803)      No acute intracranial abnormality  Chronic microangiopathic changes                    Workstation performed: [id number]               Procedures  Procedures      ED Course  ED Course as of 11/15/22 1911   Tue Nov 15, 2022   1437 ECG 12 lead  Procedure Note: EKG  Date/Time: 11/15/22 2:39 PM   Interpreted by: Stephen Membreno MD  Indications / Diagnosis: CP  ECG reviewed by me, the ED Physician: yes   The EKG demonstrates:  Rhythm: sinus tachycardia with PACs  Intervals: normal intervals  Axis: normal axis  QRS/Blocks: normal QRS  ST Changes: No acute ST Changes, no STD/SABRINA    1454 WBC(!): 15 58  Elevated  1454 Hemoglobin(!): 11 1  Stable from prior  1454 Leukocytes, UA: Negative   1454 Nitrite, UA: Negative   1454 Blood, UA: Negative   1504 hs TnI 0hr: 29  Will require delta  1518 Lipase(!): 2,028  Elevated >3x upper limit of normal     1523 FLU/RSV/COVID - if FLU/RSV clinically relevant  Negative  1535 Calcium(!!): 12 8  Will initiate fluids  1535 Creatinine(!): 1 47  Elevated from baseline but not meeting criteria for JORDAN  Fluids in process  [facility] T[facility] 3RD GENERATON: 2 110  WNL   1640 Pt requesting something for pain  Reportedly took Tylenol at home PTA  Will avoid NSAIDs due to GFR  Will give small dose fentanyl and reassess  Nursing made aware to monitor pulse ox     1728 Delta 2hr hsTnI: -1   1741 Pt desatting slightly on RA following fentanyl  Will place on trach oxygen  1806 CT head wo contrast  No acute intracranial abnormality  1842 PE Study with CT Abdomen and Pelvis with contrast  Necrotizing aspiration PNA-> Will initiate abx as well as remainder of sepsis labs; R pleural effusion; Stranding around pancreas consistent with pancreatitis which correlates with elevated lipase; Lymphadenopathy   1852 Pt and daughter updated on all results and plan for admission  Pt reluctant but agreeable  1854 SOD contacted for admission  Initial Sepsis Screening     Row Name 11/15/22 1855                Is the patient's history suggestive of a new or worsening infection? Yes (Proceed)  -KW        Suspected source of infection pneumonia  -KW        Are two or more of the following signs & symptoms of infection both present and new to the patient? Yes (Proceed)  -KW        Indicate SIRS criteria Leukocytosis (WBC > 26572 IJL); Tachycardia > 90 bpm  -KW        If the answer is yes to both questions, suspicion of sepsis is present --        If severe sepsis is present AND tissue hypoperfusion perists in the hour after fluid resuscitation or lactate > 4, the patient meets criteria for SEPTIC SHOCK --        Are any of the following organ dysfunction criteria present within 6 hours of suspected infection and SIRS criteria that are NOT considered to be chronic conditions? No  -KW        Organ dysfunction --        Date of presentation of severe sepsis --        Time of presentation of severe sepsis --        Tissue hypoperfusion persists in the hour after crystalloid fluid administration, evidenced, by either: --        Was hypotension present within one hour of the conclusion of crystalloid fluid administration? --        Date of presentation of septic shock --        Time of presentation of septic shock --              User Key  (r) = Recorded By, (t) = Taken By, (c) = Cosigned By    [address] Name Provider Doreen Reese MD Resident                          MDM  Number of Diagnoses or Management Options  Abdominal pain  Generalized weakness  Hypercalcemia  Hypoxemia requiring supplemental oxygen  Necrotizing pneumonia ([address] )  Pancreatitis  Pleural effusion on right  Sepsis [facility])  Diagnosis management comments: Pt is a 80yo F who presents with generalized weakness  Exam pertinent for tachycardia  Plan for basic labs as well as thyroid function, lipase, and UA  Will plan for CT head due to intermittent confusion as well as imaging of chest abdomen pelvis  Patient tachycardic and borderline hypoxic, therefore will get CTA PE  Patient inevitably found to have necrotizing pneumonia, pleural effusion, hypercalcemia, and pancreatitis  See ED course for details  Plan to admit pt to SOD  Pt discussed with admitting team and admission orders placed  Pt admitted without incident              Amount and/or Complexity of Data Reviewed  Clinical lab tests: ordered and reviewed  Tests in the radiology section of CPT®: ordered and reviewed        Disposition  Final diagnoses:   Hypercalcemia   Pleural effusion on right   Generalized weakness   Abdominal pain   Hypoxemia requiring supplemental oxygen   Pancreatitis   Necrotizing pneumonia ([facility] [address] )   Sepsis ([facility] [address] )     Time reflects when diagnosis was documented in both MDM as applicable and the Disposition within this note     Time User Action Codes Description Comment    11/15/2022  6:16 PM Foster Makua [E83 52] Hypercalcemia     11/15/2022  6:16 PM Foster Makua [J90] Pleural effusion on right     11/15/2022  6:16 PM Marea Bevel Add [R53 1] Generalized weakness     11/15/2022  6:16 PM Foster Makua [R10 9] Abdominal pain     11/15/2022  6:17 PM Foster Makua [R09 02,  Z99 81] Hypoxemia requiring supplemental oxygen     11/15/2022  6:55 PM Foster Makua [K85 90] Pancreatitis     11/15/2022  6:55 PM Marea Bevel Add [J85 0] Necrotizing pneumonia ([facility] [address] )     11/15/2022  6:55 PM Marea Bevel Modify [E83 52] Hypercalcemia     11/15/2022  6:55 PM Marea Bevel Modify [J85 0] Necrotizing pneumonia ([facility] [address] )     11/15/2022  6:55 PM Marea Bevel Add [A41 9] Sepsis [facility])       ED Disposition     ED Disposition   Admit    Condition   Stable    Date/Time   Tue Nov 15, 2022  7:07 PM    Comment   Case was discussed with SOD and the patient's admission status was agreed to be Admission Status: inpatient status to the service of Dr Brooklynn Cheng  Follow-up Information    None         Patient's Medications   Discharge Prescriptions    No medications on file     No discharge procedures on file  PDMP Review       Value Time User    PDMP Reviewed  Yes 7/23/2020 12:16 PM Alexa Hernandez PA-C           ED Provider  Attending physically available and evaluated Anibal Milelr I managed the patient along with the ED Attending      Electronically Signed by         Elvira Mack MD  11/15/22 1911

## 2022-11-16 PROBLEM — E03.9 HYPOTHYROIDISM: Status: ACTIVE | Noted: 2022-01-01

## 2022-11-16 NOTE — ASSESSMENT & PLAN NOTE
· Known history since 11/2021  · Most recent TSH 4 2 in 08/2022  · Managed on levothyroxine 100 mcg  · Repeat TSH within normal limits    Plan  Stable  · Continue home dose levothyroxine

## 2022-11-16 NOTE — ASSESSMENT & PLAN NOTE
Creatinine of 1 47 on admission treated with IV fluid boluses, 3L total, NSS at 100ml/hr, renal function assessment and lab monitoring      Pt has CKD 3 and noted baseline of 1-1 1    Plan:  · Cr 1 29 today, continue to trend  · Avoid nephrotoxic agents  · Monitor urine output - approx 500 cc 24 hours with IV lasix 20 mg x1 yesterday  · Encourage adequate intake

## 2022-11-16 NOTE — ASSESSMENT & PLAN NOTE
· Known history, in setting of CKD stage IIIB  · Follows nephrology outpatient (Dr Heavenly Sweet)  · Hgb 11 1 on admission, at baseline     · Currently hemodynamically stable    Plan:  · Trend CBC, hb 9 2   · No active bleeding present   · Transfuse if Hb < 7

## 2022-11-16 NOTE — ASSESSMENT & PLAN NOTE
· Known history, maintained on losartan 50 mg daily at home  · Follows PCP outpatient      Plan:  · BP on the low side with systolic high 23T to 81C on admission, systolics continued to be in the 90s to 100s  · Will hold home dose losartan at this time  · Continue monitoring blood pressures

## 2022-11-16 NOTE — RESPIRATORY THERAPY NOTE
RT Protocol Note  Brigida Kitchen 81 y o  female MRN: [id number]  Unit/Bed#: [facility] 834-01 Encounter: [id number]    Assessment    Principal Problem:    Sepsis with acute hypoxic respiratory failure ([place] [address] )  Active Problems:    Asymptomatic carotid artery stenosis    Hyperlipidemia    Hypertension    Laryngeal carcinoma (HCC)    CKD (chronic kidney disease) stage 3, GFR 30-59 ml/min    Hypercalcemia    Necrotizing pneumonia (HCC)    Pancreatitis    Lung nodule seen on imaging study      Home Pulmonary Medications:  na       Past Medical History:   Diagnosis Date    Aortoiliac occlusive disease ([place] [address] )     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 (HCC)     Carotid artery stenosis     Hyperlipidemia     Hypertension     PVD (peripheral vascular disease) ([facility])     Throat cancer ([place] [address] )      Social History     Socioeconomic History    Marital status:       Spouse name: None    Number of children: None    Years of education: None    Highest education level: None   Occupational History    None   Tobacco Use    Smoking status: Former Smoker     Quit date:      Years since quittin 8    Smokeless tobacco: Never Used   Substance and Sexual Activity    Alcohol use: Never    Drug use: Never    Sexual activity: None   Other Topics Concern    None   Social History Narrative    None     Social Determinants of Health     Financial Resource Strain: Not on file   Food Insecurity: Not on file   Transportation Needs: Not on file   Physical Activity: Not on file   Stress: Not on file   Social Connections: Not on file   Intimate Partner Violence: Not on file   Housing Stability: Not on file       Subjective         Objective    Physical Exam:   Assessment Type: Assess only  General Appearance: Awake, Alert  Respiratory Pattern: Dyspnea with exertion  Chest Assessment: Chest expansion symmetrical  Bilateral Breath Sounds: Diminished, Clear  Cough: None  O2 Device: 28% Trach collar    Vitals:  Blood pressure 145/81, pulse (!) 121, temperature 97 9 °F (36 6 °C), resp  rate 16, SpO2 97 %, not currently breastfeeding  Imaging and other studies: I have personally reviewed pertinent reports  O2 Device: 28% Trach collar     Plan    Respiratory Plan: Home Bronchodilator Patient pathway        Resp Comments: (P) Pt  evaluated at bedside per Respiratory protocol  Pt  found with 28% Trach collar on for Stoma with speaking valve that she's had for appx  20 years  Pt's breath sounds are diminished but clear at this time and she is in no distress  Pt  denies taking bronchodilators at home and says she just needs to suction secretions at home  Will continue Albuterol udns prn for SOB/Wheezing per Respiratory protocol

## 2022-11-16 NOTE — CONSULTS
Consultation - Pulmonary Medicine   Harshal Gomez 81 y o  female MRN: [id number]  Unit/Bed#: [facility] 834-01 Encounter: [id number]      Physician Requesting Consult:  Leopold Gilding, DO  Reason for Consult:  Pleural effusion      Assessment:  1  Abnormal chest CT scan with right lower lobe consolidation, concerning for pneumonia, this is community-acquired pneumonia, patient cannot have aspiration pneumonia from mouth secretions because there is no anatomical connection after her complete laryngectomy  Also to consider underlying malignancy given her remote smoking history but less likely  2  Right-sided pleural effusion, most likely parapneumonic  3  Left lower lobe nodule that needs follow-up in the future  4  Emphysema with possible underlying COPD but no evidence of exacerbation at this time  5  Status post total laryngectomy with tracheostomy/provox voice box    Plan:   · Follow cultures and send tracheal aspirate culture  · Continue antibiotics but I recommend to switch to ceftriaxone and discontinue Unasyn and vancomycin  · Consult IR for thoracentesis and may need chest tube if this complicated or empyema  · Repeat CT scan in 6-8 weeks to confirm resolution and in the future will decide further follow-up of the left lower lobe lung nodule  · Continue bronchodilators nebulizer therapy via tracheostomy to help mobilizing secretions and continue pulmonary toilet  · Encouraged out of bed and ambulation  · Discussed with Internal Medicine attending and Team by bedside    ______________________________________________________________________    HPI:    Harshal Gomez is a 81 y o  female who presents with vague symptoms including some abdominal pain and shortness of breath and recent weight loss  She also reports some back pain and headache  She denied sick contact  She felt that her secretions has increased slightly recently but not significant  She had mild shortness of breath as well      Patient has extensive medical history/conditions including:  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 with Provox valve  Peripheral arterial disease with claudication, valvular heart disease, diastolic CHF, CKD stage 3, bilateral carotid stenosis secondary to radiation and status post vascular procedures  At baseline patient lives alone in an independent living setting, she takes care of herself including her tracheostomy and she suctions twice daily  Usually she has mild secretions  Review of Systems:  12 points Full review of systems was performed  Aside from what was mentioned in the HPI, it is otherwise negative      Historical Information   Past Medical History:   Diagnosis Date   • Aortoiliac occlusive disease ([facility] [address] )    • 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 ([facility] [address] )    • Carotid artery stenosis    • Hyperlipidemia    • Hypertension    • [facility] vascular [facility]    • Throat cancer [facility])      Past Surgical History:   Procedure Laterality Date   • GALLBLADDER SURGERY  2019   • ILIAC ARTERY STENT Left    • IR CEREBRAL ANGIOGRAPHY / INTERVENTION  2020   • LARYNX SURGERY     • LEG SURGERY  07/10/2012     Social History   Social History     Substance and Sexual Activity   Alcohol Use Never     Social History     Tobacco Use   Smoking Status Former   • Types: Cigarettes   • Quit date:    • Years since quittin 8   Smokeless Tobacco Never       Occupational history:  No occupational exposure    Family History:   Family History   Problem Relation Age of Onset   • Heart disease Brother    • No Known Problems Mother    • No Known Problems Father    • Breast cancer Sister 71   • No Known Problems Daughter    • No Known Problems Maternal Grandmother    • No Known Problems Maternal Grandfather    • No Known Problems Paternal Grandmother    • No Known Problems Paternal Grandfather    • No Known Problems Sister    • No Known Problems Maternal Aunt    • No Known Problems Maternal Aunt    • No Known Problems Maternal Aunt        Medications: The patient's active and prehospital medications were reviewed    Current Facility-Administered Medications   Medication Dose Route Frequency Provider Last Rate   • acetaminophen  650 mg Oral Q6H PRN Sánchez Chou DO     • albuterol  2 5 mg Nebulization Q4H PRN Linda Mendez MD     • ampicillin-sulbactam  3 g Intravenous Q12H Sánchez Chou DO 3 g (11/16/22 0810)   • atorvastatin  40 mg Oral Daily With Dinner Sánchez Chou DO     • clopidogrel  75 mg Oral Daily Sánchez Chou DO     • heparin (porcine)  5,000 Units Subcutaneous [facility] Sánchez Chou DO     • levothyroxine  100 mcg Oral Early Morning Sánchez Chou DO     • loratadine  5 mg Oral Daily Sánchez Chou DO     • sertraline  25 mg Oral Daily Sánchez Chou DO     • sodium chloride  100 mL/hr Intravenous Continuous Amauri Hollins  mL/hr (11/16/22 3672)   • vancomycin  12 5 mg/kg (Adjusted) Intravenous Q24H Sánchez Chou DO           PhysicalExamination:  Vitals:   Vitals:    11/16/22 0214 11/16/22 0710 11/16/22 0711 11/16/22 0933   BP: 117/67 114/77 114/77    BP Location:       Pulse: (!) 125 (!) 125 (!) 124    Resp: 20 16 16    Temp: 97 9 °F (36 6 °C) 98 2 °F (36 8 °C) 98 2 °F (36 8 °C)    TempSrc:       SpO2: 98% 95% 96% 97%   Height:  4' 8" (1 422 m)       Temp  Min: 97 9 °F (36 6 °C)  Max: 98 7 °F (37 1 °C)       SpO2: 97 %,   SpO2 Activity: At Rest,   O2 Device: Trach mask    General: alert, not in acute distress  HEENT: PERRL, no icteric sclera or cyanosis, no thrush  Neck:  Supple, no lymphadenopathy or thyromegaly, no JVD, tracheostomy with Provox voice box in place  Lungs:  Decreased breath sounds over the right lung, no wheezing, no crackles  Heart: S1S2 regular, 2/6 systolic murmur  Abdomen: soft, nontender, bowel sounds  present  Extremities: no edema, no clubbing or cyanosis  Neuro: Alert and oriented x 3, no focal neurodeficits   Skin: intact, no rashes    Diagnostic Data:  CBC:   Results from last 7 days   Lab Units 11/16/22  0632 11/15/22  1427   WBC Thousand/uL 12 57* 15 58*   HEMOGLOBIN g/dL 10 3* 11 1*   HEMATOCRIT % 32 2* 36 2   PLATELETS Thousands/uL 388 383       CMP:   Results from last 7 days   Lab Units 11/16/22  0632 11/16/22  0235 11/15/22  1427   POTASSIUM mmol/L 3 8 3 8 3 9   CHLORIDE mmol/L 113* 111* 107   CO2 mmol/L 20* 23 18*   BUN mg/dL 24 25 34*   CREATININE mg/dL 0 93 1 01 1 47*   CALCIUM mg/dL 10 9* 11 2* 12 8*   ALK PHOS U/L 137*  --  167*   ALT U/L 18  --  19   AST U/L 19  --  19     PT/INR:   Lab Results   Component Value Date    INR 1 01 11/15/2022   ,     Microbiology:  Results from last 7 days   Lab Units 11/15/22  2003 11/15/22  1913   BLOOD CULTURE   --  Received in Microbiology Lab  Culture in Progress  Received in Microbiology Lab  Culture in Progress  LEGIONELLA URINARY ANTIGEN  Negative  --         ABG: No results found for: PHART, ZKB4TYH, PO2ART, CFP6XOQ, R1LYEPOA, BEART, SOURCE    Imaging:  Chest CT scan reviewed on PACs:  No PE, right lower lobe large consolidation appears heterogeneous and lobulated, possible necrosis, moderate to large pleural effusion, left lower lobe nodule, mediastinal lymphadenopathy could be reactive    Chest CT scan from 2019 reviewed on PACs:  Emphysematous changes, no other significant abnormality    Abdominal CT scan from 2021 reviewed on PACs:  Clear lung parenchyma at the bases bilaterally with very small pleural effusions bilaterally  Cardiac lab/EKG/telemetry/ECHO:             Echocardiogram:  LVEF 81%, grade 1 diastolic dysfunction, normal RV, mild MR/MS with moderate diffuse thickening of mitral valve leaflets, moderate aortic stenosis     Code Status: Level 3 - DNAR and DNI    Jj Magdaleno MD    Portions of the record may have been created with voice recognition software   Occasional wrong word or "sound a like" substitutions may have occurred due to the inherent limitations of voice recognition software  Read the chart carefully and recognize, using context, where substitutions have occurred

## 2022-11-16 NOTE — ASSESSMENT & PLAN NOTE
· Likely secondary to hypercalcemia  · Presented with abdominal pain; labs notable for elevated lipase >2000, elevated WBC  · CT imaging suggestive pancreatitis  · Patient has elevated alk-phos with normal T bili and asymptomatic abnormal biliary strictures on imaging  · Patient reports improved appetite and improved abdominal pain    Plan:  · Supportive care with analgesics or antiemetics as needed; renal function at baseline, ECG with normal QTC  · Continue diet as tolerated   · Continued monitoring of vitals, O2 sats, urine output with goal > 0 5-1 mL/kg/hr  · Replete electrolytes as indicated    Suspected as having resolved, limiting fluid resuscitation at this time due to volume overload  Unclear source of hypercalcemia that triggered pancreatitis, hematology on board for hypercalcemia control   Ca slowly downtrending, last 11 9 today

## 2022-11-16 NOTE — PLAN OF CARE
Problem: PHYSICAL THERAPY ADULT  Goal: Performs mobility at highest level of function for planned discharge setting  See evaluation for individualized goals  Description: Treatment/Interventions: ADL retraining, Functional transfer training, LE strengthening/ROM, Therapeutic exercise, Endurance training, Patient/family training, Equipment eval/education, Bed mobility, Gait training, Spoke to nursing, Spoke to case management, Spoke to advanced practitioner, OT  Equipment Recommended: Mario Collins       See flowsheet documentation for full assessment, interventions and recommendations  Note: Prognosis: Good  Problem List: Decreased strength, Decreased endurance, Impaired balance, Decreased mobility, Decreased skin integrity  Pt is 81 y o  female seen for PT evaluation s/p admit [facility] on 11/15/2022  Pt presenting w/ fatigue; fevers; tachycardia; and weight loss CTA = Large, heterogeneous airspace consolidation in the right lower lobe, with a 6 3 x 5 2 x 4 8 cm somewhat lobulated hypodense component with a small droplet of air  This likely represents a necrotizing aspiration pneumonia   Current dx/ problem list includes: sepsis w/ acute hypoxic respiratory failure; r/o necrotizing pneumonia; R pleural effusion  Pt   has a past medical history of Aortoiliac occlusive disease ([facility] [address] [facility] 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 ([facility] [address] ), Carotid artery stenosis, Hyperlipidemia, Hypertension, PVD (peripheral vascular disease) ([facility] [address] ), and Throat cancer ([facility] [address] )   pt has hx of trach w/ speaking valve    Due to acute medical issues, ongoing medical workup for primary dx; pain, fall risk, increased reliance on more restrictive AD compared to baseline;  decreased activity tolerance compared to baseline, increased assistance needed at current time, new O2 needs; tachycardia continuous telemetry monitoring, multiple lines, decline in overall functional mobility status; health management issues; note unstable clinical picture (high complexity)   Prior to admission, pt reports being 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; lives in [facility]; (+) drives; cooks; has cleaning lady 2x week; I w/ ADL:s IADL's denies falls and completes all trach care for 20+ years    Currently pt  is requiring S A for bed skills; CGA for functional transfers and CGA for ambulation w/ RW on 5Lo2 (28% venturi + trach collar) pt's resting HR was 128 w/ HR max w/ mobility 146; pt denies dizziness or symptoms w/ same  Pt seated in chair and instructed in basis HEP (see below) post session  Pt presents functioning below baseline and currently w/ overall mobility deficits 2* to: decreased LE strength/AROM; limited flexibility;  generalized weakness/ deconditioning; decreased endurance; decreased activity tolerance;  impaired balance; gait deviations; d SOB/MINER; fatigue;  limited insight into current deficits  multiple lines; O2 tele;(Please find additional objective findings from PT assessment regarding body systems outlined above ) Pt will continue to benefit from skilled PT interventions to address stated impairments; to maximize functional potential; for ongoing pt/ family training; and DME needs  Anticipate that with continued progress pt to d/c home with family support and Maile Nur ongoing use of rollator  when medically cleared  Stephon Starr Pt/ family agreeable to plan and goals as stated on evaluation  Barriers to Discharge Comments: lives alone- in [address]  PT Discharge Recommendation: Home with home health rehabilitation    See flowsheet documentation for full assessment

## 2022-11-16 NOTE — PROGRESS NOTES
Brigida Kitchen is a 81 y o  female who is currently ordered Vancomycin IV with management by the Pharmacy Consult service  Relevant clinical data and objective / subjective history reviewed  Vancomycin Assessment:  Indication and Goal AUC/Trough: Pneumonia (goal -600, trough >10), -600, trough >10  Clinical Status: stable  Micro:   Ordered  Renal Function:  SCr: 1 5 mg/dL  CrCl: 23 1 mL/min  Renal replacement: Not on dialysis  Days of Therapy: 1  Current Dose: 750 mg Q 12H  Vancomycin Plan:  New Dosin mg Load then 750mg Q 12H  Estimated AUC: 540 mg/L hr  Estimated Trough: 18 2 mcg/mL  Next Level:  @ 22:00  Renal Function Monitoring: Daily BMP and UOP  Pharmacy will continue to follow closely for s/sx of nephrotoxicity, infusion reactions and appropriateness of therapy  BMP and CBC will be ordered per protocol  We will continue to follow the patient’s culture results and clinical progress daily      Surjit Calderón, Pharmacist

## 2022-11-16 NOTE — ASSESSMENT & PLAN NOTE
· In setting of CKD stage 3B, excessive use of vitamin-D and calcium supplementation at home per patient  · Further evidenced by elevated corrected calcium 14 2; supported by initial presentation with abdominal pain, fatigue, decreased appetite/poor PO intake; mentation at baseline, nontoxic on exam  · ECG without arrhythmia  · Further ED work up notable for multiple derangements including Hgb 11 1, low bicarb 18, elevated BUN 34, elevated creatinine 1 47 (baseline 1 11 4), GFR 32, , hypoalbuminemia 2 1; elevated lipase 2028, uric acid 10 4  · CT imaging findings concerning for acute pancreatitis,  necrotizing pneumonia vs  malignancy  · Etiology multifactorial, possibly due to hypercalcemia malignancy in light of CT findings, further complicated by home vitamin-D supplementation; med rec without any other causative agents  Bone demineralization also possible given her age  · Per chart review, patient noted to have elevated corrected calcium level since 08/2022 however does not appear to have been further worked up by PCP  · Unlikely primary parathyroidism or tertiary cause given PTH in 09/2022 was low; baseline renal function with CKD III  Vitamin-D 25 hydroxy level within normal limits  Calcium continues to be elevated   Status post 3 mg zoledronic acid     SPEP with faint gammopathy, PTH very low     Plan:  · Pending UPEP levels pending to r/o multiple myeloma  · PTHrP to assess hypercalcemia of malignancy, pending   · Vitamin D levels within normal limits: Vit D 25-OH 77 2, Vit D 1,25 OH 49 4  · PTH 6 3, compensating appropriately for elevated Ca level   · IV fluids stopped given patient is developing fluid overload with aggressive fluid resuscitation  Will monitor urine output closely  · Trend BMP to assess response    Calcium slowly downtrending 11 9 today from 12 7

## 2022-11-16 NOTE — SEDATION DOCUMENTATION
Right side thoracentesis performed by CHANEL Lovell  900 ml of clear yellow fluid drained  Ultrasound guided  Labs were sent  Pt tolerated well  Puncture site is CDI and covered with a band aid  Report called to nurse

## 2022-11-16 NOTE — OCCUPATIONAL THERAPY NOTE
Occupational Therapy Evaluation     Patient Name: Edson PHILLIP Date: 11/16/2022  Problem List  Principal Problem:    Sepsis with acute hypoxic respiratory failure ([facility] )  Active Problems:    Asymptomatic carotid artery stenosis    Hyperlipidemia    Hypertension    Laryngeal carcinoma (HCC)    CKD (chronic kidney disease) stage 3, GFR 30-59 ml/min    Hypercalcemia    Right lower lobe lung mass with right pleural effusion    Pancreatitis    Lung nodule seen on imaging study    Pleural effusion, right    Anemia of chronic disease    Hypothyroidism    Past Medical History  Past Medical History:   Diagnosis Date   • Aortoiliac occlusive disease ([facility] )    • 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 (HCC)    • Carotid artery stenosis    • Hyperlipidemia    • Hypertension    • PVD (peripheral vascular disease) ([facility] )    • Throat cancer [facility])      Past Surgical History  Past Surgical History:   Procedure Laterality Date   • GALLBLADDER SURGERY  03/30/2019   • ILIAC ARTERY STENT Left    • IR CEREBRAL ANGIOGRAPHY / INTERVENTION  7/22/2020   • LARYNX SURGERY  2002   • LEG SURGERY  07/10/2012           11/16/22 1056   OT Last Visit   OT Visit Date 11/16/22   Note Type   Note type Evaluation   Pain Assessment   Pain Assessment Tool 0-10   Pain Score No Pain   Restrictions/Precautions   Weight Bearing Precautions Per Order No   Other Precautions O2;Fall Risk;Pain;Multiple lines;Telemetry  (Trach collar- 5L O2, speaking valve)   [facility]   Home Layout One level   Bathroom Shower/Tub Tub/shower unit  (Tub with cut out door)   Bathroom Toilet Standard   Bathroom Equipment Grab bars around toilet   Home Equipment Walker   Additional Comments Pt resides at [facility] Function   Level of [place] Independent with ADLs; Independent with functional mobility; Needs assistance with IADLS   Lives With Alone   Receives Help From Family;Friend(s)   IADLs Independent with driving; Independent with meal prep; Independent with medication management  (Transportation also provided by facility)   Falls in the last 6 months 0   Vocational Retired   Comments Pt reports being I with ADLs and completes functional mobility with RW  Pt reports having support for cleaning  Pt reports being a  +  Lifestyle   Autonomy I with ADLs and completes functional mobility with RW   Reciprocal Relationships Support of facility   Service to Others Retired   [address] Enjoys going on the facility bus to activities within the community   ADL   Where Assessed Edge of bed   Eating Assistance 7  Independent   Grooming Assistance 5  [address] [facility] [address]  Supervision/Setup    [address] [address] Deficit Setup;Don/doff L sock; Don/doff R sock  (Attempted B/L shoes, however, unable to 2* to B/L feet swelling)   Toileting Assistance  4  Minimal Assistance   Functional Assistance 4  Minimal Assistance   Functional Deficit Increased time to complete;Supervision/safety;Setup   Bed Mobility   Supine to Sit 5  Supervision   Additional items Bedrails   Sit to Supine Unable to assess   Additional Comments Pt greeted supine in bed  Transfers   Sit to Stand 4  Minimal assistance   Additional items Assist x 1; Increased time required;Verbal cues   Stand to Sit 4  Minimal assistance   Additional items Assist x 1; Increased time required;Verbal cues   Additional Comments with RW   Functional Mobility   Functional Mobility 4  Minimal assistance   Additional Comments Min Ax1 with functional mobility- short household distances with SBAx1 for line management  Pt tachycardic  O2 sats stable on 5L O2     Additional items Rolling walker   Balance   Static Sitting Fair +   Dynamic Sitting Fair   Static Standing Fair -   Dynamic Standing Poor +   Ambulatory Poor +   Activity Tolerance   Activity Tolerance Treatment limited secondary to medical complications (Comment)  (Pt with tachycardic- -140s per uyen)   Medical Staff Made Aware Co-eval with PT 2* to Pt's medical complexity and decreased endurance  Nurse Made Aware RN cleared/updated  RUE Assessment   RUE Assessment WFL   LUE Assessment   LUE Assessment WFL   Hand Function   Gross Motor Coordination Functional   Fine Motor Coordination Functional   Sensation   Light Touch No apparent deficits   Psychosocial   Psychosocial (WDL) WDL   Cognition   Overall Cognitive Status WFL   Arousal/Participation Alert; Cooperative   Attention Attends with cues to redirect   Orientation Level Oriented X4   Memory Decreased recall of precautions   Following Commands Follows one step commands with increased time or repetition   Comments Pt pleasant and cooperative during OT session  Assessment   Limitation Decreased ADL status; Decreased Safe judgement during ADL;Decreased UE ROM; Decreased UE strength;Decreased endurance;Decreased cognition;Decreased self-care trans;Decreased high-level ADLs   Prognosis Fair   Assessment Pt is a 80 yo Female who presented to \[facility]\"" on 11/15/2022 with generalized weakness and pain in back/stomach  Pt with diagnosis of sepsis with acute hypoxic respiratory failure, R lower lobe mass, and L pleural effusion  Pt  has a past medical history of Aortoiliac occlusive disease ([facility] ), 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 ([facility] [address] ), Carotid artery stenosis, Hyperlipidemia, Hypertension, PVD (peripheral vascular disease) ([place] [address] ), and Throat cancer ([facility] )  Pt greeted bedside for OT evaluation on 11/16/2022  Pt resides at [place] [address]  Pt reports being I with ADLs and completes functional mobility with RW  Pt reports having support for cleaning  Pt reports being a  +   Pt demonstrating the following occupational deficits: S with UB ADLs, min A with LB ADLs, S with bed mobility, min A with functional transfers, and min A with functional mobility with RW  Limitations that impact functional performance include decreased ADL status, decreased UE ROM, decreased UE strength, decreased safe judgement during ADLs, decreased cognition, decreased endurance, decreased self care transfers, decreased high level ADLs and pain  Occupational performance areas to address ADL retraining, functional transfer training, UE strengthening/ROM, endurance training, cognitive reorientation, Pt/caregiver education, equipment evaluation/education, compensatory technique education, energy conservation and activity engagement   Pt would benefit from continued skilled OT services while in hospital to maximize independence with ADLs  Will continue to follow Pt's progress  Pt would benefit from post acute rehabilitation services upon DC to maximize safety and independence with ADLs and functional tasks of choice  Goals   Patient Goals To walk  LTG Time Frame 10-14   Long Term Goal #1 See goals listed below  Plan   Treatment Interventions ADL retraining;Functional transfer training;UE strengthening/ROM; Cognitive reorientation; Endurance training;Patient/family training;Equipment evaluation/education; Compensatory technique education; Activityengagement; Energy conservation   Goal Expiration Date 11/30/22   OT Frequency Other (comment)  (2-4x/wk)   Recommendation   OT Discharge Recommendation Home with home health rehabilitation   Additional Comments  The patient's raw score on the -PAC Daily Activity inpatient short form is 19, standardized score is 40 22, greater than 39 4  Patients at this level are likely to benefit from discharge to home  Please refer to the recommendation of the Occupational Therapist for safe discharge planning     -PAC Daily Activity Inpatient   Lower Body Dressing 3   Bathing 3   Toileting 3   Upper Body Dressing 3   Grooming 3   Eating 4   Daily Activity Raw Score 19   Daily Activity Standardized Score (Calc for Raw Score >=11) 40 22   AM-PAC Applied Cognition Inpatient   Following a Speech/Presentation 3   Understanding Ordinary Conversation 4   Taking Medications 4   Remembering Where Things Are Placed or Put Away 4   Remembering List of 4-5 Errands 4   Taking Care of Complicated Tasks 3   Applied Cognition Raw Score 22   Applied Cognition Standardized Score 47 83   End of Consult   Education Provided Yes   Patient Position at End of Consult Bedside chair; All needs within reach   Nurse Communication Nurse aware of consult       Goals:  1  Pt will complete UB ADLs with I in order to maximize participation with ADLs  2  Pt will complete LB ADLs with I in order to maximize safety with ADLs  3  Pt will complete toileting routine (transfer, hygiene, and clothing management) with I in order to return to prior level of function  4  Pt will complete bed mobility with I in order to maximize participation with ADLs  5  Pt will complete functional transfers at I level in order to increase participation with ADLs  6  Pt will increase dynamic standing balance to F+ in order to increase safety with ADLs  7  Pt will increase standing tolerance x10 min in order to increase participation with ADLs  8  Pt will complete functional mobility with AD PRN for item retrieval task at Toni level in order to increase participation with ADLs  9  Pt will complete IADL tasks/simulation of IADLs tasks with I in order to return to PLOF  10  Pt will demonstrate G energy conservation techniques with ADLs/IADLs in order to reduce the risk of falls  11  Pt will be attentive 100% of the time for ongoing functional/formal cognitive assessment to assist with safe dc planning prn        Светлана Alaniz, MS, OTR/L

## 2022-11-16 NOTE — PLAN OF CARE
Problem: PAIN - ADULT  Goal: Verbalizes/displays adequate comfort level or baseline comfort level  Description: Interventions:  - Encourage patient to monitor pain and request assistance  - Assess pain using appropriate pain scale  - Administer analgesics based on type and severity of pain and evaluate response  - Implement non-pharmacological measures as appropriate and evaluate response  - Consider cultural and social influences on pain and pain management  - Notify physician/advanced practitioner if interventions unsuccessful or patient reports new pain  Outcome: Progressing     Problem: INFECTION - ADULT  Goal: Absence or prevention of progression during hospitalization  Description: INTERVENTIONS:  - Assess and monitor for signs and symptoms of infection  - Monitor lab/diagnostic results  - Monitor all insertion sites, i e  indwelling lines, tubes, and drains  - Monitor endotracheal if appropriate and nasal secretions for changes in amount and color  - [place] appropriate cooling/warming therapies per order  - Administer medications as ordered  - Instruct and encourage patient and family to use good hand hygiene technique  - Identify and instruct in appropriate isolation precautions for identified infection/condition  Outcome: Progressing  Goal: Absence of fever/infection during neutropenic period  Description: INTERVENTIONS:  - Monitor WBC    Outcome: Progressing     Problem: Knowledge Deficit  Goal: Patient/family/caregiver demonstrates understanding of disease process, treatment plan, medications, and discharge instructions  Description: Complete learning assessment and assess knowledge base    Interventions:  - Provide teaching at level of understanding  - Provide teaching via preferred learning methods  Outcome: Progressing

## 2022-11-16 NOTE — ASSESSMENT & PLAN NOTE
Lab Results   Component Value Date    EGFR 59 11/17/2022    EGFR 54 11/16/2022    EGFR 55 11/16/2022    CREATININE 0 88 11/17/2022    CREATININE 0 95 11/16/2022    CREATININE 0 93 11/16/2022     · Known history of CKD stage III states 2014  · Follows [facility] nephrology (Dr Philippe Roche)  · Cr on admission slightly elevated 1 47(baseline 1 1-1 4)  · Etiology likely 2/2 long history of hypertension, nephrosclerosis, age-related nephron loss     Plan  · One time IV Lasix 20 mg - 450 ccs u/o   · Consider another one time lasix dose to reassess if patient's blood pressure improves  · Cr 1 29 today, monitor closely with BMP   · Avoid use of nephrotoxic agents

## 2022-11-16 NOTE — PHYSICAL THERAPY NOTE
PHYSICAL THERAPY EVALUATION  NAME:  Raysa Sanders  DATE: 11/16/22    AGE:   81 y o  Mrn:   [id number]  ADMIT DX:  Hypercalcemia [E83 52]  Necrotizing [facility] ([facility] ) [J85 0]  Pancreatitis [K85 90]  Abdominal pain [R10 9]  Hypoxemia requiring supplemental oxygen [R09 02, Z99 81]  Pleural effusion on right [J90]  Generalized weakness [R53 1]  Sepsis ([facility] ) [A41 9]    Past Medical History:   Diagnosis Date    Aortoiliac occlusive disease ([place] [address] )     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 ([place] [address] )     Carotid artery stenosis     Hyperlipidemia     Hypertension     PVD (peripheral vascular disease) ([place] [address] )     Throat cancer [facility])      Past Surgical History:   Procedure Laterality Date    GALLBLADDER SURGERY  03/30/2019    ILIAC ARTERY STENT Left     IR CEREBRAL ANGIOGRAPHY / INTERVENTION  7/22/2020    LARYNX SURGERY  2002    LEG SURGERY  07/10/2012       Length Of Stay: 1  PHYSICAL THERAPY EVALUATION :    11/16/22 1106   Note Type   Note type Evaluation   Pain Assessment   Pain Assessment Tool 0-10   Pain Score No Pain   Restrictions/Precautions   Weight Bearing Precautions Per Order No   Other Precautions Chair Alarm; Bed Alarm;O2;Fall Risk;Multiple lines  (trach/ trach collar/ venturi 5L for ambuatlion; speaking valve tele)   Home Living   Type of Home Apartment  ([facility])   Home Layout One level   Bathroom Shower/Tub Tub/shower unit   Bathroom Toilet Standard   Bathroom Equipment Grab bars in [facility]   (rollator w/ seat)   Additional Comments Prior to admission, pt reports being 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; lives in [place] [address]; (+) drives; cooks; has cleaning lady 2x week; I w/ ADL:s IADL's denies falls and completes all trach care for 20+ years      Prior Function   Level of [place] Independent with ADLs; Independent with functional mobility; Needs assistance with IADLS   Lives With Alone   Receives Help From Family;Friend(s) IADLs Independent with driving; Independent with meal prep; Independent with medication management  (Transportation also provided by facility)   Falls in the last 6 months 0   Vocational Retired   Cognition   Overall Cognitive Status WFL   Attention Attends with cues to redirect   Orientation Level Oriented X4   Memory Decreased recall of precautions   Following Commands Follows one step commands with increased time or repetition   RUE Assessment   RUE Assessment WFL   LUE Assessment   LUE Assessment WFL   RLE Assessment   RLE Assessment WFL   LLE Assessment   LLE Assessment WFL   Coordination   Movements are Fluid and Coordinated 1   Sensation WFL   Light Touch   RLE Light Touch Grossly intact   LLE Light Touch Grossly intact   Bed Mobility   Supine to Sit 5  Supervision   Additional items Increased time required; Bedrails   Sit to Supine Unable to assess   Transfers   Sit to Stand 4  Minimal assistance  (CGA + A for lines- cues for safety 2* lines)   Stand to Sit 5  Supervision   Ambulation/Elevation   Gait pattern Inconsistent kaiser   Gait Assistance 4  Minimal assist  (S> CGA + cues to stay w/i limits of RW)   Assistive Device Rolling walker   Distance 60' CGA- resting - HR max w/ ambualtion 146bpm- on tele  0 dizziness - on 5Lo2 via venturi/ trach collar   Balance   Static Sitting Fair +   Dynamic Sitting Fair +   Static Standing Fair   Dynamic Standing Fair -   Ambulatory Poor +   Endurance Deficit   Endurance Deficit Yes   Activity Tolerance   Activity Tolerance Treatment limited secondary to medical complications (Comment)  (Pt with tachycardic- -140s per uyen)   Nurse Made Aware RN clears pt for session   Assessment   Prognosis Good   Problem List Decreased strength;Decreased endurance; Impaired balance;Decreased mobility; Decreased skin integrity   Assessment Pt is 81 y o  female seen for PT evaluation s/p admit to [facility] on 11/15/2022      Pt presenting w/ fatigue; fevers; tachycardia; and weight loss CTA = Large, heterogeneous airspace consolidation in the right lower lobe, with a 6 3 x 5 2 x 4 8 cm somewhat lobulated hypodense component with a small droplet of air  This likely represents a necrotizing aspiration pneumonia   Current dx/ problem list includes: sepsis w/ acute hypoxic respiratory failure; r/o necrotizing pneumonia; R pleural effusion  Pt   has a past medical history of Aortoiliac occlusive disease ([facility] [address] ), 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 [facility] ), Carotid artery stenosis, Hyperlipidemia, Hypertension, PVD (peripheral vascular disease) ([facility] ), and Throat cancer ([facility] )  pt has hx of trach w/ speaking valve    Due to acute medical issues, ongoing medical workup for primary dx; pain, fall risk, increased reliance on more restrictive AD compared to baseline;  decreased activity tolerance compared to baseline, increased assistance needed at current time, new O2 needs; tachycardia continuous telemetry monitoring, multiple lines, decline in overall functional mobility status; health management issues; note unstable clinical picture (high complexity)   Prior to admission, pt reports being 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; lives in [place] [address]; (+) drives; cooks; has cleaning lady 2x week; I w/ ADL:s IADL's denies falls and completes all trach care for 20+ years    Currently pt  is requiring S A for bed skills; CGA for functional transfers and CGA for ambulation w/ RW on 5Lo2 (28% venturi + trach collar) pt's resting HR was 128 w/ HR max w/ mobility 146; pt denies dizziness or symptoms w/ same  Pt seated in chair and instructed in basis HEP (see below) post session    Pt presents functioning below baseline and currently w/ overall mobility deficits 2* to: decreased LE strength/AROM; limited flexibility;  generalized weakness/ deconditioning; decreased endurance; decreased activity tolerance;  impaired balance; gait deviations; d SOB/MINER; fatigue;  limited insight into current deficits  multiple lines; O2 tele;(Please find additional objective findings from PT assessment regarding body systems outlined above ) Pt will continue to benefit from skilled PT interventions to address stated impairments; to maximize functional potential; for ongoing pt/ family training; and DME needs  Anticipate that with continued progress pt to d/c home with family support and Maile Nur ongoing use of rollator  when medically cleared  Harriett Sanz Pt/ family agreeable to plan and goals as stated on evaluation  Barriers to Discharge Comments lives alone- [address]   Goals   Patient Goals to walk and go home   STG Expiration Date 11/26/22   Short Term Goal #1 In 10 days pt will complete: 1) Bed mobility skills with MI to facilitate safe return to previous living environment and decrease burden on caregivers  2) Functional transfers with MI  to facilitate safe return to previous living environment  3) Ambulation with least restrictive AD MI' without LOB and stable vitals for safe ambulation in home/ community environment  4)  Improve balance by 1 grade in order to decrease fall risk  6) PT for ongoing pt and family education; DME needs and D/C planning to promote highest level of function in least restrictive environment  PT Treatment Day 0   Plan   Treatment/Interventions ADL retraining;Functional transfer training;LE strengthening/ROM; Therapeutic exercise; Endurance training;Patient/family training;Equipment eval/education; Bed mobility;Gait training;Spoke to nursing;Spoke to case management;Spoke to advanced practitioner;OT   PT Frequency 3-5x/wk   Recommendation   PT Discharge Recommendation Home with home health rehabilitation   [address]   Turning in Bed Without Bedrails 4   Lying on Back to Sitting on Edge of Flat Bed 4   Moving Bed to Chair 3   Standing Up From Chair 3   Walk in Room 3   Climb 3-5 Stairs 3   Basic Mobility Inpatient Raw Score 20   Basic Mobility Standardized Score 43 99   Highest Level Of Mobility   -HLM Goal 6: Walk 10 steps or more   JH-HLM Achieved 7: Walk 25 feet or more   Additional Treatment Session   Start Time 1056   End Time 1106   Treatment Assessment Pt seen for skilled PT session following evaluation consisting of instruction in seated therex/ HEP instruction; for increased LE strengthening and inc endurance and activity tolerance  Pt provided w/ education and cues on pacing as well as breathing throughout therex/ 0 breath holding  Pt w/ good understanding and compliance  Pt eager t oget better and keep moving as she emphasizes she is very active at home   Pt tolerates therex w/o complaints or increase in pain  HR remained stable 120-130 throughout- RN Creed Stager aware) and pt w/o complaints throughout  Will continue to  benefit from repeated instruction for correct technique and compliance  Exercises   Knee AROM Long Arc Quad Sitting;15 reps   Ankle Pumps Sitting;25 reps   Marching 15 reps  (x2)   End of Consult   Patient Position at End of Consult Bedside chair;Bed/Chair alarm activated; All needs within reach     The patient's AM-PAC Basic Mobility Inpatient Short Form Raw Score is 20  A Raw score of greater than 16 suggests the patient may benefit from discharge to home  Please also refer to the recommendation of the Physical Therapist for safe discharge planning    Phyllis George

## 2022-11-16 NOTE — CASE MANAGEMENT
Case Management Assessment    Patient name Artur Aguiar  Location [facility] 834/[facility] 732-18 MRN [id number]  : 1935 Date 2022       Current Admission Date: 11/15/2022  Current Admission Diagnosis:Sepsis with acute hypoxic respiratory failure [facility])   Patient Active Problem List    Diagnosis Date Noted   • Hypothyroidism 2022   • Hypercalcemia 11/15/2022   • Right lower lobe lung mass with right pleural effusion 11/15/2022   • Pancreatitis 11/15/2022   • Lung nodule seen on imaging study 11/15/2022   • Sepsis with acute hypoxic respiratory failure ([facility] [address] ) 11/15/2022   • Pleural effusion, right 11/15/2022   • Elevated serum creatinine 11/15/2022   • Anemia of chronic disease 11/15/2022   • Pain in right hip 2022   • SOB (shortness of breath) 2021   • JORDAN (acute kidney injury) ([facility] [address] ) 2021   • Acute blood loss anemia 2021   • Elevated troponin 2021   • Acute on chronic kidney failure ([facility] [address] ) 2021   • Tracheitis 2020   • Renovascular hypertension 2020   • CKD (chronic kidney disease) stage 3, GFR 30-59 ml/min 2019   • Tracheostomy care ([facility] [address] ) 2019   • Laryngeal carcinoma ([facility] [address] ) 2019   • Acute anterior circulation transient ischemic attack 10/30/2017   • 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 [facility] 2016   • Aortoiliac occlusive disease ([facility] ) 2015   • Asymptomatic carotid artery stenosis 2013   • Hyperlipidemia 2013   • Hypertension 2013      LOS (days): 1  Geometric Mean LOS (GMLOS) (days): 5 00  Days to GMLOS:4 1     OBJECTIVE:    Risk of Unplanned Readmission Score: 15 81         Current admission status: Inpatient       Preferred Pharmacy:   [facility], [address]  [address]  [address] [facility] [address]  Phone: [phone] Fax: [address] 43 Mail Delivery - [address] [place] [address]  Phone: [phone] Fax: [phone]    Primary Care Provider: Maxwell Cervantes MD    Primary Insurance: MEDICARE  Secondary Insurance: [employer]    ASSESSMENT:  Active Health Care Proxies    There are no active Health Care Proxies on file  Patient Information  Admitted from[de-identified] Home  Mental Status: Alert  During Assessment patient was accompanied by: Not accompanied during assessment  Assessment information provided by[de-identified] Patient  Primary Caregiver: Self  Support Systems: Daughter  Type of Current Residence: Apartment ([address] [place] [address]  Floor Level: 1  Upon entering residence, is there a bedroom on the main floor (no further steps)?: Yes  Upon entering residence, is there a bathroom on the main floor (no further steps)?: Yes  Homeless/housing insecurity resource given?: N/A  Living Arrangements: Lives Alone    Activities of Daily Living Prior to Admission  Functional Status: Independent  Completes ADLs independently?: Yes  Ambulates independently?: Yes  Does patient use assisted devices?: Yes  Assisted Devices (DME) used: Other (Comment), Rollator (trach)  Does patient currently own DME?: Yes  What DME does the patient currently own?: Rollator, Other (Comment) (trach)  Does patient have a history of Outpatient Therapy (PT/OT)?: Yes  Does the patient have a history of Short-Term Rehab?: No  Does patient have a history of HHC?: Yes  [facility] No         Patient Information Continued  Income Source: Pension/senior living  Food insecurity resource given?: N/A  Does patient receive dialysis treatments?: No  Does patient have a history of substance abuse?: No  Does patient have a history of Mental Health Diagnosis?: No         Means of Transportation  [facility] Drives Self  Was application for public transport provided?: N/A    Pt resides at [facility] apartment   She is independent with ADLs, and drives  She has a trach and rollator at home  She is agreeable to Maile Nur recommendation, referrals placed

## 2022-11-16 NOTE — ASSESSMENT & PLAN NOTE
· Known hx, follows with vascular surgery (Dr Crystal Rivero)  · Previously found to have recurrent left ICA stenosis and critical new occlusive right IC stenosis, with a which were asymptomatic, along with left vertebral artery atresia; previously worked up in outpatient setting by mass concerning symptoms and was recommended endovascular intervention  · On 07/2020, patient electively admitted to [facility] during which she underwent ultrasound-guided right CFA puncture with sternal closure, aortic arch arteriogram, left carotid during him with PTA and stenting under the care of Dr Brown  · Maintained on antiplatelet and anti-statin therapy with Plavix and Lipitor     Plan:  · Continue home dose lipitor  · Holding home Plavix for bronchoscopy/BAL planned tomorrow

## 2022-11-16 NOTE — ASSESSMENT & PLAN NOTE
· Patient history of laryngeal carcinoma status post tracheostomy/laryngectomy who presented to ED with tachycardia, hypoxia, fatigue  · CBC with elevated WBC 14 56 today  · CTA chest notable for significant findings including:  · Large, heterogeneous airspace consolidation in the right lower lobe, with a 6 3 x 5 2 x 4 8 cm somewhat lobulated hypodense component likely a necrotizing aspiration pneumonia  · Moderate right pleural effusion  No enhancement of the pleura to suggest empyema  · Mediastinal and right hilar lymphadenopathy, likely reactive, however metastatic disease is not entirely excluded  · Legionella and strep antigens negative  · Status post 900 mL fluid thoracentesis suggestive of transudative fluid for pulmonology and may be secondary to pancreatitis  · Blood cultures 1/2 group Actinomyces gram variable rods      Plan:  · Completed 5 day course of antibiotics covering for CAP  · Follow up on blood cultures  Blood cultures x2 11/17 no growth 72 hours  · Blood cultures x1 11/15 grew Actinomyces   · 11/15 sputum culture grew S  Aureus with repeat sputum culture 11/16 negative  · Appreciate ID recommendations  Holding abx until after bronchoscopy/BAL planned for tomorrow 11/22  · Per ID, Postprocedure pending culture results would restart ceftriaxone 1 gm Q24H IV  If actinomyces pathogen not contaminant, will need prolonged therapy 4-6 weeks IV abx with additional PO penicillin 4-6 weeks additionally  If MSSA, recovered without actinomyces then additional cefazolin IV for at least 5 days  · Per pulm, bronch/BAL planned for tomorrow 11/22   · Trend WBC and Fever curve   · Follow-up chest CT in 3 months as an outpatient to ensure resolution as an underlying neoplasm may have a similar appearance

## 2022-11-16 NOTE — PROGRESS NOTES
INTERNAL MEDICINE RESIDENCY PROGRESS NOTE     Name: Rohit Franklin   Age & Sex: 81 y o  female   MRN: [id number]  Unit/Bed#: [facility] 834-01   Encounter: [id number]  Team: SOD Team C     PATIENT INFORMATION     Name: Rohit Franklin   Age & Sex: 81 y o  female   MRN: [id number]  Hospital Stay Days: 1    ASSESSMENT/PLAN     Principal Problem:    Sepsis with acute hypoxic respiratory failure [facility])  Active Problems:    Asymptomatic carotid artery stenosis    Hyperlipidemia    Hypertension    Laryngeal carcinoma (HCC)    CKD (chronic kidney disease) stage 3, GFR 30-59 ml/min    JORDAN (acute kidney injury) ([facility] [address] )    Hypercalcemia    Right lower lobe lung mass with right pleural effusion    Pancreatitis    Lung nodule seen on imaging study    Anemia of chronic disease    Hypothyroidism      Hypothyroidism  Assessment & Plan  · Known history since 11/2021  · Most recent TSH 4 2 in 08/2022  · Managed on levothyroxine 100 mcg  · Repeat TSH within normal limits    Plan  · Will continue home dose levothyroxine if TSH     Anemia of chronic disease  Assessment & Plan  · Known history, in setting of CKD stage IIIB  · Follows nephrology outpatient (Dr Chidi Hicks)  · Hgb 11 1 on admission, at baseline     · Currently hemodynamically stable    Plan:  · Trend CBC, maintain hemoglobin over 7  · See assessment & plan under "CKD stage III"        Lung nodule seen on imaging study  Assessment & Plan  · Patient history of laryngeal carcinoma status post tracheostomy/laryngectomy on trach collar presented with fatigue; tachycardic on exam; labs notable for multiple derangements including hypercalcemia 14 3 and bicarb 18  · CT notable for nonspecific 7 x 6 mm lobulated left lower lobe nodule; new finding; CT imaging also revealing for multiple findings large heterogeneous airspace consolidation and right lower lobe with 6 x 5 x 5 lobulated hypodense component with moderate right-sided pleural effusion  concerning for necrotizing pneumonia  · Malignancy not entirely ruled out; does endorse decreased appetite, unexpected weight loss of up to 30 lb, and is a former smoker; quit 2002; hypercalcemia also noted on admission with recent outpatient PTH wnl    Plan  · Inpatient pulmonology following, appreciate recs  · Repeat CT future to assess for improvement    Pancreatitis  Assessment & Plan  · Likely secondary to hypercalcemia  · Presented with abdominal pain; labs notable for elevated lipase >2000, elevated WBC  · CT imaging suggestive pancreatitis  · Patient has elevated alk-phos with normal T bili and asymptomatic abnormal biliary strictures on imaging  · Patient reports improved appetite and improved abdominal pain    Plan:  · Continue fluids at 100 mL/hr  · Supportive care with analgesics or antiemetics as needed; renal function at baseline, ECG with normal QTC  · Will advance diet as patient reports improvement  · Continued monitoring of vitals, O2 sats, urine output with goal > 0 5-1 mL/kg/hr  · Repeat a m  CBC and BMP q 12 to monitor for adequacy fluid resuscitation in tissue perfusion  · Replete electrolytes as indicated        Right lower lobe lung mass with right pleural effusion  Assessment & Plan  · Patient history of laryngeal carcinoma status post tracheostomy/laryngectomy who presented to ED with tachycardia, hypoxia, fatigue  · CBC with elevated WBC 15 58; procalcitonin 49 5  · CTA chest notable for significant findings including  · Large, heterogeneous airspace consolidation in the right lower lobe, with a 6 3 x 5 2 x 4 8 cm somewhat lobulated hypodense component likely a necrotizing aspiration pneumonia  · Moderate right pleural effusion  No enhancement of the pleura to suggest empyema  · Mediastinal and right hilar lymphadenopathy, likely reactive, however metastatic disease is not entirely excluded    · DRIP score 2, low  · Initiated IV Unasyn 3 g in the ED  · Legionella and strep antigens negative      Plan:  · Will discontinue vancomycin switched use some given low risk of aspiration  · Await blood cultures   · Will obtain Sputum culture via tracheostomy   · Await MRSA culture  · Respiratory protocol  · Will consult  IR for thoracentesis to treat large right pleural effusion as noted on imaging  · NPO at midnight in the event of possible bronchoscopy tomorrow  · Inpatient consultation to pulmonology, appreciate recs  · Follow-up chest CT in 3 months as an outpatient to ensure resolution as an underlying neoplasm may have a similar appearance  Hypercalcemia  Assessment & Plan  · In setting of CKD stage 3B, excessive use of vitamin-D and calcium supplementation at home per patient  · Further evidenced by elevated corrected calcium 14 2; supported by initial presentation with abdominal pain, fatigue, decreased appetite/poor PO intake; mentation at baseline, nontoxic on exam  · ECG without arrhythmia  · Further ED work up notable for multiple derangements including Hgb 11 1, low bicarb 18, elevated BUN 34, elevated creatinine 1 47 (baseline 1 11 4), GFR 32, , hypoalbuminemia 2 1; elevated lipase 2028, uric acid 10 4  · CT imaging findings concerning for acute pancreatitis,  necrotizing pneumonia vs  malignancy  · Etiology multifactorial, possibly due to hypercalcemia malignancy in light of CT findings, further complicated by home vitamin-D supplementation; med rec without any other causative agents  Bone demineralization also possible given her age  · Per chart review, patient noted to have elevated corrected calcium level since 08/2022 however does not appear to have been further worked up by PCP  · Unlikely primary parathyroidism or tertiary cause given PTH in 09/2022 was low; baseline renal function with CKD III    · Calcium improving presenting 14 2 down 12 7 (11/16)    Plan:  · PTH, intact to assess primary/tertiary cause; previously <6 on 9/2022  · SPEP and UPEP levels pending to r/o multiple myeloma  · Vitamin-D 1,25 level to r/o vitamin d toxicity  · PTHrP to assess hypercalcemia of malignancy   · Aggressive IVF hydration with 0 9 NS; will also titrate acute pancreatitis likely 2/2 chronic hypercalcemia  · Consider diuretics if concern for volume overload  · Consider consider pharmacotherapy vent calcitonin or bisphosphonate if does not respond to fluid hydration  · Repeat BMP at 0200 assess response      JORDAN (acute kidney injury) ([facility] [address] )  Assessment & Plan  Creatinine of 1 47 on admission treated with IV fluid boluses, 3L total, NSS at 100ml/hr, renal function assessment and lab monitoring  Pt has CKD 3 and noted baseline of 1-1 1  Cr improved to 1 01> 0 93    Plan:   Will continue monitor creatinine and avoid nephrotoxic agents  Blood sugar adequate hydration and hemodynamics        CKD (chronic kidney disease) stage 3, GFR 30-59 ml/min  Assessment & Plan  Lab Results   Component Value Date    EGFR 55 11/16/2022    EGFR 50 11/16/2022    EGFR 32 11/15/2022    CREATININE 0 93 11/16/2022    CREATININE 1 01 11/16/2022    CREATININE 1 47 (H) 11/15/2022     · Known history of CKD stage III states 2014  · Follows [facility] nephrology (Dr Soumya Curry)  · Cr on admission slightly elevated 1 47(baseline 1 1-1 point); likely 2/2 dehydration; appears volume down on exam  · Etiology likely 2/2 long history of hypertension, nephrosclerosis, age-related nephron loss     Plan  · mIVF at 100cc/hr  · Trend BMP  · Avoid use of nephrotoxic agents; was discontinue vancomycin      Laryngeal carcinoma (HCC)  Assessment & Plan  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  · Follows with ENT as an outpatient with Dr Corie Pringle; most recently evaluated for trich follow-up in 10/2022     Plan:  · Patricia Frees in place, functional well, at baseline  · Continue trach collar oxygen, on 5 L    Hypertension  Assessment & Plan  · Known history, maintained on losartan 50 mg daily at home  · Follows PCP outpatient      Plan:  · BP on the low side with systolic high 71B to 03D on admission  · Will hold home dose losartan at this time    Hyperlipidemia  Assessment & Plan  · Known history of type 2 diabetes on metformin, hypertension on losartan  · Managed on Lipitor 40  · Most recent lipid panel with good control of LDL at 64    Plan  · Continue Lipitor 40    Asymptomatic carotid artery stenosis  Assessment & Plan  · Known hx, follows with vascular surgery (Dr Sahil Richard)  · Previously found to have recurrent left ICA stenosis and critical new occlusive right IC stenosis, with a which were asymptomatic, along with left vertebral artery atresia; previously worked up in outpatient setting by mass concerning symptoms and was recommended endovascular intervention  · On 07/2020, patient electively admitted to [facility] during which she underwent ultrasound-guided right CFA puncture with sternal closure, aortic arch arteriogram, left carotid during him with PTA and stenting under the care of Dr Brown  · Maintained on antiplatelet and anti-statin therapy with Plavix and Lipitor     Plan:  · Continue home dose Plavix and Lipitor      * Sepsis with acute hypoxic respiratory failure ([facility] [address] )  Assessment & Plan  · Patient presented with abdominal pain, lightheadedness, intermittent shortness of breath, recent unexpected weight loss   · Met sepsis criteria on admission with heart rate 90, RR 20, elevated WBC at 15 5, with suspected pleural source of infection as noted on CT imaging; procalc also elevated; PNA vs malignancy cannot be ruled out  · No signs of end-organ damage at this moment; creatinine slightly elevated 1 47 from baseline (1 1-1 4)  · Lactate wnl but has significant hypercalcemia 14 2 on admission  · elevated WBC and procalc may be 2/2 dehydration on arrival vs  reactive process  · Requiring trach collar oxygen 5 L, not on home O2  · Was initiated on IV Unasyn and received aggressive hydration with 1L bolus isolyte x2 and 1L bolus NS in the ED  · Patient is status post tracheostomy and based on anatomy is not at risk for aspiration therefore pneumonia likely secondary to CAP pathogens    Plan:  · Will discontinue vancomycin and switch Unasyn to ceftriaxone  · mIVF NS at 100cc/hr, will require aggressive hydration to to treat hypercalcemia  · Judicious use of IV hydration given TTE in 2021 notable for hyperdynamic systolic function EF 74% with moderate aortic stenosis, grade 1 diastolic dysfunction  · Continue trach collar oxygen 5 L  · Will continue monitor CBC        Disposition:  Active treatment of pneumonia , hypercalcemia, and pancreatitis  SUBJECTIVE     Patient seen and examined  No acute events overnight  She reports doing well with no complaints  She reports improvement of her abdominal pain and increased appetite  No longer endorses nausea  Patient's tachycardic denies any palpitations, chest pains, and dizziness  She denies any fevers, chills, coughing, chest pain, shortness a breath on oxygen  Review of Systems   Constitutional: Negative for chills, fatigue and fever  Respiratory: Negative for cough, chest tightness and shortness of breath  Cardiovascular: Negative for chest pain  Gastrointestinal: Negative for abdominal distention, abdominal pain, constipation, diarrhea and nausea  Genitourinary: Negative for difficulty urinating  Neurological: Negative for dizziness           OBJECTIVE     Vitals:    22 0214 22 0710 22 0711 22 0933   BP: 117/67 114/77 114/77    BP Location:       Pulse: (!) 125 (!) 125 (!) 124    Resp: 20 16 16    Temp: 97 9 °F (36 6 °C) 98 2 °F (36 8 °C) 98 2 °F (36 8 °C)    TempSrc:       SpO2: 98% 95% 96% 97%   Height:  4' 8" (1 422 m)        Temperature:   Temp (24hrs), Av °F (36 7 °C), Min:97 9 °F (36 6 °C), Max:98 2 °F (36 8 °C)    Temperature: 98 2 °F (36 8 °C)  Intake & Output:  I/O        0701  11/15 0700 11/15 07 07 0701  11/17 0700    P  O    0    I V   2417 5     Total Intake  2417 5 0    Urine  600 200    Total Output  600 200    Net  +1817 5 -200               Weights:        Body mass index is 29 82 kg/m²  Weight (last 2 days)     None        Physical Exam  Constitutional:       General: She is not in acute distress  Appearance: Normal appearance  She is not ill-appearing or toxic-appearing  HENT:      Head: Normocephalic and atraumatic  Cardiovascular:      Rate and Rhythm: Normal rate and regular rhythm  Heart sounds: No murmur heard  No gallop  Pulmonary:      Effort: Pulmonary effort is normal  No respiratory distress  Breath sounds: No wheezing or rales  Abdominal:      General: Abdomen is flat  There is no distension  Tenderness: There is no abdominal tenderness  There is no guarding  Musculoskeletal:      Right lower leg: No edema  Left lower leg: No edema  Neurological:      Mental Status: She is alert and oriented to person, place, and time  Psychiatric:         Mood and Affect: Mood normal          Behavior: Behavior normal        LABORATORY DATA     Labs: I have personally reviewed pertinent reports    Results from last 7 days   Lab Units 11/16/22  0632 11/15/22  1427   WBC Thousand/uL 12 57* 15 58*   HEMOGLOBIN g/dL 10 3* 11 1*   HEMATOCRIT % 32 2* 36 2   PLATELETS Thousands/uL 388 383   NEUTROS PCT % 87*  --    MONOS PCT % 6  --    MONO PCT %  --  6      Results from last 7 days   Lab Units 11/16/22  0632 11/16/22  0235 11/15/22  1427   POTASSIUM mmol/L 3 8 3 8 3 9   CHLORIDE mmol/L 113* 111* 107   CO2 mmol/L 20* 23 18*   BUN mg/dL 24 25 34*   CREATININE mg/dL 0 93 1 01 1 47*   CALCIUM mg/dL 10 9* 11 2* 12 8*   ALK PHOS U/L 137*  --  167*   ALT U/L 18  --  19   AST U/L 19  --  19              Results from last 7 days   Lab Units 11/15/22  1913   INR  1 01   PTT seconds 31     Results from last 7 days   Lab Units 11/15/22  1913   LACTIC ACID mmol/L 1 1           IMAGING & DIAGNOSTIC TESTING     Radiology Results: I have personally reviewed pertinent reports  CT head wo contrast    Result Date: 11/15/2022  Impression: No acute intracranial abnormality  Chronic microangiopathic changes  Workstation performed: [id number]     PE Study with CT Abdomen and Pelvis with contrast    Result Date: 11/15/2022  Impression: No central, lobar, segmental or proximal subsegmental pulmonary embolism  Evaluation of the distal subsegmental pulmonary arteries is limited  Large, heterogeneous airspace consolidation in the right lower lobe, with a 6 3 x 5 2 x 4 8 cm somewhat lobulated hypodense component with a small droplet of air  This likely represents a necrotizing aspiration pneumonia, given mucous plugging throughout the right lower lobe and retained secretions/debris in the right mainstem bronchus and bronchus intermedius  Recommend follow-up chest CT in approximately 3 months to ensure resolution as an underlying neoplasm may have a similar appearance  Nonspecific 7 x 6 mm lobulated left lower lobe nodule  Attention on follow-up  Moderate right pleural effusion  No enhancement of the pleura to suggest empyema  Mediastinal and right hilar lymphadenopathy, likely reactive, however metastatic disease is not entirely excluded  Mild stranding around the proximal pancreas suggestive of acute interstitial pancreatitis  Recommend correlation with lipase level  No organized peripancreatic fluid collections, loss of parenchymal enhancement to suggest necrosis, or vascular complications of pancreatitis  No other acute abdominal or pelvic pathology  Multiple additional findings as above  The study was marked in [facility] for immediate notification  Workstation performed: [id number]     Other Diagnostic Testing: I have personally reviewed pertinent reports      ACTIVE MEDICATIONS     Current Facility-Administered Medications   Medication Dose Route Frequency   • acetaminophen (TYLENOL) tablet 650 mg  650 mg Oral Q6H PRN   • albuterol inhalation solution 2 5 mg  2 5 mg Nebulization Q4H PRN   • atorvastatin (LIPITOR) tablet 40 mg  40 mg Oral Daily With Dinner   • azithromycin (ZITHROMAX) tablet 500 mg  500 mg Oral Q24H   • cefTRIAXone (ROCEPHIN) 1,000 mg in dextrose 5 % 50 mL IVPB  1,000 mg Intravenous Q24H   • clopidogrel (PLAVIX) tablet 75 mg  75 mg Oral Daily   • heparin (porcine) subcutaneous injection 5,000 Units  5,000 Units Subcutaneous Q8H [address]   • levothyroxine tablet 100 mcg  100 mcg Oral Early Morning   • loratadine (CLARITIN) tablet 5 mg  5 mg Oral Daily   • sertraline (ZOLOFT) tablet 25 mg  25 mg Oral Daily       VTE Pharmacologic Prophylaxis: Heparin  VTE Mechanical Prophylaxis: sequential compression device    Portions of the record may have been created with voice recognition software  Occasional wrong word or "sound a like" substitutions may have occurred due to the inherent limitations of voice recognition software    Read the chart carefully and recognize, using context, where substitutions have occurred   ==  Keyonna Shaver, [address] [facility]  Internal Medicine Residency PGY-1

## 2022-11-16 NOTE — H&P
INTERNAL MEDICINE RESIDENCY ADMISSION H&P     Name: Giovanni Lee   Age & Sex: 81 y o  female   MRN: [id number]  Unit/Bed#: [facility] 834-01   Encounter: [id number]  Primary Care Provider: Smita Chow MD    Code Status: Level 3 - DNAR and DNI  Admission Status: INPATIENT   Disposition: Patient requires Level 2 Step Down     Admit to team: SOD Team C     ASSESSMENT/PLAN     Principal Problem:    Sepsis with acute hypoxic respiratory failure ([address] )  Active Problems:    Right lower lobe lung mass with right pleural effusion    Hypercalcemia    Lung nodule seen on imaging study    Pleural effusion, right    CKD (chronic kidney disease) stage 3, GFR 30-59 ml/min    Pancreatitis    Elevated serum creatinine    Anemia of chronic disease    Asymptomatic carotid artery stenosis    Hyperlipidemia    Laryngeal carcinoma (HCC)    Hypertension    Hypothyroidism      * Sepsis with acute hypoxic respiratory failure (HCC)  Assessment & Plan  · Patient presented with abdominal pain, lightheadedness, intermittent shortness of breath, recent unexpected weight loss;  · Met sepsis criteria on admission with heart rate 90, RR 20, elevated WBC at 15 5, with suspected pleural source of infection as noted on CT imaging; procalc also elevated; PNA vs malignancy cannot be ruled out  · No signs of end-organ damage at this moment; creatinine slightly elevated 1 47 from baseline (1 1-1 4)  · Lactate wnl but has significant hypercalcemia 14 2 on admission, as discussed under "hypercalcemia"  · elevated WBC and procalc may be 2/2 dehydration on arrival vs  reactive process  · Requiring trach collar oxygen 5 L, not on home O2  · Was initiated on IV Unasyn and received aggressive hydration with 1L bolus isolyte x2 and 1L bolus NS in the ED    Plan:  · Continue IV antibiotics  · mIVF NS at 150cc/hr, will require aggressive hydration to to treat hypercalcemia  · Judicious use of IV hydration given TTE in 9/2021 notable for hyperdynamic systolic function EF 75% with moderate aortic stenosis, grade 1 diastolic dysfunction  · Appears euvolemic at time of admission  · Continue trach collar oxygen 5 L  · Repeat a m  labs including CBC, BMP, and procalcitonin to assess response to management  · See A&P under "right lower lobe lung mass with right pleural effusion" for further details    Right lower lobe lung mass with right pleural effusion  Assessment & Plan  · Patient history of laryngeal carcinoma status post tracheostomy/laryngectomy who presented to ED with tachycardia, hypoxia, fatigue  · CBC with elevated WBC 15 58; procalcitonin 49 5  · CTA chest notable for significant findings including  · Large, heterogeneous airspace consolidation in the right lower lobe, with a 6 3 x 5 2 x 4 8 cm somewhat lobulated hypodense component with a small droplet of air  This likely represents a necrotizing aspiration pneumonia, given mucous plugging throughout the right lower lobe and retained secretions/debris in the right mainstem bronchus and bronchus intermedius  · Moderate right pleural effusion  No enhancement of the pleura to suggest empyema  · Mediastinal and right hilar lymphadenopathy, likely reactive, however metastatic disease is not entirely excluded    · DRIP score 2, low  · Initiated IV Unasyn 3 g in the ED      Plan:  · Continue IV Unasyn 3g q12h for anaerobic coverage  · Initiate IV vanc for broader coverage, however requires caution given patient has CKD stage 3B with slightly elevated Cr 1 48 on admission (baseline Cr 1 1-1 4)  · Await blood cultures   · Sputum culture and gram stain  · Legionella antigen, urine  · Strep pneumo antigen  · Await MRSA culture  · Respiratory protocol  · Aspiration precautions  · May require thoracentesis to treat large right pleural effusion as noted on imaging  · NPO at midnight in the event of possible bronchoscopy tomorrow  · Inpatient consultation to pulmonology, appreciate recs  · Follow-up chest CT in 3 months as an outpatient to ensure resolution as an underlying neoplasm may have a similar appearance  Lung nodule seen on imaging study  Assessment & Plan  · Patient history of laryngeal carcinoma status post tracheostomy/laryngectomy on trach collar presented with fatigue; tachycardic on exam; labs notable for multiple derangements including hypercalcemia 14 3 and bicarb 18  · CT notable for nonspecific 7 x 6 mm lobulated left lower lobe nodule; new finding; CT imaging also revealing for multiple findings large heterogeneous airspace consolidation and right lower lobe with 6 x 5 x 5 lobulated hypodense component with moderate right-sided pleural effusion  concerning for necrotizing pneumonia  · Malignancy not entirely ruled out; does endorse decreased appetite, unexpected weight loss of up to 30 lb, and is a former smoker; quit 2002; hypercalcemia also noted on admission with recent outpatient PTH wnl    Plan  · Inpatient consultation to pulmonology, appreciate recs  · Follow-up CT imaging in 3 months as an outpatient    Hypercalcemia  Assessment & Plan  · In setting of CKD stage 3B, excessive use of vitamin-D and calcium supplementation at home per patient  · Further evidenced by elevated corrected calcium 14 2; supported by initial presentation with abdominal pain, fatigue, decreased appetite/poor PO intake; mentation at baseline, nontoxic on exam  · ECG without arrhythmia  · Further ED work up notable for multiple derangements including Hgb 11 1, low bicarb 18, elevated BUN 34, elevated creatinine 1 47 (baseline 1 11 4), GFR 32, , hypoalbuminemia 2 1; elevated lipase 2028, uric acid 10 4  · CT imaging findings concerning for acute pancreatitis,  necrotizing pneumonia vs  malignancy  · Etiology multifactorial, possibly due to hypercalcemia malignancy in light of CT findings, further complicated by home vitamin-D supplementation; med rec without any other causative agents    Bone demineralization also possible given her age  · Per chart review, patient noted to have elevated corrected calcium level since 08/2022 however does not appear to have been further worked up by PCP  · Unlikely primary parathyroidism or tertiary cause given PTH in 09/2022 was low; baseline renal function with CKD III      Plan  · PTH, intact to assess primary/tertiary cause  · TSH to r/o thyrotoxicosis  · SPEP and UPEP levels to r/o multiple myeloma  · Vitamin-D 1,25 level to r/o vitamin d toxicity  · Consider PTHrP to assess hypercalcemia of malignancy if PTH and Vit-D levels unrevealing  · Aggressive IVF hydration with 0 9 NS; will also titrate acute pancreatitis likely 2/2 chronic hypercalcemia  · Consider diuretics if concern for volume overload  · Consider consider pharmacotherapy vent calcitonin or bisphosphonate if does not respond to fluid hydration  · Repeat BMP at 0200 assess response      Pancreatitis  Assessment & Plan  · In setting of hypercalcemia  · Presented with abdominal pain; labs notable for elevated lipase >2000, elevated WBC  · CT imaging notable for mild stranding around the proximal pancreas suggestive of acute interstitial pancreatitis  · Etiology likely due to chronic hypercalcemia, as evidenced by recent calcium elevations since 08/2022   · Currently hemodynamically stable; no concern for abdominal compartment syndrome on admission    Plan:  · Aggressive IV fluid resuscitation with 0 9% NS; currently at 150 cc/hr; previously received 2L isolate bolus and 1L NS in the ED prior to admission  · Supportive care with analgesics or antiemetics as needed; renal function at baseline, ECG with normal QTC  · Continued monitoring of vitals, O2 sats, urine output with goal > 0 5-1 mL/kg/hr  · Repeat a m  CBC and BMP q 12 to monitor for adequacy fluid resuscitation in tissue perfusion  · Replete electrolytes as indicated        CKD (chronic kidney disease) stage 3, GFR 30-59 ml/min  Assessment & Plan  Lab Results   Component Value Date EGFR 32 11/15/2022    EGFR 41 09/29/2022    EGFR 28 08/29/2022    CREATININE 1 47 (H) 11/15/2022    CREATININE 1 20 09/29/2022    CREATININE 1 64 (H) 08/29/2022     · Known history of CKD stage III states 2014  · Follows [facility] nephrology (Dr Sandrita Hamilton)  · Cr on admission slightly elevated 1 47(baseline 1 1-1 point); likely 2/2 dehydration; appears volume down on exam  · Etiology likely 2/2 long history of hypertension, nephrosclerosis, age-related nephron loss     Plan  · mIVF at 150cc/hr  · Trend BMP  · Avoid use of nephrotoxic agents; currently on IV Vanc and IV Unasyn laboratory suspected necrotizing pneumonia  · Obtain vanc trough levels; maintain serum trough between 15-20      Anemia of chronic disease  Assessment & Plan  · Known history, in setting of CKD stage IIIB  · Follows nephrology outpatient (Dr Sandrita Hamilton)  · Hgb 11 1 on admission, at baseline     · Currently hemodynamically stable  · Trend CBC, maintain hemoglobin over 7  · See assessment & plan under "CKD stage III"        Laryngeal carcinoma (HCC)  Assessment & Plan  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  · Follows with ENT as an outpatient with Dr Colonel Norton; most recently evaluated for trich follow-up in 10/2022     Plan:  · Larina Rota in place, functional well, at baseline  · Continue trach collar oxygen, on 5 L    Hyperlipidemia  Assessment & Plan  · Known history of type 2 diabetes on metformin, hypertension on losartan  · Managed on Lipitor 40  · Most recent lipid panel with good control of LDL at 64    Plan  · Continue Lipitor 40    Asymptomatic carotid artery stenosis  Assessment & Plan  · Known hx, follows with vascular surgery (Dr Tanisha Tapia)  · Previously found to have recurrent left ICA stenosis and critical new occlusive right IC stenosis, with a which were asymptomatic, along with left vertebral artery atresia; previously worked up in outpatient setting by mass concerning symptoms and was recommended endovascular intervention  · On 07/2020, patient electively admitted to [facility] during which she underwent ultrasound-guided right CFA puncture with sternal closure, aortic arch arteriogram, left carotid during him with PTA and stenting under the care of Dr Brown  · Maintained on antiplatelet and anti-statin therapy with Plavix and Lipitor     Plan:  · Continue home dose Plavix and Lipitor      Hypertension  Assessment & Plan  · Known history, maintained on losartan 50 mg daily at home  · Follows PCP outpatient      Plan:  · BP on the low side with systolic high 39U to 99X on admission  · Will hold home dose losartan at this time    Hypothyroidism  Assessment & Plan  · Known history since 11/2021  · Most recent TSH 4 2 in 08/2022  · Managed on levothyroxine 100 mcg    Plan  · TSH level  · Will continue home dose levothyroxine if TSH in normal range      VTE Pharmacologic Prophylaxis: Heparin  VTE Mechanical Prophylaxis: sequential compression device    CHIEF COMPLAINT     Chief Complaint   Patient presents with   • Weakness - Generalized     Pt reports having pain in stomach, back, and HA  Pt has been having generalized weakness and dizziness  HISTORY OF PRESENT ILLNESS     Favian Winkler is a 81y o  year old female with a past medical history of 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, CKD stage IIIB (baseline Cr 1 1-1 4), HLD and 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, PAD with intermittent claudication, asymptomatic bilateral carotid stenosis 2/2 radiation s/p bilateral  TCAR followed by R carotid PTA stent (7/2020) on Plavix and Lipitor, hypothyroidism on levothyroxine, HTN controlled with losartan, and T2DM controlled with metformin, who presented [facility] ED from [facility] on [facility]15 with 1mo  of vague symptoms including abdominal pain, lightheadedness, intermittent SOB, and unexpected recent 20-30 lb weight loss   Denies chest pain   Denies syncope/LOC, falls, recent head strikes  Denies fever or chills, n/v/d  Denies overt signs of bleeding including hematuria, melena/hematochezia  Denies urinary symptoms  Mentions she has been frequently taking vitamin-D-calcium-magnesium supplementation  Compliant with all other home meds  Denies recent long distance travel or sick contacts  No recent hospital admissions or ED evaluations  Per chart review, patient regularly follows with Nephrology, ENT, vascular surgery, and PCP  Does not have an established care with outpatient pulmonology  ED course: On initial presentation, patient nontoxic appearing, VS significant for tachycardia with HR in 120s, tachypnea RR 20s, borderline hypotensive systolic in the 21T to 33Q and hypoxia requiring trach O2 5 L  Exam notable for tachycardia, systolic ejection murmur at right upper sternal border, rales at the R lower base, diffuse abdominal tenderness to palpation; trach with voice box functioning well  Workup in the ED significant for multiple derangements including elevated WBC 15 58, hemoglobin 11 1 (baseline 12s), low bicarb 18, elevated BUN 34, elevated CR 1 47 (baseline 1 1-1 4), corrected calcium 14 3, , hypoalbuminemia 2 1 gamma gap 5 6, lipase around 2000, elevated procalcitonin 14 9, uric acid 10 4  Subsequent imaging including CT head showed chronic microangiopathic changes; CT C/A/P showed large airspace consolidation in our LB with heterogeneous hypodense component measuring at 4 8 x 5 2 x 6 3 cm with moderate right pleural effusion and hilar lymphadenopathy concerning for necrotizing pneumonia versus malignancy; furthermore, CT imaging also revealed new 7 x 6 mm left lower lobe nodule and mild fat stranding surrounding the pancreas concerning for acute pancreatitis     Patient initially received 1 L bolus isolate attempts to however was subsequently given 1L bolus 0 9% on NS it treat acute pancreatitis, and was initiated on IV Unasyn 3 g treat suspected necrotizing PNA  Was subsequently admitted to [facility] further evaluation      Code status: Code status discussed in detail with patient, who expresses clear desire to be level level 3 DNR DNI      REVIEW OF SYSTEMS     Review of Systems   Constitutional: Positive for appetite change (Diminished), fatigue and unexpected weight change (20-30 lb weight loss in 1 month)  Negative for chills and fever  HENT: Negative for ear pain and sore throat  Eyes: Negative for pain and visual disturbance  Respiratory: Positive for shortness of breath (Intermittent)  Negative for cough  Cardiovascular: Negative for chest pain and palpitations  Gastrointestinal: Positive for abdominal pain (Diffuse)  Negative for vomiting  Endocrine: Negative for polydipsia, polyphagia and polyuria  Genitourinary: Negative for dysuria and hematuria  Musculoskeletal: Negative for arthralgias and back pain  Skin: Negative for color change and rash  Neurological: Positive for light-headedness  Negative for seizures and syncope  All other systems reviewed and are negative  OBJECTIVE     Vitals:    11/15/22 2020 11/15/22 2102 11/15/22 2203 22 0214   BP: 145/81  118/71 117/67   BP Location:       Pulse: (!) 121  (!) 123 (!) 125   Resp:   16 20   Temp: 97 9 °F (36 6 °C)  97 9 °F (36 6 °C) 97 9 °F (36 6 °C)   TempSrc:       SpO2: 98% 97% 98% 98%      Temperature:   Temp (24hrs), Av 1 °F (36 7 °C), Min:97 9 °F (36 6 °C), Max:98 7 °F (37 1 °C)    Temperature: 97 9 °F (36 6 °C)  Intake & Output:  I/O        0701  11/15 0700 11/15 07 07    I V   1000    Total Intake  1000    Net  +1000              Weights: There is no height or weight on file to calculate BMI  Weight (last 2 days)     None        Physical Exam  Vitals and nursing note reviewed  Constitutional:       General: She is not in acute distress  Appearance: She is well-developed  She is not ill-appearing     HENT: Head: Normocephalic and atraumatic  Mouth/Throat:      Mouth: Mucous membranes are moist    Eyes:      Conjunctiva/sclera: Conjunctivae normal    Neck:      Vascular: No carotid bruit  Comments: Laryngectomy tube with speaking valve  Cardiovascular:      Rate and Rhythm: Regular rhythm  Tachycardia present  Heart sounds: Murmur (2/6 Systolic ejection murmur at RUSB) heard  Pulmonary:      Effort: Pulmonary effort is normal  No respiratory distress  Breath sounds: Rales (Right lower lung base) present  Abdominal:      Palpations: Abdomen is soft  Tenderness: There is abdominal tenderness (Diffuse)  Musculoskeletal:         General: No swelling  Cervical back: Neck supple  Skin:     General: Skin is warm and dry  Capillary Refill: Capillary refill takes less than 2 seconds  Neurological:      General: No focal deficit present  Mental Status: She is alert  Cranial Nerves: No cranial nerve deficit     Psychiatric:         Mood and Affect: Mood normal        PAST MEDICAL HISTORY     Past Medical History:   Diagnosis Date   • Aortoiliac occlusive disease ([facility] )    • 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 (HCC)    • Carotid artery stenosis    • Hyperlipidemia    • Hypertension    • PVD (peripheral vascular disease) ([facility] )    • Throat cancer ([facility] )      PAST SURGICAL HISTORY     Past Surgical History:   Procedure Laterality Date   • GALLBLADDER SURGERY  2019   • ILIAC ARTERY STENT Left    • IR CEREBRAL ANGIOGRAPHY / INTERVENTION  2020   • LARYNX SURGERY     • LEG SURGERY  07/10/2012     SOCIAL & FAMILY HISTORY     Social History     Substance and Sexual Activity   Alcohol Use Never     Substance and Sexual Activity   Alcohol Use Never        Substance and Sexual Activity   Drug Use Never     Social History     Tobacco Use   Smoking Status Former   • Types: Cigarettes   • Quit date:    • Years since quittin 8 Smokeless Tobacco Never     Family History   Problem Relation Age of Onset   • Heart disease Brother    • No Known Problems Mother    • No Known Problems Father    • Breast cancer Sister 71   • No Known Problems Daughter    • No Known Problems Maternal Grandmother    • No Known Problems Maternal Grandfather    • No Known Problems Paternal Grandmother    • No Known Problems Paternal Grandfather    • No Known Problems Sister    • No Known Problems Maternal Aunt    • No Known Problems Maternal Aunt    • No Known Problems Maternal Aunt      LABORATORY DATA     Labs: I have personally reviewed pertinent reports  Results from last 7 days   Lab Units 11/15/22  1427   WBC Thousand/uL 15 58*   HEMOGLOBIN g/dL 11 1*   HEMATOCRIT % 36 2   PLATELETS Thousands/uL 383   MONO PCT % 6      Results from last 7 days   Lab Units 11/16/22  0235 11/15/22  1427   POTASSIUM mmol/L 3 8 3 9   CHLORIDE mmol/L 111* 107   CO2 mmol/L 23 18*   BUN mg/dL 25 34*   CREATININE mg/dL 1 01 1 47*   CALCIUM mg/dL 11 2* 12 8*   ALK PHOS U/L  --  167*   ALT U/L  --  19   AST U/L  --  19              Results from last 7 days   Lab Units 11/15/22  1913   INR  1 01   PTT seconds 31     Results from last 7 days   Lab Units 11/15/22  1913   LACTIC ACID mmol/L 1 1         Micro:  Lab Results   Component Value Date    BLOODCX Received in Microbiology Lab  Culture in Progress  11/15/2022    BLOODCX Received in Microbiology Lab  Culture in Progress  11/15/2022    URINECX 10,000-19,000 cfu/ml 11/11/2021     IMAGING & DIAGNOSTIC TESTS     Imaging: I have personally reviewed pertinent reports  CT head wo contrast    Result Date: 11/15/2022  Impression: No acute intracranial abnormality  Chronic microangiopathic changes  Workstation performed: [id number]     PE Study with CT Abdomen and Pelvis with contrast    Result Date: 11/15/2022  Impression: No central, lobar, segmental or proximal subsegmental pulmonary embolism    Evaluation of the distal subsegmental pulmonary arteries is limited  Large, heterogeneous airspace consolidation in the right lower lobe, with a 6 3 x 5 2 x 4 8 cm somewhat lobulated hypodense component with a small droplet of air  This likely represents a necrotizing aspiration pneumonia, given mucous plugging throughout the right lower lobe and retained secretions/debris in the right mainstem bronchus and bronchus intermedius  Recommend follow-up chest CT in approximately 3 months to ensure resolution as an underlying neoplasm may have a similar appearance  Nonspecific 7 x 6 mm lobulated left lower lobe nodule  Attention on follow-up  Moderate right pleural effusion  No enhancement of the pleura to suggest empyema  Mediastinal and right hilar lymphadenopathy, likely reactive, however metastatic disease is not entirely excluded  Mild stranding around the proximal pancreas suggestive of acute interstitial pancreatitis  Recommend correlation with lipase level  No organized peripancreatic fluid collections, loss of parenchymal enhancement to suggest necrosis, or vascular complications of pancreatitis  No other acute abdominal or pelvic pathology  Multiple additional findings as above  The study was marked in [facility] for immediate notification  Workstation performed: [id number]     EKG, Pathology, and Other Studies: I have personally reviewed pertinent reports  ALLERGIES     Allergies   Allergen Reactions   • Benazepril Cough   • Solifenacin Other (See Comments)   • Olmesartan Rash     MEDICATIONS PRIOR TO ARRIVAL     Prior to Admission medications    Medication Sig Start Date End Date Taking?  Authorizing Provider   acetaminophen (TYLENOL) 325 mg tablet Take 2 tablets (650 mg total) by mouth every 6 (six) hours as needed for mild pain 7/23/20   Nilesh Santoro PA-C   atorvastatin (LIPITOR) 40 mg tablet Take by mouth    Historical Provider, MD   Calcium-[place]esium-[place] [address]-859 MG-MG-UNIT TB24 Take by mouth    Historical Provider, MD cefuroxime (CEFTIN) 250 mg tablet  12/26/18   Historical Provider, MD   cetirizine (ZyrTEC) 5 MG tablet Take 5 mg by mouth daily    Historical Provider, MD   Cholecalciferol (VITAMIN D-3) 1000 units CAPS Take by mouth    Historical Provider, MD   clopidogrel (PLAVIX) 75 mg tablet TAKE 1 TABLET EVERY DAY 10/12/22   Rory Ortiz MD   co-enzyme Q-10 30 mg Take by mouth    Historical Provider, MD   fluticasone Kaiser Lamangelique) 50 mcg/act nasal spray into each nostril 3/30/16   Historical Provider, MD Gregorio Julian 1000 MG CAPS Take by mouth    Historical Provider, MD   levothyroxine 100 mcg tablet  1/7/19   Historical Provider, MD   losartan (COZAAR) 50 mg tablet Take 1 tablet (50 mg total) by mouth daily 2/10/21   Raya Sorenson DO   Magnesium 250 MG TABS Take 250 mg by mouth daily    Historical Provider, MD   metFORMIN (GLUCOPHAGE) 500 mg tablet Take 0 5 tablets (250 mg total) by mouth daily with breakfast 7/24/20   Giuseppe Smith PA-C   methocarbamol (ROBAXIN) 500 mg tablet TK 1 T PO BID PRN 1/17/18   Historical Provider, MD   Multiple Vitamins-Minerals (CENTRUM SILVER 50+WOMEN PO) Take by mouth    Historical Provider, MD   niacin 500 mg ER capsule Take 500 mg by mouth daily at bedtime    Historical Provider, MD   nystatin (MYCOSTATIN) 500,000 units/5 mL suspension Apply to provox site twice daily 9/8/21   CHANEL Lawler   Omega-3 1000 MG CAPS Take 4 capsules by mouth daily     Historical Provider, MD   pantoprazole (PROTONIX) 40 mg tablet TAKE 1 TABLET(40 MG) BY MOUTH DAILY 5/12/21   Raya Sorenson DO   sertraline (ZOLOFT) 25 mg tablet  4/21/21   Historical Provider, MD   VENTOLIN  (90 Base) MCG/ACT inhaler  2/16/18   Historical Provider, MD     MEDICATIONS ADMINISTERED IN LAST 24 HOURS     Medication Administration - last 24 hours from 11/15/2022 0353 to 11/16/2022 0353       Date/Time Order Dose Route Action Action by     11/15/2022 1448 EST acetaminophen (TYLENOL) tablet 975 mg 975 mg Oral Not Given Alon Alaniz, LORIE     11/15/2022 1700 EST multi-electrolyte (ISOLYTE-S PH 7 4) bolus 1,000 mL 0 mL Intravenous Stopped Gilda Foster RN     11/15/2022 1539 EST multi-electrolyte (ISOLYTE-S PH 7 4) bolus 1,000 mL 1,000 mL Intravenous [address] Gilda Foster RN     11/15/2022 1654 EST fentanyl citrate (PF) 100 MCG/2ML 25 mcg 25 mcg Intravenous Given Gilda Foster RN     11/15/2022 1733 EST iohexol (OMNIPAQUE) 350 MG/ML injection (SINGLE-DOSE) 100 mL 85 mL Intravenous Given Carlene Rojas     11/15/2022 2203 EST multi-electrolyte (ISOLYTE-S PH 7 4) bolus 1,000 mL 0 mL Intravenous Stopped Mely John RN     11/15/2022 1914 EST multi-electrolyte (ISOLYTE-S PH 7 4) bolus 1,000 mL 1,000 mL Intravenous New Ian Chamberlain RN     11/15/2022 2004 EST ampicillin-sulbactam (UNASYN) 3 g in sodium chloride 0 9 % 100 mL IVPB 0 g Intravenous Stopped Pham Chamberlain RN     11/15/2022 1923 EST ampicillin-sulbactam (UNASYN) 3 g in sodium chloride 0 9 % 100 mL IVPB 3 g Intravenous New Bag Pham Chamberlain RN     11/15/2022 2033 EST sodium chloride 0 9 % infusion 150 mL/hr Intravenous Francisco John RN     11/15/2022 2206 EST heparin (porcine) subcutaneous injection 5,000 Units 5,000 Units Subcutaneous Given Mely John RN     11/15/2022 2022 EST sodium chloride 0 9 % bolus 1,000 mL 0 mL Intravenous Given to [address], RN     11/15/2022 2308 EST vancomycin (VANCOCIN) 1,250 mg in sodium chloride 0 9 % 250 mL IVPB 1,250 mg Intravenous New Ian John RN        CURRENT MEDICATIONS     Current Facility-Administered Medications   Medication Dose Route Frequency Provider Last Rate   • acetaminophen  650 mg Oral Q6H PRN Kateryna Guerra DO     • albuterol  2 5 mg Nebulization Q4H PRN Nellie Cisneros MD     • ampicillin-sulbactam  3 g Intravenous Q12H Kateryna Guerra, DO     • atorvastatin  40 mg Oral Daily With [address], DO     • clopidogrel  75 mg Oral Daily Lawrance , DO     • heparin (porcine)  5,000 Units Subcutaneous [facility] Lawrance , DO     • levothyroxine  100 mcg Oral Early Morning Lawrance , DO     • loratadine  5 mg Oral Daily Lawrance , DO     • sertraline  25 mg Oral Daily Lawrance , DO     • sodium chloride  150 mL/hr Intravenous Continuous Het D Gannon,  mL/hr (11/15/22 2033)   • vancomycin  12 5 mg/kg (Adjusted) Intravenous Q24H Junaid Fonseca, DO       sodium chloride, 150 mL/hr, Last Rate: 150 mL/hr (11/15/22 2033)      acetaminophen, 650 mg, Q6H PRN  albuterol, 2 5 mg, Q4H PRN        Admission Time  I spent 30 minutes admitting the patient  This involved direct patient contact where I performed a full history and physical, reviewing previous records, and reviewing laboratory and other diagnostic studies  Portions of the record may have been created with voice recognition software  Occasional wrong word or "sound a like" substitutions may have occurred due to the inherent limitations of voice recognition software    Read the chart carefully and recognize, using context, where substitutions have occurred     ==  Vannessa Jj, [address]  Internal Medicine Residency PGY-1

## 2022-11-16 NOTE — PROGRESS NOTES
Vancomycin Pharmacy Whitney Martines is an 81 y o  female who is currently receiving IV vancomycin for necrotizing aspiration PNA  Vancomycin Assessment:  1  ID Consult: No  2  Cultures:   11/15 FLU/RSV/COVID19: neg  11/15 Blood 2/2: in process  11/15 Urine Legionella: neg  11/15 Urine Strep: neg  11/15 MRSA: in process  11/15 Sputum: in process  3  Procalcitonin:   11/15: 49 95  4  Renal Function:   SCr: 1 01  CrCl: 29 mL/min  UOP: n/a  5  Days of Therapy: 2  6  Current Dose: 750 mg IV q24h  7  Last Level: n/a  8  Goal AUC(24h): 400-600  9  Goal Random/Trough: 10-15    Vancomycin Plan:  1  Evaluation: continue current regimen  2  New Dosing: continue 750 mg IV q24h  Predicted Trough / AUC(24h): 12 8 / 411  3  Next Level: random 11/17 at [address] will continue to follow closely for s/sx of nephrotoxicity, infusion reactions, and appropriateness of therapy  BMP and CBC will be ordered per protocol  We will continue to follow the patient’s culture results and clinical progress daily  Rafaela Perez, PharmD  Internal Medicine Clinical Pharmacist Specialist  [phone]  [facility]rect/Teams

## 2022-11-16 NOTE — ASSESSMENT & PLAN NOTE
· Known history of type 2 diabetes on metformin, hypertension on losartan  · Managed on Lipitor 40  · Most recent lipid panel with good control of LDL at 56    Plan  · Continue Lipitor 40mg QD

## 2022-11-16 NOTE — PROGRESS NOTES
INTERNAL MEDICINE RESIDENCY SENIOR ADMISSION NOTE     Name: Ti Alvarez   Age & Sex: 81 y o  female   MRN: [id number]  Unit/Bed#: [facility] 834-01   Encounter: [id number]  Primary Care Provider: Leni Fiore MD    Admit to team: SOD Team C     Patient seen and examined  Reviewed H&P per Dr Melinda Driscoll  Agree with the assessment and plan with any exception/addition as noted below:    Principal Problem:    Sepsis with acute hypoxic respiratory failure ([address] )  Active Problems:    Asymptomatic carotid artery stenosis    Hyperlipidemia    Hypertension    Laryngeal carcinoma (HCC)    CKD (chronic kidney disease) stage 3, GFR 30-59 ml/min    Hypercalcemia    Right lower lobe lung mass with right pleural effusion    Pancreatitis    Lung nodule seen on imaging study    Pleural effusion, right    Elevated serum creatinine    Anemia of chronic disease    Patient is a 61-year-old female with past medical history of tobacco abuse, laryngeal carcinoma status post tracheostomy, CKD stage 3b, hyperlipidemia, hypertension, aorto iliac occlusive disease, peripheral vascular disease with intermittent claudication, asymptomatic bilateral carotid stenosis secondary to radiation status post bilateral TCAR that presented to the emergency department on 11/15/2022 for abdominal and back pain  Also with associated headaches  Patient states is been going on for about the past month since he stopped taking vitamin-C  She also reports a recent 20-30 lb weight loss  On presentation, patient's vital signs were significant for tachycardia with heart rates in the 120s, tachypnea with respirations in the low 20s, borderline hypotension with systolics in the 77N that recovered with fluid resuscitation, and hypoxia that required trach collar at 5 L  Furthermore, physical exam significant for tachycardia with regular rhythm, FILIBERTO at RUSB, left upper quadrant abdominal tenderness, bilateral paraspinal tenderness, and rhonchi at the R lung base   However, the patient appears to be nontoxic  Laboratory investigations significant for multiple derangements, including:  Low bicarbonate 18, BUN elevated at 34, creatinine elevated from a baseline of 1 2 now to 1 47 with an EGFR of 32, corrected calcium 14 3 (previously 11 8 in September of 2022), alkaline phosphatase 167, hypoalbuminemia at 2 1 with a gamma gap of 5 6, lipase greater than 2000, procalcitonin 49 95, WBC 15 58 with a left shift, hemoglobin 11 1 (baseline around 12), and a uric acid of 10 4  Furthermore, imaging showed CT head with chronic microangiopathic changes  CT of the chest, abdomen, and pelvis with contrast revealed tracheostomy with secretions/debris in the upper trachea as well as the right mainstem bronchus and bronchus intermedius, moderate to severe emphysematous changes, large airspace consolidation in the right lower lobe with heterogeneous, somewhat lobulated hypodense component measuring 4 8 x 5 2 x 6 3 cm  In addition, there is a moderate right pleural effusion, a lobulated 7 x 6 mm left lower lobe nodule, and right hilar lymphadenopathy  Finally, the pancreas was seen with mild stranding, suggesting acute pancreatitis  Given these findings, there are multiple concerns, including:  Severe sepsis secondary to pneumonia with evidence of renal dysfunction and mild hypoxia, right lower lobe mass representing necrotizing pneumonia verses possible malignancy, moderate to large right pleural effusion, new left lower lobe nodule, hilar lymphadenopathy, metabolic derangements (most notably, hypercalcemia), anemia, and acute pancreatitis  The patient's hypercalcemia may be driven by possible malignancy and made worse by vitamin supplementation  Will hold vitamin supplements for now  Patient had previous PTH in September of 2022 that was low  Will recheck  Will also check vitamin-D labs   In anticipation of previous findings, will plan for PTHrP, iron panel, SPEP, and UPEP for the morning  Will treat with IV fluid hydration given sepsis and hypercalcemia  Suspect pancreatitis is secondary to hypercalcemia  Repeat electrolyte levels at 0200  May need bisphosphonate or calcitonin therapy in the future if not responsive to IV fluids  Will continue Unasyn and add vancomycin  If MRSA swab negative, can DC vancomycin  Low concern for Pseudomonas at this time; therefore, Unasyn giving adequate coverage, including anaerobes  Drip score of 2  Will check urine strep pneumo and Legionella  Respiratory protocol  Sputum culture ordered  Will consult pulmonology at this time  NPO at midnight in the event that the patient would go for procedure  Low threshold to involve ENT given tracheostomy, thoracic surgery given right lower lobe mass, medical Oncology if needed, ID if needed, and possibly palliative care once more information is obtained and if patient requires goals of care discussions  Plan to continue chronic medications, including Plavix for peripheral vascular disease and carotid stenosis, statin therapy, levothyroxine, Claritin, sertraline  Patient is confirmed level 3 DNR/DNI      Code Status: Level 3 - DNAR and DNI  Admission Status: INPATIENT   Disposition: Patient requires Level 2 Step Down   Expected Length of Stay: >2 midnights    Uvaldo Rodriguez DO, [address]  PGY-3, Internal Medicine  [facility]

## 2022-11-16 NOTE — CONSULTS
Vancomycin Pharmacy Consult      Vancomycin has been discontinued; Pharmacy will sign off now  Thank you for involving us in this patient's care  Please do not hesitate to reach back out to Pharmacy  Skip Jamison, PharmD  Internal Medicine Clinical Pharmacist Specialist  [phone]  [facility]paul/Teams

## 2022-11-16 NOTE — PLAN OF CARE
Problem: OCCUPATIONAL THERAPY ADULT  Goal: Performs self-care activities at highest level of function for planned discharge setting  See evaluation for individualized goals  Description: Treatment Interventions: ADL retraining, Functional transfer training, UE strengthening/ROM, Cognitive reorientation, Endurance training, Patient/family training, Equipment evaluation/education, Compensatory technique education, Activityengagement, Energy conservation          See flowsheet documentation for full assessment, interventions and recommendations  Note: Limitation: Decreased ADL status, Decreased Safe judgement during ADL, Decreased UE ROM, Decreased UE strength, Decreased endurance, Decreased cognition, Decreased self-care trans, Decreased high-level ADLs  Prognosis: Fair  Assessment: Pt is a 82 yo Female who [facility] on 11/15/2022 with generalized weakness and pain in back/stomach  Pt with diagnosis of sepsis with acute hypoxic respiratory failure, R lower lobe mass, and L pleural effusion  Pt  has a past medical history of Aortoiliac occlusive disease ([facility] ), 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 [facility] Carotid artery stenosis, Hyperlipidemia, Hypertension, PVD (peripheral vascular disease) ([facility] ), and Throat cancer ([facility] )  Pt greeted bedside for OT evaluation on 11/16/2022  Pt resides at [place] [address]  Pt reports being I with ADLs and completes functional mobility with RW  Pt reports having support for cleaning  Pt reports being a  +  Pt demonstrating the following occupational deficits: S with UB ADLs, min A with LB ADLs, S with bed mobility, min A with functional transfers, and min A with functional mobility with RW   Limitations that impact functional performance include decreased ADL status, decreased UE ROM, decreased UE strength, decreased safe judgement during ADLs, decreased cognition, decreased endurance, decreased self care transfers, decreased high level ADLs and pain  Occupational performance areas to address ADL retraining, functional transfer training, UE strengthening/ROM, endurance training, cognitive reorientation, Pt/caregiver education, equipment evaluation/education, compensatory technique education, energy conservation and activity engagement   Pt would benefit from continued skilled OT services while in hospital to maximize independence with ADLs  Will continue to follow Pt's progress  Pt would benefit from post acute rehabilitation services upon DC to maximize safety and independence with ADLs and functional tasks of choice       OT Discharge Recommendation: Home with home health rehabilitation

## 2022-11-16 NOTE — ASSESSMENT & PLAN NOTE
· Patient presented with abdominal pain, lightheadedness, intermittent shortness of breath, recent unexpected weight loss   · Met sepsis criteria on admission  · Lactate wnl but has significant hypercalcemia 14 2 on admission  · Patient is status post tracheostomy  · Completed cephalosporin 5 day course, completed azithromycin 3 day course    Plan:  · Completed 5 day course of antibiotics covering for CAP  · Follow up on blood cultures  Blood cultures x2 11/17 no growth 72 hours  · Blood cultures x1 11/15 grew Actinomyces   · 11/15 sputum culture grew S  Aureus with repeat sputum culture 11/16 negative  · Appreciate ID recommendations  Holding abx until after bronchoscopy/BAL planned for tomorrow 11/22  · Per ID, Postprocedure pending culture results would restart ceftriaxone 1 gm Q24H IV  If actinomyces pathogen not contaminant, will need prolonged therapy 4-6 weeks IV abx with additional PO penicillin 4-6 weeks additionally  If MSSA, recovered without actinomyces then additional cefazolin IV for at least 5 days  · Per pulm, bronch/BAL planned for tomorrow 11/22   · Continue trach collar oxygen as needed - on 6L this AM   · Trend WBC and fever curve  WBC 14 56  No fevers throughout hospital course at this time   · Hypotensive but cannot give fluids due to volume overload at this time  S/p 1x dose lasix 20 mg yesterday 11/20 - 500 ccs urine output last 24 hours  Will continue to monitor blood pressures, can give another dose of lasix if pressures improve before considering fluids

## 2022-11-16 NOTE — ASSESSMENT & PLAN NOTE
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

## 2022-11-16 NOTE — ASSESSMENT & PLAN NOTE
· Patient history of laryngeal carcinoma status post tracheostomy/laryngectomy on trach collar presented with fatigue; tachycardic on exam; labs notable for multiple derangements including hypercalcemia 14 3 and bicarb 18  · CT notable for nonspecific 7 x 6 mm lobulated left lower lobe nodule; new finding; CT imaging also revealing for multiple findings large heterogeneous airspace consolidation and right lower lobe with 6 x 5 x 5 lobulated hypodense component with moderate right-sided pleural effusion  concerning for necrotizing pneumonia  · Malignancy not entirely ruled out; does endorse decreased appetite, unexpected weight loss of up to 30 lb, and is a former smoker; quit 2002; hypercalcemia also noted on admission with recent outpatient PTH wnl  · 11/19 - repeat CT suggested of necrosis/abscess    Plan  · Inpatient pulmonology following, appreciate recs; 5 day course completed abx for suspecting necrotizing pneumonia with large heterogeneous airspace consolidation in right lower lobe  · ID consulted, appreciate reccs  Holding off abx for bronch/BAL planned tomorrow    · Repeat CT future to assess for improvement, outpatient

## 2022-11-16 NOTE — PROGRESS NOTES
Pastoral Care Progress Note    2022  Patient: Ti Alvarez : 1935  Admission Date & Time: 11/15/2022 1343  MRN: [id number] CSN: [id number]        Stopped in to intro self to patient, who needed a meal and some loose bandages attended to and asked me to advocate for her  I found a staff member to come attend to her

## 2022-11-16 NOTE — CASE MANAGEMENT
Case Management Discharge Planning Note    Patient name Edson Beasley  Location [facility] 834/[facility] 691-84 MRN [id number]  : 1935 Date 2022       Current Admission Date: 11/15/2022  Current Admission Diagnosis:Sepsis with acute hypoxic respiratory failure [facility])   Patient Active Problem List    Diagnosis Date Noted   • Hypothyroidism 2022   • Hypercalcemia 11/15/2022   • Right lower lobe lung mass with right pleural effusion 11/15/2022   • Pancreatitis 11/15/2022   • Lung nodule seen on imaging study 11/15/2022   • Sepsis with acute hypoxic respiratory failure ([facility] ) 11/15/2022   • Pleural effusion, right 11/15/2022   • Elevated serum creatinine 11/15/2022   • Anemia of chronic disease 11/15/2022   • Pain in right hip 2022   • SOB (shortness of breath) 2021   • JORDAN (acute kidney injury) ([facility] ) 2021   • Acute blood loss anemia 2021   • Elevated troponin 2021   • Acute on chronic kidney failure ([facility] ) 2021   • Tracheitis 2020   • Renovascular hypertension 2020   • CKD (chronic kidney disease) stage 3, GFR 30-59 ml/min 2019   • Tracheostomy care ([facility] ) 2019   • Laryngeal carcinoma ([facility] ) 2019   • Acute anterior circulation transient ischemic attack 10/30/2017   • 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 ([facility] ) 2016   • Aortoiliac occlusive disease ([facility] ) 2015   • Asymptomatic carotid artery stenosis 2013   • Hyperlipidemia 2013   • Hypertension 2013      LOS (days): 1  Geometric Mean LOS (GMLOS) (days): 5 00  Days to GMLOS:4 1     OBJECTIVE:  Risk of Unplanned Readmission Score: 15 81         Current admission status: Inpatient   Preferred Pharmacy:   [facility] [address]  [address] [facility] [address]  Phone: [phone] Fax: [address] [facility] - [address] [place] [address]  Phone: [phone] Fax: [phone]    Primary Care Provider: Joe Cabezas MD    Primary Insurance: MEDICARE  Secondary Insurance: [employer]    DISCHARGE DETAILS:    Discharge planning discussed with[de-identified] pt  Freedom of Choice: Yes  Comments - Freedom of Choice: cm discussed hhc rec  CM contacted family/caregiver?: No- see comments  Were Treatment Team discharge recommendations reviewed with patient/caregiver?: Yes  Did patient/caregiver verbalize understanding of patient care needs?: Yes  Were patient/caregiver advised of the risks associated with not following Treatment Team discharge recommendations?: Yes    Contacts  Reason/Outcome: Discharge [address]         Is the patient interested in [facility] at discharge?: Yes  [address] requested[de-identified] Occupational Therapy, Physical [address] Name[de-identified] [facility] Provider[de-identified] PCP  Home Health Services Needed[de-identified] Evaluate Functional Status and Safety  Homebound Criteria Met[de-identified] Uses an Assist Device (i e  cane, walker, etc)  Supporting Clincal Findings[de-identified] Fatigues Easliy in Short Distances                   Treatment Team Recommendation: Home with [address] [employer]  Discharge Destination Plan[de-identified] Home with [address] [facility]

## 2022-11-16 NOTE — PLAN OF CARE
Problem: Potential for Falls  Goal: Patient will remain free of falls  Description: INTERVENTIONS:  - Educate patient/family on patient safety including physical limitations  - Instruct patient to call for assistance with activity   - Consult OT/PT to assist with strengthening/mobility   - Keep Call bell within reach  - Keep bed low and locked with side rails adjusted as appropriate  - Keep care items and personal belongings within reach  - Initiate and maintain comfort rounds  - Make Fall Risk Sign visible to staff  - Offer Toileting every 2 Hours, in advance of need  - Initiate/Maintain alarm  - Obtain necessary fall risk management equipment:   - Apply yellow socks and bracelet for high fall risk patients  - Consider moving patient to room near nurses station  Outcome: Progressing     Problem: PAIN - ADULT  Goal: Verbalizes/displays adequate comfort level or baseline comfort level  Description: Interventions:  - Encourage patient to monitor pain and request assistance  - Assess pain using appropriate pain scale  - Administer analgesics based on type and severity of pain and evaluate response  - Implement non-pharmacological measures as appropriate and evaluate response  - Consider cultural and social influences on pain and pain management  - Notify physician/advanced practitioner if interventions unsuccessful or patient reports new pain  Outcome: Progressing     Problem: INFECTION - ADULT  Goal: Absence or prevention of progression during hospitalization  Description: INTERVENTIONS:  - Assess and monitor for signs and symptoms of infection  - Monitor lab/diagnostic results  - Monitor all insertion sites, i e  indwelling lines, tubes, and drains  - Monitor endotracheal if appropriate and nasal secretions for changes in amount and color  - [place] appropriate cooling/warming therapies per order  - Administer medications as ordered  - Instruct and encourage patient and family to use good hand hygiene technique  - Identify and instruct in appropriate isolation precautions for identified infection/condition  Outcome: Progressing  Goal: Absence of fever/infection during neutropenic period  Description: INTERVENTIONS:  - Monitor WBC    Outcome: Progressing     Problem: SAFETY ADULT  Goal: Patient will remain free of falls  Description: INTERVENTIONS:  - Educate patient/family on patient safety including physical limitations  - Instruct patient to call for assistance with activity   - Consult OT/PT to assist with strengthening/mobility   - Keep Call bell within reach  - Keep bed low and locked with side rails adjusted as appropriate  - Keep care items and personal belongings within reach  - Initiate and maintain comfort rounds  - Make Fall Risk Sign visible to staff  - Offer Toileting every Hours, in advance of need  - Initiate/Maintain alarm  - Obtain necessary fall risk management equipment:   - Apply yellow socks and bracelet for high fall risk patients  - Consider moving patient to room near nurses station  Outcome: Progressing  Goal: Maintain or return to baseline ADL function  Description: INTERVENTIONS:  -  Assess patient's ability to carry out ADLs; assess patient's baseline for ADL function and identify physical deficits which impact ability to perform ADLs (bathing, care of mouth/teeth, toileting, grooming, dressing, etc )  - Assess/evaluate cause of self-care deficits   - Assess range of motion  - Assess patient's mobility; develop plan if impaired  - Assess patient's need for assistive devices and provide as appropriate  - Encourage maximum independence but intervene and supervise when necessary  - Involve family in performance of ADLs  - Assess for home care needs following discharge   - Consider OT consult to assist with ADL evaluation and planning for discharge  - Provide patient education as appropriate  Outcome: Progressing  Goal: Maintains/Returns to pre admission functional level  Description: INTERVENTIONS:  - Perform BMAT or MOVE assessment daily    - Set and communicate daily mobility goal to care team and patient/family/caregiver  - Collaborate with rehabilitation services on mobility goals if consulted  - Perform Range of Motion 3 times a day  - Reposition patient every 2 hours  - Dangle patient 3 times a day  - Stand patient 3 times a day  - Ambulate patient 3 times a day  - Out of bed to chair 3 times a day   - Out of bed for meals 3 times a day  - Out of bed for toileting  - Record patient progress and toleration of activity level   Outcome: Progressing     Problem: DISCHARGE PLANNING  Goal: Discharge to home or other facility with appropriate resources  Description: INTERVENTIONS:  - Identify barriers to discharge w/patient and caregiver  - Arrange for needed discharge resources and transportation as appropriate  - Identify discharge learning needs (meds, wound care, etc )  - Arrange for interpretive services to assist at discharge as needed  - Refer to Case Management Department for coordinating discharge planning if the patient needs post-hospital services based on physician/advanced practitioner order or complex needs related to functional status, cognitive ability, or social support system  Outcome: Progressing     Problem: Knowledge Deficit  Goal: Patient/family/caregiver demonstrates understanding of disease process, treatment plan, medications, and discharge instructions  Description: Complete learning assessment and assess knowledge base    Interventions:  - Provide teaching at level of understanding  - Provide teaching via preferred learning methods  Outcome: Progressing

## 2022-11-17 PROBLEM — R78.81 BACTEREMIA: Status: ACTIVE | Noted: 2022-01-01

## 2022-11-17 NOTE — PLAN OF CARE
Problem: PHYSICAL THERAPY ADULT  Goal: Performs mobility at highest level of function for planned discharge setting  See evaluation for individualized goals  Description: Treatment/Interventions: ADL retraining, Functional transfer training, LE strengthening/ROM, Therapeutic exercise, Endurance training, Patient/family training, Equipment eval/education, Bed mobility, Gait training, Spoke to nursing, Spoke to case management, Spoke to advanced practitioner, OT  Equipment Recommended: [place]       See flowsheet documentation for full assessment, interventions and recommendations  Outcome: Progressing  Note: Prognosis: Good  Problem List: Decreased strength, Decreased endurance, Impaired balance, Decreased mobility, Decreased skin integrity  Assessment: Pt seen for PT treatment session w/ focus on t/f training, gait training, + ther ex instruction  Pt w/ increased gait distance this session, however notably fatigued + w/ desaturation + tachycardia w/ gait training  Pt required extended rest break (returned after 15 min) prior to return for ther ex instruction  Pt able to complete ther ex w/ cues for pacing, notably fatigued but no desaturation w/ ther ex  From a PT standpoint continue to recommend HHPT upon d/c      Barriers to Discharge Comments: lives alone- in [address] [place] [address] Road  PT Discharge Recommendation: Home with home health rehabilitation    See flowsheet documentation for full assessment

## 2022-11-17 NOTE — QUICK NOTE
Notified by nursing that the patient was having possible lower chest pain versus upper abdominal pain  On evaluation, the patient was not in any acute distress, still tachycardic, with 1+ bilateral pitting edema, and mild tenderness in the epigastric region  Patient is being treated for pancreatitis; however, given the patient's history and that malignancy has been ruled out, will evaluate with another 12 lead EKG, troponins, and chest x-ray to rule out cardiac pathology  Pulmonary embolism is low on the differential given where the patient's pain is and compliance with DVT prophylaxis, but will evaluate for EKG changes such as S1 Q 3 T3, and chest x-ray finding such as Westmark's sign or Duffy's hump in the setting of her still being worked up for malignancy  Will modify diet back to clear liquid, give 500 mL bolus of normal saline, and up titrate patient's pain regimen  Will continue to monitor and reassess for any changes in plan, such as CTA PE study      Laron Canavan, DO, [address]  PGY-3, Internal Medicine  [facility]

## 2022-11-17 NOTE — CASE MANAGEMENT
Case Management Discharge Planning Note    Patient name Mike Mc  Location [facility] 834/[facility] 722-81 MRN [id number]  : 1935 Date 2022       Current Admission Date: 11/15/2022  Current Admission Diagnosis:Sepsis with acute hypoxic respiratory failure [facility])   Patient Active Problem List    Diagnosis Date Noted   • Hypothyroidism 2022   • Hypercalcemia 11/15/2022   • Right lower lobe lung mass with right pleural effusion 11/15/2022   • Pancreatitis 11/15/2022   • Lung nodule seen on imaging study 11/15/2022   • Sepsis with acute hypoxic respiratory failure ([facility] ) 11/15/2022   • Pleural effusion, right 11/15/2022   • Elevated serum creatinine 11/15/2022   • Anemia of chronic disease 11/15/2022   • Pain in right hip 2022   • SOB (shortness of breath) 2021   • JORDAN (acute kidney injury) ([facility] ) 2021   • Acute blood loss anemia 2021   • Elevated troponin 2021   • Acute on chronic kidney failure ([facility] ) 2021   • Tracheitis 2020   • Renovascular hypertension 2020   • CKD (chronic kidney disease) stage 3, GFR 30-59 ml/min 2019   • Tracheostomy care ([facility] ) 2019   • 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   • Acute anterior circulation transient ischemic attack 10/30/2017   • 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 ([facility] ) 2016   • Aortoiliac occlusive disease ([facility] ) 2015   • Asymptomatic carotid artery stenosis 2013   • Hyperlipidemia 2013   • Hypertension 2013      LOS (days): 2  Geometric Mean LOS (GMLOS) (days): 5 00  Days to GMLOS:3 3     OBJECTIVE:  Risk of Unplanned Readmission Score: 16 65         Current admission status: Inpatient   Preferred Pharmacy:   [facility] [address]  [address]  [facility] [address]  Phone: [phone] Fax: [place] [facility] - [address] [place] [address]  Phone: [phone] Fax: [phone]    Primary Care Provider: Leni Fiore MD    Primary Insurance: MEDICARE  Secondary Insurance: [employer]    DISCHARGE DETAILS:                                                                                                 Additional Comments: Pt is not a candidate for heal at home  PCP no [facility]

## 2022-11-17 NOTE — PHYSICAL THERAPY NOTE
Physical Therapy Treatment Note    Patient's Name: John Vincent  : 22 1340   PT Last Visit   PT Visit Date 22   Note Type   Note Type Treatment   Pain Assessment   Pain Assessment Tool 0-10   Pain Score No Pain   Restrictions/Precautions   Weight Bearing Precautions Per Order No   Other Precautions Multiple lines; Fall Risk  (trach w/ trach collar, speaking valve)   General   Chart Reviewed Yes   Additional Pertinent History Treatment session in split-session: 12:55-13:10 and 13:25-13:40  Response to Previous Treatment Patient with no complaints from previous session  Family/Caregiver Present No   Subjective   Subjective Pt agreeable to mobilize  Bed Mobility   Supine to Sit Unable to assess   Sit to Supine Unable to assess   Additional Comments Pt greeted seated EOB  Transfers   Sit to Stand 5  Supervision   Stand to Sit 5  Supervision   Additional Comments RW   Ambulation/Elevation   Gait pattern Excessively slow; Short stride   Gait Assistance 5  Supervision   Additional items Verbal cues   Assistive Device Rolling walker   Distance 90'x2 w/ standing rest   Balance   Static Sitting Good   Dynamic Sitting Fair +   Static Standing Fair   Dynamic Standing Fair -   Ambulatory Fair -  (RW)   Endurance Deficit   Endurance Deficit Yes   Endurance Deficit Description MINER, desaturation to 81% w/ ambulation, HR increased to 150, improved w/ seated rest   Activity Tolerance   Activity Tolerance Patient limited by fatigue   Nurse Made Aware yes - cleared for PT   Exercises   Hip Abduction Sitting;20 reps;AROM; Bilateral   Hip Adduction Sitting;20 reps;AROM; Bilateral   Knee AROM Long Arc Quad Sitting;20 reps;AROM; Bilateral   Ankle Pumps Standing;20 reps;AROM; Bilateral  (heel raises w/ RW)   Marching Standing;20 reps;AROM; Bilateral  (w/ RW)   Assessment   Prognosis Good   Problem List Decreased strength;Decreased endurance; Impaired balance;Decreased mobility; Decreased skin integrity Assessment Pt seen for PT treatment session w/ focus on t/f training, gait training, + ther ex instruction  Pt w/ increased gait distance this session, however notably fatigued + w/ desaturation + tachycardia w/ gait training  Pt required extended rest break (returned after 15 min) prior to return for ther ex instruction  Pt able to complete ther ex w/ cues for pacing, notably fatigued but no desaturation w/ ther ex  From a PT standpoint continue to recommend HHPT upon d/c  Goals   Patient Goals get better   PT Treatment Day 1   Plan   Treatment/Interventions Functional transfer training;LE strengthening/ROM; Therapeutic exercise; Endurance training;Patient/family training;Equipment eval/education; Bed mobility;Gait training; Compensatory technique education;Spoke to nursing  (balance training)   Progress Progressing toward goals   PT Frequency 3-5x/wk   Recommendation   PT Discharge Recommendation Home with home health rehabilitation   Equipment Recommended   (pt already has rollator)   [facility] [address] Mobility Inpatient   Turning in Bed Without Bedrails 4   Lying on Back to Sitting on Edge of Flat Bed 4   Moving Bed to Chair 3   Standing Up From Chair 3   Walk in Room 3   Climb 3-5 Stairs 3   Basic Mobility Inpatient Raw Score 20   Basic Mobility Standardized Score 43 99   Highest Level Of Mobility   JH-HLM Goal 6: Walk 10 steps or more   JH-HLM Achieved 7: Walk 25 feet or more   Education   Education Provided Mobility training;Assistive device;Home exercise program   Patient Demonstrates acceptance/verbal understanding;Reinforcement needed   End of Consult   Patient Position at End of Consult Seated edge of bed; All needs within reach  (all lines in tact)     Blaine Lombardi, PT, DPT

## 2022-11-17 NOTE — BRIEF OP NOTE (RAD/CATH)
IR THORACENTESIS Procedure Note    PATIENT NAME: Alyce Barnard  : 1935  MRN: [id number]    Pre-op Diagnosis:   1  Necrotizing pneumonia ([address] )    2  Hypercalcemia    3  Pleural effusion on right    4  Generalized weakness    5  Abdominal pain    6  Hypoxemia requiring supplemental oxygen    7  Pancreatitis    8  Sepsis ([address] )      Post-op Diagnosis:   1  Necrotizing pneumonia ([address] )    2  Hypercalcemia    3  Pleural effusion on right    4  Generalized weakness    5  Abdominal pain    6  Hypoxemia requiring supplemental oxygen    7  Pancreatitis    8   Sepsis [facility])        Surgeon:   Jamie Gutiérrez  Assistants:     No qualified resident was available, Resident is only observing    Estimated Blood Loss: none  Findings: 900 ml clear yellow pleural fluid, Right    Specimens: sent as requested    Complications:  None immediate    Anesthesia: yan Cedeno Prior     Date: 2022  Time: 7:58 AM

## 2022-11-17 NOTE — PROGRESS NOTES
PULMONOLOGY PROGRESS NOTE     Name: Moriah Marcum   Age & Sex: 81 y o  female   MRN: [id number]  Unit/Bed#: [facility] 834-01   Encounter: [id number]    PATIENT INFORMATION     Name: Moriah Marcum   Age & Sex: 81 y o  female   MRN: [id number]  Hospital Stay Days: 2    ASSESSMENT/PLAN     Assessment:   1  Acute on chronic respiratory failure with hypoxia - Improving  2  Abnormal chest CT with RLL consolidation  3  Right-sided pleural effusion - s/p thoracentesis on  with neutrophilic predominant transudate  4  LLL nodule  5  Emphysema with possible underlying COPD - not in acute exacerbation  6  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  Plan:  • Wean FiO2 as tolerated to maintain SpO2 > 88%  • Antibiotics per primary team  • Continue to follow culture results from thoracentesis  • Repeat CT chest in 6-8 weeks to confirm resolution  • Continue bronchodilators nebulizer therapy via tracheostomy to help mobilizing secretions and continue pulmonary toilet  • Rest of care per primary team       SUBJECTIVE     Patient seen and examined  No acute events overnight  She states she feels better and is eager to be discharged  She denies any worsening shortness of breath or chest pain  Reports mild epigastric pain and tenderness  OBJECTIVE     Vitals:    22 0251 22 0317 22 0704 22 0736   BP: 129/77 98/50  107/58   Pulse: (!) 129 (!) 125  (!) 125   Resp: 20   20   Temp: 97 9 °F (36 6 °C)   97 7 °F (36 5 °C)   TempSrc:       SpO2: 95% 91% 91% (!) 89%   Height:          Temperature:   Temp (24hrs), Av 9 °F (36 6 °C), Min:97 2 °F (36 2 °C), Max:98 4 °F (36 9 °C)    Temperature: 97 7 °F (36 5 °C)  Intake & Output:  I/O       11/15 0701   0700  0701   07 07 0700    P  O   120     I V  2417 5      Total Intake 2417 5 120     Urine 600 400     Other  900     Total Output 600 1300     Net +1817 5 -1180 Weights:        Body mass index is 29 82 kg/m²  Weight (last 2 days)     None        Physical Exam  Vitals and nursing note reviewed  Constitutional:       General: She is not in acute distress  Appearance: She is not ill-appearing or toxic-appearing  HENT:      Head: Normocephalic  Eyes:      General: No scleral icterus  Pupils: Pupils are equal, round, and reactive to light  Neck:      Trachea: Tracheostomy present  Cardiovascular:      Rate and Rhythm: Normal rate and regular rhythm  Heart sounds: No murmur heard  No gallop  Pulmonary:      Effort: Pulmonary effort is normal  No respiratory distress  Breath sounds: Decreased breath sounds present  No wheezing, rhonchi or rales  Abdominal:      General: Bowel sounds are normal  There is no distension  Palpations: Abdomen is soft  Tenderness: There is abdominal tenderness (epigastric)  There is no guarding  Musculoskeletal:      Cervical back: Normal range of motion  Right lower leg: No edema  Left lower leg: No edema  Skin:     General: Skin is warm  Capillary Refill: Capillary refill takes less than 2 seconds  Neurological:      Mental Status: She is alert and oriented to person, place, and time  Mental status is at baseline  Cranial Nerves: No cranial nerve deficit  Psychiatric:         Mood and Affect: Mood normal            LABORATORY DATA     Labs: I have personally reviewed pertinent reports    Results from last 7 days   Lab Units 11/17/22  0406 11/16/22  0632 11/15/22  1427   WBC Thousand/uL 11 93* 12 57* 15 58*   HEMOGLOBIN g/dL 9 6* 10 3* 11 1*   HEMATOCRIT % 31 8* 32 2* 36 2   PLATELETS Thousands/uL 342 388 383   NEUTROS PCT % 85* 87*  --    MONOS PCT % 7 6  --    MONO PCT %  --   --  6      Results from last 7 days   Lab Units 11/17/22  0406 11/16/22  1626 11/16/22  0632   POTASSIUM mmol/L 3 8 3 9 3 8   CHLORIDE mmol/L 114* 113* 113*   CO2 mmol/L 19* 19* 20*   BUN mg/dL 21 23 24   CREATININE mg/dL 0 88 0 95 0 93   CALCIUM mg/dL 11 6* 12 0* 10 9*   ALK PHOS U/L 122* 151* 137*   ALT U/L 21 22 18   AST U/L 24 22 19              Results from last 7 days   Lab Units 11/15/22  1913   INR  1 01   PTT seconds 31     Results from last 7 days   Lab Units 11/15/22  1913   LACTIC ACID mmol/L 1 1             ABG:       Micro:   Results from last 7 days   Lab Units 11/16/22  1416 11/15/22  2025 11/15/22  2003 11/15/22  1913   BLOOD CULTURE   --   --   --  No Growth at 24 hrs  GRAM STAIN RESULT  Rare Epithelial Cells  4+ Polys  No bacteria seen 1+ Polys*  Rare Gram positive cocci in pairs, chains and clusters*  --  Gram positive cocci in clusters*   LEGIONELLA URINARY ANTIGEN   --   --  Negative  --    STREP PNEUMONIAE ANTIGEN, URINE   --   --  Negative  --          IMAGING & DIAGNOSTIC TESTING     Radiology Results: I have personally reviewed pertinent reports  XR chest portable    Result Date: 11/17/2022  Impression: Near complete drainage of right effusion with trace residual effusion  Masslike opacity in the right lower lobe better shown on CT  Mild pulmonary venous congestion  Workstation performed: [id number]     CT head wo contrast    Result Date: 11/15/2022  Impression: No acute intracranial abnormality  Chronic microangiopathic changes  Workstation performed: [id number]     PE Study with CT Abdomen and Pelvis with contrast    Result Date: 11/15/2022  Impression: No central, lobar, segmental or proximal subsegmental pulmonary embolism  Evaluation of the distal subsegmental pulmonary arteries is limited  Large, heterogeneous airspace consolidation in the right lower lobe, with a 6 3 x 5 2 x 4 8 cm somewhat lobulated hypodense component with a small droplet of air  This likely represents a necrotizing aspiration pneumonia, given mucous plugging throughout the right lower lobe and retained secretions/debris in the right mainstem bronchus and bronchus intermedius    Recommend follow-up chest CT in approximately 3 months to ensure resolution as an underlying neoplasm may have a similar appearance  Nonspecific 7 x 6 mm lobulated left lower lobe nodule  Attention on follow-up  Moderate right pleural effusion  No enhancement of the pleura to suggest empyema  Mediastinal and right hilar lymphadenopathy, likely reactive, however metastatic disease is not entirely excluded  Mild stranding around the proximal pancreas suggestive of acute interstitial pancreatitis  Recommend correlation with lipase level  No organized peripancreatic fluid collections, loss of parenchymal enhancement to suggest necrosis, or vascular complications of pancreatitis  No other acute abdominal or pelvic pathology  Multiple additional findings as above  The study was marked in [facility] for immediate notification  Workstation performed: [id number]     Other Diagnostic Testing: I have personally reviewed pertinent reports      ACTIVE MEDICATIONS     Current Facility-Administered Medications   Medication Dose Route Frequency   • acetaminophen (TYLENOL) tablet 650 mg  650 mg Oral Q6H PRN   • albuterol inhalation solution 2 5 mg  2 5 mg Nebulization Q4H PRN   • atorvastatin (LIPITOR) tablet 40 mg  40 mg Oral Daily With Dinner   • azithromycin (ZITHROMAX) tablet 500 mg  500 mg Oral Q24H   • cefTRIAXone (ROCEPHIN) 1,000 mg in dextrose 5 % 50 mL IVPB  1,000 mg Intravenous Q24H   • clopidogrel (PLAVIX) tablet 75 mg  75 mg Oral Daily   • heparin (porcine) subcutaneous injection 5,000 Units  5,000 Units Subcutaneous Q8H [facility]   • HYDROmorphone HCl (DILAUDID) injection 0 2 mg  0 2 mg Intravenous Q6H PRN   • levothyroxine tablet 100 mcg  100 mcg Oral Early Morning   • loratadine (CLARITIN) tablet 5 mg  5 mg Oral Daily   • oxyCODONE (ROXICODONE) IR tablet 2 5 mg  2 5 mg Oral Q6H PRN    Or   • oxyCODONE (ROXICODONE) IR tablet 5 mg  5 mg Oral Q6H PRN   • sertraline (ZOLOFT) tablet 25 mg  25 mg Oral Daily   • zoledronic acid (ZOMETA) 3 mg in sodium chloride 0 9 % 100 mL IVPB  3 mg Intravenous Once       VTE Pharmacologic Prophylaxis: Heparin  VTE Mechanical Prophylaxis: sequential compression device      Disclaimer: Portions of the record may have been created with voice recognition software  Occasional wrong word or "sound a like" substitutions may have occurred due to the inherent limitations of voice recognition software  Careful consideration should be taken to recognize, using context, where substitutions have occurred      Ivan Giron MD   Pulmonary and Critical Care Fellow, PGY-4  [facility]

## 2022-11-17 NOTE — PROGRESS NOTES
INTERNAL MEDICINE RESIDENCY PROGRESS NOTE     Name: Selwyn Quinones   Age & Sex: 81 y o  female   MRN: [id number]  Unit/Bed#: [facility] 834-01   Encounter: [id number]  Team: SOD Team C     PATIENT INFORMATION     Name: Selwyn Quinones   Age & Sex: 81 y o  female   MRN: [id number]  Hospital Stay Days: 2    ASSESSMENT/PLAN     Principal Problem:    Sepsis with acute hypoxic respiratory failure ([facility] )  Active Problems:    Asymptomatic carotid artery stenosis    Hyperlipidemia    Hypertension    Laryngeal carcinoma (HCC)    CKD (chronic kidney disease) stage 3, GFR 30-59 ml/min    JORDAN (acute kidney injury) ([facility] )    Hypercalcemia    Right lower lobe lung mass with right pleural effusion    Pancreatitis    Lung nodule seen on imaging study    Anemia of chronic disease    Hypothyroidism    Bacteremia      * Sepsis with acute hypoxic respiratory failure (HCC)  Assessment & Plan  · Patient presented with abdominal pain, lightheadedness, intermittent shortness of breath, recent unexpected weight loss   · Met sepsis criteria on admission with heart rate 90, RR 20, elevated WBC at 15 5, with suspected pleural source of infection as noted on CT imaging; procalc also elevated; PNA vs malignancy cannot be ruled out  · No signs of end-organ damage at this moment; creatinine slightly elevated 1 47 from baseline (1 1-1 4)  · Lactate wnl but has significant hypercalcemia 14 2 on admission  · elevated WBC and procalc may be 2/2 dehydration on arrival vs  reactive process  · Requiring trach collar oxygen 5 L, not on home O2  · Was initiated on IV Unasyn and received aggressive hydration with 1L bolus isolyte x2 and 1L bolus NS in the ED  · Patient is status post tracheostomy and based on anatomy is not at risk for aspiration therefore pneumonia likely secondary to CAP pathogens    Plan:  · Will continue ceftriaxone and azithromycin  · mIVF NS at 100cc/hr, will require aggressive hydration to to treat hypercalcemia  · Judicious use of IV hydration given TTE in 9/2021 notable for hyperdynamic systolic function EF 22% with moderate aortic stenosis, grade 1 diastolic dysfunction  · Continue trach collar oxygen as needed  · Will continue monitor CBC    Bacteremia  Assessment & Plan  1/2 blood cultures grew Gram-positive cocci in clusters  Patient does not appear to be acutely bacteremic based on clinical appearance, labs, and vitals    Plan: May be contaminated but will continue treatment with IV ceftriaxone  Awaiting results from 2nd culture    Hypothyroidism  Assessment & Plan  · Known history since 11/2021  · Most recent TSH 4 2 in 08/2022  · Managed on levothyroxine 100 mcg  · Repeat TSH within normal limits    Plan  · Continue home dose levothyroxine    Anemia of chronic disease  Assessment & Plan  · Known history, in setting of CKD stage IIIB  · Follows nephrology outpatient (Dr Carmine Christine)  · Hgb 11 1 on admission, at baseline     · Currently hemodynamically stable    Plan:  · Trend CBC, maintain hemoglobin over 7        Lung nodule seen on imaging study  Assessment & Plan  · Patient history of laryngeal carcinoma status post tracheostomy/laryngectomy on trach collar presented with fatigue; tachycardic on exam; labs notable for multiple derangements including hypercalcemia 14 3 and bicarb 18  · CT notable for nonspecific 7 x 6 mm lobulated left lower lobe nodule; new finding; CT imaging also revealing for multiple findings large heterogeneous airspace consolidation and right lower lobe with 6 x 5 x 5 lobulated hypodense component with moderate right-sided pleural effusion  concerning for necrotizing pneumonia  · Malignancy not entirely ruled out; does endorse decreased appetite, unexpected weight loss of up to 30 lb, and is a former smoker; quit 2002; hypercalcemia also noted on admission with recent outpatient PTH wnl    Plan  · Inpatient pulmonology following, appreciate recs  · Repeat CT future to assess for improvement    Pancreatitis  Assessment & Plan  · Likely secondary to hypercalcemia  · Presented with abdominal pain; labs notable for elevated lipase >2000, elevated WBC  · CT imaging suggestive pancreatitis  · Patient has elevated alk-phos with normal T bili and asymptomatic abnormal biliary strictures on imaging  · Patient reports improved appetite and improved abdominal pain    Plan:  · Supportive care with analgesics or antiemetics as needed; renal function at baseline, ECG with normal QTC  · Will advance diet as patient reports improvement  · Continued monitoring of vitals, O2 sats, urine output with goal > 0 5-1 mL/kg/hr  · Repeat a m  CBC and BMP q 12 to monitor for adequacy fluid resuscitation in tissue perfusion  · Replete electrolytes as indicated        Right lower lobe lung mass with right pleural effusion  Assessment & Plan  · Patient history of laryngeal carcinoma status post tracheostomy/laryngectomy who presented to ED with tachycardia, hypoxia, fatigue  · CBC with elevated WBC 15 58; procalcitonin 49 5  · CTA chest notable for significant findings including  · Large, heterogeneous airspace consolidation in the right lower lobe, with a 6 3 x 5 2 x 4 8 cm somewhat lobulated hypodense component likely a necrotizing aspiration pneumonia  · Moderate right pleural effusion  No enhancement of the pleura to suggest empyema  · Mediastinal and right hilar lymphadenopathy, likely reactive, however metastatic disease is not entirely excluded    · Initiated IV Unasyn 3 g in the ED and transitioned to ceftriaxone given low suspicion of aspiration  · Legionella and strep antigens negative  · Status post 900 mL fluid thoracentesis suggestive of transudative fluid based on light's criteria  · Blood cultures 1/2 group Gram-positive cocci clusters       Plan:  · Will discontinue ceftriaxone and azithromycin  · Await blood cultures   · Will obtain Sputum culture via tracheostomy   · Respiratory protocol  · Will consult  IR for thoracentesis to treat large right pleural effusion as noted on imaging  · NPO at midnight in the event of possible bronchoscopy tomorrow  · Inpatient consultation to pulmonology, appreciate recs  · Follow-up chest CT in 3 months as an outpatient to ensure resolution as an underlying neoplasm may have a similar appearance  Hypercalcemia  Assessment & Plan  · In setting of CKD stage 3B, excessive use of vitamin-D and calcium supplementation at home per patient  · Further evidenced by elevated corrected calcium 14 2; supported by initial presentation with abdominal pain, fatigue, decreased appetite/poor PO intake; mentation at baseline, nontoxic on exam  · ECG without arrhythmia  · Further ED work up notable for multiple derangements including Hgb 11 1, low bicarb 18, elevated BUN 34, elevated creatinine 1 47 (baseline 1 11 4), GFR 32, , hypoalbuminemia 2 1; elevated lipase 2028, uric acid 10 4  · CT imaging findings concerning for acute pancreatitis,  necrotizing pneumonia vs  malignancy  · Etiology multifactorial, possibly due to hypercalcemia malignancy in light of CT findings, further complicated by home vitamin-D supplementation; med rec without any other causative agents  Bone demineralization also possible given her age  · Per chart review, patient noted to have elevated corrected calcium level since 08/2022 however does not appear to have been further worked up by PCP  · Unlikely primary parathyroidism or tertiary cause given PTH in 09/2022 was low; baseline renal function with CKD III    · Calcium continues to be elevated 13 3    Plan:  · PTH, pendingSPEP and UPEP levels pending to r/o multiple myeloma  · Vitamin-D 1,25 level to r/o vitamin d toxicity  · PTHrP to assess hypercalcemia of malignancy   · Will cut down IV fluids given patient is beginning to develop fluid overload with aggressive fluid resuscitation  · Start zoledronic acid given hypercalcemia and refractory did IV fluids  · Trend BMP to assess response      JORDAN (acute kidney injury) ([facility] )  Assessment & Plan  Creatinine of 1 47 on admission treated with IV fluid boluses, 3L total, NSS at 100ml/hr, renal function assessment and lab monitoring  Pt has CKD 3 and noted baseline of 1-1 1  Cr improved to 1 01> 0 93    Plan:   Will continue monitor creatinine and avoid nephrotoxic agents  Blood sugar adequate hydration and hemodynamics        CKD (chronic kidney disease) stage 3, GFR 30-59 ml/min  Assessment & Plan  Lab Results   Component Value Date    EGFR 59 11/17/2022    EGFR 54 11/16/2022    EGFR 55 11/16/2022    CREATININE 0 88 11/17/2022    CREATININE 0 95 11/16/2022    CREATININE 0 93 11/16/2022     · Known history of CKD stage III states 2014  · Follows [facility] nephrology (Dr Keshawn Galarza)  · Cr on admission slightly elevated 1 47(baseline 1 1-1 4); likely 2/2 dehydration; appears volume down on exam  · Etiology likely 2/2 long history of hypertension, nephrosclerosis, age-related nephron loss     Plan  · mIVF at 100cc/hr  · Trend BMP  · Avoid use of nephrotoxic agents      Laryngeal carcinoma (HCC)  Assessment & Plan  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  · Follows with ENT as an outpatient with Dr Cady Mendoza; most recently evaluated for trich follow-up in 10/2022     Plan:  · Siria Nims in place, functional well, at baseline  · Continue trach collar oxygen, on 5 L    Hypertension  Assessment & Plan  · Known history, maintained on losartan 50 mg daily at home  · Follows PCP outpatient      Plan:  · BP on the low side with systolic high 54H to 93A on admission  · Will hold home dose losartan at this time    Hyperlipidemia  Assessment & Plan  · Known history of type 2 diabetes on metformin, hypertension on losartan  · Managed on Lipitor 40  · Most recent lipid panel with good control of LDL at 64    Plan  · Continue Lipitor 40    Asymptomatic carotid artery stenosis  Assessment & Plan  · Known hx, follows with vascular surgery (Dr Sahil Richard)  · Previously found to have recurrent left ICA stenosis and critical new occlusive right IC stenosis, with a which were asymptomatic, along with left vertebral artery atresia; previously worked up in outpatient setting by mass concerning symptoms and was recommended endovascular intervention  · On 2020, patient electively [place] [facility] [place] she underwent ultrasound-guided right CFA puncture with sternal closure, aortic arch arteriogram, left carotid during him with PTA and stenting under the care of Dr Brown  · Maintained on antiplatelet and anti-statin therapy with Plavix and Lipitor     Plan:  · Continue home dose Plavix and Lipitor        Disposition:  Actively treating pancreatitis, pneumonia, hypercalcemia, and possible bacteremia  SUBJECTIVE     Patient seen and examined  Patient reported abdominal pain overnight which she reports now resolved  He she states she has good appetite and no longer feels nauseous  She denies any cough, , fevers, nausea, shortness of breath, and chest pain  Review of Systems   Constitutional: Negative for chills and fever  Respiratory: Negative for cough, shortness of breath and wheezing  Cardiovascular: Negative for chest pain  Gastrointestinal: Negative for abdominal distention, abdominal pain, constipation and diarrhea  Genitourinary: Negative for difficulty urinating  OBJECTIVE     Vitals:    22 0704 22 0736 22 1117 22 1202   BP:  107/58 107/57    Pulse:  (!) 125 (!) 118    Resp:  20 20    Temp:  97 7 °F (36 5 °C) 97 7 °F (36 5 °C)    TempSrc:       SpO2: 91% (!) 89% 90% 94%   Height:          Temperature:   Temp (24hrs), Av 9 °F (36 6 °C), Min:97 2 °F (36 2 °C), Max:98 4 °F (36 9 °C)    Temperature: 97 7 °F (36 5 °C)  Intake & Output:  I/O       11/15 07 07 0701   07 07 07    P  O   120     I V  2417 5      Total Intake 2417 5 120 Urine 600 400     Other  900     Total Output 600 1300     Net +1817 5 -1180                Weights:        Body mass index is 29 82 kg/m²  Weight (last 2 days)     None        Physical Exam  Constitutional:       General: She is not in acute distress  Appearance: Normal appearance  She is not ill-appearing or toxic-appearing  HENT:      Head: Normocephalic and atraumatic  Cardiovascular:      Rate and Rhythm: Normal rate and regular rhythm  Heart sounds: No murmur heard  No gallop  Pulmonary:      Effort: No respiratory distress  Breath sounds: No wheezing or rales  Comments: Decreased breath sounds bilaterally  Abdominal:      General: There is no distension  Tenderness: There is no abdominal tenderness  There is no guarding  Musculoskeletal:      Right lower leg: No edema  Left lower leg: No edema  Neurological:      General: No focal deficit present  Mental Status: She is alert and oriented to person, place, and time  Psychiatric:         Mood and Affect: Mood normal          Behavior: Behavior normal        LABORATORY DATA     Labs: I have personally reviewed pertinent reports    Results from last 7 days   Lab Units 11/17/22  0406 11/16/22  0632 11/15/22  1427   WBC Thousand/uL 11 93* 12 57* 15 58*   HEMOGLOBIN g/dL 9 6* 10 3* 11 1*   HEMATOCRIT % 31 8* 32 2* 36 2   PLATELETS Thousands/uL 342 388 383   NEUTROS PCT % 85* 87*  --    MONOS PCT % 7 6  --    MONO PCT %  --   --  6      Results from last 7 days   Lab Units 11/17/22  0406 11/16/22  1626 11/16/22  0632   POTASSIUM mmol/L 3 8 3 9 3 8   CHLORIDE mmol/L 114* 113* 113*   CO2 mmol/L 19* 19* 20*   BUN mg/dL 21 23 24   CREATININE mg/dL 0 88 0 95 0 93   CALCIUM mg/dL 11 6* 12 0* 10 9*   ALK PHOS U/L 122* 151* 137*   ALT U/L 21 22 18   AST U/L 24 22 19              Results from last 7 days   Lab Units 11/15/22  1913   INR  1 01   PTT seconds 31     Results from last 7 days   Lab Units 11/15/22  1913   LACTIC ACID mmol/L 1 1           IMAGING & DIAGNOSTIC TESTING     Radiology Results: I have personally reviewed pertinent reports  XR chest portable    Result Date: 11/17/2022  Impression: Near complete drainage of right effusion with trace residual effusion  Masslike opacity in the right lower lobe better shown on CT  Mild pulmonary venous congestion  Workstation performed: [id number]     CT head wo contrast    Result Date: 11/15/2022  Impression: No acute intracranial abnormality  Chronic microangiopathic changes  Workstation performed: [id number]     PE Study with CT Abdomen and Pelvis with contrast    Result Date: 11/15/2022  Impression: No central, lobar, segmental or proximal subsegmental pulmonary embolism  Evaluation of the distal subsegmental pulmonary arteries is limited  Large, heterogeneous airspace consolidation in the right lower lobe, with a 6 3 x 5 2 x 4 8 cm somewhat lobulated hypodense component with a small droplet of air  This likely represents a necrotizing aspiration pneumonia, given mucous plugging throughout the right lower lobe and retained secretions/debris in the right mainstem bronchus and bronchus intermedius  Recommend follow-up chest CT in approximately 3 months to ensure resolution as an underlying neoplasm may have a similar appearance  Nonspecific 7 x 6 mm lobulated left lower lobe nodule  Attention on follow-up  Moderate right pleural effusion  No enhancement of the pleura to suggest empyema  Mediastinal and right hilar lymphadenopathy, likely reactive, however metastatic disease is not entirely excluded  Mild stranding around the proximal pancreas suggestive of acute interstitial pancreatitis  Recommend correlation with lipase level  No organized peripancreatic fluid collections, loss of parenchymal enhancement to suggest necrosis, or vascular complications of pancreatitis  No other acute abdominal or pelvic pathology  Multiple additional findings as above   The study was marked in EPIC for immediate notification  Workstation performed: [id number]     Other Diagnostic Testing: I have personally reviewed pertinent reports  ACTIVE MEDICATIONS     Current Facility-Administered Medications   Medication Dose Route Frequency   • acetaminophen (TYLENOL) tablet 650 mg  650 mg Oral Q6H PRN   • albuterol inhalation solution 2 5 mg  2 5 mg Nebulization Q4H PRN   • atorvastatin (LIPITOR) tablet 40 mg  40 mg Oral Daily With Dinner   • azithromycin (ZITHROMAX) tablet 500 mg  500 mg Oral Q24H   • cefTRIAXone (ROCEPHIN) 1,000 mg in dextrose 5 % 50 mL IVPB  1,000 mg Intravenous Q24H   • clopidogrel (PLAVIX) tablet 75 mg  75 mg Oral Daily   • heparin (porcine) subcutaneous injection 5,000 Units  5,000 Units Subcutaneous Q8H [address]   • HYDROmorphone HCl (DILAUDID) injection 0 2 mg  0 2 mg Intravenous Q6H PRN   • levothyroxine tablet 100 mcg  100 mcg Oral Early Morning   • loratadine (CLARITIN) tablet 5 mg  5 mg Oral Daily   • oxyCODONE (ROXICODONE) IR tablet 2 5 mg  2 5 mg Oral Q6H PRN    Or   • oxyCODONE (ROXICODONE) IR tablet 5 mg  5 mg Oral Q6H PRN   • sertraline (ZOLOFT) tablet 25 mg  25 mg Oral Daily       VTE Pharmacologic Prophylaxis: Heparin  VTE Mechanical Prophylaxis: sequential compression device    Portions of the record may have been created with voice recognition software  Occasional wrong word or "sound a like" substitutions may have occurred due to the inherent limitations of voice recognition software    Read the chart carefully and recognize, using context, where substitutions have occurred   ==  Dolores Salmeron, [facility]  Internal Medicine Residency PGY-1

## 2022-11-17 NOTE — QUICK NOTE
Notified by nursing that the patient has been in sinus tachycardia  After chart review, patient has been sinus tachycardia since admission  She is currently being treated for infection, which is likely driving her sinus tachycardia  ECG revealed sinus tachycardia, no concern for atrial fibrillation or other arrhythmias at this time  Physical exam, again, only revealing tachycardia  Does have about 1+ pitting edema bilaterally, currently receiving fluids for hypercalcemia  Patient is asymptomatic  Will continue to monitor, favor against giving a bolus of fluid given the patient's stability and edema at this time  Will continue to monitor throughout the night and reassess      Gali Lee DO, [address]  PGY-3, Internal Medicine  [facility]

## 2022-11-17 NOTE — PLAN OF CARE
Problem: Potential for Falls  Goal: Patient will remain free of falls  Description: INTERVENTIONS:  - Educate patient/family on patient safety including physical limitations  - Instruct patient to call for assistance with activity   - Consult OT/PT to assist with strengthening/mobility   - Keep Call bell within reach  - Keep bed low and locked with side rails adjusted as appropriate  - Keep care items and personal belongings within reach  - Initiate and maintain comfort rounds  - Make Fall Risk Sign visible to staff  - Apply yellow socks and bracelet for high fall risk patients  - Consider moving patient to room near nurses station  Outcome: Progressing     Problem: SAFETY ADULT  Goal: Patient will remain free of falls  Description: INTERVENTIONS:  - Educate patient/family on patient safety including physical limitations  - Instruct patient to call for assistance with activity   - Consult OT/PT to assist with strengthening/mobility   - Keep Call bell within reach  - Keep bed low and locked with side rails adjusted as appropriate  - Keep care items and personal belongings within reach  - Initiate and maintain comfort rounds  - Make Fall Risk Sign visible to staff  - Apply yellow socks and bracelet for high fall risk patients  - Consider moving patient to room near nurses station  Outcome: Progressing  Goal: Maintain or return to baseline ADL function  Description: INTERVENTIONS:  -  Assess patient's ability to carry out ADLs; assess patient's baseline for ADL function and identify physical deficits which impact ability to perform ADLs (bathing, care of mouth/teeth, toileting, grooming, dressing, etc )  - Assess/evaluate cause of self-care deficits   - Assess range of motion  - Assess patient's mobility; develop plan if impaired  - Assess patient's need for assistive devices and provide as appropriate  - Encourage maximum independence but intervene and supervise when necessary  - Involve family in performance of ADLs  - Assess for home care needs following discharge   - Consider OT consult to assist with ADL evaluation and planning for discharge  - Provide patient education as appropriate  Outcome: Progressing  Goal: Maintains/Returns to pre admission functional level  Description: INTERVENTIONS:  - Perform BMAT or MOVE assessment daily    - Set and communicate daily mobility goal to care team and patient/family/caregiver     - Collaborate with rehabilitation services on mobility goals if consulted  - Out of bed for toileting  - Record patient progress and toleration of activity level   Outcome: Progressing     Problem: CARDIOVASCULAR - ADULT  Goal: Absence of cardiac dysrhythmias or at baseline rhythm  Description: INTERVENTIONS:  - Continuous cardiac monitoring, vital signs, obtain 12 lead EKG if ordered  - Administer antiarrhythmic and heart rate control medications as ordered  - Monitor electrolytes and administer replacement therapy as ordered  Outcome: Progressing     Problem: RESPIRATORY - ADULT  Goal: Achieves optimal ventilation and oxygenation  Description: INTERVENTIONS:  - Assess for changes in respiratory status  - Assess for changes in mentation and behavior  - Position to facilitate oxygenation and minimize respiratory effort  - Oxygen administered by appropriate delivery if ordered  - Initiate smoking cessation education as indicated  - Encourage broncho-pulmonary hygiene including cough, deep breathe, Incentive Spirometry  - Assess the need for suctioning and aspirate as needed  - Assess and instruct to report SOB or any respiratory difficulty  - Respiratory Therapy support as indicated  Outcome: Progressing     Problem: SKIN/TISSUE INTEGRITY - ADULT  Goal: Skin Integrity remains intact(Skin Breakdown Prevention)  Description: Assess:  -Inspect skin when repositioning, toileting, and assisting with ADLS  -Assess extremities for adequate circulation and sensation     Bed Management:  -Have minimal linens on bed & keep smooth, unwrinkled  -Change linens as needed when moist or perspiring    Toileting:  -Offer bedside commode    Activity:  -Encourage activity and walks on unit  -Encourage or provide ROM exercises   -Use appropriate equipment to lift or move patient in bed    Skin Care:  -Avoid use of baby powder, tape, friction and shearing, hot water or constrictive clothing  -Do not massage red bony areas    Outcome: Progressing

## 2022-11-18 ENCOUNTER — TELEPHONE (OUTPATIENT)
Dept: HEMATOLOGY ONCOLOGY | Facility: CLINIC | Age: 87
End: 2022-11-18

## 2022-11-18 NOTE — PLAN OF CARE
Problem: Potential for Falls  Goal: Patient will remain free of falls  Description: INTERVENTIONS:  - Educate patient/family on patient safety including physical limitations  - Instruct patient to call for assistance with activity   - Consult OT/PT to assist with strengthening/mobility   - Keep Call bell within reach  - Keep bed low and locked with side rails adjusted as appropriate  - Keep care items and personal belongings within reach  - Initiate and maintain comfort rounds  - Make Fall Risk Sign visible to staff  -  - Apply yellow socks and bracelet for high fall risk patients  - Consider moving patient to room near nurses station  Outcome: Progressing     Problem: PAIN - ADULT  Goal: Verbalizes/displays adequate comfort level or baseline comfort level  Description: Interventions:  - Encourage patient to monitor pain and request assistance  - Assess pain using appropriate pain scale  - Administer analgesics based on type and severity of pain and evaluate response  - Implement non-pharmacological measures as appropriate and evaluate response  - Consider cultural and social influences on pain and pain management  - Notify physician/advanced practitioner if interventions unsuccessful or patient reports new pain  Outcome: Progressing     Problem: INFECTION - ADULT  Goal: Absence or prevention of progression during hospitalization  Description: INTERVENTIONS:  - Assess and monitor for signs and symptoms of infection  - Monitor lab/diagnostic results  - Monitor all insertion sites, i e  indwelling lines, tubes, and drains  - Monitor endotracheal if appropriate and nasal secretions for changes in amount and color  - [place] appropriate cooling/warming therapies per order  - Administer medications as ordered  - Instruct and encourage patient and family to use good hand hygiene technique  - Identify and instruct in appropriate isolation precautions for identified infection/condition  Outcome: Progressing  Goal: Absence of fever/infection during neutropenic period  Description: INTERVENTIONS:  - Monitor WBC    Outcome: Progressing     Problem: SAFETY ADULT  Goal: Patient will remain free of falls  Description: INTERVENTIONS:  - Educate patient/family on patient safety including physical limitations  - Instruct patient to call for assistance with activity   - Consult OT/PT to assist with strengthening/mobility   - Keep Call bell within reach  - Keep bed low and locked with side rails adjusted as appropriate  - Keep care items and personal belongings within reach  - Initiate and maintain comfort rounds  - Make Fall Risk Sign visible to staff  - Offer Toileting every 2 Hours, in advance of need    - Obtain necessary fall risk management equipment: walker  - Apply yellow socks and bracelet for high fall risk patients  - Consider moving patient to room near nurses station  Outcome: Progressing  Goal: Maintain or return to baseline ADL function  Description: INTERVENTIONS:  -  Assess patient's ability to carry out ADLs; assess patient's baseline for ADL function and identify physical deficits which impact ability to perform ADLs (bathing, care of mouth/teeth, toileting, grooming, dressing, etc )  - Assess/evaluate cause of self-care deficits   - Assess range of motion  - Assess patient's mobility; develop plan if impaired  - Assess patient's need for assistive devices and provide as appropriate  - Encourage maximum independence but intervene and supervise when necessary  - Involve family in performance of ADLs  - Assess for home care needs following discharge   - Consider OT consult to assist with ADL evaluation and planning for discharge  - Provide patient education as appropriate  Outcome: Progressing  Goal: Maintains/Returns to pre admission functional level  Description: INTERVENTIONS:  - Perform BMAT or MOVE assessment daily    - Set and communicate daily mobility goal to care team and patient/family/caregiver     - Collaborate with rehabilitation services on mobility goals if consulted  - Perform Range of Motion 3 times a day  - Reposition patient every 2 hours  - Dangle patient 3 times a day  - Stand patient 3 times a day  - Ambulate patient 3 times a day  - Out of bed to chair 3 times a day   - Out of bed for meals 3 times a day  - Out of bed for toileting  - Record patient progress and toleration of activity level   Outcome: Progressing     Problem: DISCHARGE PLANNING  Goal: Discharge to home or other facility with appropriate resources  Description: INTERVENTIONS:  - Identify barriers to discharge w/patient and caregiver  - Arrange for needed discharge resources and transportation as appropriate  - Identify discharge learning needs (meds, wound care, etc )  - Arrange for interpretive services to assist at discharge as needed  - Refer to Case Management Department for coordinating discharge planning if the patient needs post-hospital services based on physician/advanced practitioner order or complex needs related to functional status, cognitive ability, or social support system  Outcome: Progressing     Problem: Knowledge Deficit  Goal: Patient/family/caregiver demonstrates understanding of disease process, treatment plan, medications, and discharge instructions  Description: Complete learning assessment and assess knowledge base    Interventions:  - Provide teaching at level of understanding  - Provide teaching via preferred learning methods  Outcome: Progressing     Problem: CARDIOVASCULAR - ADULT  Goal: Absence of cardiac dysrhythmias or at baseline rhythm  Description: INTERVENTIONS:  - Continuous cardiac monitoring, vital signs, obtain 12 lead EKG if ordered  - Administer antiarrhythmic and heart rate control medications as ordered  - Monitor electrolytes and administer replacement therapy as ordered  Outcome: Progressing     Problem: RESPIRATORY - ADULT  Goal: Achieves optimal ventilation and oxygenation  Description: INTERVENTIONS:  - Assess for changes in respiratory status  - Assess for changes in mentation and behavior  - Position to facilitate oxygenation and minimize respiratory effort  - Oxygen administered by appropriate delivery if ordered  - Initiate smoking cessation education as indicated  - Encourage broncho-pulmonary hygiene including cough, deep breathe, Incentive Spirometry  - Assess the need for suctioning and aspirate as needed  - Assess and instruct to report SOB or any respiratory difficulty  - Respiratory Therapy support as indicated  Outcome: Progressing     Problem: SKIN/TISSUE INTEGRITY - ADULT  Goal: Skin Integrity remains intact(Skin Breakdown Prevention)  Description: Assess:  -  -Inspect skin when repositioning, toileting, and assisting with ADLS    -Assess extremities for adequate circulation and sensation     Bed Management:  -Have minimal linens on bed & keep smooth, unwrinkled  -Change linens as needed when moist or perspiring  -Avoid sitting or lying in one position for more than 2 hours while in bed  -Keep HOB at 30 degrees     Toileting:  -Offer bedside commode      Activity:  -Mobilize patient 3 times a day  -Encourage activity and walks on unit  -Encourage or provide ROM exercises   -Turn and reposition patient every 2 Hours  -Use appropriate equipment to lift or move patient in bed  -Instruct/ Assist with weight shifting every 1 when out of bed in chair  -Consider limitation of chair time 2 hour intervals    Skin Care:  -Avoid use of baby powder, tape, friction and   -Relieve pressure over bony prominences using allyvens  -Do not massage red bony areas    Next Steps:    -Consider consults to  interdisciplinary teams such as ID  Outcome: Progressing     Problem: Prexisting or High Potential for Compromised Skin Integrity  Goal: Skin integrity is maintained or improved  Description: INTERVENTIONS:  - Identify patients at risk for skin breakdown  - Assess and monitor skin integrity  - Assess and monitor nutrition and hydration status  - Monitor labs   - Assess for incontinence   - Turn and reposition patient  - Assist with mobility/ambulation  - Relieve pressure over bony prominences  - Avoid friction and shearing  - Provide appropriate hygiene as needed including keeping skin clean and dry  - Evaluate need for skin moisturizer/barrier cream  - Collaborate with interdisciplinary team   - Patient/family teaching  - Consider wound care consult   Outcome: Progressing

## 2022-11-18 NOTE — PLAN OF CARE
Problem: OCCUPATIONAL THERAPY ADULT  Goal: Performs self-care activities at highest level of function for planned discharge setting  See evaluation for individualized goals  Description: Treatment Interventions: ADL retraining, Functional transfer training, UE strengthening/ROM, Cognitive reorientation, Endurance training, Patient/family training, Equipment evaluation/education, Compensatory technique education, Activityengagement, Energy conservation          See flowsheet documentation for full assessment, interventions and recommendations  Outcome: Progressing  Note: Limitation: Decreased ADL status, Decreased Safe judgement during ADL, Decreased UE ROM, Decreased UE strength, Decreased endurance, Decreased cognition, Decreased self-care trans, Decreased high-level ADLs  Prognosis: Fair  Assessment: Pt greeted bedside for OT treatment on 11/18/2022 focusing on maximizing independence with ADLs  Pt completes functional transfers with S-min A and functional mobility with RW at S level  Pt completes functional mobility within room distances with no AD and requires min Ax1 with HHA at times  Pt incontinent of bowel s/p functional mobility  Pt completes LB dressing with min A and toileting routine at min A level  Limitations that impact functional performance include decreased ADL status, decreased UE ROM, decreased UE strength, decreased safe judgement during ADLs, decreased cognition, decreased endurance, decreased self care transfers and decreased high level ADLs  Occupational performance areas to address ADL retraining, functional transfer training, UE strengthening/ROM, endurance training, cognitive reorientation, Pt/caregiver education, equipment evaluation/education, compensatory technique education, energy conservation and activity engagement   Pt would benefit from continued skilled OT services while in hospital to maximize independence with ADLs  Will continue to follow Pt's goals and progress   Pt would benefit from post acute rehabilitation services upon DC to maximize safety and independence with ADLs and functional tasks of choice       OT Discharge Recommendation: Home with home health rehabilitation

## 2022-11-18 NOTE — PROGRESS NOTES
INTERNAL MEDICINE RESIDENCY PROGRESS NOTE     Name: Giovanni Lee   Age & Sex: 81 y o  female   MRN: [id number]  Unit/Bed#: [facility] 834-01   Encounter: [id number]  Team: SOD Team C     PATIENT INFORMATION     Name: Giovanni Lee   Age & Sex: 81 y o  female   MRN: [id number]  Hospital Stay Days: 3    ASSESSMENT/PLAN     Principal Problem:    Sepsis with acute hypoxic respiratory failure ([facility] )  Active Problems:    Asymptomatic carotid artery stenosis    Hyperlipidemia    Hypertension    Laryngeal carcinoma (HCC)    CKD (chronic kidney disease) stage 3, GFR 30-59 ml/min    JORDAN (acute kidney injury) ([facility] )    Hypercalcemia    Right lower lobe lung mass with right pleural effusion    Pancreatitis    Lung nodule seen on imaging study    Anemia of chronic disease    Hypothyroidism    Gram-positive bacteria on cultures      * Sepsis with acute hypoxic respiratory failure (HCC)  Assessment & Plan  · Patient presented with abdominal pain, lightheadedness, intermittent shortness of breath, recent unexpected weight loss   · Met sepsis criteria on admission with heart rate 90, RR 20, elevated WBC at 15 5, with suspected pleural source of infection as noted on CT imaging; procalc also elevated; PNA vs malignancy cannot be ruled out  · No signs of end-organ damage at this moment; creatinine slightly elevated 1 47 from baseline (1 1-1 4)  · Lactate wnl but has significant hypercalcemia 14 2 on admission  · elevated WBC and procalc may be 2/2 dehydration on arrival vs  reactive process  · Requiring trach collar oxygen 5 L, not on home O2  · Was initiated on IV Unasyn and received aggressive hydration with 1L bolus isolyte x2 and 1L bolus NS in the ED  · Patient is status post tracheostomy and based on anatomy is not at risk for aspiration therefore pneumonia likely secondary to CAP pathogens    Plan:  · Will continue ceftriaxone and azithromycin  · Continue trach collar oxygen as needed  · Will continue monitor CBC    Gram-positive bacteria on cultures  Assessment & Plan  1/2 blood cultures grew Gram-positive cocci in clusters  Patient does not appear to be acutely bacteremic based on clinical appearance, labs, and vitals    Plan:  Likely contaminated but will continue treatment with IV ceftriaxone  Awaiting results from 2nd culture    Hypothyroidism  Assessment & Plan  · Known history since 11/2021  · Most recent TSH 4 2 in 08/2022  · Managed on levothyroxine 100 mcg  · Repeat TSH within normal limits    Plan  · Continue home dose levothyroxine    Anemia of chronic disease  Assessment & Plan  · Known history, in setting of CKD stage IIIB  · Follows nephrology outpatient (Dr Nena Ohara)  · Hgb 11 1 on admission, at baseline     · Currently hemodynamically stable    Plan:  · Trend CBC, maintain hemoglobin over 7        Lung nodule seen on imaging study  Assessment & Plan  · Patient history of laryngeal carcinoma status post tracheostomy/laryngectomy on trach collar presented with fatigue; tachycardic on exam; labs notable for multiple derangements including hypercalcemia 14 3 and bicarb 18  · CT notable for nonspecific 7 x 6 mm lobulated left lower lobe nodule; new finding; CT imaging also revealing for multiple findings large heterogeneous airspace consolidation and right lower lobe with 6 x 5 x 5 lobulated hypodense component with moderate right-sided pleural effusion  concerning for necrotizing pneumonia  · Malignancy not entirely ruled out; does endorse decreased appetite, unexpected weight loss of up to 30 lb, and is a former smoker; quit 2002; hypercalcemia also noted on admission with recent outpatient PTH wnl    Plan  · Inpatient pulmonology following, appreciate recs  · Repeat CT future to assess for improvement    Pancreatitis  Assessment & Plan  · Likely secondary to hypercalcemia  · Presented with abdominal pain; labs notable for elevated lipase >2000, elevated WBC  · CT imaging suggestive pancreatitis  · Patient has elevated alk-phos with normal T bili and asymptomatic abnormal biliary strictures on imaging  · Patient reports improved appetite and improved abdominal pain    Plan:  · Supportive care with analgesics or antiemetics as needed; renal function at baseline, ECG with normal QTC  · Will advance diet as patient reports improvement  · Continued monitoring of vitals, O2 sats, urine output with goal > 0 5-1 mL/kg/hr  · Repeat a m  CBC and BMP q 12 to monitor for adequacy fluid resuscitation in tissue perfusion  · Replete electrolytes as indicated        Right lower lobe lung mass with right pleural effusion  Assessment & Plan  · Patient history of laryngeal carcinoma status post tracheostomy/laryngectomy who presented to ED with tachycardia, hypoxia, fatigue  · CBC with elevated WBC 15 58; procalcitonin 49 5  · CTA chest notable for significant findings including  · Large, heterogeneous airspace consolidation in the right lower lobe, with a 6 3 x 5 2 x 4 8 cm somewhat lobulated hypodense component likely a necrotizing aspiration pneumonia  · Moderate right pleural effusion  No enhancement of the pleura to suggest empyema  · Mediastinal and right hilar lymphadenopathy, likely reactive, however metastatic disease is not entirely excluded    · Initiated IV Unasyn 3 g in the ED and transitioned to ceftriaxone given low suspicion of aspiration  · Legionella and strep antigens negative  · Status post 900 mL fluid thoracentesis suggestive of transudative fluid for pulmonology and may be secondary to pancreatitis  · Blood cultures 1/2 group Gram-positive cocci clusters         Plan:  · Will discontinue ceftriaxone and azithromycin  · Await blood cultures   · Will obtain Sputum culture via tracheostomy   · Respiratory protocol  · Will consult  IR for thoracentesis to treat large right pleural effusion as noted on imaging  · NPO at midnight in the event of possible bronchoscopy tomorrow  · Inpatient consultation to pulmonology, appreciate recs  · Follow-up chest CT in 3 months as an outpatient to ensure resolution as an underlying neoplasm may have a similar appearance  Hypercalcemia  Assessment & Plan  · In setting of CKD stage 3B, excessive use of vitamin-D and calcium supplementation at home per patient  · Further evidenced by elevated corrected calcium 14 2; supported by initial presentation with abdominal pain, fatigue, decreased appetite/poor PO intake; mentation at baseline, nontoxic on exam  · ECG without arrhythmia  · Further ED work up notable for multiple derangements including Hgb 11 1, low bicarb 18, elevated BUN 34, elevated creatinine 1 47 (baseline 1 11 4), GFR 32, , hypoalbuminemia 2 1; elevated lipase 2028, uric acid 10 4  · CT imaging findings concerning for acute pancreatitis,  necrotizing pneumonia vs  malignancy  · Etiology multifactorial, possibly due to hypercalcemia malignancy in light of CT findings, further complicated by home vitamin-D supplementation; med rec without any other causative agents  Bone demineralization also possible given her age  · Per chart review, patient noted to have elevated corrected calcium level since 08/2022 however does not appear to have been further worked up by PCP  · Unlikely primary parathyroidism or tertiary cause given PTH in 09/2022 was low; baseline renal function with CKD III    · Calcium continues to be elevated 13 8  · Status post 3 mg low and  zoledronic acid     Plan:  · PTH, pendingSPEP and UPEP levels pending to r/o multiple myeloma  · Vitamin-D 1,25 level to r/o vitamin d toxicity  · PTHrP to assess hypercalcemia of malignancy   · Will cut down IV fluids given patient is beginning to develop fluid overload with aggressive fluid resuscitation  · Will consult Hematology/Oncology for treatment of hypercalcemia likely secondary malignancy  · Trend BMP to assess response      JORDAN (acute kidney injury) ([facility] [address] [facility] )  Assessment & Plan  Creatinine of 1 47 on admission treated with IV fluid boluses, 3L total, NSS at 100ml/hr, renal function assessment and lab monitoring  Pt has CKD 3 and noted baseline of 1-1 1  Cr improved to 1 01> 0 93    Plan:   Will continue monitor creatinine and avoid nephrotoxic agents  Blood sugar adequate hydration and hemodynamics        CKD (chronic kidney disease) stage 3, GFR 30-59 ml/min  Assessment & Plan  Lab Results   Component Value Date    EGFR 59 11/17/2022    EGFR 54 11/16/2022    EGFR 55 11/16/2022    CREATININE 0 88 11/17/2022    CREATININE 0 95 11/16/2022    CREATININE 0 93 11/16/2022     · Known history of CKD stage III states 2014  · Follows [facility] nephrology (Dr Carmine Christine)  · Cr on admission slightly elevated 1 47(baseline 1 1-1 4); likely 2/2 dehydration; appears volume down on exam  · Etiology likely 2/2 long history of hypertension, nephrosclerosis, age-related nephron loss     Plan  · mIVF at 100cc/hr  · Trend BMP  · Avoid use of nephrotoxic agents      Laryngeal carcinoma (HCC)  Assessment & Plan  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  · Follows with ENT as an outpatient with Dr Patrick Horn; most recently evaluated for trich follow-up in 10/2022     Plan:  · Bobie Cam in place, functional well, at baseline  · Continue trach collar oxygen, on 5 L    Hypertension  Assessment & Plan  · Known history, maintained on losartan 50 mg daily at home  · Follows PCP outpatient      Plan:  · BP on the low side with systolic high 31S to 22R on admission  · Will hold home dose losartan at this time    Hyperlipidemia  Assessment & Plan  · Known history of type 2 diabetes on metformin, hypertension on losartan  · Managed on Lipitor 40  · Most recent lipid panel with good control of LDL at 64    Plan  · Continue Lipitor 40    Asymptomatic carotid artery stenosis  Assessment & Plan  · Known hx, follows with vascular surgery (Dr Jammie Blanco)  · Previously found to have recurrent left ICA stenosis and critical new occlusive right IC stenosis, with a which were asymptomatic, along with left vertebral artery atresia; previously worked up in outpatient setting by mass concerning symptoms and was recommended endovascular intervention  · On 2020, patient electively admitted to [facility] during which she underwent ultrasound-guided right CFA puncture with sternal closure, aortic arch arteriogram, left carotid during him with PTA and stenting under the care of Dr Brown  · Maintained on antiplatelet and anti-statin therapy with Plavix and Lipitor     Plan:  · Continue home dose Plavix and Lipitor        Disposition: Actively treating pancreatitis, pneumonia, hypercalcemia, and possible bacteremia  SUBJECTIVE     Patient seen and examined  No acute events overnight  She reports doing well no acute complaints  She denies any abdominal pain, decreased appetite, and difficulty breathing  Review of Systems   Constitutional: Negative for chills and fever  Respiratory: Negative for cough, shortness of breath and wheezing  Cardiovascular: Negative for chest pain and palpitations  Gastrointestinal: Negative for abdominal distention, abdominal pain, constipation, diarrhea, nausea and vomiting  Genitourinary: Negative for difficulty urinating  Neurological: Negative for dizziness  OBJECTIVE     Vitals:    22 0658 22 0741 22 0930 22 1044   BP: 94/66   116/67   Pulse: (!) 121   (!) 121   Resp: 17   18   Temp: 97 7 °F (36 5 °C)   97 5 °F (36 4 °C)   TempSrc:       SpO2: 96% 95% 94% 98%   Weight:       Height:          Temperature:   Temp (24hrs), Av 7 °F (36 5 °C), Min:97 5 °F (36 4 °C), Max:97 9 °F (36 6 °C)    Temperature: 97 5 °F (36 4 °C)  Intake & Output:  I/O        0701   0700  0701   0700  0701   0700    P  O  120 220     I V  (mL/kg)  925 (15 3)     Total Intake(mL/kg) 120 1145 (19)     Urine (mL/kg/hr) 400 250 (0 2)     Other 900      Total Output 1300 250     Net -1180 +895                Weights:        Body mass index is 29 82 kg/m²  Weight (last 2 days)     Date/Time Weight    11/17/22 1300 60 3 (133)        Physical Exam  LABORATORY DATA     Labs: I have personally reviewed pertinent reports  Results from last 7 days   Lab Units 11/18/22  0240 11/17/22  0406 11/16/22  0632   WBC Thousand/uL 10 15 11 93* 12 57*   HEMOGLOBIN g/dL 9 1* 9 6* 10 3*   HEMATOCRIT % 31 4* 31 8* 32 2*   PLATELETS Thousands/uL 316 342 388   NEUTROS PCT % 85* 85* 87*   MONOS PCT % 7 7 6      Results from last 7 days   Lab Units 11/18/22  0240 11/17/22  1415 11/17/22  0406   POTASSIUM mmol/L 3 7 3 7 3 8   CHLORIDE mmol/L 112* 114* 114*   CO2 mmol/L 23 18* 19*   BUN mg/dL 19 19 21   CREATININE mg/dL 0 96 1 11 0 88   CALCIUM mg/dL 12 1* 11 5* 11 6*   ALK PHOS U/L 113 114 122*   ALT U/L 25 22 21   AST U/L 25 21 24              Results from last 7 days   Lab Units 11/15/22  1913   INR  1 01   PTT seconds 31     Results from last 7 days   Lab Units 11/15/22  1913   LACTIC ACID mmol/L 1 1           IMAGING & DIAGNOSTIC TESTING     Radiology Results: I have personally reviewed pertinent reports  XR chest portable    Result Date: 11/17/2022  Impression: Near complete drainage of right effusion with trace residual effusion  Masslike opacity in the right lower lobe better shown on CT  Mild pulmonary venous congestion  Workstation performed: [id number]     CT head wo contrast    Result Date: 11/15/2022  Impression: No acute intracranial abnormality  Chronic microangiopathic changes  Workstation performed: [id number]     PE Study with CT Abdomen and Pelvis with contrast    Result Date: 11/15/2022  Impression: No central, lobar, segmental or proximal subsegmental pulmonary embolism  Evaluation of the distal subsegmental pulmonary arteries is limited   Large, heterogeneous airspace consolidation in the right lower lobe, with a 6 3 x 5 2 x 4 8 cm somewhat lobulated hypodense component with a small droplet of air  This likely represents a necrotizing aspiration pneumonia, given mucous plugging throughout the right lower lobe and retained secretions/debris in the right mainstem bronchus and bronchus intermedius  Recommend follow-up chest CT in approximately 3 months to ensure resolution as an underlying neoplasm may have a similar appearance  Nonspecific 7 x 6 mm lobulated left lower lobe nodule  Attention on follow-up  Moderate right pleural effusion  No enhancement of the pleura to suggest empyema  Mediastinal and right hilar lymphadenopathy, likely reactive, however metastatic disease is not entirely excluded  Mild stranding around the proximal pancreas suggestive of acute interstitial pancreatitis  Recommend correlation with lipase level  No organized peripancreatic fluid collections, loss of parenchymal enhancement to suggest necrosis, or vascular complications of pancreatitis  No other acute abdominal or pelvic pathology  Multiple additional findings as above  The study was marked in [facility] for immediate notification  Workstation performed: [id number]     Other Diagnostic Testing: I have personally reviewed pertinent reports      ACTIVE MEDICATIONS     Current Facility-Administered Medications   Medication Dose Route Frequency   • acetaminophen (TYLENOL) tablet 650 mg  650 mg Oral Q6H PRN   • albuterol inhalation solution 2 5 mg  2 5 mg Nebulization Q4H PRN   • atorvastatin (LIPITOR) tablet 40 mg  40 mg Oral Daily With Dinner   • cefTRIAXone (ROCEPHIN) 1,000 mg in dextrose 5 % 50 mL IVPB  1,000 mg Intravenous Q24H   • clopidogrel (PLAVIX) tablet 75 mg  75 mg Oral Daily   • heparin (porcine) subcutaneous injection 5,000 Units  5,000 Units Subcutaneous Q8H [facility]   • HYDROmorphone HCl (DILAUDID) injection 0 2 mg  0 2 mg Intravenous Q6H PRN   • levothyroxine tablet 100 mcg  100 mcg Oral Early Morning   • loratadine (CLARITIN) tablet 5 mg  5 mg Oral Daily   • multi-electrolyte (PLASMALYTE-A/ISOLYTE-S PH 7 4) IV solution  100 mL/hr Intravenous Continuous   • oxyCODONE (ROXICODONE) IR tablet 2 5 mg  2 5 mg Oral Q6H PRN    Or   • oxyCODONE (ROXICODONE) IR tablet 5 mg  5 mg Oral Q6H PRN   • sertraline (ZOLOFT) tablet 25 mg  25 mg Oral Daily       VTE Pharmacologic Prophylaxis: Heparin  VTE Mechanical Prophylaxis: sequential compression device    Portions of the record may have been created with voice recognition software  Occasional wrong word or "sound a like" substitutions may have occurred due to the inherent limitations of voice recognition software    Read the chart carefully and recognize, using context, where substitutions have occurred   ==  Alfred Began, [address] [facility]  Internal Medicine Residency PGY-1

## 2022-11-18 NOTE — PROGRESS NOTES
PULMONOLOGY PROGRESS NOTE     Name: Cornell Martinez   Age & Sex: 81 y o  female   MRN: [id number]  Unit/Bed#: [facility] 834-01   Encounter: [id number]    PATIENT INFORMATION     Name: Cornell Martinez   Age & Sex: 81 y o  female   MRN: [id number]  Hospital Stay Days: 3    ASSESSMENT/PLAN     Assessment:   1  Acute on chronic respiratory failure with hypoxia - Improving  2  Abnormal chest CT with RLL consolidation  3  Right-sided pleural effusion - s/p thoracentesis on  with neutrophilic predominant transudate  4  LLL nodule  5  Emphysema with possible underlying COPD - not in acute exacerbation  6  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  7  Hypercalcemia     Plan:  • Wean FiO2 as tolerated to maintain SpO2 > 88%  • Antibiotics per primary team  • Continue to follow cytology from thoracentesis  Currently still pending  • Repeat CT chest in 12 weeks  • Continue bronchodilators nebulizer therapy via tracheostomy to help mobilizing secretions and continue pulmonary toilet  • Patient does have bilateral lower extremity edema  Recommend leg elevation and compression stockings  • TTE pending  Will defer diuretic use to primary team    • Pulmonology will reevaluate on   Please, TigerText with questions or concerns in the interim  SUBJECTIVE     Patient seen and examined  No acute events overnight  Patient states she feels well overall  She denies any worsening shortness of breath or chest pain  She does report discomfort in lower extremities bilaterally      OBJECTIVE     Vitals:    22 0237 22 0251 22 0658 22 0741   BP: 115/63  94/66    Pulse: (!) 117  (!) 121    Resp: 18  17    Temp: 97 7 °F (36 5 °C)  97 7 °F (36 5 °C)    TempSrc:       SpO2: 94% 93% 96% 95%   Weight:       Height:          Temperature:   Temp (24hrs), Av 7 °F (36 5 °C), Min:97 7 °F (36 5 °C), Max:97 9 °F (36 6 °C)    Temperature: 97 7 °F (36 5 °C)  Intake & Output:  I/O       11/16 0701 11/17 0700 11/17 0701  11/18 0700 11/18 0701  11/19 0700    P  O  120 220     I V  (mL/kg)  925 (15 3)     Total Intake(mL/kg) 120 1145 (19)     Urine (mL/kg/hr) 400 250 (0 2)     Other 900      Total Output 1300 250     Net -1180 +895                Weights:        Body mass index is 29 82 kg/m²  Weight (last 2 days)     Date/Time Weight    11/17/22 1300 60 3 (133)        Physical Exam  Vitals and nursing note reviewed  Constitutional:       General: She is not in acute distress  Appearance: She is not ill-appearing or toxic-appearing  HENT:      Head: Normocephalic  Eyes:      General: No scleral icterus  Pupils: Pupils are equal, round, and reactive to light  Neck:      Trachea: Tracheostomy present  Cardiovascular:      Rate and Rhythm: Normal rate and regular rhythm  Heart sounds: No murmur heard  No gallop  Pulmonary:      Effort: Pulmonary effort is normal  No respiratory distress  Breath sounds: Decreased breath sounds present  No wheezing, rhonchi or rales  Abdominal:      General: Bowel sounds are normal  There is no distension  Palpations: Abdomen is soft  Tenderness: There is abdominal tenderness (epigastric)  There is no guarding  Musculoskeletal:      Cervical back: Normal range of motion  Right lower leg: Edema present  Left lower leg: Edema present  Skin:     General: Skin is warm  Capillary Refill: Capillary refill takes less than 2 seconds  Neurological:      Mental Status: She is alert and oriented to person, place, and time  Mental status is at baseline  Cranial Nerves: No cranial nerve deficit  Psychiatric:         Mood and Affect: Mood normal            LABORATORY DATA     Labs: I have personally reviewed pertinent reports    Results from last 7 days   Lab Units 11/18/22  0240 11/17/22  0406 11/16/22  0632   WBC Thousand/uL 10 15 11 93* 12 57*   HEMOGLOBIN g/dL 9 1* 9 6* 10 3* HEMATOCRIT % 31 4* 31 8* 32 2*   PLATELETS Thousands/uL 316 342 388   NEUTROS PCT % 85* 85* 87*   MONOS PCT % 7 7 6      Results from last 7 days   Lab Units 11/18/22  0240 11/17/22  1415 11/17/22  0406   POTASSIUM mmol/L 3 7 3 7 3 8   CHLORIDE mmol/L 112* 114* 114*   CO2 mmol/L 23 18* 19*   BUN mg/dL 19 19 21   CREATININE mg/dL 0 96 1 11 0 88   CALCIUM mg/dL 12 1* 11 5* 11 6*   ALK PHOS U/L 113 114 122*   ALT U/L 25 22 21   AST U/L 25 21 24              Results from last 7 days   Lab Units 11/15/22  1913   INR  1 01   PTT seconds 31     Results from last 7 days   Lab Units 11/15/22  1913   LACTIC ACID mmol/L 1 1             ABG:       Micro:   Results from last 7 days   Lab Units 11/17/22  2141 11/16/22  1524 11/16/22  1416 11/15/22  2033 11/15/22  2025 11/15/22  2003 11/15/22  1913   BLOOD CULTURE  Received in Microbiology Lab  Culture in Progress  Received in Microbiology Lab  Culture in Progress  --   --   --   --   --  No Growth at 48 hrs  SPUTUM CULTURE   --   --  Few Colonies of  --  1+ Growth of Staphylococcus aureus*  Few Colonies of  --   --    GRAM STAIN RESULT   --  No Polys or Bacteria seen Rare Epithelial Cells  4+ Polys  No bacteria seen  --  1+ Polys*  Rare Gram positive cocci in pairs, chains and clusters*  --  Gram positive cocci in clusters*  Gram variable rods*   BODY FLUID CULTURE, STERILE   --  No growth  --   --   --   --   --    MRSA CULTURE ONLY   --   --   --  No Methicillin Resistant Staphlyococcus aureus (MRSA) isolated  --   --   --    LEGIONELLA URINARY ANTIGEN   --   --   --   --   --  Negative  --    STREP PNEUMONIAE ANTIGEN, URINE   --   --   --   --   --  Negative  --          IMAGING & DIAGNOSTIC TESTING     Radiology Results: I have personally reviewed pertinent reports  XR chest portable    Result Date: 11/17/2022  Impression: Near complete drainage of right effusion with trace residual effusion  Masslike opacity in the right lower lobe better shown on CT   Mild pulmonary venous congestion  Workstation performed: [id number]     CT head wo contrast    Result Date: 11/15/2022  Impression: No acute intracranial abnormality  Chronic microangiopathic changes  Workstation performed: [id number]     PE Study with CT Abdomen and Pelvis with contrast    Result Date: 11/15/2022  Impression: No central, lobar, segmental or proximal subsegmental pulmonary embolism  Evaluation of the distal subsegmental pulmonary arteries is limited  Large, heterogeneous airspace consolidation in the right lower lobe, with a 6 3 x 5 2 x 4 8 cm somewhat lobulated hypodense component with a small droplet of air  This likely represents a necrotizing aspiration pneumonia, given mucous plugging throughout the right lower lobe and retained secretions/debris in the right mainstem bronchus and bronchus intermedius  Recommend follow-up chest CT in approximately 3 months to ensure resolution as an underlying neoplasm may have a similar appearance  Nonspecific 7 x 6 mm lobulated left lower lobe nodule  Attention on follow-up  Moderate right pleural effusion  No enhancement of the pleura to suggest empyema  Mediastinal and right hilar lymphadenopathy, likely reactive, however metastatic disease is not entirely excluded  Mild stranding around the proximal pancreas suggestive of acute interstitial pancreatitis  Recommend correlation with lipase level  No organized peripancreatic fluid collections, loss of parenchymal enhancement to suggest necrosis, or vascular complications of pancreatitis  No other acute abdominal or pelvic pathology  Multiple additional findings as above  The study was marked in [facility] for immediate notification  Workstation performed: [id number]     Other Diagnostic Testing: I have personally reviewed pertinent reports      ACTIVE MEDICATIONS     Current Facility-Administered Medications   Medication Dose Route Frequency   • acetaminophen (TYLENOL) tablet 650 mg  650 mg Oral Q6H PRN   • albuterol inhalation solution 2 5 mg  2 5 mg Nebulization Q4H PRN   • atorvastatin (LIPITOR) tablet 40 mg  40 mg Oral Daily With Dinner   • azithromycin (ZITHROMAX) tablet 500 mg  500 mg Oral Q24H   • cefTRIAXone (ROCEPHIN) 1,000 mg in dextrose 5 % 50 mL IVPB  1,000 mg Intravenous Q24H   • clopidogrel (PLAVIX) tablet 75 mg  75 mg Oral Daily   • heparin (porcine) subcutaneous injection 5,000 Units  5,000 Units Subcutaneous Q8H [address]   • HYDROmorphone HCl (DILAUDID) injection 0 2 mg  0 2 mg Intravenous Q6H PRN   • levothyroxine tablet 100 mcg  100 mcg Oral Early Morning   • loratadine (CLARITIN) tablet 5 mg  5 mg Oral Daily   • multi-electrolyte (PLASMALYTE-A/ISOLYTE-S PH 7 4) IV solution  100 mL/hr Intravenous Continuous   • oxyCODONE (ROXICODONE) IR tablet 2 5 mg  2 5 mg Oral Q6H PRN    Or   • oxyCODONE (ROXICODONE) IR tablet 5 mg  5 mg Oral Q6H PRN   • sertraline (ZOLOFT) tablet 25 mg  25 mg Oral Daily       VTE Pharmacologic Prophylaxis: Heparin  VTE Mechanical Prophylaxis: sequential compression device      Disclaimer: Portions of the record may have been created with voice recognition software  Occasional wrong word or "sound a like" substitutions may have occurred due to the inherent limitations of voice recognition software  Careful consideration should be taken to recognize, using context, where substitutions have occurred      Ivan Giron MD   Pulmonary and Critical Care Fellow, PGY-4  [facility]

## 2022-11-18 NOTE — TELEPHONE ENCOUNTER
New Patient Intake Form   Patient Details:    Randi Vargas  1935    Appointment Information   Who is calling to schedule? In-basket   If not self, what is the caller's name? Tolbert Fuel   DID YOU CONFIRM INSURANCE WITH PATIENT? Currently inpatient    Referring provider Hospital follow up    What is the diagnosis? possible malignancy r/t to hypercalcemia along with noted lung nodules     Is there a confirmed tissue diagnosis? Yes     Is there a biopsy ordered or pending? Please specify dates  If yes, route to NN/OCC   11/16/22     Is patient aware of diagnosis? Did Not Speak to Patient / Received via In Basket     Have you had any imaging or labs done? If yes, where? (If imaging done outside of [facility], please remind patient to bring a disk ) Yes     If imaging done at outside facility, did you instruct patient to obtain discs and bring to visit? n/a   Have you been seen by another Oncologist/Hematologist?  If so, who and where? Unsure did not speak with the patient    Are the records in [facility] or Care Everywhere? yes   Does the patient have records at another facility/hospital?    If yes, Name of facility, city and state where facility is located  no     Did you instruct patient to have records faxed to [facility] and provide rightfax number?   n/a   Preferred [place]   Is the patient willing to be seen by another provider? (This is for breast patients only) n/a     Did you send new patient paperwork? Email or mail? n/a   Miscellaneous Information:  In basket message:  post-hospital f/u [facility] office   4-6 weeks out 12/20/22 1120 AM Dr Katerine Dumont office

## 2022-11-18 NOTE — PLAN OF CARE
Problem: PHYSICAL THERAPY ADULT  Goal: Performs mobility at highest level of function for planned discharge setting  See evaluation for individualized goals  Description: Treatment/Interventions: ADL retraining, Functional transfer training, LE strengthening/ROM, Therapeutic exercise, Endurance training, Patient/family training, Equipment eval/education, Bed mobility, Gait training, Spoke to nursing, Spoke to case management, Spoke to advanced practitioner, OT  Equipment Recommended: Gayla Walters       See flowsheet documentation for full assessment, interventions and recommendations  Outcome: Progressing  Note: Prognosis: Good  Problem List: Decreased strength, Decreased endurance, Impaired balance, Decreased mobility, Decreased skin integrity  Assessment: Pt seen for PT treatment session w/ focus on t/f training, gait training, + bed mobility training  Pt demonstrated progress this session w/ increased gait distance during gait training  Pt desaturated to 86% on 8 L/min via trach collar, -140 w/ ambulation  Pt incontinent of bowel w/ ambulation  Toilet t/f performed + extended standing performed w/ cues for balance to perform hygiene  From a PT standpoint continue to recommend HHPT upon d/c   Barriers to Discharge: Decreased caregiver support  Barriers to Discharge Comments: lives alone- in [address]  PT Discharge Recommendation: Home with home health rehabilitation    See flowsheet documentation for full assessment

## 2022-11-18 NOTE — OCCUPATIONAL THERAPY NOTE
Occupational Therapy Progress Note     Patient Name: Yasmine Hunter  YPORC'A Date: 11/18/2022  Problem List  Principal Problem:    Sepsis with acute hypoxic respiratory failure ([facility] [address] )  Active Problems:    Asymptomatic carotid artery stenosis    Hyperlipidemia    Hypertension    Laryngeal carcinoma (HCC)    CKD (chronic kidney disease) stage 3, GFR 30-59 ml/min    JORDAN (acute kidney injury) ([facility] [address] )    Hypercalcemia    Right lower lobe lung mass with right pleural effusion    Pancreatitis    Lung nodule seen on imaging study    Anemia of chronic disease    Hypothyroidism    Gram-positive bacteria on cultures              11/18/22 1340   OT Last Visit   OT Visit Date 11/18/22   Note Type   Note Type Treatment   Pain Assessment   Pain Assessment Tool 0-10   Pain Score 1   Restrictions/Precautions   Weight Bearing Precautions Per Order No   Other Precautions Fall Risk;Pain;Multiple lines  (8L O2 via trach collar, speaking valve)   Lifestyle   Autonomy I with ADLs and completes functional mobility with RW   Reciprocal Relationships Support of facility   Service to Others Retired   [address] Enjoys going on the facility bus to activities within the community   ADL   Where Assessed Edge of bed   LB Dressing Assistance 4  Minimal Assistance   LB Dressing Deficit Setup; Thread RLE into underwear; Thread LLE into underwear; Increased time to complete;Supervision/safety;Verbal cueing   Toileting Assistance  4  Minimal Assistance   Toileting Deficit Setup;Supervison/safety; Increased time to complete;Grab bar use;Clothing management up;Clothing management down;Perineal hygiene   Toileting Comments Min A transfer, min A hygiene- Pt with incontinence of bowel s/p functional mobility   Bed Mobility   Sit to Supine 4  Minimal assistance   Additional items Assist x 1; Increased time required;LE management   Additional Comments Pt greeted sitting on EOB     Transfers   Sit to Stand 5  Supervision   Additional items Verbal cues;Increased time required   Stand to Sit 5  Supervision   Additional items Increased time required;Verbal cues   Toilet transfer 4  Minimal assistance   Additional items Assist x 1; Increased time required;Verbal cues;Standard toilet  (GB)   Functional Mobility   Functional Mobility 5  Supervision   Additional Comments S with functional mobility with RW and S- min A with functional mobility wtih no AD   Additional items Rolling walker   Cognition   Overall Cognitive Status WFL   Arousal/Participation Alert; Cooperative   Attention Attends with cues to redirect   Orientation Level Oriented X4   Memory Decreased recall of precautions   Following Commands Follows one step commands with increased time or repetition   Comments Pt pleasant and cooperative during OT session  Activity Tolerance   Activity Tolerance Patient limited by fatigue  (O2 sats 86-93% on 8L O2 with activity  -140s )   Medical Staff Made Aware RN cleared/updated  Assessment   Assessment Pt greeted bedside for OT treatment on 11/18/2022 focusing on maximizing independence with ADLs  Pt completes functional transfers with S-min A and functional mobility with RW at S level  Pt completes functional mobility within room distances with no AD and requires min Ax1 with HHA at times  Pt incontinent of bowel s/p functional mobility  Pt completes LB dressing with min A and toileting routine at min A level  Limitations that impact functional performance include decreased ADL status, decreased UE ROM, decreased UE strength, decreased safe judgement during ADLs, decreased cognition, decreased endurance, decreased self care transfers and decreased high level ADLs  Occupational performance areas to address ADL retraining, functional transfer training, UE strengthening/ROM, endurance training, cognitive reorientation, Pt/caregiver education, equipment evaluation/education, compensatory technique education, energy conservation and activity engagement    Pt would benefit from continued skilled OT services while in hospital to maximize independence with ADLs  Will continue to follow Pt's goals and progress  Pt would benefit from post acute rehabilitation services upon DC to maximize safety and independence with ADLs and functional tasks of choice  Plan   Treatment Interventions ADL retraining;UE strengthening/ROM; Functional transfer training;Cognitive reorientation; Endurance training;Patient/family training; Compensatory technique education;Equipment evaluation/education; Energy conservation; Activityengagement   Goal Expiration Date 11/30/22   OT Treatment Day 1   OT Frequency Other (comment)  (2-4x/wk)   Recommendation   OT Discharge Recommendation Home with home health rehabilitation   Additional Comments  The patient's raw score on the AM-PAC Daily Activity inpatient short form is 19, standardized score is 40 22, greater than 39 4  Patients at this level are likely to benefit from discharge to home  Please refer to the recommendation of the Occupational Therapist for safe discharge planning  AM-PAC Daily Activity Inpatient   Lower Body Dressing 3   Bathing 3   Toileting 3   Upper Body Dressing 3   Grooming 3   Eating 4   Daily Activity Raw Score 19   Daily Activity Standardized Score (Calc for Raw Score >=11) 40 22   AM-[facility] Applied Cognition Inpatient   Following a Speech/Presentation 3   Understanding Ordinary Conversation 4   Taking Medications 4   Remembering Where Things Are Placed or Put Away 4   Remembering List of 4-5 Errands 4   Taking Care of Complicated Tasks 3   Applied Cognition Raw Score 22   Applied Cognition Standardized Score 47 83   End of Consult   Education Provided Yes   Patient Position at End of Consult Supine; All needs within reach   Nurse Communication Nurse aware of consult       Iris Greenberg MS, OTR/L

## 2022-11-18 NOTE — PHYSICAL THERAPY NOTE
Physical Therapy Treatment Note    Patient's Name: Rome Mcneill  : 22 1341   PT Last Visit   PT Visit Date 22   Note Type   Note Type Treatment   Pain Assessment   Pain Assessment Tool 0-10   Pain Score No Pain   Restrictions/Precautions   Weight Bearing Precautions Per Order No   Other Precautions Multiple lines;Telemetry;O2;Fall Risk  (trach w/ trach collar, Venturi mask on 8 L/min)   General   Chart Reviewed Yes   Response to Previous Treatment Patient with no complaints from previous session  Family/Caregiver Present No   Subjective   Subjective Pt agreeable to mobilize  Bed Mobility   Sit to Supine 4  Minimal assistance   Additional items Assist x 1; Increased time required;Verbal cues;LE management   Additional Comments Pt greeted seated EOB  Transfers   Sit to Stand 5  Supervision   Additional items Verbal cues; Increased time required   Stand to Sit 5  Supervision   Additional items Verbal cues; Increased time required   Toilet transfer 4  Minimal assistance   Additional items Assist x 1; Increased time required;Verbal cues;Standard toilet  (GB)   Additional Comments RW   Ambulation/Elevation   Gait pattern Short stride; Foward flexed; Inconsistent kaiser   Gait Assistance 5  Supervision   Additional items Verbal cues   Assistive Device Rolling walker   Distance 80'x3 w/ standing rests   Balance   Static Sitting Good   Dynamic Sitting Fair +   Static Standing Fair   Dynamic Standing Fair -   Ambulatory Fair -  (RW)   Endurance Deficit   Endurance Deficit Yes   Endurance Deficit Description desaturation on 8L/min via trach collar to 86-93%, -140   Activity Tolerance   Activity Tolerance Patient limited by fatigue;Treatment limited secondary to medical complications (Comment)  (desaturation / tachycardia)   Medical Staff Made Aware OT [address] [facility]   Nurse Made Aware yes - cleared for therapy   Exercises   Neuro re-ed Dynamic standing balance w/ multidirectional reaching for hygiene  Assessment   Prognosis Good   Problem List Decreased strength;Decreased endurance; Impaired balance;Decreased mobility; Decreased skin integrity   Assessment Pt seen for PT treatment session w/ focus on t/f training, gait training, + bed mobility training  Pt demonstrated progress this session w/ increased gait distance during gait training  Pt desaturated to 86% on 8 L/min via trach collar, -140 w/ ambulation  Pt incontinent of bowel w/ ambulation  Toilet t/f performed + extended standing performed w/ cues for balance to perform hygiene  From a PT standpoint continue to recommend HHPT upon d/c    Barriers to Discharge Decreased caregiver support   Goals   Patient Goals to lie down   PT Treatment Day 2   Plan   Treatment/Interventions Functional transfer training;LE strengthening/ROM; Therapeutic exercise; Endurance training;Patient/family training;Equipment eval/education; Bed mobility;Gait training; Compensatory technique education;Spoke to nursing;OT  (balance training)   Progress Progressing toward goals   PT Frequency 3-5x/wk   Recommendation   PT Discharge Recommendation Home with home health rehabilitation   Equipment Recommended   (pt already has rollator)   AM-PAC Basic Mobility Inpatient   Turning in Bed Without Bedrails 4   Lying on Back to Sitting on Edge of Flat Bed 4   Moving Bed to Chair 3   Standing Up From Chair 3   Walk in Room 3   Climb 3-5 Stairs 3   Basic Mobility Inpatient Raw Score 20   Basic Mobility Standardized Score 43 99   Highest Level Of Mobility   JH-HLM Goal 6: Walk 10 steps or more   JH-HLM Achieved 7: Walk 25 feet or more   Education   Education Provided Mobility training;Assistive device   Patient Demonstrates acceptance/verbal understanding;Reinforcement needed   End of Consult   Patient Position at End of Consult Supine; All needs within reach  (all lines in tact)     Judi Zaman, PT, DPT

## 2022-11-18 NOTE — CONSULTS
Medical Oncology/Hematology Consult Note  Brigida Kitchen, female, 81 y  o , 1935,  PPHP [id number]/PPHP [id number], [id number]     Reason for consultation: Hypercalcemia  LOS: 3 days  Presented in the ED with tachycardia, hypoxia, fatigue  Assessment and Plan:    1  Hypercalcemia  -Corrected calcium trend: 13 8 (11/18) <-13 4 (11/17) <--12 7 (11/16)<--14 3 (11/15)  -Evident since 8/29/2022 at 14 2 mg/dL  No interventions done for hypercalcemia then   -Patient on daily vit-D and calcium supplements at home    -No osseous lesions present on imaging from 11/15/22  -Primary parathyroidism or tertiary cause ruled out as PTH on 09/2022 was low; CKD 3 stable     -Possible etiologies: patient had evidence of acute on chronic kidney injury: Hgb 11 1, low bicarb 18, elevated BUN 34, elevated creatinine 1 47 (baseline 1 11 4), GFR 32, , hypoalbuminemia 2 1; elevated lipase 2028, uric acid 10 4  -May have reduced clearance of calcium supplements  Kidney injury resolved with aggressive hydration, however, hypercalcemia continues to persist     2  Right lower lobe lung mass with right pleural effusion  -Patient history of laryngeal carcinoma status post tracheostomy/laryngectomy   -CTA chest notable for significant findings: large, heterogeneous airspace consolidation in the right lower lobe, with a 6 3 x 5 2 x 4 8 cm somewhat lobulated hypodense component likely a necrotizing aspiration pneumonia  Mediastinal and right hilar lymphadenopathy, likely reactive, however metastatic disease is not entirely excluded  -Started on IV Unasyn then transitioned to ceftriaxone given low suspicion of aspiration  -S/p thoracentesis (900 mL fluid)  Cytology negative for malignancy   Presence of mesothelial cells and mixed inflammatory cells      3  Laryngeal carcinoma   -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 in 2004    -Follows with ENT as an outpatient with Dr Jalil Garcia; most recently evaluated for trach follow-up in 10/2022  -Marylene Perna in place, continue trach collar oxygen, currently on 6 L of O2    4  Chronic normocytic, hypochromic anemia  -Evident since 8/2022  -Hemoglobin trend: 9 1 (11/18) <-11 1 (11/15)  Hemoglobin dropped from 11 1 to 9 1 g/dL most likely from iatrogenic anemia by virtue of frequent blood draws along with CKD  Will r/u possible GI bleed; will start with occult stool sample  CT C/A/P (11/15/22):    -Large, heterogeneous airspace consolidation in the right lower lobe, with a 6 3 x 5 2 x 4 8 cm somewhat lobulated hypodense component with a small droplet of air  This likely represents a necrotizing aspiration pneumonia, given mucous plugging throughout the right lower lobe and retained secretions/debris in the right mainstem bronchus and bronchus intermedius  Recommend follow-up chest CT in approximately 3 months to ensure resolution as an underlying neoplasm may have a similar appearance      Nonspecific 7 x 6 mm lobulated left lower lobe nodule        Mediastinal and right hilar lymphadenopathy, likely reactive, however metastatic disease is not entirely excluded      Mild stranding around the proximal pancreas suggestive of acute interstitial pancreatitis  Recommend correlation with lipase level  No other acute abdominal or pelvic pathology      OSSEOUS STRUCTURES:  No focal aggressive osseous lesions  Degenerative changes of the spine  Grade 1 anterolisthesis of L4 on L5      LABS:  Hemoglobin 9 1  iron panel (11/16): iron saturation 6%, ferritin 59  Uric acid 10 4 mg/dL      PTH -<6 3 pg/mL  LDH- 184  WNL    · Protein electrophoresis, serum (11/16)-Albumin/Globulin ratio- 0 67  SPEP shows a faint possible band in the gamma region  · Serum immunofixation(11/16)- Cannot rule out small gammopathy  Suggest retest in 3-6 mos     Vitamin-D level pending    PLAN:  1) Patient is s/p 3 mg of zoledronic acid on 11/17/22    It may take between 4-7 days for Zometa to achieve full benefit; recommend calcitonin 4 units/kg SQ q12 hours x 2 days  2) Continue to trend CMP daily to assess for tx response/improvement  Repeat uric acid as it was elevaytd on 11/15/22     3) Agree with continued gentle hydration in balancing between hypercalcemia and brewing fluid overload  4) Recommend to repeat imaging after completion of antibiotics for pneumonia to better assess and visualize for noted lung nodules/hypodense areas on CT imaging  May recommend biopsy, if indicated  4)  Ordered Venofer 200 mg x3 EOD  Ferritin 59, iron saturation 6%, may benefit from iron infusion for hypochromic anemia  Due to sudden drop in hgb, plan to check for occult stool as well  5) Followed by Nephrology, appt  With Dr Mundo Teresa on 11/30/22      6) Outpatient follow up plan: via [facility]  Will need outpatient appointment to follow up with possible malignancy r/t to hypercalcemia along with noted lung nodules  Communication with team:  Communicated with primary team  Reviewed this patient with Dr Leroy Fernandez  Discussed plan with patient and bedside RN, Breonna Oneal  Thank you for this consult  Bea Rosales DNP, [facility]AN[facility]  Hematology-Oncology      History of present illness:  Anibal Miller is a 81 y o  female with a past medical history of laryngeal carcinoma s/p laryngectomy/tracheostomy with [employer] apparatus dating back to 2004  She follows with ENT as an outpatient with Dr Cayla Schneider; most recently evaluated for trach follow-up in 10/2022  Trach in place, continue trach collar oxygen, currently on 6 L of O2  She also has a hx of CKD stage IIIB, hypertension, hyperlipidemia, hypothyroidism, and 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  She [facility] ED from [facility] on [facility]22 with symptoms including lightheadedness, intermittent SOB, los of appetite   CTA chest notable for significant findings: large, heterogeneous airspace consolidation in the right lower lobe, with a 6 3 x 5 2 x 4 8 cm somewhat lobulated hypodense component likely a necrotizing aspiration pneumonia  Mediastinal and right hilar lymphadenopathy, likely reactive, however metastatic disease is not entirely excluded  She was started on IV Unasyn then transitioned to ceftriaxone given low suspicion of aspiration  She was s/p thoracentesis (900 mL fluid)  Cytology negative for malignancy  Presence of mesothelial cells and mixed inflammatory cells  Oncology was consulted to [facility] recommendations form her hypercalcemia  Her corrected calcium trend: 13 8 (11/18) <-13 4 (11/17) <--12 7 (11/16)<--14 3 (11/15)  Evident since 8/29/2022 at 14 2 mg/dL, but no interventions done for hypercalcemia then  Patient on daily vit-D and calcium supplements at her SNF  No osseous lesions present on imaging from 11/15/22  Primary parathyroidism or tertiary cause ruled out as PTH on 09/2022 was low; CKD 3 stable  Possible etiologies: patient had evidence of acute on chronic kidney injury, may have reduced clearance of calcium supplements  Kidney injury resolved with aggressive hydration, however, hypercalcemia continues to persist  Equally considering malignancy with no known primary source at this time  Plan to wait to finish antibiotic tx for pneumonia and then re-image  Plan for hypercalcemia to add calcitonin while waiting for Zometa to prove its efficacy  Review of Systems:   Review of Systems   Constitutional: Positive for fatigue  Negative for chills and fever  HENT: Negative for ear pain and sore throat  Eyes: Negative for pain and visual disturbance  Respiratory: Negative for cough and shortness of breath  Tracheostomy   Cardiovascular: Negative for chest pain and palpitations  Gastrointestinal: Negative for abdominal pain and vomiting  Genitourinary: Negative for dysuria and hematuria  Musculoskeletal: Negative for arthralgias and back pain  Skin: Negative for color change and rash  Neurological: Positive for weakness   Negative for seizures and syncope  Hematological: Bruises/bleeds easily  All other systems reviewed and are negative  Oncology History:   Cancer Staging   No matching staging information was found for the patient  Oncology History    No history exists  Past Medical History:   Past Medical History:   Diagnosis Date   • [facility]    • 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 ([facility] )    • Carotid artery stenosis    • Hyperlipidemia    • Hypertension    • PVD (peripheral vascular disease) ([facility] )    • Throat cancer [facility])        Past Surgical History:   Procedure Laterality Date   • GALLBLADDER SURGERY  2019   • ILIAC ARTERY STENT Left    • IR CEREBRAL ANGIOGRAPHY / INTERVENTION  2020   • LARYNX SURGERY     • LEG SURGERY  07/10/2012       Family History   Problem Relation Age of Onset   • Heart disease Brother    • No Known Problems Mother    • No Known Problems Father    • Breast cancer Sister 71   • No Known Problems Daughter    • No Known Problems Maternal Grandmother    • No Known Problems Maternal Grandfather    • No Known Problems Paternal Grandmother    • No Known Problems Paternal Grandfather    • No Known Problems Sister    • No Known Problems Maternal Aunt    • No Known Problems Maternal Aunt    • No Known Problems Maternal Aunt        Social History     Socioeconomic History   • Marital status:       Spouse name: None   • Number of children: None   • Years of education: None   • Highest education level: None   Occupational History   • None   Tobacco Use   • Smoking status: Former     Types: Cigarettes     Quit date:      Years since quittin 8   • Smokeless tobacco: Never   Substance and Sexual Activity   • Alcohol use: Never   • Drug use: Never   • Sexual activity: None   Other Topics Concern   • None   Social History Narrative   • None     Social Determinants of Health     Financial Resource Strain: Not on file   Food Insecurity: Not on file   Transportation Needs: Not on file   Physical Activity: Not on file   Stress: Not on file   Social Connections: Not on file   Intimate Partner Violence: Not on file   Housing Stability: Not on file         Current Facility-Administered Medications:   •  acetaminophen (TYLENOL) tablet 650 mg, 650 mg, Oral, Q6H PRN, Linette Gannon DO  •  albuterol inhalation solution 2 5 mg, 2 5 mg, Nebulization, Q4H PRN, Will Bailey MD  •  atorvastatin (LIPITOR) tablet 40 mg, 40 mg, Oral, Daily With Asher Fonseca DO, 40 mg at 11/17/22 1747  •  azithromycin (ZITHROMAX) tablet 500 mg, 500 mg, Oral, Q24H, Deacons Riff, DO, 500 mg at 11/17/22 1146  •  cefTRIAXone (ROCEPHIN) 1,000 mg in dextrose 5 % 50 mL IVPB, 1,000 mg, Intravenous, Q24H, Jamas Riff, DO, Last Rate: 100 mL/hr at 11/17/22 1421, 1,000 mg at 11/17/22 1421  •  clopidogrel (PLAVIX) tablet 75 mg, 75 mg, Oral, Daily, Junaid P Fonseca, DO, 75 mg at 11/17/22 5441  •  heparin (porcine) subcutaneous injection 5,000 Units, 5,000 Units, Subcutaneous, Q8H [facility], 5,000 Units at 11/18/22 0631 **AND** [CANCELED] Platelet count, , , Once, Carlos Solis DO  •  HYDROmorphone HCl (DILAUDID) injection 0 2 mg, 0 2 mg, Intravenous, Q6H PRN, Will Bailey MD  •  levothyroxine tablet 100 mcg, 100 mcg, Oral, Early Morning, Junaid P Fonseca, DO, 100 mcg at 11/18/22 0631  •  loratadine (CLARITIN) tablet 5 mg, 5 mg, Oral, Daily, Junaid P Fonseca, DO, 5 mg at 11/17/22 3635  •  multi-electrolyte (PLASMALYTE-A/ISOLYTE-S PH 7 4) IV solution, 100 mL/hr, Intravenous, Continuous, Jamas Riff, DO, Last Rate: 100 mL/hr at 11/18/22 0633, 100 mL/hr at 11/18/22 0998  •  oxyCODONE (ROXICODONE) IR tablet 2 5 mg, 2 5 mg, Oral, Q6H PRN **OR** oxyCODONE (ROXICODONE) IR tablet 5 mg, 5 mg, Oral, Q6H PRN, 5 mg at 11/17/22 0408 **OR** [DISCONTINUED] HYDROmorphone HCl (DILAUDID) injection 0 2 mg, 0 2 mg, Intravenous, Q6H PRN, Carlos Solis DO  •  sertraline (ZOLOFT) tablet 25 mg, 25 mg, Oral, Daily, Lorna Almonte DO, 25 mg at 11/17/22 5516    Medications Prior to Admission   Medication   • acetaminophen (TYLENOL) 325 mg tablet   • atorvastatin (LIPITOR) 40 mg tablet   • Calcium-Magnesium-Vitamin D - MG-MG-UNIT TB24   • cefuroxime (CEFTIN) 250 mg tablet   • cetirizine (ZyrTEC) 5 MG tablet   • Cholecalciferol (VITAMIN D-3) 1000 units CAPS   • clopidogrel (PLAVIX) 75 mg tablet   • co-enzyme Q-10 30 mg   • fluticasone (FLONASE) 50 mcg/act nasal spray   • Krill Oil 1000 MG CAPS   • levothyroxine 100 mcg tablet   • losartan (COZAAR) 50 mg tablet   • Magnesium 250 MG TABS   • metFORMIN (GLUCOPHAGE) 500 mg tablet   • methocarbamol (ROBAXIN) 500 mg tablet   • Multiple Vitamins-Minerals (CENTRUM SILVER 50+WOMEN PO)   • niacin 500 mg ER capsule   • nystatin (MYCOSTATIN) 500,000 units/5 mL suspension   • Omega-3 1000 MG CAPS   • pantoprazole (PROTONIX) 40 mg tablet   • sertraline (ZOLOFT) 25 mg tablet   • VENTOLIN  (90 Base) MCG/ACT inhaler       Allergies   Allergen Reactions   • Benazepril Cough   • Solifenacin Other (See Comments)   • Olmesartan Rash         Physical Exam:     BP 94/66   Pulse (!) 121   Temp 97 7 °F (36 5 °C)   Resp 17   Ht 4' 8" (1 422 m)   Wt 60 3 kg (133 lb)   SpO2 95%   BMI 29 82 kg/m²     Physical Exam  HENT:      Head: Normocephalic  Mouth/Throat:      Mouth: Mucous membranes are moist    Cardiovascular:      Rate and Rhythm: Tachycardia present  Pulmonary:      Effort: Pulmonary effort is normal       Comments: Voice box apparatus in place  On 6L of humidified O2 via trach collar  Abdominal:      General: Bowel sounds are normal       Palpations: Abdomen is soft  Musculoskeletal:      Right lower leg: Edema present  Left lower leg: Edema present  Skin:     General: Skin is warm and dry  Findings: Bruising present  Neurological:      General: No focal deficit present  Mental Status: She is alert  Motor: Weakness present  Psychiatric:         Mood and Affect: Mood normal          Thought Content: Thought content normal            Recent Results (from the past 48 hour(s))   Sputum culture and Gram stain    Collection Time: 11/16/22  2:16 PM    Specimen: Tracheal Aspirate; Sputum   Result Value Ref Range    Sputum Culture Few Colonies of     Gram Stain Result Rare Epithelial Cells     Gram Stain Result 4+ Polys     Gram Stain Result No bacteria seen    Body fluid white cell count with differential    Collection Time: 11/16/22  3:24 PM   Result Value Ref Range    Site PLEURAL     WBC, Fluid 495 /ul   Body fluid culture (Pleural Fluid Culture) and Gram stain    Collection Time: 11/16/22  3:24 PM    Specimen: Pleural, Right;  Body Fluid   Result Value Ref Range    Body Fluid Culture, Sterile No growth     Gram Stain Result No Polys or Bacteria seen    Glucose, body fluid    Collection Time: 11/16/22  3:24 PM   Result Value Ref Range    Glucose, Fluid 83 mg/dL   LD (LDH), Body Fluid    Collection Time: 11/16/22  3:24 PM   Result Value Ref Range    LD, Fluid 92 U/L   pH, body fluid    Collection Time: 11/16/22  3:24 PM   Result Value Ref Range    PH BODY FLUID 7 5    Total Protein, body fluid    Collection Time: 11/16/22  3:24 PM   Result Value Ref Range    Protein, Fluid 3 3 g/dL   Body Fluid Diff    Collection Time: 11/16/22  3:24 PM   Result Value Ref Range    Total Counted 100     Neutrophils % (Fluid) 45 %    Lymphs % (Fluid) 10 %    Mesothelial % (Fluid) 2 %    Histiocyte % (Fluid) 10 %    Monocytes % (Fluid) 33 %   Comprehensive metabolic panel    Collection Time: 11/16/22  4:26 PM   Result Value Ref Range    Sodium 142 135 - 147 mmol/L    Potassium 3 9 3 5 - 5 3 mmol/L    Chloride 113 (H) 96 - 108 mmol/L    CO2 19 (L) 21 - 32 mmol/L    ANION GAP 10 4 - 13 mmol/L    BUN 23 5 - 25 mg/dL    Creatinine 0 95 0 60 - 1 30 mg/dL    Glucose 83 65 - 140 mg/dL    Calcium 12 0 (H) 8 3 - 10 1 mg/dL    Corrected Calcium 13 5 (HH) 8 3 - 10 1 mg/dL    AST 22 5 - 45 U/L    ALT 22 12 - 78 U/L    Alkaline Phosphatase 151 (H) 46 - 116 U/L    Total Protein 7 2 6 4 - 8 4 g/dL    Albumin 2 1 (L) 3 5 - 5 0 g/dL    Total Bilirubin 0 47 0 20 - 1 00 mg/dL    eGFR 54 ml/min/1 73sq m   ECG 12 lead    Collection Time: 11/16/22  5:32 PM   Result Value Ref Range    Ventricular Rate 126 BPM    Atrial Rate 126 BPM    VA Interval 132 ms    QRSD Interval 72 ms    QT Interval 302 ms    QTC Interval 437 ms    P Axis 65 degrees    QRS Axis -9 degrees    T Wave Axis 38 degrees   ECG 12 lead    Collection Time: 11/16/22  5:32 PM   Result Value Ref Range    Ventricular Rate 125 BPM    Atrial Rate 125 BPM    VA Interval 124 ms    QRSD Interval 72 ms    QT Interval 306 ms    QTC Interval 441 ms    P Axis 65 degrees    QRS Axis -6 degrees    T Wave Axis 41 degrees   ECG 12 lead    Collection Time: 11/16/22  7:31 PM   Result Value Ref Range    Ventricular Rate 122 BPM    Atrial Rate 122 BPM    VA Interval 130 ms    QRSD Interval 74 ms    QT Interval 298 ms    QTC Interval 424 ms    P Axis 70 degrees    QRS Axis 46 degrees    T Wave Axis 55 degrees   ECG 12 lead    Collection Time: 11/17/22  2:51 AM   Result Value Ref Range    Ventricular Rate 0 BPM    Atrial Rate 0 BPM    VA Interval  ms    QRSD Interval 0 ms    QT Interval 0 ms    QTC Interval 0 ms    P Axis  degrees    QRS Axis 0 degrees    T Wave Axis 0 degrees   ECG 12 lead    Collection Time: 11/17/22  2:57 AM   Result Value Ref Range    Ventricular Rate 128 BPM    Atrial Rate 128 BPM    VA Interval 132 ms    QRSD Interval 66 ms    QT Interval 292 ms    QTC Interval 426 ms    P Axis 77 degrees    QRS Axis -54 degrees    T Wave Axis 85 degrees   Triglycerides    Collection Time: 11/17/22  4:06 AM   Result Value Ref Range    Triglycerides 116 See Comment mg/dL   HS Troponin 0hr (reflex protocol)    Collection Time: 11/17/22  4:06 AM   Result Value Ref Range    hs TnI 0hr 25 "Refer to ACS Flowchart"- see link ng/L   CBC and differential    Collection Time: 11/17/22  4:06 AM   Result Value Ref Range    WBC 11 93 (H) 4 31 - 10 16 Thousand/uL    RBC 3 70 (L) 3 81 - 5 12 Million/uL    Hemoglobin 9 6 (L) 11 5 - 15 4 g/dL    Hematocrit 31 8 (L) 34 8 - 46 1 %    MCV 86 82 - 98 fL    MCH 25 9 (L) 26 8 - 34 3 pg    MCHC 30 2 (L) 31 4 - 37 4 g/dL    RDW 18 3 (H) 11 6 - 15 1 %    MPV 9 9 8 9 - 12 7 fL    Platelets 045 801 - 978 Thousands/uL    nRBC 0 /100 WBCs    Neutrophils Relative 85 (H) 43 - 75 %    Immat GRANS % 1 0 - 2 %    Lymphocytes Relative 6 (L) 14 - 44 %    Monocytes Relative 7 4 - 12 %    Eosinophils Relative 1 0 - 6 %    Basophils Relative 0 0 - 1 %    Neutrophils Absolute 10 05 (H) 1 85 - 7 62 Thousands/µL    Immature Grans Absolute 0 09 0 00 - 0 20 Thousand/uL    Lymphocytes Absolute 0 76 0 60 - 4 47 Thousands/µL    Monocytes Absolute 0 88 0 17 - 1 22 Thousand/µL    Eosinophils Absolute 0 13 0 00 - 0 61 Thousand/µL    Basophils Absolute 0 02 0 00 - 0 10 Thousands/µL   Comprehensive metabolic panel    Collection Time: 11/17/22  4:06 AM   Result Value Ref Range    Sodium 140 135 - 147 mmol/L    Potassium 3 8 3 5 - 5 3 mmol/L    Chloride 114 (H) 96 - 108 mmol/L    CO2 19 (L) 21 - 32 mmol/L    ANION GAP 7 4 - 13 mmol/L    BUN 21 5 - 25 mg/dL    Creatinine 0 88 0 60 - 1 30 mg/dL    Glucose 102 65 - 140 mg/dL    Calcium 11 6 (H) 8 3 - 10 1 mg/dL    Corrected Calcium 13 3 (HH) 8 3 - 10 1 mg/dL    AST 24 5 - 45 U/L    ALT 21 12 - 78 U/L    Alkaline Phosphatase 122 (H) 46 - 116 U/L    Total Protein 6 0 (L) 6 4 - 8 4 g/dL    Albumin 1 9 (L) 3 5 - 5 0 g/dL    Total Bilirubin 0 35 0 20 - 1 00 mg/dL    eGFR 59 ml/min/1 73sq m   LD,Blood    Collection Time: 11/17/22  4:06 AM   Result Value Ref Range     81 - 234 U/L   HS Troponin I 2hr    Collection Time: 11/17/22  6:14 AM   Result Value Ref Range    hs TnI 2hr 29 "Refer to ACS Flowchart"- see link ng/L    Delta 2hr hsTnI 4 <20 ng/L   HS Troponin I 4hr    Collection Time: 11/17/22 11:13 AM   Result Value Ref Range    hs TnI 4hr 28 "Refer to ACS Flowchart"- see link ng/L    Delta 4hr hsTnI 3 <20 ng/L   Comprehensive metabolic panel    Collection Time: 11/17/22  2:15 PM   Result Value Ref Range    Sodium 141 135 - 147 mmol/L    Potassium 3 7 3 5 - 5 3 mmol/L    Chloride 114 (H) 96 - 108 mmol/L    CO2 18 (L) 21 - 32 mmol/L    ANION GAP 9 4 - 13 mmol/L    BUN 19 5 - 25 mg/dL    Creatinine 1 11 0 60 - 1 30 mg/dL    Glucose 136 65 - 140 mg/dL    Calcium 11 5 (H) 8 3 - 10 1 mg/dL    Corrected Calcium 13 4 (HH) 8 3 - 10 1 mg/dL    AST 21 5 - 45 U/L    ALT 22 12 - 78 U/L    Alkaline Phosphatase 114 46 - 116 U/L    Total Protein 6 0 (L) 6 4 - 8 4 g/dL    Albumin 1 6 (L) 3 5 - 5 0 g/dL    Total Bilirubin 0 34 0 20 - 1 00 mg/dL    eGFR 45 ml/min/1 73sq m   Blood culture    Collection Time: 11/17/22  9:41 PM    Specimen: Arm, Left; Blood   Result Value Ref Range    Blood Culture Received in Microbiology Lab  Culture in Progress  Blood culture    Collection Time: 11/17/22  9:41 PM    Specimen: Arm, Right; Blood   Result Value Ref Range    Blood Culture Received in Microbiology Lab  Culture in Progress      CBC and differential    Collection Time: 11/18/22  2:40 AM   Result Value Ref Range    WBC 10 15 4 31 - 10 16 Thousand/uL    RBC 3 54 (L) 3 81 - 5 12 Million/uL    Hemoglobin 9 1 (L) 11 5 - 15 4 g/dL    Hematocrit 31 4 (L) 34 8 - 46 1 %    MCV 89 82 - 98 fL    MCH 25 7 (L) 26 8 - 34 3 pg    MCHC 29 0 (L) 31 4 - 37 4 g/dL    RDW 18 6 (H) 11 6 - 15 1 %    MPV 10 4 8 9 - 12 7 fL    Platelets 092 233 - 735 Thousands/uL    nRBC 0 /100 WBCs    Neutrophils Relative 85 (H) 43 - 75 %    Immat GRANS % 0 0 - 2 %    Lymphocytes Relative 6 (L) 14 - 44 %    Monocytes Relative 7 4 - 12 %    Eosinophils Relative 2 0 - 6 %    Basophils Relative 0 0 - 1 %    Neutrophils Absolute 8 63 (H) 1 85 - 7 62 Thousands/µL    Immature Grans Absolute 0 04 0 00 - 0 20 Thousand/uL    Lymphocytes Absolute 0 63 0 60 - 4 47 Thousands/µL Monocytes Absolute 0 67 0 17 - 1 22 Thousand/µL    Eosinophils Absolute 0 16 0 00 - 0 61 Thousand/µL    Basophils Absolute 0 02 0 00 - 0 10 Thousands/µL   Comprehensive metabolic panel    Collection Time: 11/18/22  2:40 AM   Result Value Ref Range    Sodium 143 135 - 147 mmol/L    Potassium 3 7 3 5 - 5 3 mmol/L    Chloride 112 (H) 96 - 108 mmol/L    CO2 23 21 - 32 mmol/L    ANION GAP 8 4 - 13 mmol/L    BUN 19 5 - 25 mg/dL    Creatinine 0 96 0 60 - 1 30 mg/dL    Glucose 103 65 - 140 mg/dL    Calcium 12 1 (HH) 8 3 - 10 1 mg/dL    Corrected Calcium 13 8 (HH) 8 3 - 10 1 mg/dL    AST 25 5 - 45 U/L    ALT 25 12 - 78 U/L    Alkaline Phosphatase 113 46 - 116 U/L    Total Protein 6 1 (L) 6 4 - 8 4 g/dL    Albumin 1 9 (L) 3 5 - 5 0 g/dL    Total Bilirubin 0 37 0 20 - 1 00 mg/dL    eGFR 53 ml/min/1 73sq m       XR chest portable    Result Date: 11/17/2022  Narrative: CHEST INDICATION:   Shortness of breath  COMPARISON:  CXR 2/7/2021, chest CT 11/15/2022  EXAM PERFORMED/VIEWS:  XR CHEST PORTABLE FINDINGS: Cardiomediastinal silhouette appears unremarkable  Near-complete drainage of right effusion with trace residual effusion  Masslike opacity in the right lower lobe better shown on CT  Mild pulmonary venous congestion  No pneumothorax  Osseous structures appear within normal limits for patient age  Impression: Near complete drainage of right effusion with trace residual effusion  Masslike opacity in the right lower lobe better shown on CT  Mild pulmonary venous congestion  Workstation performed: [id number]     CT head wo contrast    Result Date: 11/15/2022  Narrative: CT BRAIN - WITHOUT CONTRAST INDICATION:   Plavix, AMS  COMPARISON:  MR brain March 17, 2020 TECHNIQUE:  CT examination of the brain was performed  In addition to axial images, sagittal and coronal 2D reformatted images were created and submitted for interpretation  Radiation dose length product (DLP) for this visit:  844 45 mGy-cm     This examination, like all CT scans performed in the [facility], was performed utilizing techniques to minimize radiation dose exposure, including the use of iterative  reconstruction and automated exposure control  IMAGE QUALITY:  Diagnostic  FINDINGS: PARENCHYMA: Decreased attenuation is noted in periventricular and subcortical white matter demonstrating an appearance that is statistically most likely to represent mild microangiopathic change  Chronic lacunar infarction(s) are noted in basal ganglia  No CT signs of acute infarction  No intracranial mass, mass effect or midline shift  No acute parenchymal hemorrhage  VENTRICLES AND EXTRA-AXIAL SPACES:  Normal for the patient's age  VISUALIZED ORBITS AND PARANASAL SINUSES:  Unremarkable  CALVARIUM AND EXTRACRANIAL SOFT TISSUES:  Normal      Impression: No acute intracranial abnormality  Chronic microangiopathic changes  Workstation performed: [id number]     PE Study with CT Abdomen and Pelvis with contrast    Result Date: 11/15/2022  Narrative: CT PULMONARY ANGIOGRAM OF THE CHEST AND CT ABDOMEN AND PELVIS WITH INTRAVENOUS CONTRAST INDICATION:   Tachycardia, hypoxia, fatigue  As per review of electronic medical record, 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  COMPARISON:  CT of the abdomen and pelvis from 2/7/2021  TECHNIQUE:  CT examination of the chest, abdomen and pelvis was performed  Thin section CT angiographic technique was used in the chest in order to evaluate for pulmonary embolus and coronal 3D MIP postprocessing was performed on the acquisition scanner  Axial, sagittal, and coronal 2D reformatted images were created from the source data and submitted for interpretation  Radiation dose length product (DLP) for this visit:  829 88 mGy-cm     This examination, like all CT scans performed in the [facility], was performed utilizing techniques to minimize radiation dose exposure, including the use of iterative  reconstruction and automated exposure control  IV Contrast:  85 mL of iohexol (OMNIPAQUE) Enteric Contrast:  Enteric contrast was not administered  FINDINGS: CHEST PULMONARY ARTERIAL TREE: No central, lobar, segmental or proximal subsegmental pulmonary embolism  Evaluation of the distal subsegmental pulmonary arteries is limited  BRONCHOPULMONARY: A tracheostomy seen in place  Some retained secretions/debris are seen in the upper trachea as well as the right mainstem bronchus and bronchus intermedius  There is mucous plugging in the right lower lobe bronchus and its branches  Moderate to severe emphysematous changes are seen in the lungs  There is a large airspace consolidation in the right lower lobe, which is heterogeneous with a somewhat lobulated hypodense component measuring approximately 4 8 x 5 2 x 6 3 cm (CC x AP x transverse)  There is a small droplet of air in the nondependent portion of the hypodense component superiorly (series 2 image 131)  Other portions of the consolidation more laterally are also hypodense  A small enhancing consolidation is seen in the right middle lobe, likely areas of atelectasis  There is a lobulated 7 x 6 mm left lower lobe nodule (series 2 image 148)  PLEURA:  Moderate right pleural effusion  No enhancement of the pleura to suggest empyema  No pneumothorax  HEART/GREAT VESSELS: The heart is normal in size  No pericardial effusion  Normal caliber thoracic aorta  Atherosclerotic calcifications are seen in the thoracic aorta and its branches including the coronary arteries  There is no aortic dissection  MEDIASTINUM/LYMPH NODES:  There is mediastinal lymphadenopathy measuring up to approximately 1 6 cm in short axis in the right subcarinal region  There is right hilar lymphadenopathy measuring up to 1 1 cm in short axis  Smaller lymph nodes are seen in the left hilar region measuring up to 8 mm in short axis  No axillary lymphadenopathy   CHEST WALL AND LOWER NECK:  Unremarkable  ABDOMEN LIVER/BILIARY TREE:  Normal liver morphology  No focal hepatic lesions  No biliary ductal dilation  GALLBLADDER:  Somewhat distended gallbladder, however no gallbladder wall thickening or pericholecystic fluid to suggest acute cholecystitis  Small noncalcified gallstone seen near the fundus (series 601 image 42)  SPLEEN: Unremarkable  PANCREAS:  Mild stranding is seen around the proximal pancreas, suggestive of acute pancreatitis  There are no organized fluid collections  There is no loss of pancreatic parenchymal enhancement to suggest necrosis  The main pancreatic duct is not dilated  ADRENAL GLANDS:  Unremarkable  KIDNEYS/URETERS:  Symmetric renal enhancement  No intrarenal or ureteral calculi  No hydronephrosis  There are several subcentimeter hypodense lesions in both kidneys, too small to accurately characterize with CT technique, however statistically most likely cysts  STOMACH AND BOWEL:  Evaluation of the gastrointestinal tract is somewhat limited by underdistention and lack of oral contrast  No bowel obstruction or convincing inflammation  There is a diverticulum in the 2nd portion of the duodenum  APPENDIX:  The appendix is not visualized, however there are no secondary findings of appendicitis  ABDOMINOPELVIC CAVITY:  No ascites or pneumoperitoneum  LYMPH NODES: No abdominal or pelvic lymphadenopathy  VESSELS: Normal caliber aorta  Patent major branch vessels  Extensive vascular calcifications are seen  In particular, the splenic vein is patent  There is no splenic artery pseudoaneurysm  PELVIS REPRODUCTIVE ORGANS: Unremarkable CT appearance of the uterus  URINARY BLADDER:  Unremarkable  ABDOMINAL WALL/INGUINAL REGIONS: No ventral abdominal wall hernia  OSSEOUS STRUCTURES:  No focal aggressive osseous lesions  Degenerative changes of the spine  Grade 1 anterolisthesis of L4 on L5       Impression: No central, lobar, segmental or proximal subsegmental pulmonary embolism  Evaluation of the distal subsegmental pulmonary arteries is limited  Large, heterogeneous airspace consolidation in the right lower lobe, with a 6 3 x 5 2 x 4 8 cm somewhat lobulated hypodense component with a small droplet of air  This likely represents a necrotizing aspiration pneumonia, given mucous plugging throughout the right lower lobe and retained secretions/debris in the right mainstem bronchus and bronchus intermedius  Recommend follow-up chest CT in approximately 3 months to ensure resolution as an underlying neoplasm may have a similar appearance  Nonspecific 7 x 6 mm lobulated left lower lobe nodule  Attention on follow-up  Moderate right pleural effusion  No enhancement of the pleura to suggest empyema  Mediastinal and right hilar lymphadenopathy, likely reactive, however metastatic disease is not entirely excluded  Mild stranding around the proximal pancreas suggestive of acute interstitial pancreatitis  Recommend correlation with lipase level  No organized peripancreatic fluid collections, loss of parenchymal enhancement to suggest necrosis, or vascular complications of pancreatitis  No other acute abdominal or pelvic pathology  Multiple additional findings as above  The study was marked in [facility] for immediate notification  Workstation performed: [id number]       Labs and pertinent reports reviewed  This note has been generated by voice recognition software system  Therefore, there may be spelling, grammar, and or syntax errors  Please contact if questions arise

## 2022-11-19 NOTE — PLAN OF CARE
Problem: Potential for Falls  Goal: Patient will remain free of falls  Description: INTERVENTIONS:  - Educate patient/family on patient safety including physical limitations  - Instruct patient to call for assistance with activity   - Consult OT/PT to assist with strengthening/mobility   - Keep Call bell within reach  - Keep bed low and locked with side rails adjusted as appropriate  - Keep care items and personal belongings within reach  - Initiate and maintain comfort rounds  - Make Fall Risk Sign visible to staff  - Apply yellow socks and bracelet for high fall risk patients  - Consider moving patient to room near nurses station  Outcome: Progressing     Problem: PAIN - ADULT  Goal: Verbalizes/displays adequate comfort level or baseline comfort level  Description: Interventions:  - Encourage patient to monitor pain and request assistance  - Assess pain using appropriate pain scale  - Administer analgesics based on type and severity of pain and evaluate response  - Implement non-pharmacological measures as appropriate and evaluate response  - Consider cultural and social influences on pain and pain management  - Notify physician/advanced practitioner if interventions unsuccessful or patient reports new pain  Outcome: Progressing     Problem: INFECTION - ADULT  Goal: Absence or prevention of progression during hospitalization  Description: INTERVENTIONS:  - Assess and monitor for signs and symptoms of infection  - Monitor lab/diagnostic results  - Monitor all insertion sites, i e  indwelling lines, tubes, and drains  - Monitor endotracheal if appropriate and nasal secretions for changes in amount and color  - [place] appropriate cooling/warming therapies per order  - Administer medications as ordered  - Instruct and encourage patient and family to use good hand hygiene technique  - Identify and instruct in appropriate isolation precautions for identified infection/condition  Outcome: Progressing  Goal: Absence of fever/infection during neutropenic period  Description: INTERVENTIONS:  - Monitor WBC    Outcome: Progressing     Problem: SAFETY ADULT  Goal: Patient will remain free of falls  Description: INTERVENTIONS:  - Educate patient/family on patient safety including physical limitations  - Instruct patient to call for assistance with activity   - Consult OT/PT to assist with strengthening/mobility   - Keep Call bell within reach  - Keep bed low and locked with side rails adjusted as appropriate  - Keep care items and personal belongings within reach  - Initiate and maintain comfort rounds  - Make Fall Risk Sign visible to staff  - Apply yellow socks and bracelet for high fall risk patients  - Consider moving patient to room near nurses station  Outcome: Progressing  Goal: Maintain or return to baseline ADL function  Description: INTERVENTIONS:  -  Assess patient's ability to carry out ADLs; assess patient's baseline for ADL function and identify physical deficits which impact ability to perform ADLs (bathing, care of mouth/teeth, toileting, grooming, dressing, etc )  - Assess/evaluate cause of self-care deficits   - Assess range of motion  - Assess patient's mobility; develop plan if impaired  - Assess patient's need for assistive devices and provide as appropriate  - Encourage maximum independence but intervene and supervise when necessary  - Involve family in performance of ADLs  - Assess for home care needs following discharge   - Consider OT consult to assist with ADL evaluation and planning for discharge  - Provide patient education as appropriate  Outcome: Progressing  Goal: Maintains/Returns to pre admission functional level  Description: INTERVENTIONS:  - Perform BMAT or MOVE assessment daily    - Set and communicate daily mobility goal to care team and patient/family/caregiver     - Collaborate with rehabilitation services on mobility goals if consulted  - Out of bed for toileting  - Record patient progress and toleration of activity level   Outcome: Progressing     Problem: DISCHARGE PLANNING  Goal: Discharge to home or other facility with appropriate resources  Description: INTERVENTIONS:  - Identify barriers to discharge w/patient and caregiver  - Arrange for needed discharge resources and transportation as appropriate  - Identify discharge learning needs (meds, wound care, etc )  - Arrange for interpretive services to assist at discharge as needed  - Refer to Case Management Department for coordinating discharge planning if the patient needs post-hospital services based on physician/advanced practitioner order or complex needs related to functional status, cognitive ability, or social support system  Outcome: Progressing     Problem: Knowledge Deficit  Goal: Patient/family/caregiver demonstrates understanding of disease process, treatment plan, medications, and discharge instructions  Description: Complete learning assessment and assess knowledge base    Interventions:  - Provide teaching at level of understanding  - Provide teaching via preferred learning methods  Outcome: Progressing     Problem: CARDIOVASCULAR - ADULT  Goal: Absence of cardiac dysrhythmias or at baseline rhythm  Description: INTERVENTIONS:  - Continuous cardiac monitoring, vital signs, obtain 12 lead EKG if ordered  - Administer antiarrhythmic and heart rate control medications as ordered  - Monitor electrolytes and administer replacement therapy as ordered  Outcome: Progressing     Problem: RESPIRATORY - ADULT  Goal: Achieves optimal ventilation and oxygenation  Description: INTERVENTIONS:  - Assess for changes in respiratory status  - Assess for changes in mentation and behavior  - Position to facilitate oxygenation and minimize respiratory effort  - Oxygen administered by appropriate delivery if ordered  - Initiate smoking cessation education as indicated  - Encourage broncho-pulmonary hygiene including cough, deep breathe, Incentive Spirometry  - Assess the need for suctioning and aspirate as needed  - Assess and instruct to report SOB or any respiratory difficulty  - Respiratory Therapy support as indicated  Outcome: Progressing     Problem: SKIN/TISSUE INTEGRITY - ADULT  Goal: Skin Integrity remains intact(Skin Breakdown Prevention)  Description: Assess:  -Inspect skin when repositioning, toileting, and assisting with ADLS  -Assess extremities for adequate circulation and sensation     Bed Management:  -Have minimal linens on bed & keep smooth, unwrinkled  -Change linens as needed when moist or perspiring     Toileting:  -Offer bedside commode    -Encourage activity and walks on unit  -Encourage or provide ROM exercises   -Use appropriate equipment to lift or move patient in bed    Skin Care:  -Avoid use of baby powder, tape, friction and shearing, hot water or constrictive clothing  -Do not massage red bony areas    Outcome: Progressing     Problem: Prexisting or High Potential for Compromised Skin Integrity  Goal: Skin integrity is maintained or improved  Description: INTERVENTIONS:  - Identify patients at risk for skin breakdown  - Assess and monitor skin integrity  - Assess and monitor nutrition and hydration status  - Monitor labs   - Assess for incontinence   - Turn and reposition patient  - Assist with mobility/ambulation  - Relieve pressure over bony prominences  - Avoid friction and shearing  - Provide appropriate hygiene as needed including keeping skin clean and dry  - Evaluate need for skin moisturizer/barrier cream  - Collaborate with interdisciplinary team   - Patient/family teaching  - Consider wound care consult   Outcome: Progressing     Problem: MOBILITY - ADULT  Goal: Maintain or return to baseline ADL function  Description: INTERVENTIONS:  -  Assess patient's ability to carry out ADLs; assess patient's baseline for ADL function and identify physical deficits which impact ability to perform ADLs (bathing, care of mouth/teeth, toileting, grooming, dressing, etc )  - Assess/evaluate cause of self-care deficits   - Assess range of motion  - Assess patient's mobility; develop plan if impaired  - Assess patient's need for assistive devices and provide as appropriate  - Encourage maximum independence but intervene and supervise when necessary  - Involve family in performance of ADLs  - Assess for home care needs following discharge   - Consider OT consult to assist with ADL evaluation and planning for discharge  - Provide patient education as appropriate  Outcome: Progressing  Goal: Maintains/Returns to pre admission functional level  Description: INTERVENTIONS:  - Perform BMAT or MOVE assessment daily    - Set and communicate daily mobility goal to care team and patient/family/caregiver     - Collaborate with rehabilitation services on mobility goals if consulted  - Out of bed for toileting  - Record patient progress and toleration of activity level   Outcome: Progressing

## 2022-11-19 NOTE — PROGRESS NOTES
Progress Note - Pulmonary   Brianna Clayton 81 y o  female MRN: [id number]  Unit/Bed#: [facility] 834-01 Encounter: [id number]    Assessment/Plan:  Patient is an 50-year-old female with past medical history significant for laryngectomy with a large right lower lobe consolidation  This is complicated in the setting of elevated calcium which is a high risk factor for malignancy but the associated pleural fluid is negative  Additionally, she has a 1/to blood culture with Actinomyces and MSSA growing on culture  Given the lack of clarity of etiology, discussed with family necessity of bronchoscopy and they are in agreement to plan for Monday  Would recommend procedure rule out cancer as this is possibly a postobstructive cancer with a polymicrobial infection  RLL abscess vs consolidation vs mass  -  Likely infectious related but concerning given elevated Ca   -  Pleural fluid not consistent with malignancy   -  does have elevated calcium as well as PTH which is highly concerning for squamous  -  Will d/w patient utility of bronch/brushing  -  Actinomyces on blood cultures (1/2) and MSSA on sputum   -  Actinomyces may be contaminant but noted mass may be consistent   -  may consider broadening to Unasyn; this is unlikely to affect cultures  -  discussed with patient, patient's daughter and son-in-law all necessity of bronch and then a would agree to plan for Monday   -  would recommend BAL, brushing, transbronchial    S/p tracheostomy  -  On trach collar with 10L; 40% FiO2    Chief Complaint:   Feeling better    Subjective:   No acute events overnight  The patient is resting comfortably  She denies fevers, chills, nausea or vomiting  Objective:     Vitals: Blood pressure 105/62, pulse (!) 121, temperature 97 5 °F (36 4 °C), resp  rate 18, height 4' 8" (1 422 m), weight 60 3 kg (133 lb), SpO2 90 %, not currently breastfeeding  ,Body mass index is 29 82 kg/m²        Intake/Output Summary (Last 24 hours) at 11/19/2022 Elsie Corona 1428 filed at 11/19/2022 0816  Gross per 24 hour   Intake 1133 33 ml   Output 300 ml   Net 833 33 ml       Invasive Devices       Peripheral Intravenous Line  Duration             Peripheral IV 11/18/22 Dorsal (posterior); Right Forearm <1 day              Airway  Duration             Surgical Airway -- days                    Physical Exam  Vitals and nursing note reviewed  Exam conducted with a chaperone present  Constitutional:       General: She is not in acute distress  Appearance: Normal appearance  She is not ill-appearing, toxic-appearing or diaphoretic  HENT:      Head: Normocephalic and atraumatic  Mouth/Throat:      Mouth: Mucous membranes are moist    Eyes:      Extraocular Movements: Extraocular movements intact  Pupils: Pupils are equal, round, and reactive to light  Neck:      Comments: Trach in place  Cardiovascular:      Rate and Rhythm: Tachycardia present  Pulmonary:      Effort: Pulmonary effort is normal  No respiratory distress  Breath sounds: No wheezing or rales  Comments: Decreased at right base  Abdominal:      Tenderness: There is no abdominal tenderness  There is no guarding or rebound  Musculoskeletal:         General: Swelling present  Skin:     General: Skin is warm and dry  Neurological:      General: No focal deficit present  Mental Status: She is alert  Psychiatric:         Mood and Affect: Mood normal          Behavior: Behavior normal          Thought Content: Thought content normal          Judgment: Judgment normal        Labs: I have personally reviewed pertinent lab results    Lab Results   Component Value Date    SODIUM 141 11/19/2022    K 3 3 (L) 11/19/2022     (H) 11/19/2022    CO2 20 (L) 11/19/2022    BUN 13 11/19/2022    CREATININE 1 09 11/19/2022    GLUC 141 (H) 11/19/2022    CALCIUM 10 9 (H) 11/19/2022       Imaging and other studies: I have personally reviewed pertinent films in PACS with a Radiologist

## 2022-11-19 NOTE — PROGRESS NOTES
INTERNAL MEDICINE RESIDENCY PROGRESS NOTE     Name: Rohit Franklin   Age & Sex: 81 y o  female   MRN: [id number]  Unit/Bed#: [facility] 834-01   Encounter: [id number]  Team: SOD Team C     PATIENT INFORMATION     Name: Rohit Franklin   Age & Sex: 81 y o  female   MRN: [id number]  Hospital Stay Days: 4    ASSESSMENT/PLAN     Principal Problem:    Sepsis with acute hypoxic respiratory failure ([facility] )  Active Problems:    Asymptomatic carotid artery stenosis    Hyperlipidemia    Hypertension    Laryngeal carcinoma (HCC)    CKD (chronic kidney disease) stage 3, GFR 30-59 ml/min    JORDAN (acute kidney injury) ([facility] )    Hypercalcemia    Right lower lobe lung mass with right pleural effusion    Pancreatitis    Lung nodule seen on imaging study    Anemia of chronic disease    Hypothyroidism    Gram-positive bacteria on cultures      * Sepsis with acute hypoxic respiratory failure (HCC)  Assessment & Plan  · Patient presented with abdominal pain, lightheadedness, intermittent shortness of breath, recent unexpected weight loss   · Met sepsis criteria on admission with heart rate 90, RR 20, elevated WBC at 15 5, with suspected pleural source of infection as noted on CT imaging; procalc also elevated; PNA vs malignancy cannot be ruled out  · No signs of end-organ damage at this moment; creatinine slightly elevated 1 47 from baseline (1 1-1 4)  · Lactate wnl but has significant hypercalcemia 14 2 on admission  · elevated WBC and procalc may be 2/2 dehydration on arrival vs  reactive process  · Requiring trach collar oxygen 5 L, not on home O2  · Was initiated on IV Unasyn and received aggressive hydration with 1L bolus isolyte x2 and 1L bolus NS in the ED  · Patient is status post tracheostomy and based on anatomy is not at risk for aspiration therefore pneumonia likely secondary to CAP pathogens  · Previously a ceftriaxone completed azithromycin 3 day course    Plan:  · Transition to Ancef   · Continue trach collar oxygen as needed  · Will continue monitor CBC    Gram-positive bacteria on cultures  Assessment & Plan  1/2 blood cultures grew Gram-positive cocci in clusters  Patient does not appear to be acutely bacteremic based on clinical appearance, labs, and vitals    Plan:  Likely contaminated but will continue treatment with IV ceftriaxone  Awaiting results from 2nd culture    Hypothyroidism  Assessment & Plan  · Known history since 11/2021  · Most recent TSH 4 2 in 08/2022  · Managed on levothyroxine 100 mcg  · Repeat TSH within normal limits    Plan  · Continue home dose levothyroxine    Anemia of chronic disease  Assessment & Plan  · Known history, in setting of CKD stage IIIB  · Follows nephrology outpatient (Dr Jose Mcfadden)  · Hgb 11 1 on admission, at baseline     · Currently hemodynamically stable    Plan:  · Trend CBC, maintain hemoglobin over 7        Lung nodule seen on imaging study  Assessment & Plan  · Patient history of laryngeal carcinoma status post tracheostomy/laryngectomy on trach collar presented with fatigue; tachycardic on exam; labs notable for multiple derangements including hypercalcemia 14 3 and bicarb 18  · CT notable for nonspecific 7 x 6 mm lobulated left lower lobe nodule; new finding; CT imaging also revealing for multiple findings large heterogeneous airspace consolidation and right lower lobe with 6 x 5 x 5 lobulated hypodense component with moderate right-sided pleural effusion  concerning for necrotizing pneumonia  · Malignancy not entirely ruled out; does endorse decreased appetite, unexpected weight loss of up to 30 lb, and is a former smoker; quit 2002; hypercalcemia also noted on admission with recent outpatient PTH wnl  · 11/19 - repeat CT suggested of necrosis/abscess    Plan  · Inpatient pulmonology following, appreciate recs; will continue antibiotics as of now  · Repeat CT future to assess for improvement    Pancreatitis  Assessment & Plan  · Likely secondary to hypercalcemia  · Presented with abdominal pain; labs notable for elevated lipase >2000, elevated WBC  · CT imaging suggestive pancreatitis  · Patient has elevated alk-phos with normal T bili and asymptomatic abnormal biliary strictures on imaging  · Patient reports improved appetite and improved abdominal pain    Plan:  · Supportive care with analgesics or antiemetics as needed; renal function at baseline, ECG with normal QTC  · Will advance diet as patient reports improvement  · Continued monitoring of vitals, O2 sats, urine output with goal > 0 5-1 mL/kg/hr  · Repeat a m  CBC and BMP q 12 to monitor for adequacy fluid resuscitation in tissue perfusion  · Replete electrolytes as indicated        Right lower lobe lung mass with right pleural effusion  Assessment & Plan  · Patient history of laryngeal carcinoma status post tracheostomy/laryngectomy who presented to ED with tachycardia, hypoxia, fatigue  · CBC with elevated WBC 15 58; procalcitonin 49 5  · CTA chest notable for significant findings including  · Large, heterogeneous airspace consolidation in the right lower lobe, with a 6 3 x 5 2 x 4 8 cm somewhat lobulated hypodense component likely a necrotizing aspiration pneumonia  · Moderate right pleural effusion  No enhancement of the pleura to suggest empyema  · Mediastinal and right hilar lymphadenopathy, likely reactive, however metastatic disease is not entirely excluded    · Initiated IV Unasyn 3 g in the ED and transitioned to ceftriaxone given low suspicion of aspiration  · Legionella and strep antigens negative  · Status post 900 mL fluid thoracentesis suggestive of transudative fluid for pulmonology and may be secondary to pancreatitis  · Blood cultures 1/2 group Actinomyces gram variable rods      Plan:  · Will continue Ancef  · Await blood cultures   · Respiratory protocol  · Will consult  IR for thoracentesis to treat large right pleural effusion as noted on imaging  · NPO at midnight in the event of possible bronchoscopy tomorrow  · Inpatient consultation to pulmonology, appreciate recs  · Follow-up chest CT in 3 months as an outpatient to ensure resolution as an underlying neoplasm may have a similar appearance  Hypercalcemia  Assessment & Plan  · In setting of CKD stage 3B, excessive use of vitamin-D and calcium supplementation at home per patient  · Further evidenced by elevated corrected calcium 14 2; supported by initial presentation with abdominal pain, fatigue, decreased appetite/poor PO intake; mentation at baseline, nontoxic on exam  · ECG without arrhythmia  · Further ED work up notable for multiple derangements including Hgb 11 1, low bicarb 18, elevated BUN 34, elevated creatinine 1 47 (baseline 1 11 4), GFR 32, , hypoalbuminemia 2 1; elevated lipase 2028, uric acid 10 4  · CT imaging findings concerning for acute pancreatitis,  necrotizing pneumonia vs  malignancy  · Etiology multifactorial, possibly due to hypercalcemia malignancy in light of CT findings, further complicated by home vitamin-D supplementation; med rec without any other causative agents  Bone demineralization also possible given her age  · Per chart review, patient noted to have elevated corrected calcium level since 08/2022 however does not appear to have been further worked up by PCP  · Unlikely primary parathyroidism or tertiary cause given PTH in 09/2022 was low; baseline renal function with CKD III    Vitamin-D 25 hydroxy level within normal limits  Calcium continues to be elevated   Status post 3 mg zoledronic acid     SPEP with faint gammopathy, PTH very low     Plan:  · Pending UPEP levels pending to r/o multiple myeloma  · Pending Vitamin D 1, 25  · PTHrP to assess hypercalcemia of malignancy   · Will cut down IV fluids given patient is beginning to develop fluid overload with aggressive fluid resuscitation  · Will appreciate recommendations from Hematology/Oncology for treatment of hypercalcemia likely secondary malignancy  · Trend BMP to assess response    JORDAN (acute kidney injury) ([facility] )  Assessment & Plan  Creatinine of 1 47 on admission treated with IV fluid boluses, 3L total, NSS at 100ml/hr, renal function assessment and lab monitoring  Pt has CKD 3 and noted baseline of 1-1 1  Cr improved to 1 01> 0 93    Plan:   Will continue monitor creatinine and avoid nephrotoxic agents  Blood sugar adequate hydration and hemodynamics        CKD (chronic kidney disease) stage 3, GFR 30-59 ml/min  Assessment & Plan  Lab Results   Component Value Date    EGFR 59 11/17/2022    EGFR 54 11/16/2022    EGFR 55 11/16/2022    CREATININE 0 88 11/17/2022    CREATININE 0 95 11/16/2022    CREATININE 0 93 11/16/2022     · Known history of CKD stage III states 2014  · Follows [facility] nephrology (Dr Alba Session)  · Cr on admission slightly elevated 1 47(baseline 1 1-1 4); likely 2/2 dehydration; appears volume down on exam  · Etiology likely 2/2 long history of hypertension, nephrosclerosis, age-related nephron loss     Plan  · mIVF at 100cc/hr  · Trend BMP  · Avoid use of nephrotoxic agents      Laryngeal carcinoma (HCC)  Assessment & Plan  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  · Follows with ENT as an outpatient with Dr Vidal Suarez; most recently evaluated for trich follow-up in 10/2022     Plan:  · Jacquenette Summer in place, functional well, at baseline  · Continue trach collar oxygen, on 5 L    Hypertension  Assessment & Plan  · Known history, maintained on losartan 50 mg daily at home  · Follows PCP outpatient      Plan:  · BP on the low side with systolic high 40D to 50K on admission  · Will hold home dose losartan at this time    Hyperlipidemia  Assessment & Plan  · Known history of type 2 diabetes on metformin, hypertension on losartan  · Managed on Lipitor 40  · Most recent lipid panel with good control of LDL at 64    Plan  · Continue Lipitor 40    Asymptomatic carotid artery stenosis  Assessment & Plan  · Known hx, follows with vascular surgery (Dr Abbi Sesay)  · Previously found to have recurrent left ICA stenosis and critical new occlusive right IC stenosis, with a which were asymptomatic, along with left vertebral artery atresia; previously worked up in outpatient setting by mass concerning symptoms and was recommended endovascular intervention  · On 2020, patient electively admitted to [facility] during which she underwent ultrasound-guided right CFA puncture with sternal closure, aortic arch arteriogram, left carotid during him with PTA and stenting under the care of Dr Brown  · Maintained on antiplatelet and anti-statin therapy with Plavix and Lipitor     Plan:  · Continue home dose Plavix and Lipitor        SUBJECTIVE     Patient seen and examined  No acute events overnight  She reports good appetite and no longer having abdominal pain  She states reports no difficulty going to the bathroom in no difficulty with ambulation  Patient denies any shortness of breath with occasional O2 saturations in the 80s and 70s without symptoms  Review of Systems   Constitutional: Negative for chills and fever  Respiratory: Negative for cough, shortness of breath and wheezing  Cardiovascular: Positive for leg swelling  Negative for chest pain and palpitations  Gastrointestinal: Negative for abdominal distention, abdominal pain, constipation, diarrhea, nausea and vomiting  Genitourinary: Negative for difficulty urinating  OBJECTIVE     Vitals:    22 0700 22 0725 22 0815 22 0833   BP:   105/62    Pulse:   (!) 121    Resp:   18    Temp:   97 5 °F (36 4 °C)    TempSrc:       SpO2: 90% (!) 89% (!) 89% 90%   Weight:       Height:          Temperature:   Temp (24hrs), Av °F (36 7 °C), Min:97 5 °F (36 4 °C), Max:98 4 °F (36 9 °C)    Temperature: 97 5 °F (36 4 °C)  Intake & Output:  I/O        07 07 0701   07 07 07    P  O  220 120     I  V  (mL/kg) 925 (15 3) 923 3 (15 3)     IV Piggyback  90     Total Intake(mL/kg) 1145 (19) 1133 3 (18 8)     Urine (mL/kg/hr) 250 (0 2) 100 (0 1) 200 (0 5)    Other       Stool  0     Total Output 250 100 200    Net +895 +1033 3 -200           Unmeasured Stool Occurrence  1 x         Weights:        Body mass index is 29 82 kg/m²  Weight (last 2 days)     Date/Time Weight    11/18/22 1530 60 3 (133)    11/17/22 1300 60 3 (133)        Physical Exam  Constitutional:       General: She is not in acute distress  Appearance: Normal appearance  She is normal weight  She is not ill-appearing or toxic-appearing  HENT:      Head: Normocephalic and atraumatic  Cardiovascular:      Rate and Rhythm: Normal rate and regular rhythm  Heart sounds: Murmur heard  No gallop  Pulmonary:      Effort: No respiratory distress  Breath sounds: No wheezing or rales  Abdominal:      General: Abdomen is flat  There is no distension  Tenderness: There is no abdominal tenderness  There is no guarding  Musculoskeletal:      Right lower leg: No edema  Left lower leg: No edema  Neurological:      Mental Status: She is alert and oriented to person, place, and time  Psychiatric:         Mood and Affect: Mood normal          Behavior: Behavior normal        LABORATORY DATA     Labs: I have personally reviewed pertinent reports    Results from last 7 days   Lab Units 11/18/22  0240 11/17/22  0406 11/16/22  0632   WBC Thousand/uL 10 15 11 93* 12 57*   HEMOGLOBIN g/dL 9 1* 9 6* 10 3*   HEMATOCRIT % 31 4* 31 8* 32 2*   PLATELETS Thousands/uL 316 342 388   NEUTROS PCT % 85* 85* 87*   MONOS PCT % 7 7 6      Results from last 7 days   Lab Units 11/18/22  0240 11/17/22  1415 11/17/22  0406   POTASSIUM mmol/L 3 7 3 7 3 8   CHLORIDE mmol/L 112* 114* 114*   CO2 mmol/L 23 18* 19*   BUN mg/dL 19 19 21   CREATININE mg/dL 0 96 1 11 0 88   CALCIUM mg/dL 12 1* 11 5* 11 6*   ALK PHOS U/L 113 114 122*   ALT U/L 25 22 21   AST U/L 25 21 24              Results from last 7 days   Lab Units 11/15/22  1913   INR  1 01   PTT seconds 31     Results from last 7 days   Lab Units 11/15/22  1913   LACTIC ACID mmol/L 1 1           IMAGING & DIAGNOSTIC TESTING     Radiology Results: I have personally reviewed pertinent reports  XR chest portable    Result Date: 11/17/2022  Impression: Near complete drainage of right effusion with trace residual effusion  Masslike opacity in the right lower lobe better shown on CT  Mild pulmonary venous congestion  Workstation performed: [id number]     CT head wo contrast    Result Date: 11/15/2022  Impression: No acute intracranial abnormality  Chronic microangiopathic changes  Workstation performed: [id number]     CT chest wo contrast    Result Date: 11/19/2022  Impression: 1  Right lower lobe consolidation appears similar  There is relative internal hypodensity, along with air, consistent with abscess/necrosis  Recommend remains for a follow-up CT  2   Stable right lower lobe airway debris, with new airway debris in the middle lobe, consistent with interval aspiration event 3  Moderate right pleural effusion, not significantly changed in size  Trace left pleural effusion  4   Interstitial opacities consistent with mild edema 5  Stable left lower lobe nodule, attention on follow-up The study was marked in EP[employer] for immediate notification  Workstation performed: [id number]     IR IN-Patient Thoracentesis    Result Date: 11/18/2022  Impression: Impression: Ultrasound-guided thoracentesis yielding 900 mL clear yellow pleural fluid  Signed, performed, and dictated by CHANEL Horner under the supervision of Dr Salena Valera  Workstation performed: [id number]     PE Study with CT Abdomen and Pelvis with contrast    Result Date: 11/15/2022  Impression: No central, lobar, segmental or proximal subsegmental pulmonary embolism  Evaluation of the distal subsegmental pulmonary arteries is limited   Large, heterogeneous airspace consolidation in the right lower lobe, with a 6 3 x 5 2 x 4 8 cm somewhat lobulated hypodense component with a small droplet of air  This likely represents a necrotizing aspiration pneumonia, given mucous plugging throughout the right lower lobe and retained secretions/debris in the right mainstem bronchus and bronchus intermedius  Recommend follow-up chest CT in approximately 3 months to ensure resolution as an underlying neoplasm may have a similar appearance  Nonspecific 7 x 6 mm lobulated left lower lobe nodule  Attention on follow-up  Moderate right pleural effusion  No enhancement of the pleura to suggest empyema  Mediastinal and right hilar lymphadenopathy, likely reactive, however metastatic disease is not entirely excluded  Mild stranding around the proximal pancreas suggestive of acute interstitial pancreatitis  Recommend correlation with lipase level  No organized peripancreatic fluid collections, loss of parenchymal enhancement to suggest necrosis, or vascular complications of pancreatitis  No other acute abdominal or pelvic pathology  Multiple additional findings as above  The study was marked in [facility] for immediate notification  Workstation performed: [id number]     Other Diagnostic Testing: I have personally reviewed pertinent reports      ACTIVE MEDICATIONS     Current Facility-Administered Medications   Medication Dose Route Frequency   • acetaminophen (TYLENOL) tablet 650 mg  650 mg Oral Q6H PRN   • albuterol inhalation solution 2 5 mg  2 5 mg Nebulization Q4H PRN   • atorvastatin (LIPITOR) tablet 40 mg  40 mg Oral Daily With Dinner   • ceFAZolin (ANCEF) IVPB (premix in dextrose) 2,000 mg 50 mL  2,000 mg Intravenous Q8H   • clopidogrel (PLAVIX) tablet 75 mg  75 mg Oral Daily   • heparin (porcine) subcutaneous injection 5,000 Units  5,000 Units Subcutaneous Q8H [address]   • HYDROmorphone HCl (DILAUDID) injection 0 2 mg  0 2 mg Intravenous Q6H PRN   • iron sucrose (VENOFER) 200 mg in sodium chloride 0 9 % 100 mL IVPB  200 mg Intravenous Once per day on Mon Wed Fri   • levothyroxine tablet 100 mcg  100 mcg Oral Early Morning   • loratadine (CLARITIN) tablet 5 mg  5 mg Oral Daily   • oxyCODONE (ROXICODONE) IR tablet 2 5 mg  2 5 mg Oral Q6H PRN    Or   • oxyCODONE (ROXICODONE) IR tablet 5 mg  5 mg Oral Q6H PRN   • sertraline (ZOLOFT) tablet 25 mg  25 mg Oral Daily       VTE Pharmacologic Prophylaxis: Heparin  VTE Mechanical Prophylaxis: sequential compression device    Portions of the record may have been created with voice recognition software  Occasional wrong word or "sound a like" substitutions may have occurred due to the inherent limitations of voice recognition software    Read the chart carefully and recognize, using context, where substitutions have occurred   ==  Bayron Andrews, [address] [facility]  Internal Medicine Residency PGY-1

## 2022-11-19 NOTE — PLAN OF CARE
Problem: Potential for Falls  Goal: Patient will remain free of falls  Description: INTERVENTIONS:  - Educate patient/family on patient safety including physical limitations  - Instruct patient to call for assistance with activity   - Consult OT/PT to assist with strengthening/mobility   - Keep Call bell within reach  - Keep bed low and locked with side rails adjusted as appropriate  - Keep care items and personal belongings within reach  - Initiate and maintain comfort rounds  - Make Fall Risk Sign visible to staff     - Apply yellow socks and bracelet for high fall risk patients  - Consider moving patient to room near nurses station  Outcome: Progressing

## 2022-11-20 NOTE — CONSULTS
Consultation - Infectious Disease   Ha Whatley 81 y o  female MRN: [id number]  Unit/Bed#: [facility] 834-01 Encounter: [id number]      Inpatient consult to Infectious Diseases  Consult performed by: Ashok Martell MD  Consult ordered by: Yaquelin Anderson DO          IMPRESSION & RECOMMENDATIONS:   Impression:  1  Necrotizing RLL pneumonia R/0 abscess   2  History of laryngeal carcinoma s/p laryngectomy/tracheitis  3  DM type 2  4  CAD  5  Extensive PVD    Recommendations:    Discuss with the primary service  1  Check final repeat blood cultures  2  Agree with bronchoscopy /BAL possibly scheduled for tomorrow  Postprocedure pending culture results would restart ceftriaxone 1 g Q 24 hours IV  If Actinomyces are pathogen and not contaminant will need prolonged therapy i e  4-6 weeks of IV followed by additional 4-6 weeks p o  Penicillin V  If MSSA recovered without Actinomyces would give additional cefazolin IV for at least 5 days  HISTORY OF PRESENT ILLNESS:    Reason for Consult:  Bacteremia with Actinomyces  HPI: Latrelle Kehr is a 81y o  year old female with a significant past history for CAD, DM type 2 HTN, extensive PVD as well as laryngeal carcinoma s/p laryngectomy and trach placement who was admitted from the  ER 11/15 with increasing abdominal and back pain  Initial ER workup disclosed elevated WBC count, procalcitonin, with CT of the chest abdomen and pelvis revealing large airspace consolidation RLL with heterogeneous hypodense component that likely represented a necrotizing aspiration pneumonia with mucus plugging, right pleural effusion as well as changes suggestive of acute interstitial pancreatitis  Patient met sepsis criteria with respiratory failure  Placed on Unasyn and vancomycin that was changed to vanc stay 11/16 to ceftriaxone and Zithromax and finally yesterday cefazolin  Currently on no antibiotics  Sputum cultures reveal MSSA, 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  WBC count now 14,560, creatinine WNL repeat blood cultures x2  are negative      Review of Systems positive for abdominal, back pain, SOB, MINER, cough, tracheostomy with laryngectomy but able to speak with trach plugged  A [id number] point system-based review of systems is otherwise negative  PAST MEDICAL HISTORY:  Past Medical History:   Diagnosis Date   • Aortoiliac occlusive disease ([facility] [address] )    • 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 [facility]    • Carotid artery stenosis    • Hyperlipidemia    • Hypertension    • PVD (peripheral vascular disease) ([facility] )    • Throat cancer [facility])      Past Surgical History:   Procedure Laterality Date   • GALLBLADDER SURGERY  2019   • ILIAC ARTERY STENT Left    • IR CEREBRAL ANGIOGRAPHY / INTERVENTION  2020   • IR THORACENTESIS  2022   • LARYNX SURGERY     • LEG SURGERY  07/10/2012       FAMILY HISTORY:  Non-contributory    SOCIAL HISTORY:  Social History     Social History     Substance and Sexual Activity   Alcohol Use Never     Social History     Substance and Sexual Activity   Drug Use Never     Social History     Tobacco Use   Smoking Status Former   • Types: Cigarettes   • Quit date:    • Years since quittin 8   Smokeless Tobacco Never       ALLERGIES:  Allergies   Allergen Reactions   • Benazepril Cough   • Solifenacin Other (See Comments)   • Olmesartan Rash       MEDICATIONS:  All current active medications have been reviewed        PHYSICAL EXAM:  Temp:  [97 5 °F (36 4 °C)-98 7 °F (37 1 °C)] 97 5 °F (36 4 °C)  HR:  [114-130] 122  Resp:  [17-22] 20  BP: (110-149)/(56-91) 110/56  SpO2:  [86 %-98 %] 92 %  Temp (24hrs), Av °F (36 7 °C), Min:97 5 °F (36 4 °C), Max:98 7 °F (37 1 °C)  Current: Temperature: 97 5 °F (36 4 °C)    Intake/Output Summary (Last 24 hours) at 2022 1533  Last data filed at 2022 0641  Gross per 24 hour   Intake 100 ml   Output 580 ml   Net -480 ml       General Appearance: Appearing alert, responsive, chronically ill-appearing nontoxic, and in no distress, appears stated age   Head:  Normocephalic, without obvious abnormality, atraumatic   Eyes:  PERRL, conjunctiva pale and sclera anicteric, both eyes   Nose: Nares normal, mucosa normal, no drainage   Throat: Oropharynx moist without lesions; lips, mucosa, and tongue normal; edentulous l   Neck: Tracheostomy, surgical scars   Back:   Symmetric, no curvature, ROM normal, no CVA tenderness   Lungs:   Dullness at bases   Chest Wall:  No tenderness or deformity   Heart:  Regular rate with ectopics, S1, S2 normal, no murmur, rub or gallop   Abdomen:   Soft, non-tender, non-distended, positive bowel sounds, no masses, no organomegaly    No CVA tenderness   Extremities: Extremities normal, atraumatic, no cyanosis, clubbing or edema   Skin: Skin color pale, as above, surgical scars  Lymph nodes: Cervical, supraclavicular, and axillary nodes normal   Neurologic: Alert and oriented times 3, extremity strength 5/5 and symmetric           Invasive Devices:   Peripheral IV 11/18/22 Dorsal (posterior); Right Forearm (Active)   Site Assessment WDL 11/20/22 0240   Dressing Type Transparent 11/20/22 0240   Line Status Saline locked 11/20/22 0240   Dressing Status Clean;Dry; Intact 11/20/22 0240       Surgical Airway (Active)   Status Speaking valve 11/19/22 2330   Site Assessment Clean 11/19/22 2330   Site Care Cleansed 11/19/22 0725   Inner Cannula Care No inner cannula 11/19/22 0725   Ties Assessment Not needed 11/19/22 2330   Equipment at bedside Sterile saline 11/19/22 2330       LABS, IMAGING, & OTHER STUDIES:  Lab Results:      I have personally reviewed pertinent labs      Results from last 7 days   Lab Units 11/20/22  1433 11/19/22  1408 11/18/22  0240   WBC Thousand/uL 14 56* 12 56* 10 15   HEMOGLOBIN g/dL 9 2* 8 9* 9 1*   PLATELETS Thousands/uL 340 313 316     Results from last 7 days   Lab Units 11/20/22  1433 11/19/22  1408 11/18/22  0240 SODIUM mmol/L 142 141 143   POTASSIUM mmol/L 3 9 3 3* 3 7   CHLORIDE mmol/L 112* 113* 112*   CO2 mmol/L 24 20* 23   BUN mg/dL 13 13 19   CREATININE mg/dL 1 29 1 09 0 96   EGFR ml/min/1 73sq m 37 46 53   CALCIUM mg/dL 10 3* 10 9* 12 1*   AST U/L 23 26 25   ALT U/L 23 27 25   ALK PHOS U/L 109 108 113     Results from last 7 days   Lab Units 11/17/22  2141 11/16/22  1524 11/16/22  1416 11/15/22  2033 11/15/22  2025 11/15/22  2003 11/15/22  1913   BLOOD CULTURE  No Growth at 48 hrs  No Growth at 48 hrs   --   --   --   --   --  No Growth After 4 Days  Actinomyces species*   SPUTUM CULTURE   --   --  Few Colonies of  --  1+ Growth of Staphylococcus aureus*  Few Colonies of  --   --    GRAM STAIN RESULT   --  No Polys or Bacteria seen Rare Epithelial Cells  4+ Polys  No bacteria seen  --  1+ Polys*  Rare Gram positive cocci in pairs, chains and clusters*  --  Gram variable rods*   BODY FLUID CULTURE, STERILE   --  No growth  --   --   --   --   --    MRSA CULTURE ONLY   --   --   --  No Methicillin Resistant Staphlyococcus aureus (MRSA) isolated  --   --   --    LEGIONELLA URINARY ANTIGEN   --   --   --   --   --  Negative  --        Imaging Studies:   I have personally reviewed pertinent imaging study reports and images in PACS  EKG, Pathology, and Other Studies:   I have personally reviewed pertinent reports

## 2022-11-20 NOTE — PROGRESS NOTES
INTERNAL MEDICINE RESIDENCY PROGRESS NOTE     Name: Sharla Rivas   Age & Sex: 81 y o  female   MRN: [id number]  Unit/Bed#: [facility] 834-01   Encounter: [id number]  Team: SOD Team C     PATIENT INFORMATION     Name: Sharla Rivas   Age & Sex: 81 y o  female   MRN: [id number]  Hospital Stay Days: 5    ASSESSMENT/PLAN     Principal Problem:    Sepsis with acute hypoxic respiratory failure ([facility] )  Active Problems:    Asymptomatic carotid artery stenosis    Hyperlipidemia    Hypertension    Laryngeal carcinoma (HCC)    CKD (chronic kidney disease) stage 3, GFR 30-59 ml/min    JORDAN (acute kidney injury) ([facility] [address] )    Hypercalcemia    Right lower lobe lung mass with right pleural effusion    Pancreatitis    Lung nodule seen on imaging study    Anemia of chronic disease    Hypothyroidism    Gram-positive bacteria on cultures      Gram-positive bacteria on cultures  Assessment & Plan  1/2 blood cultures grew Gram-positive cocci in clusters  Patient does not appear to be acutely bacteremic based on clinical appearance, labs, and vitals    Plan:  Likely contaminated but will continue treatment with IV ceftriaxone  Awaiting results from 2nd culture    Hypothyroidism  Assessment & Plan  · Known history since 11/2021  · Most recent TSH 4 2 in 08/2022  · Managed on levothyroxine 100 mcg  · Repeat TSH within normal limits    Plan  · Continue home dose levothyroxine    Anemia of chronic disease  Assessment & Plan  · Known history, in setting of CKD stage IIIB  · Follows nephrology outpatient (Dr Keshawn Galarza)  · Hgb 11 1 on admission, at baseline     · Currently hemodynamically stable    Plan:  · Trend CBC, maintain hemoglobin over 7        Lung nodule seen on imaging study  Assessment & Plan  · Patient history of laryngeal carcinoma status post tracheostomy/laryngectomy on trach collar presented with fatigue; tachycardic on exam; labs notable for multiple derangements including hypercalcemia 14 3 and bicarb 18  · CT notable for nonspecific 7 x 6 mm lobulated left lower lobe nodule; new finding; CT imaging also revealing for multiple findings large heterogeneous airspace consolidation and right lower lobe with 6 x 5 x 5 lobulated hypodense component with moderate right-sided pleural effusion  concerning for necrotizing pneumonia  · Malignancy not entirely ruled out; does endorse decreased appetite, unexpected weight loss of up to 30 lb, and is a former smoker; quit 2002; hypercalcemia also noted on admission with recent outpatient PTH wnl  · 11/19 - repeat CT suggested of necrosis/abscess    Plan  · Inpatient pulmonology following, appreciate recs; 5 day course completed  · Repeat CT future to assess for improvement    Pancreatitis  Assessment & Plan  · Likely secondary to hypercalcemia  · Presented with abdominal pain; labs notable for elevated lipase >2000, elevated WBC  · CT imaging suggestive pancreatitis  · Patient has elevated alk-phos with normal T bili and asymptomatic abnormal biliary strictures on imaging  · Patient reports improved appetite and improved abdominal pain    Plan:  · Supportive care with analgesics or antiemetics as needed; renal function at baseline, ECG with normal QTC  · Will advance diet as patient reports improvement  · Continued monitoring of vitals, O2 sats, urine output with goal > 0 5-1 mL/kg/hr  · Replete electrolytes as indicated    Suspected as having resolved, limiting fluid resuscitation at this time due to volume overload  Unclear source of hypercalcemia that triggered pancreatitis, hematology on board for hypercalcemia control        Right lower lobe lung mass with right pleural effusion  Assessment & Plan  · Patient history of laryngeal carcinoma status post tracheostomy/laryngectomy who presented to ED with tachycardia, hypoxia, fatigue     · CBC with elevated WBC 15 58; procalcitonin 49 5  · CTA chest notable for significant findings including  · Large, heterogeneous airspace consolidation in the right lower lobe, with a 6 3 x 5 2 x 4 8 cm somewhat lobulated hypodense component likely a necrotizing aspiration pneumonia  · Moderate right pleural effusion  No enhancement of the pleura to suggest empyema  · Mediastinal and right hilar lymphadenopathy, likely reactive, however metastatic disease is not entirely excluded  · Initiated IV Unasyn 3 g in the ED and transitioned to ceftriaxone given low suspicion of aspiration  · Legionella and strep antigens negative  · Status post 900 mL fluid thoracentesis suggestive of transudative fluid for pulmonology and may be secondary to pancreatitis  · Blood cultures 1/2 group Actinomyces gram variable rods      Plan:  · Completed 5 day course of antibiotics covering for CAP, however given CT findings, will get ID on board for concerns of inadequate coverage   · Respiratory protocol  · NPO at midnight in the event of possible bronchoscopy Monday, pulmonary consulted  · Follow-up chest CT in 3 months as an outpatient to ensure resolution as an underlying neoplasm may have a similar appearance  Hypercalcemia  Assessment & Plan  · In setting of CKD stage 3B, excessive use of vitamin-D and calcium supplementation at home per patient  · Further evidenced by elevated corrected calcium 14 2; supported by initial presentation with abdominal pain, fatigue, decreased appetite/poor PO intake; mentation at baseline, nontoxic on exam  · ECG without arrhythmia  · Further ED work up notable for multiple derangements including Hgb 11 1, low bicarb 18, elevated BUN 34, elevated creatinine 1 47 (baseline 1 11 4), GFR 32, , hypoalbuminemia 2 1; elevated lipase 2028, uric acid 10 4  · CT imaging findings concerning for acute pancreatitis,  necrotizing pneumonia vs  malignancy  · Etiology multifactorial, possibly due to hypercalcemia malignancy in light of CT findings, further complicated by home vitamin-D supplementation; med rec without any other causative agents    Bone demineralization also possible given her age  · Per chart review, patient noted to have elevated corrected calcium level since 08/2022 however does not appear to have been further worked up by PCP  · Unlikely primary parathyroidism or tertiary cause given PTH in 09/2022 was low; baseline renal function with CKD III  Vitamin-D 25 hydroxy level within normal limits  Calcium continues to be elevated   Status post 3 mg zoledronic acid     SPEP with faint gammopathy, PTH very low     Plan:  · Pending UPEP levels pending to r/o multiple myeloma  · Pending Vitamin D 1, 25  · PTHrP to assess hypercalcemia of malignancy   · IV fluids stopped given patient is developing fluid overload with aggressive fluid resuscitation  · Will appreciate recommendations from Hematology/Oncology for treatment of hypercalcemia likely secondary malignancy  · Trend BMP to assess response    JORDAN (acute kidney injury) ([facility] [address] )  Assessment & Plan  Creatinine of 1 47 on admission treated with IV fluid boluses, 3L total, NSS at 100ml/hr, renal function assessment and lab monitoring  Pt has CKD 3 and noted baseline of 1-1 1  Cr improved to 1 01> 0 93    Plan:   Will continue monitor creatinine and avoid nephrotoxic agents  Blood sugar adequate hydration and hemodynamics        CKD (chronic kidney disease) stage 3, GFR 30-59 ml/min  Assessment & Plan  Lab Results   Component Value Date    EGFR 59 11/17/2022    EGFR 54 11/16/2022    EGFR 55 11/16/2022    CREATININE 0 88 11/17/2022    CREATININE 0 95 11/16/2022    CREATININE 0 93 11/16/2022     · Known history of CKD stage III states 2014  · Follows [facility] nephrology (Dr Jesus Carr)  · Cr on admission slightly elevated 1 47(baseline 1 1-1 4); likely 2/2 dehydration; appears volume down on exam  · Etiology likely 2/2 long history of hypertension, nephrosclerosis, age-related nephron loss     Plan  · One time IV Lasix 20 mg, will assess response before further diuresis  · Trend BMP  · Avoid use of nephrotoxic agents      Laryngeal carcinoma [facility])  Assessment & Plan  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  · Follows with ENT as an outpatient with Dr Tika Eid; most recently evaluated for trich follow-up in 10/2022     Plan:  · [place] in place, functional well, at baseline  · Continue trach collar oxygen, on 8 L    Hypertension  Assessment & Plan  · Known history, maintained on losartan 50 mg daily at home  · Follows PCP outpatient      Plan:  · BP on the low side with systolic high 40Z to 02L on admission  · Will hold home dose losartan at this time  · BP have risen to 130s, continue to hold losartan until stabilized in setting of infection of unclear source control, will restart if continues to increase    Hyperlipidemia  Assessment & Plan  · Known history of type 2 diabetes on metformin, hypertension on losartan  · Managed on Lipitor 40  · Most recent lipid panel with good control of LDL at 64    Plan  · Continue Lipitor 40    Asymptomatic carotid artery stenosis  Assessment & Plan  · Known hx, follows with vascular surgery (Dr Karen Marshall)  · Previously found to have recurrent left ICA stenosis and critical new occlusive right IC stenosis, with a which were asymptomatic, along with left vertebral artery atresia; previously worked up in outpatient setting by mass concerning symptoms and was recommended endovascular intervention  · On 07/2020, patient electively admitted to [facility] during which she underwent ultrasound-guided right CFA puncture with sternal closure, aortic arch arteriogram, left carotid during him with PTA and stenting under the care of Dr Brown  · Maintained on antiplatelet and anti-statin therapy with Plavix and Lipitor     Plan:  · Continue home dose Plavix and Lipitor      * Sepsis with acute hypoxic respiratory failure ([address] )  Assessment & Plan  · Patient presented with abdominal pain, lightheadedness, intermittent shortness of breath, recent unexpected weight loss · Met sepsis criteria on admission  · Lactate wnl but has significant hypercalcemia 14 2 on admission  · Patient is status post tracheostomy  · Completed cephalosporin 5 day course, completed azithromycin 3 day course    Plan:  · ID consulted given concern for persistent infection and inadequate coverage  · Continue trach collar oxygen as needed  · Pump consulted, possible bronchoscopy Monday   · Will continue monitor CBC  · Monitor BCx,  actinomyces      Disposition: Continue medical management, possible bronchoscopy tomorrow  SUBJECTIVE     Patient seen and examined  No acute events overnight  Feeling well today  Continues to deny SOB, chest pain, nausea, vomiting  Does continue to endorse mild abdominal pain  OBJECTIVE     Vitals:    22 0715 22 0827 22 1219 22 1234   BP: 134/73  116/56    Pulse: (!) 119  (!) 114 (!) 130   Resp:     Temp: 98 1 °F (36 7 °C)  98 7 °F (37 1 °C)    TempSrc:   Oral    SpO2: 91% 93% 96%    Weight:       Height:          Temperature:   Temp (24hrs), Av 1 °F (36 7 °C), Min:97 9 °F (36 6 °C), Max:98 7 °F (37 1 °C)    Temperature: 98 7 °F (37 1 °C)  Intake & Output:  I/O        0701   0700  0701   0700  0701   0700    P  O  120 100     I V  (mL/kg) 923 3 (15 3)      IV Piggyback 90      Total Intake(mL/kg) 1133 3 (18 8) 100 (1 7)     Urine (mL/kg/hr) 100 (0 1) 780 (0 5)     Stool 0 0     Total Output 100 780     Net +1033 3 -680            Unmeasured Urine Occurrence  1 x     Unmeasured Stool Occurrence 1 x 2 x         Weights:        Body mass index is 29 82 kg/m²  Weight (last 2 days)     Date/Time Weight    22 1530 60 3 (133)        Physical Exam  Vitals reviewed  Constitutional:       Appearance: Normal appearance  HENT:      Head: Normocephalic and atraumatic  Eyes:      Conjunctiva/sclera: Conjunctivae normal    Cardiovascular:      Rate and Rhythm: Normal rate and regular rhythm     Pulmonary: Effort: Pulmonary effort is normal       Breath sounds: Examination of the left-upper field reveals wheezing  Examination of the right-lower field reveals decreased breath sounds  Decreased breath sounds and wheezing present  No rhonchi or rales  Abdominal:      General: Abdomen is flat  Bowel sounds are normal  There is no distension  Palpations: Abdomen is soft  Tenderness: There is no abdominal tenderness  There is no guarding  Musculoskeletal:      Right lower leg: Edema (2+) present  Left lower leg: Edema (2+) present  Skin:     General: Skin is warm  Neurological:      General: No focal deficit present  Mental Status: She is alert  Psychiatric:         Mood and Affect: Mood normal          Behavior: Behavior normal        LABORATORY DATA     Labs: I have personally reviewed pertinent reports  Results from last 7 days   Lab Units 11/19/22  1408 11/18/22  0240 11/17/22  0406   WBC Thousand/uL 12 56* 10 15 11 93*   HEMOGLOBIN g/dL 8 9* 9 1* 9 6*   HEMATOCRIT % 29 9* 31 4* 31 8*   PLATELETS Thousands/uL 313 316 342   NEUTROS PCT % 88* 85* 85*   MONOS PCT % 7 7 7      Results from last 7 days   Lab Units 11/19/22  1408 11/18/22  0240 11/17/22  1415   POTASSIUM mmol/L 3 3* 3 7 3 7   CHLORIDE mmol/L 113* 112* 114*   CO2 mmol/L 20* 23 18*   BUN mg/dL 13 19 19   CREATININE mg/dL 1 09 0 96 1 11   CALCIUM mg/dL 10 9* 12 1* 11 5*   ALK PHOS U/L 108 113 114   ALT U/L 27 25 22   AST U/L 26 25 21              Results from last 7 days   Lab Units 11/15/22  1913   INR  1 01   PTT seconds 31     Results from last 7 days   Lab Units 11/15/22  1913   LACTIC ACID mmol/L 1 1           IMAGING & DIAGNOSTIC TESTING     Radiology Results: I have personally reviewed pertinent reports  XR chest portable    Result Date: 11/17/2022  Impression: Near complete drainage of right effusion with trace residual effusion  Masslike opacity in the right lower lobe better shown on CT  Mild pulmonary venous congestion  Workstation performed: [id number]     CT head wo contrast    Result Date: 11/15/2022  Impression: No acute intracranial abnormality  Chronic microangiopathic changes  Workstation performed: [id number]     CT chest wo contrast    Result Date: 11/19/2022  Impression: 1  Right lower lobe consolidation appears similar  There is relative internal hypodensity, along with air, consistent with abscess/necrosis  Recommend remains for a follow-up CT  2   Stable right lower lobe airway debris, with new airway debris in the middle lobe, consistent with interval aspiration event 3  Moderate right pleural effusion, not significantly changed in size  Trace left pleural effusion  4   Interstitial opacities consistent with mild edema 5  Stable left lower lobe nodule, attention on follow-up The study was marked in EPIC for immediate notification  Workstation performed: [id number]     IR IN-Patient Thoracentesis    Result Date: 11/18/2022  Impression: Impression: Ultrasound-guided thoracentesis yielding 900 mL clear yellow pleural fluid  Signed, performed, and dictated by CHANEL Ferguson Mai under the supervision of Dr Erika Bar  Workstation performed: [id number]     PE Study with CT Abdomen and Pelvis with contrast    Result Date: 11/15/2022  Impression: No central, lobar, segmental or proximal subsegmental pulmonary embolism  Evaluation of the distal subsegmental pulmonary arteries is limited  Large, heterogeneous airspace consolidation in the right lower lobe, with a 6 3 x 5 2 x 4 8 cm somewhat lobulated hypodense component with a small droplet of air  This likely represents a necrotizing aspiration pneumonia, given mucous plugging throughout the right lower lobe and retained secretions/debris in the right mainstem bronchus and bronchus intermedius  Recommend follow-up chest CT in approximately 3 months to ensure resolution as an underlying neoplasm may have a similar appearance   Nonspecific 7 x 6 mm lobulated left lower lobe nodule  Attention on follow-up  Moderate right pleural effusion  No enhancement of the pleura to suggest empyema  Mediastinal and right hilar lymphadenopathy, likely reactive, however metastatic disease is not entirely excluded  Mild stranding around the proximal pancreas suggestive of acute interstitial pancreatitis  Recommend correlation with lipase level  No organized peripancreatic fluid collections, loss of parenchymal enhancement to suggest necrosis, or vascular complications of pancreatitis  No other acute abdominal or pelvic pathology  Multiple additional findings as above  The study was marked in [facility] for immediate notification  Workstation performed: [id number]     Other Diagnostic Testing: I have personally reviewed pertinent reports  ACTIVE MEDICATIONS     Current Facility-Administered Medications   Medication Dose Route Frequency   • acetaminophen (TYLENOL) tablet 650 mg  650 mg Oral Q6H PRN   • albuterol inhalation solution 2 5 mg  2 5 mg Nebulization Q4H PRN   • atorvastatin (LIPITOR) tablet 40 mg  40 mg Oral Daily With Dinner   • clopidogrel (PLAVIX) tablet 75 mg  75 mg Oral Daily   • heparin (porcine) subcutaneous injection 5,000 Units  5,000 Units Subcutaneous Q8H [address]   • HYDROmorphone HCl (DILAUDID) injection 0 2 mg  0 2 mg Intravenous Q6H PRN   • iron sucrose (VENOFER) 200 mg in sodium chloride 0 9 % 100 mL IVPB  200 mg Intravenous Once per day on Mon Wed Fri   • levothyroxine tablet 100 mcg  100 mcg Oral Early Morning   • loratadine (CLARITIN) tablet 5 mg  5 mg Oral Daily   • oxyCODONE (ROXICODONE) IR tablet 2 5 mg  2 5 mg Oral Q6H PRN    Or   • oxyCODONE (ROXICODONE) IR tablet 5 mg  5 mg Oral Q6H PRN   • sertraline (ZOLOFT) tablet 25 mg  25 mg Oral Daily       VTE Pharmacologic Prophylaxis: Heparin  VTE Mechanical Prophylaxis: sequential compression device    Portions of the record may have been created with voice recognition software    Occasional wrong word or "sound a like" substitutions may have occurred due to the inherent limitations of voice recognition software    Read the chart carefully and recognize, using context, where substitutions have occurred   ==  Nicole Hollins, [address]  Internal Medicine Residency PGY-2

## 2022-11-20 NOTE — PROGRESS NOTES
Pt tachy into the 130s-140s with ambulation  /56, 97 9, 20, Spo2 95%  Remains asymptomatic  Meal completion is poor but is taking PO fluids  [employer] with SOD made aware of above  No new orders

## 2022-11-20 NOTE — PROGRESS NOTES
Progress Note - Pulmonary   Bonita Push Chacorta 81 y o  female MRN: [id number]  Unit/Bed#: [facility] 834-01 Encounter: [id number]    Assessment:    1  RLL lung mass- concerning for malignancy but could also be infectious  2  Acute hypoxic respiratory failure  3  Right pleural effusion- labs consistent with transudative and therefore may be obstructive rather than malignant  4  Actinomyces bacteremia- Possibly due to pneumonia vs necrotic tumor with superimposed infection in the lung   5  Hypercalcemia- most likely hypercalcemia of malignancy with appropriately suppressed PTH  This is most likely paraneoplastic from suspected SCC  6  Hx of larygneal SCC- s/p tracheostomy/laryngectomy with aphonia    Plan:    · The mass in the RLL along with the degree of hypercalcemia is concerning for squamous cell carcinoma of the lung  The patient needs a bronchoscopy with possible biopsy  Therefore recommend holding plavix  Will need to discuss with GI lab regarding availability for procedure  · If patient is having a productive cough, would send two specimens for cytology which may help obtain the diagnosis without the need for bronch  · Additionally if the pleural effusion worsens, we can repeat thoracentesis which can increase the sensitivity for diagnosis of malignancy  · Finally in regards to the actinomyces/MSSA, possible contaminent however it is possible that there is a superimposed infection/pneumonia since the RLL bronchus is nearly entirely occluded due to mucus with additional consolidation surrounding the mass  There also appears to be necrosis within the mass  Ideally biopsying the lesion and observing path for sulfur granules would be helpful diagnostically and this can be attempted at the time of bronchoscopy  Specimens will be sent for cultures  · ID has been consulted, will defer decision for antibiotics  · Continue supp O2     Chief Complaint:   I feel fine today    Subjective:   Patient seen and examined bedside  Having more of a productive cough  No acute events overnight    Objective:     Vitals: Blood pressure 110/56, pulse (!) 122, temperature 97 5 °F (36 4 °C), resp  rate 20, height 4' 8" (1 422 m), weight 60 3 kg (133 lb), SpO2 92 %, not currently breastfeeding  ,Body mass index is 29 82 kg/m²  Intake/Output Summary (Last 24 hours) at 11/20/2022 1656  Last data filed at 11/20/2022 1400  Gross per 24 hour   Intake 820 ml   Output 880 ml   Net -60 ml       Invasive Devices     Peripheral Intravenous Line  Duration           Peripheral IV 11/18/22 Dorsal (posterior); Right Forearm 1 day          Airway  Duration           Surgical Airway -- days                Physical Exam:   General: AAO, no acute distress  Pulm: decreased right breath sounds, normal breath sounds on left  Cardiac: tachycardic, regular rhythm, no murmurs  GI: abd soft,nontender  Skin: no rashes, no jaundice     Labs: I have personally reviewed pertinent lab results  Imaging and other studies: I have personally reviewed pertinent reports  and I have personally reviewed pertinent films in PACS  CT chest w/o contrast on 11/19/2022- b/l ground glass opacities, there is near total occlusion of the RLL bronchus from likely mucus plugging   There is a large RLL mass with ipsilateral pleural effusion

## 2022-11-21 NOTE — PLAN OF CARE
Problem: Potential for Falls  Goal: Patient will remain free of falls  Description: INTERVENTIONS:  - Educate patient/family on patient safety including physical limitations  - Instruct patient to call for assistance with activity   - Consult OT/PT to assist with strengthening/mobility   - Keep Call bell within reach  - Keep bed low and locked with side rails adjusted as appropriate  - Keep care items and personal belongings within reach  - Initiate and maintain comfort rounds  - Make Fall Risk Sign visible to staff  - Offer Toileting every  Hours, in advance of need  - Initiate/Maintain alarm  - Obtain necessary fall risk management equipment:   - Apply yellow socks and bracelet for high fall risk patients  - Consider moving patient to room near nurses station  Outcome: Not Progressing     Problem: PAIN - ADULT  Goal: Verbalizes/displays adequate comfort level or baseline comfort level  Description: Interventions:  - Encourage patient to monitor pain and request assistance  - Assess pain using appropriate pain scale  - Administer analgesics based on type and severity of pain and evaluate response  - Implement non-pharmacological measures as appropriate and evaluate response  - Consider cultural and social influences on pain and pain management  - Notify physician/advanced practitioner if interventions unsuccessful or patient reports new pain  Outcome: Not Progressing     Problem: INFECTION - ADULT  Goal: Absence or prevention of progression during hospitalization  Description: INTERVENTIONS:  - Assess and monitor for signs and symptoms of infection  - Monitor lab/diagnostic results  - Monitor all insertion sites, i e  indwelling lines, tubes, and drains  - Monitor endotracheal if appropriate and nasal secretions for changes in amount and color  - [place] appropriate cooling/warming therapies per order  - Administer medications as ordered  - Instruct and encourage patient and family to use good hand hygiene technique  - Identify and instruct in appropriate isolation precautions for identified infection/condition  Outcome: Not Progressing  Goal: Absence of fever/infection during neutropenic period  Description: INTERVENTIONS:  - Monitor WBC    Outcome: Not Progressing     Problem: SAFETY ADULT  Goal: Patient will remain free of falls  Description: INTERVENTIONS:  - Educate patient/family on patient safety including physical limitations  - Instruct patient to call for assistance with activity   - Consult OT/PT to assist with strengthening/mobility   - Keep Call bell within reach  - Keep bed low and locked with side rails adjusted as appropriate  - Keep care items and personal belongings within reach  - Initiate and maintain comfort rounds  - Make Fall Risk Sign visible to staff  - Offer Toileting every  Hours, in advance of need  - Initiate/Maintain alarm  - Obtain necessary fall risk management equipment:   - Apply yellow socks and bracelet for high fall risk patients  - Consider moving patient to room near nurses station  Outcome: Not Progressing  Goal: Maintain or return to baseline ADL function  Description: INTERVENTIONS:  -  Assess patient's ability to carry out ADLs; assess patient's baseline for ADL function and identify physical deficits which impact ability to perform ADLs (bathing, care of mouth/teeth, toileting, grooming, dressing, etc )  - Assess/evaluate cause of self-care deficits   - Assess range of motion  - Assess patient's mobility; develop plan if impaired  - Assess patient's need for assistive devices and provide as appropriate  - Encourage maximum independence but intervene and supervise when necessary  - Involve family in performance of ADLs  - Assess for home care needs following discharge   - Consider OT consult to assist with ADL evaluation and planning for discharge  - Provide patient education as appropriate  Outcome: Not Progressing  Goal: Maintains/Returns to pre admission functional level  Description: INTERVENTIONS:  - Perform BMAT or MOVE assessment daily    - Set and communicate daily mobility goal to care team and patient/family/caregiver  - Collaborate with rehabilitation services on mobility goals if consulted  - Perform Range of Motion  times a day  - Reposition patient every  hours    - Dangle patient  times a day  - Stand patient  times a day  - Ambulate patient  times a day  - Out of bed to chair  times a day   - Out of bed for meals  times a day  - Out of bed for toileting  - Record patient progress and toleration of activity level   Outcome: Not Progressing     Problem: DISCHARGE PLANNING  Goal: Discharge to home or other facility with appropriate resources  Description: INTERVENTIONS:  - Identify barriers to discharge w/patient and caregiver  - Arrange for needed discharge resources and transportation as appropriate  - Identify discharge learning needs (meds, wound care, etc )  - Arrange for interpretive services to assist at discharge as needed  - Refer to Case Management Department for coordinating discharge planning if the patient needs post-hospital services based on physician/advanced practitioner order or complex needs related to functional status, cognitive ability, or social support system  Outcome: Not Progressing     Problem: CARDIOVASCULAR - ADULT  Goal: Absence of cardiac dysrhythmias or at baseline rhythm  Description: INTERVENTIONS:  - Continuous cardiac monitoring, vital signs, obtain 12 lead EKG if ordered  - Administer antiarrhythmic and heart rate control medications as ordered  - Monitor electrolytes and administer replacement therapy as ordered  Outcome: Not Progressing     Problem: RESPIRATORY - ADULT  Goal: Achieves optimal ventilation and oxygenation  Description: INTERVENTIONS:  - Assess for changes in respiratory status  - Assess for changes in mentation and behavior  - Position to facilitate oxygenation and minimize respiratory effort  - Oxygen administered by appropriate delivery if ordered  - Initiate smoking cessation education as indicated  - Encourage broncho-pulmonary hygiene including cough, deep breathe, Incentive Spirometry  - Assess the need for suctioning and aspirate as needed  - Assess and instruct to report SOB or any respiratory difficulty  - Respiratory Therapy support as indicated  Outcome: Not Progressing     Problem: SKIN/TISSUE INTEGRITY - ADULT  Goal: Skin Integrity remains intact(Skin Breakdown Prevention)  Description: Assess:  -Perform Wilfrid assessment every   -Clean and moisturize skin every   -Inspect skin when repositioning, toileting, and assisting with ADLS  -Assess under medical devices such as  every   -Assess extremities for adequate circulation and sensation     Bed Management:  -Have minimal linens on bed & keep smooth, unwrinkled  -Change linens as needed when moist or perspiring  -Avoid sitting or lying in one position for more than  hours while in bed  -Keep HOB at degrees     Toileting:  -Offer bedside commode  -Assess for incontinence every   -Use incontinent care products after each incontinent episode such as     Activity:  -Mobilize patient  times a day  -Encourage activity and walks on unit  -Encourage or provide ROM exercises   -Turn and reposition patient every  Hours  -Use appropriate equipment to lift or move patient in bed  -Instruct/ Assist with weight shifting every  when out of bed in chair  -Consider limitation of chair time  hour intervals    Skin Care:  -Avoid use of baby powder, tape, friction and shearing, hot water or constrictive clothing  -Relieve pressure over bony prominences using   -Do not massage red bony areas    Next Steps:  -Teach patient strategies to minimize risks such as    -Consider consults to  interdisciplinary teams such as   Outcome: Not Progressing     Problem: Prexisting or High Potential for Compromised Skin Integrity  Goal: Skin integrity is maintained or improved  Description: INTERVENTIONS:  - Identify patients at risk for skin breakdown  - Assess and monitor skin integrity  - Assess and monitor nutrition and hydration status  - Monitor labs   - Assess for incontinence   - Turn and reposition patient  - Assist with mobility/ambulation  - Relieve pressure over bony prominences  - Avoid friction and shearing  - Provide appropriate hygiene as needed including keeping skin clean and dry  - Evaluate need for skin moisturizer/barrier cream  - Collaborate with interdisciplinary team   - Patient/family teaching  - Consider wound care consult   Outcome: Not Progressing     Problem: MOBILITY - ADULT  Goal: Maintain or return to baseline ADL function  Description: INTERVENTIONS:  -  Assess patient's ability to carry out ADLs; assess patient's baseline for ADL function and identify physical deficits which impact ability to perform ADLs (bathing, care of mouth/teeth, toileting, grooming, dressing, etc )  - Assess/evaluate cause of self-care deficits   - Assess range of motion  - Assess patient's mobility; develop plan if impaired  - Assess patient's need for assistive devices and provide as appropriate  - Encourage maximum independence but intervene and supervise when necessary  - Involve family in performance of ADLs  - Assess for home care needs following discharge   - Consider OT consult to assist with ADL evaluation and planning for discharge  - Provide patient education as appropriate  Outcome: Not Progressing  Goal: Maintains/Returns to pre admission functional level  Description: INTERVENTIONS:  - Perform BMAT or MOVE assessment daily    - Set and communicate daily mobility goal to care team and patient/family/caregiver  - Collaborate with rehabilitation services on mobility goals if consulted  - Perform Range of Motion  times a day  - Reposition patient every  hours    - Dangle patient  times a day  - Stand patient  times a day  - Ambulate patient  times a day  - Out of bed to chair  times a day   - Out of bed for meal times a day  - Out of bed for toileting  - Record patient progress and toleration of activity level   Outcome: Not Progressing

## 2022-11-21 NOTE — RESTORATIVE TECHNICIAN NOTE
Restorative Technician Note      Patient Name: John Vincent     Restorative Tech Visit Date: 11/21/22  Note Type: Mobility  Patient Position Upon Consult: Supine  Activity Performed: Ambulated  Assistive Device: Standard walker; Other (Comment) (6L O2; 2nd person for chair follow)  Patient Position at End of Consult: Bedside chair;  All needs within reach    Carleen Beard  DPT, Restorative Technician

## 2022-11-21 NOTE — TELEPHONE ENCOUNTER
11/20/22  Intake received  Medicare A/B  [employer] Medicare Supplement insurance   Plan F  No outreach needed at this time

## 2022-11-21 NOTE — PROGRESS NOTES
INTERNAL MEDICINE RESIDENCY PROGRESS NOTE     Name: Mari Vasquez   Age & Sex: 81 y o  female   MRN: [id number]  Unit/Bed#: [facility] 834-01   Encounter: [id number]  Team: SOD Team C     PATIENT INFORMATION     Name: Mari Vasquez   Age & Sex: 81 y o  female   MRN: [id number]  Hospital Stay Days: 6    ASSESSMENT/PLAN     Principal Problem:    Sepsis with acute hypoxic respiratory failure ([facility] [address] )  Active Problems:    Right lower lobe lung mass with right pleural effusion    Hypercalcemia    Asymptomatic carotid artery stenosis    Hyperlipidemia    Hypertension    Laryngeal carcinoma (HCC)    CKD (chronic kidney disease) stage 3, GFR 30-59 ml/min    JORDAN (acute kidney injury) ([facility] [address] )    Pancreatitis    Lung nodule seen on imaging study    Anemia of chronic disease    Hypothyroidism    Gram-positive bacteria on cultures      Right lower lobe lung mass with right pleural effusion  Assessment & Plan  · Patient history of laryngeal carcinoma status post tracheostomy/laryngectomy who presented to ED with tachycardia, hypoxia, fatigue  · CBC with elevated WBC 14 56 today  · CTA chest notable for significant findings including:  · Large, heterogeneous airspace consolidation in the right lower lobe, with a 6 3 x 5 2 x 4 8 cm somewhat lobulated hypodense component likely a necrotizing aspiration pneumonia  · Moderate right pleural effusion  No enhancement of the pleura to suggest empyema  · Mediastinal and right hilar lymphadenopathy, likely reactive, however metastatic disease is not entirely excluded  · Legionella and strep antigens negative  · Status post 900 mL fluid thoracentesis suggestive of transudative fluid for pulmonology and may be secondary to pancreatitis  · Blood cultures 1/2 group Actinomyces gram variable rods      Plan:  · Completed 5 day course of antibiotics covering for CAP  · Follow up on blood cultures  Blood cultures x2 11/17 no growth 72 hours     · Blood cultures x1 11/15 grew Actinomyces · 11/15 sputum culture grew S  Aureus with repeat sputum culture 11/16 negative  · Appreciate ID recommendations  Holding abx until after bronchoscopy/BAL planned for tomorrow 11/22  · Per ID, Postprocedure pending culture results would restart ceftriaxone 1 gm Q24H IV  If actinomyces pathogen not contaminant, will need prolonged therapy 4-6 weeks IV abx with additional PO penicillin 4-6 weeks additionally  If MSSA, recovered without actinomyces then additional cefazolin IV for at least 5 days  · Per pulm, bronch/BAL planned for tomorrow 11/22   · Trend WBC and Fever curve   · Follow-up chest CT in 3 months as an outpatient to ensure resolution as an underlying neoplasm may have a similar appearance  Hypercalcemia  Assessment & Plan  · In setting of CKD stage 3B, excessive use of vitamin-D and calcium supplementation at home per patient  · Further evidenced by elevated corrected calcium 14 2; supported by initial presentation with abdominal pain, fatigue, decreased appetite/poor PO intake; mentation at baseline, nontoxic on exam  · ECG without arrhythmia  · Further ED work up notable for multiple derangements including Hgb 11 1, low bicarb 18, elevated BUN 34, elevated creatinine 1 47 (baseline 1 11 4), GFR 32, , hypoalbuminemia 2 1; elevated lipase 2028, uric acid 10 4  · CT imaging findings concerning for acute pancreatitis,  necrotizing pneumonia vs  malignancy  · Etiology multifactorial, possibly due to hypercalcemia malignancy in light of CT findings, further complicated by home vitamin-D supplementation; med rec without any other causative agents  Bone demineralization also possible given her age  · Per chart review, patient noted to have elevated corrected calcium level since 08/2022 however does not appear to have been further worked up by PCP  · Unlikely primary parathyroidism or tertiary cause given PTH in 09/2022 was low; baseline renal function with CKD III    Vitamin-D 25 hydroxy level within normal limits  Calcium continues to be elevated   Status post 3 mg zoledronic acid     SPEP with faint gammopathy, PTH very low     Plan:  · Pending UPEP levels pending to r/o multiple myeloma  · PTHrP to assess hypercalcemia of malignancy, pending   · Vitamin D levels within normal limits: Vit D 25-OH 77 2, Vit D 1,25 OH 49 4  · PTH 6 3, compensating appropriately for elevated Ca level   · IV fluids stopped given patient is developing fluid overload with aggressive fluid resuscitation  Will monitor urine output closely  · Trend BMP to assess response  Calcium slowly downtrending 11 9 today from 12 7    Gram-positive bacteria on cultures  Assessment & Plan  1/2 blood cultures grew Actinomyces    Plan:  See Sepsis and Right Lower Lobe Mass A/P     Hypothyroidism  Assessment & Plan  · Known history since 11/2021  · Most recent TSH 4 2 in 08/2022  · Managed on levothyroxine 100 mcg  · Repeat TSH within normal limits    Plan  Stable  · Continue home dose levothyroxine    Anemia of chronic disease  Assessment & Plan  · Known history, in setting of CKD stage IIIB  · Follows nephrology outpatient (Dr Tere Bocanegra)  · Hgb 11 1 on admission, at baseline     · Currently hemodynamically stable    Plan:  · Trend CBC, hb 9 2   · No active bleeding present   · Transfuse if Hb < 7         Lung nodule seen on imaging study  Assessment & Plan  · Patient history of laryngeal carcinoma status post tracheostomy/laryngectomy on trach collar presented with fatigue; tachycardic on exam; labs notable for multiple derangements including hypercalcemia 14 3 and bicarb 18  · CT notable for nonspecific 7 x 6 mm lobulated left lower lobe nodule; new finding; CT imaging also revealing for multiple findings large heterogeneous airspace consolidation and right lower lobe with 6 x 5 x 5 lobulated hypodense component with moderate right-sided pleural effusion  concerning for necrotizing pneumonia  · Malignancy not entirely ruled out; does endorse decreased appetite, unexpected weight loss of up to 30 lb, and is a former smoker; quit 2002; hypercalcemia also noted on admission with recent outpatient PTH wnl  · 11/19 - repeat CT suggested of necrosis/abscess    Plan  · Inpatient pulmonology following, appreciate recs; 5 day course completed abx for suspecting necrotizing pneumonia with large heterogeneous airspace consolidation in right lower lobe  · ID consulted, appreciate reccs  Holding off abx for bronch/BAL planned tomorrow  · Repeat CT future to assess for improvement, outpatient     Pancreatitis  Assessment & Plan  · Likely secondary to hypercalcemia  · Presented with abdominal pain; labs notable for elevated lipase >2000, elevated WBC  · CT imaging suggestive pancreatitis  · Patient has elevated alk-phos with normal T bili and asymptomatic abnormal biliary strictures on imaging  · Patient reports improved appetite and improved abdominal pain    Plan:  · Supportive care with analgesics or antiemetics as needed; renal function at baseline, ECG with normal QTC  · Continue diet as tolerated   · Continued monitoring of vitals, O2 sats, urine output with goal > 0 5-1 mL/kg/hr  · Replete electrolytes as indicated    Suspected as having resolved, limiting fluid resuscitation at this time due to volume overload  Unclear source of hypercalcemia that triggered pancreatitis, hematology on board for hypercalcemia control  Ca slowly downtrending, last 11 9 today         JORDAN (acute kidney injury) ([facility] [address] )  Assessment & Plan  Creatinine of 1 47 on admission treated with IV fluid boluses, 3L total, NSS at 100ml/hr, renal function assessment and lab monitoring      Pt has CKD 3 and noted baseline of 1-1 1    Plan:  · Cr 1 29 today, continue to trend  · Avoid nephrotoxic agents  · Monitor urine output - approx 500 cc 24 hours with IV lasix 20 mg x1 yesterday  · Encourage adequate intake          CKD (chronic kidney disease) stage 3, GFR 30-59 ml/min  Assessment & Plan  Lab Results   Component Value Date    EGFR 59 11/17/2022    EGFR 54 11/16/2022    EGFR 55 11/16/2022    CREATININE 0 88 11/17/2022    CREATININE 0 95 11/16/2022    CREATININE 0 93 11/16/2022     · Known history of CKD stage III states 2014  · Follows [facility] nephrology (Dr Carmine Christine)  · Cr on admission slightly elevated 1 47(baseline 1 1-1 4)  · Etiology likely 2/2 long history of hypertension, nephrosclerosis, age-related nephron loss     Plan  · One time IV Lasix 20 mg - 450 ccs u/o   · Consider another one time lasix dose to reassess if patient's blood pressure improves  · Cr 1 29 today, monitor closely with BMP   · Avoid use of nephrotoxic agents      Laryngeal carcinoma (HCC)  Assessment & Plan  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  · Follows with ENT as an outpatient with Dr Patrick Horn; most recently evaluated for trich follow-up in 10/2022     Plan:  · Bobie Cam in place, functional well, at baseline  · Continue trach collar oxygen, on 6 L able to saturating appropriately    Hypertension  Assessment & Plan  · Known history, maintained on losartan 50 mg daily at home  · Follows PCP outpatient      Plan:  · BP on the low side with systolic high 20X to 51T on admission, systolics continued to be in the 90s to 100s  · Will hold home dose losartan at this time  · Continue monitoring blood pressures    Hyperlipidemia  Assessment & Plan  · Known history of type 2 diabetes on metformin, hypertension on losartan  · Managed on Lipitor 40  · Most recent lipid panel with good control of LDL at 56    Plan  · Continue Lipitor 40mg QD    Asymptomatic carotid artery stenosis  Assessment & Plan  · Known hx, follows with vascular surgery (Dr Jammie Blanco)  · Previously found to have recurrent left ICA stenosis and critical new occlusive right IC stenosis, with a which were asymptomatic, along with left vertebral artery atresia; previously worked up in outpatient setting by [facility] concerning symptoms and was recommended endovascular intervention  · On 07/2020, patient electively [place] [facility] [place] which she underwent ultrasound-guided right CFA puncture with sternal closure, aortic arch arteriogram, left carotid during him with PTA and stenting under the care of Dr Brown  · Maintained on antiplatelet and anti-statin therapy with Plavix and Lipitor     Plan:  · Continue home dose lipitor  · Holding home Plavix for bronchoscopy/BAL planned tomorrow  * Sepsis with acute hypoxic respiratory failure ([facility] [address] )  Assessment & Plan  · Patient presented with abdominal pain, lightheadedness, intermittent shortness of breath, recent unexpected weight loss   · Met sepsis criteria on admission  · Lactate wnl but has significant hypercalcemia 14 2 on admission  · Patient is status post tracheostomy  · Completed cephalosporin 5 day course, completed azithromycin 3 day course    Plan:  · Completed 5 day course of antibiotics covering for CAP  · Follow up on blood cultures  Blood cultures x2 11/17 no growth 72 hours  · Blood cultures x1 11/15 grew Actinomyces   · 11/15 sputum culture grew S  Aureus with repeat sputum culture 11/16 negative  · Appreciate ID recommendations  Holding abx until after bronchoscopy/BAL planned for tomorrow 11/22  · Per ID, Postprocedure pending culture results would restart ceftriaxone 1 gm Q24H IV  If actinomyces pathogen not contaminant, will need prolonged therapy 4-6 weeks IV abx with additional PO penicillin 4-6 weeks additionally  If MSSA, recovered without actinomyces then additional cefazolin IV for at least 5 days  · Per pulm, bronch/BAL planned for tomorrow 11/22   · Continue trach collar oxygen as needed - on 6L this AM   · Trend WBC and fever curve  WBC 14 56  No fevers throughout hospital course at this time   · Hypotensive but cannot give fluids due to volume overload at this time  S/p 1x dose lasix 20 mg yesterday 11/20 - 500 ccs urine output last 24 hours  Will continue to monitor blood pressures, can give another dose of lasix if pressures improve before considering fluids  Disposition: Plan for bronch/BAL tomorrow      SUBJECTIVE     Patient seen and examined, appears tired and resting in bed  No acute events overnight  Patient saturating appropriately on 6L O2 this morning, slowly weaning from 8L overnight  Patient denies any fevers, chills, nausea or vomiting, abdominal pain, diarrhea, constipation  Patient does have some shortness of breath, no chest pain  She received 1x dose of lasix 20 mg yesterday, made 500 ccs urine output this AM  Her blood pressures are soft, hesistant to do another dose of lasix given softer systolic bps  Will reassess urine output  Encouraged patient to continue eating adequately  Plan for bronchoscopy/BAL tomorrow per pulm  OBJECTIVE     Vitals:    22 0745 22 1047 22 1145 22 1316   BP:  101/56  98/52   Pulse:  104  105   Resp:  16     Temp:  98 1 °F (36 7 °C)     TempSrc:       SpO2: 97% 95% 96% 96%   Weight:       Height:          Temperature:   Temp (24hrs), Av 9 °F (36 6 °C), Min:97 3 °F (36 3 °C), Max:98 6 °F (37 °C)    Temperature: 98 1 °F (36 7 °C)  Intake & Output:  I/O        0701   0700  0701   0700  0701   0700    P  O  340 720 318    I V  (mL/kg)   200 (3 3)    IV Piggyback       Total Intake(mL/kg) 340 (5 6) 720 (11 9) 518 (8 6)    Urine (mL/kg/hr) 780 (0 5) 450 (0 3) 250 (0 6)    Stool 0      Total Output 780 450 250    Net -440 +270 +268           Unmeasured Urine Occurrence 1 x  1 x    Unmeasured Stool Occurrence 2 x          Weights:        Body mass index is 29 82 kg/m²  Weight (last 2 days)     None        Physical Exam  Constitutional:       Appearance: She is ill-appearing  Comments: Tired, resting in bed asleep    HENT:      Mouth/Throat:      Mouth: Mucous membranes are moist       Pharynx: Oropharynx is clear     Eyes:      General: No scleral icterus  Extraocular Movements: Extraocular movements intact  Conjunctiva/sclera: Conjunctivae normal    Cardiovascular:      Rate and Rhythm: Regular rhythm  Tachycardia present  Pulses: Normal pulses  Heart sounds: Normal heart sounds  No murmur heard  No gallop  Pulmonary:      Effort: No respiratory distress  Comments: Trach collar oxygen 6L O2, saturating appropriately     Coarse breath sounds bilaterally, more on right side than left  Abdominal:      General: Bowel sounds are normal  There is no distension  Palpations: Abdomen is soft  Tenderness: There is no abdominal tenderness  There is no guarding or rebound  Musculoskeletal:      Right lower leg: Edema (+1) present  Left lower leg: Edema (+1) present  Skin:     General: Skin is warm and dry  Capillary Refill: Capillary refill takes less than 2 seconds  Neurological:      Mental Status: She is oriented to person, place, and time  LABORATORY DATA     Labs: I have personally reviewed pertinent reports  Results from last 7 days   Lab Units 11/20/22  1433 11/19/22  1408 11/18/22  0240   WBC Thousand/uL 14 56* 12 56* 10 15   HEMOGLOBIN g/dL 9 2* 8 9* 9 1*   HEMATOCRIT % 31 1* 29 9* 31 4*   PLATELETS Thousands/uL 340 313 316   NEUTROS PCT % 89* 88* 85*   MONOS PCT % 7 7 7      Results from last 7 days   Lab Units 11/20/22  1433 11/19/22  1408 11/18/22  0240   POTASSIUM mmol/L 3 9 3 3* 3 7   CHLORIDE mmol/L 112* 113* 112*   CO2 mmol/L 24 20* 23   BUN mg/dL 13 13 19   CREATININE mg/dL 1 29 1 09 0 96   CALCIUM mg/dL 10 3* 10 9* 12 1*   ALK PHOS U/L 109 108 113   ALT U/L 23 27 25   AST U/L 23 26 25              Results from last 7 days   Lab Units 11/15/22  1913   INR  1 01   PTT seconds 31     Results from last 7 days   Lab Units 11/15/22  1913   LACTIC ACID mmol/L 1 1           IMAGING & DIAGNOSTIC TESTING     Radiology Results: I have personally reviewed pertinent reports    XR chest portable    Result Date: 11/17/2022  Impression: Near complete drainage of right effusion with trace residual effusion  Masslike opacity in the right lower lobe better shown on CT  Mild pulmonary venous congestion  Workstation performed: [id number]     CT head wo contrast    Result Date: 11/15/2022  Impression: No acute intracranial abnormality  Chronic microangiopathic changes  Workstation performed: [id number]     CT chest wo contrast    Result Date: 11/19/2022  Impression: 1  Right lower lobe consolidation appears similar  There is relative internal hypodensity, along with air, consistent with abscess/necrosis  Recommend remains for a follow-up CT  2   Stable right lower lobe airway debris, with new airway debris in the middle lobe, consistent with interval aspiration event 3  Moderate right pleural effusion, not significantly changed in size  Trace left pleural effusion  4   Interstitial opacities consistent with mild edema 5  Stable left lower lobe nodule, attention on follow-up The study was marked in EPIC for immediate notification  Workstation performed: [id number]     IR IN-Patient Thoracentesis    Result Date: 11/18/2022  Impression: Impression: Ultrasound-guided thoracentesis yielding 900 mL clear yellow pleural fluid  Signed, performed, and dictated by CHANEL Nagel under the supervision of Dr Sirena Dowling  Workstation performed: [id number]     PE Study with CT Abdomen and Pelvis with contrast    Result Date: 11/15/2022  Impression: No central, lobar, segmental or the or proximal subsegmental pulmonary embolism  Evaluation of the distal subsegmental pulmonary arteries is limited  Large, heterogeneous airspace consolidation in the right lower lobe, with a 6 3 x 5 2 x 4 8 cm somewhat lobulated hypodense component with a small droplet of air    This likely represents a necrotizing aspiration pneumonia, given mucous plugging throughout the right lower lobe and retained secretions/debris in the right mainstem bronchus and bronchus intermedius  Recommend follow-up chest CT in approximately 3 months to ensure resolution as an underlying neoplasm may have a similar appearance  Nonspecific 7 x 6 mm lobulated left lower lobe nodule  Attention on follow-up  Moderate right pleural effusion  No enhancement of the pleura to suggest empyema  Mediastinal and right hilar lymphadenopathy, likely reactive, however metastatic disease is not entirely excluded  Mild stranding around the proximal pancreas suggestive of acute interstitial pancreatitis  Recommend correlation with lipase level  No organized peripancreatic fluid collections, loss of parenchymal enhancement to suggest necrosis, or vascular complications of pancreatitis  No other acute abdominal or pelvic pathology  Multiple additional findings as above  The study was marked in [facility] for immediate notification  Workstation performed: [id number]     Other Diagnostic Testing: I have personally reviewed pertinent reports      ACTIVE MEDICATIONS     Current Facility-Administered Medications   Medication Dose Route Frequency   • acetaminophen (TYLENOL) tablet 650 mg  650 mg Oral Q6H PRN   • albuterol inhalation solution 2 5 mg  2 5 mg Nebulization Q4H PRN   • atorvastatin (LIPITOR) tablet 40 mg  40 mg Oral Daily With Dinner   • [START ON 11/22/2022] clopidogrel (PLAVIX) tablet 75 mg  75 mg Oral Daily   • glycerin-hypromellose- (ARTIFICIAL TEARS) ophthalmic solution 1 drop  1 drop Both Eyes PRN   • heparin (porcine) subcutaneous injection 5,000 Units  5,000 Units Subcutaneous Q8H [address]   • HYDROmorphone HCl (DILAUDID) injection 0 2 mg  0 2 mg Intravenous Q6H PRN   • iron sucrose (VENOFER) 200 mg in sodium chloride 0 9 % 100 mL IVPB  200 mg Intravenous Once per day on Mon Wed Fri   • levothyroxine tablet 100 mcg  100 mcg Oral Early Morning   • loratadine (CLARITIN) tablet 5 mg  5 mg Oral Daily   • oxyCODONE (ROXICODONE) IR tablet 2 5 mg  2 5 mg Oral Q6H PRN    Or • oxyCODONE (ROXICODONE) IR tablet 5 mg  5 mg Oral Q6H PRN   • sertraline (ZOLOFT) tablet 25 mg  25 mg Oral Daily       VTE Pharmacologic Prophylaxis: Heparin  VTE Mechanical Prophylaxis: sequential compression device    Portions of the record may have been created with voice recognition software  Occasional wrong word or "sound a like" substitutions may have occurred due to the inherent limitations of voice recognition software    Read the chart carefully and recognize, using context, where substitutions have occurred   ==  [address]  Internal Medicine Residency PGY-1

## 2022-11-21 NOTE — DISCHARGE INSTR - OTHER ORDERS
Skin Care Plan:  1-Turn/reposition q2h or when medically stable for pressure re-distribution on skin   2-Elevate heels to offload pressure  3-Apply adaptic and then Allevyn foam to sacral/buttocks every other day and PRN soilage/displacement  4-Moisturize skin daily with skin nourishing cream  5-Ehob cushion in chair when out of bed  6-Left Pretibial Wound: Cleanse wound w/ NS and pat dry  Apply small clover allevyn foam dressing  Harry with T for Treatment and change every other day or PRN soilage  7-Left & Right Foot and heel Wounds: Cleanse wound beds and pat dry  Apply adaptic/maxorb to wound beds, cover with ABDs, and secure with rubina wraps to feet and Allevyn foams to heels  Change every other day or PRN soilage

## 2022-11-21 NOTE — WOUND OSTOMY CARE
Consult Note - Wound   Dub Pali 81 y o  female MRN: [id number]  Unit/Bed#: 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, CKD stage IIIB (baseline Cr 1 1-1 4), HLD and 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, PAD with intermittent claudication, asymptomatic bilateral carotid stenosis 2/2 radiation s/p bilateral  TCAR followed by R carotid PTA stent (7/2020) on Plavix and Lipitor, hypothyroidism on levothyroxine, HTN controlled with losartan, and T2DM controlled with metformin  Patient is a min assist for turning and repositioning  Patient is continent of bowel and bladder  On assessment, patient is seeing sitting in bed  Wound Care was consulted for B/L leg blisters  Assessment Findings:   Patient reports discomfort in sacro-buttocks  Area is pink in color, blanches and is intact with no skin loss or wounds present  B/L heels are dry, intact and petros with no skin loss or wounds present  1  Left Pretibial Wound: circular area of partial thickness skin loss  Appears like ruptured blister  Wound bed is pink and jeanine-wound is intact  Small to moderate serosanguineous/clear drainage present  maxorb applied to area and covered with foam dressing  2  Left Anterior Foot Wound: partial thickness skin loss  Apprears like ruptured blister  2 areas measured together  Wound bed is pink in color  Jeanine-wound is intact and fragile  Scant serosanguineous drainage noted  Wound was cleansed and dressed with adaptic, ABD, and rubina wrap  3  Right Anterior Foot Wound: no skin loss noted at this time  2 intact blisters and 1 intact bullae filled with drainage  No drainage noted not at this time   Wound was cleansed and dressed with adaptic, ABD, and rubina wrap  No induration, fluctuance, odor, warmth/temperature differences, redness, or purulence noted to the above noted wounds and skin areas assessed  New dressings applied per orders listed below  Patient tolerated well- no s/s of non-verbal pain or discomfort observed during the encounter  Bedside nurse aware of plan of care  See flow sheets for more detailed assessment findings  Orders listed below and wound care will continue to follow, call or tiger text with questions  Skin Care Plan:  1-Preventative Hydraguard to bilateral heels and bilateral buttocks BID and PRN  2-Turn/reposition q2h or when medically stable for pressure re-distribution on skin   3-Elevate heels to offload pressure  4-Moisturize skin daily with skin nourishing cream  5-Ehob cushion in chair when out of bed  6-Left Pretibial Wound: Cleanse wound w/ NS and pat dry  Apply maxorb to wound bed and cover with small clover allevyn foam dressing  Harry with T for Treatment and change every other day or PRN soilage  7-Left & Right Foot Wounds: Cleanse wound beds and pat dry  Apply adaptic to wound beds, cover with ABDs, and secure with rubina wraps  Change every other day or PRN soilage  Wounds:  Wound Pretibial Distal;Left (Active)   Wound Image   11/21/22 0922   Wound Description Whitakers 11/21/22 1300   Alem-wound Assessment Intact 11/21/22 1300   Wound Length (cm) 0 5 cm 11/21/22 0922   Wound Width (cm) 1 cm 11/21/22 0922   Wound Depth (cm) 0 1 cm 11/21/22 0922   Wound Surface Area (cm^2) 0 5 cm^2 11/21/22 0922   Wound Volume (cm^3) 0 05 cm^3 11/21/22 0922   Calculated Wound Volume (cm^3) 0 05 cm^3 11/21/22 0922   Drainage Amount Small 11/21/22 1300   Drainage Description Serosanguineous 11/21/22 1300   Non-staged Wound Description Partial thickness 11/21/22 1300   Treatments Cleansed;Irrigation with NSS;Site care 11/21/22 1300   Dressing Non adherent; Foam, Silicon (eg   Allevyn, etc) 11/21/22 1300   Wound packed? No 11/21/22 1300   Dressing Changed New 11/21/22 1300   Patient Tolerance Tolerated well 11/21/22 1300   Dressing Status Clean; Intact;Dry 11/21/22 1300       Wound 11/21/22 Foot Anterior; Left (Active)   Wound Image   11/21/22 0923   Wound Description Intact 11/21/22 1300   Alem-wound Assessment Intact 11/21/22 1300   Wound Length (cm) 6 cm 11/21/22 0923   Wound Width (cm) 5 cm 11/21/22 0923   Wound Depth (cm) 0 1 cm 11/21/22 0923   Wound Surface Area (cm^2) 30 cm^2 11/21/22 0923   Wound Volume (cm^3) 3 cm^3 11/21/22 0923   Calculated Wound Volume (cm^3) 3 cm^3 11/21/22 0923   Drainage Amount Scant 11/21/22 1300   Drainage Description Serosanguineous 11/21/22 1300   Non-staged Wound Description Partial thickness 11/21/22 1300   Treatments Cleansed;Site care;Elevated 11/21/22 1300   Dressing Non adherent;ABD;Dry dressing 11/21/22 1300   Wound packed? No 11/21/22 1300   Dressing Changed New 11/21/22 1300   Patient Tolerance Tolerated well 11/21/22 1300   Dressing Status Dry;Clean; Intact 11/21/22 1300       Wound 11/21/22 Foot Anterior;Right (Active)   Wound Image   11/21/22 0924   Wound Description Intact 11/21/22 0924   Alem-wound Assessment Intact 11/21/22 0924   Wound Length (cm) 2 cm 11/21/22 0924   Wound Width (cm) 2 cm 11/21/22 0924   Wound Depth (cm) 0 cm 11/21/22 0924   Wound Surface Area (cm^2) 4 cm^2 11/21/22 0924   Wound Volume (cm^3) 0 cm^3 11/21/22 0924   Calculated Wound Volume (cm^3) 0 cm^3 11/21/22 0924   Drainage Amount None 11/21/22 0924   Non-staged Wound Description Not applicable 10/16/77 5176   Treatments Cleansed;Site care 11/21/22 0924   Dressing Non adherent;ABD;Dry dressing 11/21/22 0924   Dressing Changed New 11/21/22 0924   Patient Tolerance Tolerated well 11/21/22 0924   Dressing Status Clean;Dry;New drainage 11/21/22 0924               Cuate Villalobos RN, BSN

## 2022-11-21 NOTE — PROGRESS NOTES
Progress Note - Infectious Disease   Akhil Whatley 81 y o  female MRN: [id number]  Unit/Bed#: [facility] 834-01 Encounter: [id number]      Impression:  1  Necrotizing RLL pneumonia R/0 abscess   2  History of laryngeal carcinoma s/p laryngectomy/tracheitis  3  DM type 2  4  CAD  5  Extensive PVD    Recommendations:  Patient is afebrile with elevated WBC count  1  Bronchoscopy/BAL was postponed  2  In light of above as discussed would restart ceftriaxone 1 g Q 24 hours IV pending reschedule bronchoscopy/BAL to determine if actinomycosis is contaminant or pathogen would still try to do bronchoscopy with BAL ASAP     Antibiotics:  1  Ceftriaxone 1 g Q 24 hours IV, day 1 Rx     Subjective: The patient has no complaints  Denies fevers, chills, or sweats  Denies nausea, vomiting, or diarrhea  Objective:  Vitals:  Temp:  [97 3 °F (36 3 °C)-98 6 °F (37 °C)] 98 1 °F (36 7 °C)  HR:  [101-122] 105  Resp:  [16-22] 16  BP: ()/(52-57) 98/52  SpO2:  [91 %-97 %] 96 %  Temp (24hrs), Av 9 °F (36 6 °C), Min:97 3 °F (36 3 °C), Max:98 6 °F (37 °C)  Current: Temperature: 98 1 °F (36 7 °C)    Physical Exam:     General Appearance:    Eyes:   Alert, responsive, chronically ill-appearing nontoxic, no acute distress  Conjunctiva pale   Throat: Oropharynx moist without lesions  Lips, mucosa, and tongue normal   Neck: Tracheostomy, surgical scars   Lungs:   Bibasilar dullness   Heart:  Regular rate with ectopics and rhythm, S1, S2 normal, no murmur, rub or gallop   Abdomen:   Soft, non-tender, non-distended, positive bowel sounds  No masses, no organomegaly    No CVA tenderness   Extremities: Extremities normal, atraumatic, no clubbing, cyanosis or edema   Skin: Skin color pale, surgical scars, as above texture, turgor normal, no rashes or lesions  No draining wounds noted  Invasive Devices     Peripheral Intravenous Line  Duration           Peripheral IV 22 Distal;Dorsal (posterior); Left Forearm <1 day Airway  Duration           Surgical Airway -- days                Labs, Imaging, & Other studies:   All pertinent labs were personally reviewed  Results from last 7 days   Lab Units 11/20/22  1433 11/19/22  1408 11/18/22  0240   WBC Thousand/uL 14 56* 12 56* 10 15   HEMOGLOBIN g/dL 9 2* 8 9* 9 1*   PLATELETS Thousands/uL 340 313 316     Results from last 7 days   Lab Units 11/20/22  1433 11/19/22  1408 11/18/22  0240   SODIUM mmol/L 142 141 143   POTASSIUM mmol/L 3 9 3 3* 3 7   CHLORIDE mmol/L 112* 113* 112*   CO2 mmol/L 24 20* 23   BUN mg/dL 13 13 19   CREATININE mg/dL 1 29 1 09 0 96   EGFR ml/min/1 73sq m 37 46 53   CALCIUM mg/dL 10 3* 10 9* 12 1*   AST U/L 23 26 25   ALT U/L 23 27 25   ALK PHOS U/L 109 108 113     Results from last 7 days   Lab Units 11/17/22  2141 11/16/22  1524 11/16/22  1416 11/15/22  2033 11/15/22  2025 11/15/22  2003 11/15/22  1913   BLOOD CULTURE  No Growth at 72 hrs  No Growth at 72 hrs   --   --   --   --   --  Actinomyces species*  No Growth After 5 Days     SPUTUM CULTURE   --   --  Few Colonies of  --  1+ Growth of Staphylococcus aureus*  Few Colonies of  --   --    GRAM STAIN RESULT   --  No Polys or Bacteria seen Rare Epithelial Cells  4+ Polys  No bacteria seen  --  1+ Polys*  Rare Gram positive cocci in pairs, chains and clusters*  --  Gram variable rods*   BODY FLUID CULTURE, STERILE   --  No growth  --   --   --   --   --    MRSA CULTURE ONLY   --   --   --  No Methicillin Resistant Staphlyococcus aureus (MRSA) isolated  --   --   --    LEGIONELLA URINARY ANTIGEN   --   --   --   --   --  Negative  --

## 2022-11-21 NOTE — PROGRESS NOTES
PULMONOLOGY PROGRESS NOTE     Name: Sharla Rivas   Age & Sex: 81 y o  female   MRN: [id number]  Unit/Bed#: [facility] 834-01   Encounter: [id number]    PATIENT INFORMATION     Name: Sharla Rivas   Age & Sex: 81 y o  female   MRN: [id number]  Hospital Stay Days: 6    ASSESSMENT/PLAN     Assessment:   1  RLL lung mass  2  Acute hypoxic respiratory failure  3  Right pleural effusion - s/p thoracentesis on 52/10 with neutrophilic predominant transudate  4  Actinomyces bacteremia  5  Hypercalcemia  6  History of laryngeal SCC - s/p total laryngectomy with tracheostomy/provox voice box  7  Emphysema with possible underlying COPD - not in acute exacerbation    Plan:  • CT chest reviewed in detail  Based on Actinomyces in blood culture and findings of RLL mass/consolidation on CT chest, patient would benefit from bronchoscopy with BAL  She would also benefit from biopsy, however she is currently on Plavix which would need to be held for 7 days in order to do a transbronchial biopsy  • After discussing with ID, will proceed with bronchoscopy and BAL tomorrow to assess whether patient does have Actinomyces infection which would change antibiotic therapy  • Will arrange outpatient follow up with pulmonology  She will need repeat imaging after completing treatment to assess for resolution of mass/consolidation  If still present, she will need biopsy in the near future  • NPO after midnight  • Case discussed with patient and family at bedside as well as primary team   • Pulmonology will continue to follow  SUBJECTIVE     Patient seen and examined  No acute events overnight  She denies any worsening shortness of breath or chest pain       OBJECTIVE     Vitals:    11/20/22 1905 11/20/22 2339 11/21/22 0243 11/21/22 0740   BP:  100/57 96/57 100/56   Pulse:  101 (!) 115 (!) 109   Resp:  22 20    Temp:  (!) 97 3 °F (36 3 °C) 98 1 °F (36 7 °C)    TempSrc:       SpO2: 97% 97% 91% 95%   Weight:       Height: Temperature:   Temp (24hrs), Av °F (36 7 °C), Min:97 3 °F (36 3 °C), Max:98 7 °F (37 1 °C)    Temperature: 98 1 °F (36 7 °C)  Intake & Output:  I/O        0701   0700  0701   0700  0701   0700    P  O  340 720     I V  (mL/kg)       IV Piggyback       Total Intake(mL/kg) 340 (5 6) 720 (11 9)     Urine (mL/kg/hr) 780 (0 5) 450 (0 3) 250 (2 2)    Stool 0      Total Output 780 450 250    Net -440 +270 -250           Unmeasured Urine Occurrence 1 x  1 x    Unmeasured Stool Occurrence 2 x          Weights:        Body mass index is 29 82 kg/m²  Weight (last 2 days)     None        Physical Exam  Vitals and nursing note reviewed  Constitutional:       General: She is not in acute distress  Appearance: She is not ill-appearing or toxic-appearing  HENT:      Head: Normocephalic  Eyes:      General: No scleral icterus  Pupils: Pupils are equal, round, and reactive to light  Neck:      Trachea: Tracheostomy present  Cardiovascular:      Rate and Rhythm: Normal rate and regular rhythm  Heart sounds: No murmur heard  No gallop  Pulmonary:      Effort: Pulmonary effort is normal  No respiratory distress  Breath sounds: Decreased breath sounds present  No wheezing, rhonchi or rales  Abdominal:      General: Bowel sounds are normal  There is no distension  Palpations: Abdomen is soft  Tenderness: There is no abdominal tenderness  There is no guarding  Musculoskeletal:      Cervical back: Normal range of motion  Right lower leg: Edema present  Left lower leg: Edema present  Skin:     General: Skin is warm  Capillary Refill: Capillary refill takes less than 2 seconds  Neurological:      Mental Status: She is alert and oriented to person, place, and time  Mental status is at baseline  Cranial Nerves: No cranial nerve deficit  Psychiatric:         Mood and Affect: Mood normal            LABORATORY DATA     Labs:  I have personally reviewed pertinent reports  Results from last 7 days   Lab Units 11/20/22  1433 11/19/22  1408 11/18/22  0240   WBC Thousand/uL 14 56* 12 56* 10 15   HEMOGLOBIN g/dL 9 2* 8 9* 9 1*   HEMATOCRIT % 31 1* 29 9* 31 4*   PLATELETS Thousands/uL 340 313 316   NEUTROS PCT % 89* 88* 85*   MONOS PCT % 7 7 7      Results from last 7 days   Lab Units 11/20/22  1433 11/19/22  1408 11/18/22  0240   POTASSIUM mmol/L 3 9 3 3* 3 7   CHLORIDE mmol/L 112* 113* 112*   CO2 mmol/L 24 20* 23   BUN mg/dL 13 13 19   CREATININE mg/dL 1 29 1 09 0 96   CALCIUM mg/dL 10 3* 10 9* 12 1*   ALK PHOS U/L 109 108 113   ALT U/L 23 27 25   AST U/L 23 26 25              Results from last 7 days   Lab Units 11/15/22  1913   INR  1 01   PTT seconds 31     Results from last 7 days   Lab Units 11/15/22  1913   LACTIC ACID mmol/L 1 1             ABG:       Micro:   Results from last 7 days   Lab Units 11/17/22  2141 11/16/22  1524 11/16/22  1416 11/15/22  2033 11/15/22  2025 11/15/22  2003 11/15/22  1913   BLOOD CULTURE  No Growth at 72 hrs  No Growth at 72 hrs   --   --   --   --   --  No Growth After 5 Days  Actinomyces species*   SPUTUM CULTURE   --   --  Few Colonies of  --  1+ Growth of Staphylococcus aureus*  Few Colonies of  --   --    GRAM STAIN RESULT   --  No Polys or Bacteria seen Rare Epithelial Cells  4+ Polys  No bacteria seen  --  1+ Polys*  Rare Gram positive cocci in pairs, chains and clusters*  --  Gram variable rods*   BODY FLUID CULTURE, STERILE   --  No growth  --   --   --   --   --    MRSA CULTURE ONLY   --   --   --  No Methicillin Resistant Staphlyococcus aureus (MRSA) isolated  --   --   --    LEGIONELLA URINARY ANTIGEN   --   --   --   --   --  Negative  --    STREP PNEUMONIAE ANTIGEN, URINE   --   --   --   --   --  Negative  --          IMAGING & DIAGNOSTIC TESTING     Radiology Results: I have personally reviewed pertinent reports    XR chest portable    Result Date: 11/17/2022  Impression: Near complete drainage of right effusion with trace residual effusion  Masslike opacity in the right lower lobe better shown on CT  Mild pulmonary venous congestion  Workstation performed: [id number]     CT head wo contrast    Result Date: 11/15/2022  Impression: No acute intracranial abnormality  Chronic microangiopathic changes  Workstation performed: [id number]     CT chest wo contrast    Result Date: 11/19/2022  Impression: 1  Right lower lobe consolidation appears similar  There is relative internal hypodensity, along with air, consistent with abscess/necrosis  Recommend remains for a follow-up CT  2   Stable right lower lobe airway debris, with new airway debris in the middle lobe, consistent with interval aspiration event 3  Moderate right pleural effusion, not significantly changed in size  Trace left pleural effusion  4   Interstitial opacities consistent with mild edema 5  Stable left lower lobe nodule, attention on follow-up The study was marked in EP[employer] for immediate notification  Workstation performed: [id number]     IR IN-Patient Thoracentesis    Result Date: 11/18/2022  Impression: Impression: Ultrasound-guided thoracentesis yielding 900 mL clear yellow pleural fluid  Signed, performed, and dictated by HCANEL Sullivan under the supervision of Dr Ruchi Marques  Workstation performed: [id number]     PE Study with CT Abdomen and Pelvis with contrast    Result Date: 11/15/2022  Impression: No central, lobar, segmental or proximal subsegmental pulmonary embolism  Evaluation of the distal subsegmental pulmonary arteries is limited  Large, heterogeneous airspace consolidation in the right lower lobe, with a 6 3 x 5 2 x 4 8 cm somewhat lobulated hypodense component with a small droplet of air  This likely represents a necrotizing aspiration pneumonia, given mucous plugging throughout the right lower lobe and retained secretions/debris in the right mainstem bronchus and bronchus intermedius    Recommend follow-up chest CT in approximately 3 months to ensure resolution as an underlying neoplasm may have a similar appearance  Nonspecific 7 x 6 mm lobulated left lower lobe nodule  Attention on follow-up  Moderate right pleural effusion  No enhancement of the pleura to suggest empyema  Mediastinal and right hilar lymphadenopathy, likely reactive, however metastatic disease is not entirely excluded  Mild stranding around the proximal pancreas suggestive of acute interstitial pancreatitis  Recommend correlation with lipase level  No organized peripancreatic fluid collections, loss of parenchymal enhancement to suggest necrosis, or vascular complications of pancreatitis  No other acute abdominal or pelvic pathology  Multiple additional findings as above  The study was marked in [facility] for immediate notification  Workstation performed: [id number]     Other Diagnostic Testing: I have personally reviewed pertinent reports      ACTIVE MEDICATIONS     Current Facility-Administered Medications   Medication Dose Route Frequency   • acetaminophen (TYLENOL) tablet 650 mg  650 mg Oral Q6H PRN   • albuterol inhalation solution 2 5 mg  2 5 mg Nebulization Q4H PRN   • atorvastatin (LIPITOR) tablet 40 mg  40 mg Oral Daily With Dinner   • [START ON 11/22/2022] clopidogrel (PLAVIX) tablet 75 mg  75 mg Oral Daily   • heparin (porcine) subcutaneous injection 5,000 Units  5,000 Units Subcutaneous Q8H [address]   • HYDROmorphone HCl (DILAUDID) injection 0 2 mg  0 2 mg Intravenous Q6H PRN   • iron sucrose (VENOFER) 200 mg in sodium chloride 0 9 % 100 mL IVPB  200 mg Intravenous Once per day on Mon Wed Fri   • levothyroxine tablet 100 mcg  100 mcg Oral Early Morning   • loratadine (CLARITIN) tablet 5 mg  5 mg Oral Daily   • oxyCODONE (ROXICODONE) IR tablet 2 5 mg  2 5 mg Oral Q6H PRN    Or   • oxyCODONE (ROXICODONE) IR tablet 5 mg  5 mg Oral Q6H PRN   • sertraline (ZOLOFT) tablet 25 mg  25 mg Oral Daily       VTE Pharmacologic Prophylaxis: Heparin  VTE Mechanical Prophylaxis: sequential compression device      Disclaimer: Portions of the record may have been created with voice recognition software  Occasional wrong word or "sound a like" substitutions may have occurred due to the inherent limitations of voice recognition software  Careful consideration should be taken to recognize, using context, where substitutions have occurred      Damaris Hopper MD   Pulmonary and Critical Care Fellow, PGY-4  [facility]

## 2022-11-22 ENCOUNTER — ANESTHESIA EVENT (INPATIENT)
Dept: GASTROENTEROLOGY | Facility: HOSPITAL | Age: 87
End: 2022-11-22

## 2022-11-22 ENCOUNTER — ANESTHESIA (INPATIENT)
Dept: GASTROENTEROLOGY | Facility: HOSPITAL | Age: 87
End: 2022-11-22

## 2022-11-22 RX ORDER — MAGNESIUM SULFATE HEPTAHYDRATE 40 MG/ML
INJECTION, SOLUTION INTRAVENOUS AS NEEDED
Status: DISCONTINUED | OUTPATIENT
Start: 2022-11-22 | End: 2022-11-22

## 2022-11-22 RX ORDER — ALBUTEROL SULFATE 90 UG/1
AEROSOL, METERED RESPIRATORY (INHALATION) AS NEEDED
Status: DISCONTINUED | OUTPATIENT
Start: 2022-11-22 | End: 2022-11-22

## 2022-11-22 RX ORDER — FENTANYL CITRATE 50 UG/ML
INJECTION, SOLUTION INTRAMUSCULAR; INTRAVENOUS AS NEEDED
Status: DISCONTINUED | OUTPATIENT
Start: 2022-11-22 | End: 2022-11-22

## 2022-11-22 RX ORDER — SODIUM CHLORIDE 9 MG/ML
INJECTION, SOLUTION INTRAVENOUS CONTINUOUS PRN
Status: DISCONTINUED | OUTPATIENT
Start: 2022-11-22 | End: 2022-11-22

## 2022-11-22 RX ORDER — MIDAZOLAM HYDROCHLORIDE 2 MG/2ML
INJECTION, SOLUTION INTRAMUSCULAR; INTRAVENOUS AS NEEDED
Status: DISCONTINUED | OUTPATIENT
Start: 2022-11-22 | End: 2022-11-22

## 2022-11-22 RX ORDER — PROPOFOL 10 MG/ML
INJECTION, EMULSION INTRAVENOUS AS NEEDED
Status: DISCONTINUED | OUTPATIENT
Start: 2022-11-22 | End: 2022-11-22

## 2022-11-22 RX ORDER — LIDOCAINE HYDROCHLORIDE 10 MG/ML
INJECTION, SOLUTION EPIDURAL; INFILTRATION; INTRACAUDAL; PERINEURAL AS NEEDED
Status: DISCONTINUED | OUTPATIENT
Start: 2022-11-22 | End: 2022-11-22

## 2022-11-22 RX ORDER — ALBUMIN, HUMAN INJ 5% 5 %
SOLUTION INTRAVENOUS CONTINUOUS PRN
Status: DISCONTINUED | OUTPATIENT
Start: 2022-11-22 | End: 2022-11-22

## 2022-11-22 RX ADMIN — ALBUTEROL SULFATE 4 PUFF: 90 AEROSOL, METERED RESPIRATORY (INHALATION) at 13:12

## 2022-11-22 RX ADMIN — PROPOFOL 50 MG: 10 INJECTION, EMULSION INTRAVENOUS at 12:44

## 2022-11-22 RX ADMIN — EPINEPHRINE 2 MCG/MIN: 1 INJECTION, SOLUTION, CONCENTRATE INTRAVENOUS at 13:15

## 2022-11-22 RX ADMIN — ALBUTEROL SULFATE 4 PUFF: 90 AEROSOL, METERED RESPIRATORY (INHALATION) at 12:49

## 2022-11-22 RX ADMIN — MIDAZOLAM 2 MG: 1 INJECTION INTRAMUSCULAR; INTRAVENOUS at 12:44

## 2022-11-22 RX ADMIN — FENTANYL CITRATE 50 MCG: 50 INJECTION INTRAMUSCULAR; INTRAVENOUS at 12:44

## 2022-11-22 RX ADMIN — SODIUM CHLORIDE: 0.9 INJECTION, SOLUTION INTRAVENOUS at 12:39

## 2022-11-22 RX ADMIN — LIDOCAINE HYDROCHLORIDE 50 MG: 10 INJECTION, SOLUTION EPIDURAL; INFILTRATION; INTRACAUDAL; PERINEURAL at 12:44

## 2022-11-22 RX ADMIN — ALBUMIN (HUMAN): 12.5 INJECTION, SOLUTION INTRAVENOUS at 14:49

## 2022-11-22 RX ADMIN — SODIUM CHLORIDE: 0.9 INJECTION, SOLUTION INTRAVENOUS at 13:22

## 2022-11-22 RX ADMIN — PROPOFOL 20 MG: 10 INJECTION, EMULSION INTRAVENOUS at 12:48

## 2022-11-22 RX ADMIN — ALBUTEROL SULFATE 4 PUFF: 90 AEROSOL, METERED RESPIRATORY (INHALATION) at 12:55

## 2022-11-22 RX ADMIN — MAGNESIUM SULFATE HEPTAHYDRATE 2 G: 40 INJECTION, SOLUTION INTRAVENOUS at 13:33

## 2022-11-22 NOTE — PLAN OF CARE
Problem: Potential for Falls  Goal: Patient will remain free of falls  Description: INTERVENTIONS:  - Educate patient/family on patient safety including physical limitations  - Instruct patient to call for assistance with activity   - Consult OT/PT to assist with strengthening/mobility   - Keep Call bell within reach  - Keep bed low and locked with side rails adjusted as appropriate  - Keep care items and personal belongings within reach  - Initiate and maintain comfort rounds  - Make Fall Risk Sign visible to staff  - Offer Toileting every 2 Hours, in advance of need  - Initiate/Maintain bed alarm  - Obtain necessary fall risk management equipment: bed/chair alarm  - Apply yellow socks and bracelet for high fall risk patients  - Consider moving patient to room near nurses station  Outcome: Progressing     Problem: PAIN - ADULT  Goal: Verbalizes/displays adequate comfort level or baseline comfort level  Description: Interventions:  - Encourage patient to monitor pain and request assistance  - Assess pain using appropriate pain scale  - Administer analgesics based on type and severity of pain and evaluate response  - Implement non-pharmacological measures as appropriate and evaluate response  - Consider cultural and social influences on pain and pain management  - Notify physician/advanced practitioner if interventions unsuccessful or patient reports new pain  Outcome: Progressing     Problem: INFECTION - ADULT  Goal: Absence or prevention of progression during hospitalization  Description: INTERVENTIONS:  - Assess and monitor for signs and symptoms of infection  - Monitor lab/diagnostic results  - Monitor all insertion sites, i e  indwelling lines, tubes, and drains  - Monitor endotracheal if appropriate and nasal secretions for changes in amount and color  - [place] appropriate cooling/warming therapies per order  - Administer medications as ordered  - Instruct and encourage patient and family to use good hand hygiene technique  - Identify and instruct in appropriate isolation precautions for identified infection/condition  Outcome: Progressing  Goal: Absence of fever/infection during neutropenic period  Description: INTERVENTIONS:  - Monitor WBC    Outcome: Progressing     Problem: SAFETY ADULT  Goal: Patient will remain free of falls  Description: INTERVENTIONS:  - Educate patient/family on patient safety including physical limitations  - Instruct patient to call for assistance with activity   - Consult OT/PT to assist with strengthening/mobility   - Keep Call bell within reach  - Keep bed low and locked with side rails adjusted as appropriate  - Keep care items and personal belongings within reach  - Initiate and maintain comfort rounds  - Make Fall Risk Sign visible to staff  - Offer Toileting every 2 Hours, in advance of need  - Initiate/Maintain bed alarm  - Obtain necessary fall risk management equipment: bed/chair alarm  - Apply yellow socks and bracelet for high fall risk patients  - Consider moving patient to room near nurses station  Outcome: Progressing  Goal: Maintain or return to baseline ADL function  Description: INTERVENTIONS:  -  Assess patient's ability to carry out ADLs; assess patient's baseline for ADL function and identify physical deficits which impact ability to perform ADLs (bathing, care of mouth/teeth, toileting, grooming, dressing, etc )  - Assess/evaluate cause of self-care deficits   - Assess range of motion  - Assess patient's mobility; develop plan if impaired  - Assess patient's need for assistive devices and provide as appropriate  - Encourage maximum independence but intervene and supervise when necessary  - Involve family in performance of ADLs  - Assess for home care needs following discharge   - Consider OT consult to assist with ADL evaluation and planning for discharge  - Provide patient education as appropriate  Outcome: Progressing  Goal: Maintains/Returns to pre admission functional level  Description: INTERVENTIONS:  - Perform BMAT or MOVE assessment daily    - Set and communicate daily mobility goal to care team and patient/family/caregiver  - Collaborate with rehabilitation services on mobility goals if consulted  - Perform Range of Motion 6 times a day  - Reposition patient every 2 hours  - Dangle patient 6 times a day  - Stand patient 6 times a day  - Ambulate patient 6 times a day  - Out of bed to chair 6 times a day   - Out of bed for meals 6 times a day  - Out of bed for toileting  - Record patient progress and toleration of activity level   Outcome: Progressing     Problem: DISCHARGE PLANNING  Goal: Discharge to home or other facility with appropriate resources  Description: INTERVENTIONS:  - Identify barriers to discharge w/patient and caregiver  - Arrange for needed discharge resources and transportation as appropriate  - Identify discharge learning needs (meds, wound care, etc )  - Arrange for interpretive services to assist at discharge as needed  - Refer to Case Management Department for coordinating discharge planning if the patient needs post-hospital services based on physician/advanced practitioner order or complex needs related to functional status, cognitive ability, or social support system  Outcome: Progressing     Problem: Knowledge Deficit  Goal: Patient/family/caregiver demonstrates understanding of disease process, treatment plan, medications, and discharge instructions  Description: Complete learning assessment and assess knowledge base    Interventions:  - Provide teaching at level of understanding  - Provide teaching via preferred learning methods  Outcome: Progressing     Problem: CARDIOVASCULAR - ADULT  Goal: Absence of cardiac dysrhythmias or at baseline rhythm  Description: INTERVENTIONS:  - Continuous cardiac monitoring, vital signs, obtain 12 lead EKG if ordered  - Administer antiarrhythmic and heart rate control medications as ordered  - Monitor electrolytes and administer replacement therapy as ordered  Outcome: Progressing     Problem: RESPIRATORY - ADULT  Goal: Achieves optimal ventilation and oxygenation  Description: INTERVENTIONS:  - Assess for changes in respiratory status  - Assess for changes in mentation and behavior  - Position to facilitate oxygenation and minimize respiratory effort  - Oxygen administered by appropriate delivery if ordered  - Initiate smoking cessation education as indicated  - Encourage broncho-pulmonary hygiene including cough, deep breathe, Incentive Spirometry  - Assess the need for suctioning and aspirate as needed  - Assess and instruct to report SOB or any respiratory difficulty  - Respiratory Therapy support as indicated  Outcome: Progressing     Problem: SKIN/TISSUE INTEGRITY - ADULT  Goal: Skin Integrity remains intact(Skin Breakdown Prevention)  Description: Assess:  -Perform Wilfrid assessment every hour   -Clean and moisturize skin every hour   -Inspect skin when repositioning, toileting, and assisting with ADLS    -Assess extremities for adequate circulation and sensation     Bed Management:  -Have minimal linens on bed & keep smooth, unwrinkled  -Change linens as needed when moist or perspiring  -Avoid sitting or lying in one position for more than 2 hours while in bed  -Keep HOB at 45degrees     Toileting:  -Offer bedside commode  -Assess for incontinence every  hour       Activity:  -Mobilize patient 6 times a day  -Encourage activity and walks on unit  -Encourage or provide ROM exercises   -Turn and reposition patient every 2 Hours  -Use appropriate equipment to lift or move patient in bed    Skin Care:  -Avoid use of baby powder, tape, friction and shearing, hot water or constrictive clothing    -Do not massage red bony areas    Next Steps:  -Teach patient strategies to minimize risks such as hour    -Consider consults to  interdisciplinary teams such as hour   Outcome: Progressing     Problem: Prexisting or High Potential for Compromised Skin Integrity  Goal: Skin integrity is maintained or improved  Description: INTERVENTIONS:  - Identify patients at risk for skin breakdown  - Assess and monitor skin integrity  - Assess and monitor nutrition and hydration status  - Monitor labs   - Assess for incontinence   - Turn and reposition patient  - Assist with mobility/ambulation  - Relieve pressure over bony prominences  - Avoid friction and shearing  - Provide appropriate hygiene as needed including keeping skin clean and dry  - Evaluate need for skin moisturizer/barrier cream  - Collaborate with interdisciplinary team   - Patient/family teaching  - Consider wound care consult   Outcome: Progressing     Problem: MOBILITY - ADULT  Goal: Maintain or return to baseline ADL function  Description: INTERVENTIONS:  -  Assess patient's ability to carry out ADLs; assess patient's baseline for ADL function and identify physical deficits which impact ability to perform ADLs (bathing, care of mouth/teeth, toileting, grooming, dressing, etc )  - Assess/evaluate cause of self-care deficits   - Assess range of motion  - Assess patient's mobility; develop plan if impaired  - Assess patient's need for assistive devices and provide as appropriate  - Encourage maximum independence but intervene and supervise when necessary  - Involve family in performance of ADLs  - Assess for home care needs following discharge   - Consider OT consult to assist with ADL evaluation and planning for discharge  - Provide patient education as appropriate  Outcome: Progressing  Goal: Maintains/Returns to pre admission functional level  Description: INTERVENTIONS:  - Perform BMAT or MOVE assessment daily    - Set and communicate daily mobility goal to care team and patient/family/caregiver  - Collaborate with rehabilitation services on mobility goals if consulted  - Perform Range of Motion 6 times a day  - Reposition patient every 2 hours    - Dangle patient 6 times a day  - Stand patient 6 times a day  - Ambulate patient 6 times a day  - Out of bed to chair 6 times a day   - Out of bed for meals 6 times a day  - Out of bed for toileting  - Record patient progress and toleration of activity level   Outcome: Progressing

## 2022-11-22 NOTE — PROGRESS NOTES
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  7  Emphysema with possible underlying COPD - not in acute exacerbation       Plan:  • Patient is scheduled for bronchoscopy and BAL this afternoon  Unfortunately, Plavix has only been held for 2 days and for this reason we will not be able to do transbronchial biopsies  • Keep NPO  • Further recommendations after bronchoscopy  • Rest of care per primary team       SUBJECTIVE     Patient seen and examined  No acute events overnight  Patient denies any worsening shortness of breath or chest pain  OBJECTIVE     Vitals:    22 2238 22 0241 22 0726 22 0729   BP:  132/65 136/64    BP Location:       Pulse:  (!) 115 (!) 113    Resp:  (!) 24 22    Temp:  97 7 °F (36 5 °C) 97 7 °F (36 5 °C)    TempSrc:       SpO2: 90% 91% 97% 96%   Weight:       Height:          Temperature:   Temp (24hrs), Av °F (36 7 °C), Min:97 7 °F (36 5 °C), Max:98 4 °F (36 9 °C)    Temperature: 97 7 °F (36 5 °C)  Intake & Output:  I/O        0701   0700  0701   0700  0701   0700    P  O  720 318     I V  (mL/kg)  200 (3 3)     Total Intake(mL/kg) 720 (11 9) 518 (8 6)     Urine (mL/kg/hr) 450 (0 3) 925 (0 6)     Stool  0     Total Output 450 925     Net +270 -407            Unmeasured Urine Occurrence  1 x     Unmeasured Stool Occurrence  1 x         Weights:        Body mass index is 29 82 kg/m²    Weight (last 2 days)     None        Physical Exam  Vitals and nursing note reviewed  Constitutional:       General: She is not in acute distress  Appearance: She is not ill-appearing or toxic-appearing  HENT:      Head: Normocephalic  Eyes:      General: No scleral icterus  Pupils: Pupils are equal, round, and reactive to light  Neck:      Trachea: Tracheostomy present  Cardiovascular:      Rate and Rhythm: Normal rate and regular rhythm  Heart sounds: No murmur heard  No gallop  Pulmonary:      Effort: Pulmonary effort is normal  No respiratory distress  Breath sounds: Decreased breath sounds present  No wheezing, rhonchi or rales  Abdominal:      General: Bowel sounds are normal  There is no distension  Palpations: Abdomen is soft  Tenderness: There is no abdominal tenderness  There is no guarding  Musculoskeletal:      Cervical back: Normal range of motion  Right lower leg: Edema present  Left lower leg: Edema present  Skin:     General: Skin is warm  Capillary Refill: Capillary refill takes less than 2 seconds  Neurological:      Mental Status: She is alert and oriented to person, place, and time  Mental status is at baseline  Cranial Nerves: No cranial nerve deficit  Psychiatric:         Mood and Affect: Mood normal            LABORATORY DATA     Labs: I have personally reviewed pertinent reports    Results from last 7 days   Lab Units 11/22/22  0517 11/20/22  1433 11/19/22  1408   WBC Thousand/uL 12 55* 14 56* 12 56*   HEMOGLOBIN g/dL 8 8* 9 2* 8 9*   HEMATOCRIT % 31 5* 31 1* 29 9*   PLATELETS Thousands/uL 266 340 313   NEUTROS PCT % 86* 89* 88*   MONOS PCT % 7 7 7      Results from last 7 days   Lab Units 11/22/22  0517 11/20/22  1433 11/19/22  1408   POTASSIUM mmol/L 3 6 3 9 3 3*   CHLORIDE mmol/L 111* 112* 113*   CO2 mmol/L 23 24 20*   BUN mg/dL 14 13 13   CREATININE mg/dL 1 19 1 29 1 09   CALCIUM mg/dL 9 7 10 3* 10 9*   ALK PHOS U/L 99 109 108   ALT U/L 19 23 27   AST U/L 24 23 26     Results from last 7 days   Lab Units 11/22/22  0517   MAGNESIUM mg/dL 1 6     Results from last 7 days   Lab Units 11/22/22  0517   PHOSPHORUS mg/dL 1 4*      Results from last 7 days   Lab Units 11/15/22  1913   INR  1 01   PTT seconds 31     Results from last 7 days   Lab Units 11/15/22  1913   LACTIC ACID mmol/L 1 1             ABG:       Micro:   Results from last 7 days   Lab Units 11/17/22  2141 11/16/22  1524 11/16/22  1416 11/15/22  2033 11/15/22  2025 11/15/22  2003 11/15/22  1913   BLOOD CULTURE  No Growth After 4 Days  No Growth After 4 Days  --   --   --   --   --  Actinomyces species*  No Growth After 5 Days  SPUTUM CULTURE   --   --  Few Colonies of  --  1+ Growth of Staphylococcus aureus*  Few Colonies of  --   --    GRAM STAIN RESULT   --  No Polys or Bacteria seen Rare Epithelial Cells  4+ Polys  No bacteria seen  --  1+ Polys*  Rare Gram positive cocci in pairs, chains and clusters*  --  Gram variable rods*   BODY FLUID CULTURE, STERILE   --  No growth  --   --   --   --   --    MRSA CULTURE ONLY   --   --   --  No Methicillin Resistant Staphlyococcus aureus (MRSA) isolated  --   --   --    LEGIONELLA URINARY ANTIGEN   --   --   --   --   --  Negative  --    STREP PNEUMONIAE ANTIGEN, URINE   --   --   --   --   --  Negative  --          IMAGING & DIAGNOSTIC TESTING     Radiology Results: I have personally reviewed pertinent reports  XR chest portable    Result Date: 11/17/2022  Impression: Near complete drainage of right effusion with trace residual effusion  Masslike opacity in the right lower lobe better shown on CT  Mild pulmonary venous congestion  Workstation performed: [id number]     CT head wo contrast    Result Date: 11/15/2022  Impression: No acute intracranial abnormality  Chronic microangiopathic changes  Workstation performed: [id number]     CT chest wo contrast    Result Date: 11/19/2022  Impression: 1    Right lower lobe consolidation appears similar  There is relative internal hypodensity, along with air, consistent with abscess/necrosis  Recommend remains for a follow-up CT  2   Stable right lower lobe airway debris, with new airway debris in the middle lobe, consistent with interval aspiration event 3  Moderate right pleural effusion, not significantly changed in size  Trace left pleural effusion  4   Interstitial opacities consistent with mild edema 5  Stable left lower lobe nodule, attention on follow-up The study was marked in EPIC for immediate notification  Workstation performed: [id number]     IR IN-Patient Thoracentesis    Result Date: 11/18/2022  Impression: Impression: Ultrasound-guided thoracentesis yielding 900 mL clear yellow pleural fluid  Signed, performed, and dictated by CHANEL Sosa under the supervision of Dr Sheth Rule  Workstation performed: [id number]     PE Study with CT Abdomen and Pelvis with contrast    Result Date: 11/15/2022  Impression: No central, lobar, segmental or proximal subsegmental pulmonary embolism  Evaluation of the distal subsegmental pulmonary arteries is limited  Large, heterogeneous airspace consolidation in the right lower lobe, with a 6 3 x 5 2 x 4 8 cm somewhat lobulated hypodense component with a small droplet of air  This likely represents a necrotizing aspiration pneumonia, given mucous plugging throughout the right lower lobe and retained secretions/debris in the right mainstem bronchus and bronchus intermedius  Recommend follow-up chest CT in approximately 3 months to ensure resolution as an underlying neoplasm may have a similar appearance  Nonspecific 7 x 6 mm lobulated left lower lobe nodule  Attention on follow-up  Moderate right pleural effusion  No enhancement of the pleura to suggest empyema  Mediastinal and right hilar lymphadenopathy, likely reactive, however metastatic disease is not entirely excluded   Mild stranding around the proximal pancreas suggestive of acute interstitial pancreatitis  Recommend correlation with lipase level  No organized peripancreatic fluid collections, loss of parenchymal enhancement to suggest necrosis, or vascular complications of pancreatitis  No other acute abdominal or pelvic pathology  Multiple additional findings as above  The study was marked in [facility] for immediate notification  Workstation performed: [id number]     Other Diagnostic Testing: I have personally reviewed pertinent reports  ACTIVE MEDICATIONS     Current Facility-Administered Medications   Medication Dose Route Frequency   • acetaminophen (TYLENOL) tablet 650 mg  650 mg Oral Q6H PRN   • albuterol inhalation solution 2 5 mg  2 5 mg Nebulization Q4H PRN   • atorvastatin (LIPITOR) tablet 40 mg  40 mg Oral Daily With Dinner   • glycerin-hypromellose- (ARTIFICIAL TEARS) ophthalmic solution 1 drop  1 drop Both Eyes PRN   • HYDROmorphone HCl (DILAUDID) injection 0 2 mg  0 2 mg Intravenous Q6H PRN   • iron sucrose (VENOFER) 200 mg in sodium chloride 0 9 % 100 mL IVPB  200 mg Intravenous Once per day on Mon Wed Fri   • levothyroxine tablet 100 mcg  100 mcg Oral Early Morning   • loratadine (CLARITIN) tablet 5 mg  5 mg Oral Daily   • magnesium sulfate IVPB (premix) SOLN 1 g  1 g Intravenous Once   • oxyCODONE (ROXICODONE) IR tablet 2 5 mg  2 5 mg Oral Q6H PRN    Or   • oxyCODONE (ROXICODONE) IR tablet 5 mg  5 mg Oral Q6H PRN   • sertraline (ZOLOFT) tablet 25 mg  25 mg Oral Daily   • sodium chloride 0 9 % infusion  100 mL/hr Intravenous Continuous       VTE Pharmacologic Prophylaxis: Heparin  VTE Mechanical Prophylaxis: sequential compression device      Disclaimer: Portions of the record may have been created with voice recognition software  Occasional wrong word or "sound a like" substitutions may have occurred due to the inherent limitations of voice recognition software   Careful consideration should be taken to recognize, using context, where substitutions have occurred      An Nuñez MD   Pulmonary and Critical Care Fellow, PGY-4  [facility]

## 2022-11-22 NOTE — ASSESSMENT & PLAN NOTE
· Known history, in setting of CKD stage IIIB  · Follows nephrology outpatient (Dr Jose Mcfadden)  · Hgb 11 1 on admission, at baseline     · Currently hemodynamically stable    Plan:  · Trend CBC, hb 8 8  · No active bleeding present   · Transfuse if Hb < 7

## 2022-11-22 NOTE — OCCUPATIONAL THERAPY NOTE
Occupational Therapy Cancel Note     11/22/22 1209   OT Last Visit   OT Visit Date 11/22/22   Note Type   Note Type Cancelled Session   Cancel Reasons Patient off floor/test       OT orders received and chart reviewed  Pt is currently off floor at GI lab  Will continue to follow and complete OT treatment when appropriate       Jens Tovar MS, OTR/L

## 2022-11-22 NOTE — ASSESSMENT & PLAN NOTE
· Patient history of laryngeal carcinoma status post tracheostomy/laryngectomy on trach collar presented with fatigue; tachycardic on exam; labs notable for multiple derangements including hypercalcemia 14 3 and bicarb 18  · CT notable for nonspecific 7 x 6 mm lobulated left lower lobe nodule; new finding; CT imaging also revealing for multiple findings large heterogeneous airspace consolidation and right lower lobe with 6 x 5 x 5 lobulated hypodense component with moderate right-sided pleural effusion  concerning for necrotizing pneumonia  · Malignancy not entirely ruled out; does endorse decreased appetite, unexpected weight loss of up to 30 lb, and is a former smoker; quit 2002; hypercalcemia also noted on admission with recent outpatient PTH wnl  · 11/19 - repeat CT suggested of necrosis/abscess    Plan  · Inpatient pulmonology following, appreciate recs; 5 day course completed abx for suspecting necrotizing pneumonia with large heterogeneous airspace consolidation in right lower lobe  · ID consulted, appreciate reccs  Holding off abx for bronch/BAL  Will reconsider abx start now that procedure canceled   Pending ID reccs on this   · Repeat CT future to assess for improvement, outpatient

## 2022-11-22 NOTE — ANESTHESIA POSTPROCEDURE EVALUATION
Post-Op Assessment Note    CV Status:  Stable  Pain Score: 4    Pain management: adequate     Mental Status:  Alert and awake   Hydration Status:  Euvolemic   PONV Controlled:  Controlled   Airway Patency:  Patent      Post Op Vitals Reviewed: Yes      Staff: Anesthesiologist, CRNA   Comments: pt aox3, c/o pain in back, aware waiting bed placement at this time, report given at beside to recovery RN, remains off epi gtt         No notable events documented      BP   101/46   Temp      Pulse  108   Resp   14   SpO2   97% 8L trach collar

## 2022-11-22 NOTE — QUICK NOTE
Patient's daughter, Caryn Kovacs, updated via phone call regarding events that happen this afternoon prior to the bronchoscopy and the need to cancel the bronchoscopy for now  Caryn Kovacs understands  All questions answered

## 2022-11-22 NOTE — ASSESSMENT & PLAN NOTE
· In setting of CKD stage 3B, excessive use of vitamin-D and calcium supplementation at home per patient  · Further evidenced by elevated corrected calcium 14 2; supported by initial presentation with abdominal pain, fatigue, decreased appetite/poor PO intake; mentation at baseline, nontoxic on exam  · ECG without arrhythmia  · Further ED work up notable for multiple derangements including Hgb 11 1, low bicarb 18, elevated BUN 34, elevated creatinine 1 47 (baseline 1 11 4), GFR 32, , hypoalbuminemia 2 1; elevated lipase 2028, uric acid 10 4  · CT imaging findings concerning for acute pancreatitis,  necrotizing pneumonia vs  malignancy  · Etiology multifactorial, possibly due to hypercalcemia malignancy in light of CT findings, further complicated by home vitamin-D supplementation; med rec without any other causative agents  Bone demineralization also possible given her age  · Per chart review, patient noted to have elevated corrected calcium level since 08/2022 however does not appear to have been further worked up by PCP  · Unlikely primary parathyroidism or tertiary cause given PTH in 09/2022 was low; baseline renal function with CKD III  Vitamin-D 25 hydroxy level within normal limits  Calcium continues to be elevated   Status post 3 mg zoledronic acid     SPEP with faint gammopathy, PTH very low     Plan:  · Pending UPEP levels pending to r/o multiple myeloma  · PTHrP to assess hypercalcemia of malignancy, pending   · Calcium level slowly improving 11 3 today   · Vitamin D levels within normal limits: Vit D 25-OH 77 2, Vit D 1,25 OH 49 4  · PTH 6 3, compensating appropriately for elevated Ca level   · IV fluids stopped given patient is developing fluid overload with aggressive fluid resuscitation  Will monitor urine output closely  · Trend BMP to assess response    Calcium slowly downtrending 11 9 today from 12 7

## 2022-11-22 NOTE — ANESTHESIA PREPROCEDURE EVALUATION
Procedure:  BRONCHOSCOPY    Relevant Problems   CARDIO   (+) Aortoiliac occlusive disease (HCC)   (+) Hyperlipidemia   (+) Hypertension   (+) Renovascular hypertension      ENDO   (+) Hypothyroidism      GI/HEPATIC   (+) Pancreatitis      /RENAL   (+) JORDAN (acute kidney injury) ([facility] [address] )   (+) Acute on chronic kidney failure (HCC)   (+) CKD (chronic kidney disease) stage 3, GFR 30-59 ml/min      HEMATOLOGY   (+) Acute blood loss anemia   (+) Anemia of chronic disease      NEURO/PSYCH   (+) Acute anterior circulation transient ischemic attack   (+) 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 (HCC)      PULMONARY   (+) Pleural effusion, right   (+) SOB (shortness of breath)        Physical Exam    Airway    Mallampati score: II  TM Distance: <3 FB  Neck ROM: full     Dental   No notable dental hx     Cardiovascular  Cardiovascular exam normal    Pulmonary  Pulmonary exam normal     Other Findings        Anesthesia Plan  ASA Score- 4     Anesthesia Type- general with ASA Monitors  Additional Monitors:   Airway Plan: ETT  Plan Factors-Exercise tolerance (METS): >4 METS  Chart reviewed  Existing labs reviewed  Patient summary reviewed  Patient is not a current smoker  Induction- rapid sequence induction and intravenous  Postoperative Plan- Plan for postoperative opioid use  Planned trial extubation    Informed Consent- Anesthetic plan and risks discussed with patient  I personally reviewed this patient with the CRNA  Discussed and agreed on the Anesthesia Plan with the CRNA  Shun  is a 81 y o  female presenting today for bronschoscopy for necrotizing pneumonia and RLL mass  History of laryngeal cancer with s/p laryngectomy  Currently has speaking valve and stoma  History of anesthesia: no issues  NPO since last night  Denies N/V, F, chills, CP, SOB  AQA  Plan for ETT through the stoma

## 2022-11-22 NOTE — ASSESSMENT & PLAN NOTE
· Patient presented with abdominal pain, lightheadedness, intermittent shortness of breath, recent unexpected weight loss   · Met sepsis criteria on admission  · Lactate wnl but has significant hypercalcemia 14 2 on admission  · Patient is status post tracheostomy  · Completed cephalosporin 5 day course, completed azithromycin 3 day course    Plan:  · Completed 5 day course of antibiotics covering for CAP  · Follow up on blood cultures  Blood cultures x2 11/17 no growth 4 days  · Blood cultures x1 11/15 grew Actinomyces   · 11/15 sputum culture grew S  Aureus with repeat sputum culture 11/16 negative  · Appreciate ID recommendations  · Per ID, Postprocedure pending culture results would restart ceftriaxone 1 gm Q24H IV  If actinomyces pathogen not contaminant, will need prolonged therapy 4-6 weeks IV abx with additional PO penicillin 4-6 weeks additionally  If MSSA, recovered without actinomyces then additional cefazolin IV for at least 5 days  · Bronch/BAL aborted due to patient becoming hemodynamically unstable after anesthesia induction  Will be taken to ICU for higher level of care  · Continue trach collar oxygen as needed - 6L this AM   · Trend WBC and fever curve  WBC 12 55    · Hypotensive but cannot give fluids due to volume overload at this time  Overnight was hypotensive 84/44 and 92/48, after 500 cc bolus, able to maintain systolics 655H this AM  Will continue to monitor blood pressures, can give another dose of lasix if pressures improve before considering fluids

## 2022-11-22 NOTE — PHYSICAL THERAPY NOTE
Physical Therapy Cancellation Note    Patient's Name: John Vincent       11/22/22 1218   PT Last Visit   PT Visit Date 11/22/22   Note Type   Note Type Cancelled Session   Pt off the floor at the GI lab  Will continue to follow for PT as able      Sandy Traylor, PT, DPT

## 2022-11-22 NOTE — ASSESSMENT & PLAN NOTE
Lab Results   Component Value Date    EGFR 41 11/22/2022    EGFR 37 11/20/2022    EGFR 46 11/19/2022    CREATININE 1 19 11/22/2022    CREATININE 1 29 11/20/2022    CREATININE 1 09 11/19/2022     · Known history of CKD stage III states 2014  · Follows [facility] nephrology (Dr Keshawn Galarza)  · Cr on admission slightly elevated 1 47(baseline 1 1-1 4)  · Etiology likely 2/2 long history of hypertension, nephrosclerosis, age-related nephron loss     Plan  · One time IV Lasix 20 mg on 11/20 - 450 ccs u/o   · Better urine output overnight into 11/22 925 ccs and 1 unmeasured occurrence   · Improving - Cr 1 19 today, monitor closely with BMP   · Avoid use of nephrotoxic agents  · Maintain euvolemia

## 2022-11-22 NOTE — PROGRESS NOTES
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, CKD stage IIIB (baseline Cr 1 1-1 4), HLD and 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, PAD with intermittent claudication, asymptomatic bilateral carotid stenosis 2/2 radiation s/p bilateral  TCAR followed by R carotid PTA stent (7/2020) on Plavix and Lipitor, hypothyroidism on levothyroxine, HTN controlled with losartan, and T2DM controlled with metformin, who presented [facility] ED from [facility]F on [facility]15 with 1mo  of vague symptoms including abdominal pain, lightheadedness, intermittent SOB, and unexpected recent 20-30 lb weight loss  Found to have a large airspace consolidation with moderate right pleural effusion and hilar lymphadenopathy concerning for necrotizing pneumonia versus malignancy; furthermore, CT imaging also revealed new 7 x 6 mm left lower lobe nodule  11/15 BCx 1/2 growing actinomyces, but 11/17 BCx no growth to date  11/15 sputum culture grew S  Aureus, repeat sputum culture 11/16 negative  Patient is on ceftriaxone per ID recommendations  Patient planned to go for bronchoscopy and BAL on 11/22 but was aborted following anesthesia induction when patient became bradycardic and hypotensive, thought to have bronchospasm       History obtained from chart review and the patient   -------------------------------------------------------------------------------------------------------------  Assessment and Plan:    Neuro:   • Diagnosis: Pain control  o Plan: tylenol prn for mild pain, oxycodone 2 5 for moderate pain, oxycodone 5 for severe pain, dilaudid 0 2mg for breakthrough pain  • Diagnosis: Anxiety/Depression  o Plan: sertraline      CV:   • Diagnosis: Hypotension  o Plan:  Will monitor and consider IVF, pressors as needed  o Holding home losartan  • Diagnosis: Hyperlipidemia  o Plan: Continue home lipitor  • Diagnosis: Asymptomatic carotid artery stenosis  o Plan: Continue home lipitor, plavix      Pulm:  • Diagnosis: Acute hypoxic respiratory failure, tracheostomy 2/2 laryngeal carcinoma  o Plan: continue trach collar oxygen, wean as tolerated  • Diagnosis: RLL Mass likely necrotizing pneumonia, possible malignancy  o Plan: Antibiotics per ID, Bronch/BAL planned per pulmonology      GI:   • Diagnosis: Pancreatitis 2/2 hypercalcemia - resolved  o Plan: Supportive care, diet      :   • Diagnosis: JORDAN on CKD3 (b/l 1-1 1) - resolving  o Plan: Trend Cr; avoid nephrotoxic agents      F/E/N:   • F: no fluids running  • E: replete prn  • N: reg diet       Heme/Onc:   • Diagnosis: Anemia of chronic disease  o Plan: IV Venofer; Trend CBC; plan to transfuse if Hgb < 7  • Diagnosis: DVT PPX  o Plan: Heparin subQ      Endo:   • Diagnosis: Hypercalcemia  o Elevated corrected calcium, low PTH in setting of CKD3, possible malignancy  o Vit-D 25hydroxy within normal limits, SPEP with faint gammopathy  o Plan: Pending UPEP, PTHrP  • Diagnosis: Hypothyroidism  o Plan: Continue home Levothyroxine 100mcg  • Diagnosis: Type 2 Diabetes  o Plan: Holding metformin, BS appropriate off insulin inpatient      ID:   • Diagnosis: Sepsis with acute hypoxic respiratory failure 2/2 pneumonia  o Sputum Cx 11/15: staph aureus, Sputum Cx 11/16: no organisms seen  o BCx 11/15: 1/2 growing actinomyces; BCx 11/17 no growth to date  o Plan: Ceftriaxone 1g q24 per ID recommendations      MSK/Skin:   • Diagnosis: Mobilization goals  o Plan: PT/OT     Disposition: Admit to Stepdown Level 1  Code Status: Level 3 - DNAR and DNI  --------------------------------------------------------------------------------------------------------------  Review of Systems   Constitutional: Negative for chills, diaphoresis, fever and unexpected weight change  HENT: Negative for ear pain and sore throat  Eyes: Negative for visual disturbance  Respiratory: Negative for cough, chest tightness and shortness of breath  Cardiovascular: Negative for chest pain and leg swelling  Gastrointestinal: Positive for abdominal pain  Negative for abdominal distention, constipation, diarrhea, nausea and vomiting  Endocrine: Negative  Genitourinary: Negative for difficulty urinating and dysuria  Musculoskeletal: Negative  Skin: Negative  Allergic/Immunologic: Negative  Neurological: Negative  Hematological: Negative  Psychiatric/Behavioral: Negative  All other systems reviewed and are negative  A 12-point, complete review of systems was reviewed and negative except as stated above      Physical Exam  Vitals and nursing note reviewed  Constitutional:       General: She is not in acute distress  Appearance: Normal appearance  She is not ill-appearing  HENT:      Head: Normocephalic and atraumatic  Right Ear: External ear normal       Left Ear: External ear normal       Nose: Nose normal       Mouth/Throat:      Mouth: Mucous membranes are moist       Pharynx: Oropharynx is clear  Eyes:      General: No scleral icterus  Right eye: No discharge  Left eye: No discharge  Extraocular Movements: Extraocular movements intact  Conjunctiva/sclera: Conjunctivae normal       Pupils: Pupils are equal, round, and reactive to light  Cardiovascular:      Rate and Rhythm: Normal rate and regular rhythm  Pulses: Normal pulses  Heart sounds: Normal heart sounds  Pulmonary:      Effort: Pulmonary effort is normal       Breath sounds: Normal breath sounds     Abdominal:      General: Abdomen is flat  Bowel sounds are normal  There is no distension  Palpations: Abdomen is soft  Tenderness: There is abdominal tenderness (Left upper quadrant )  There is no guarding or rebound  Musculoskeletal:         General: Normal range of motion  Cervical back: Normal range of motion and neck supple  Skin:     General: Skin is warm and dry  Capillary Refill: Capillary refill takes less than 2 seconds  Neurological:      General: No focal deficit present  Mental Status: She is alert and oriented to person, place, and time  Mental status is at baseline  Psychiatric:         Mood and Affect: Mood normal          Behavior: Behavior normal          Thought Content: Thought content normal          Judgment: Judgment normal        --------------------------------------------------------------------------------------------------------------  Vitals:   Vitals:    11/22/22 1519 11/22/22 1530 11/22/22 1910 11/22/22 2024   BP: (!) 89/54 94/51 115/53    Pulse: (!) 107 (!) 106 (!) 106    Resp: 18 20     Temp:   98 1 °F (36 7 °C)    TempSrc:   Oral    SpO2: 96% 97% 96% 97%   Weight:       Height:         Temp  Min: 97 2 °F (36 2 °C)  Max: 98 7 °F (37 1 °C)     Height: 4' 8" (142 2 cm)  Body mass index is 29 82 kg/m²      Laboratory and Diagnostics:  Results from last 7 days   Lab Units 11/22/22  0517 11/20/22  1433 11/19/22  1408 11/18/22  0240 11/17/22  0406 11/16/22  0632   WBC Thousand/uL 12 55* 14 56* 12 56* 10 15 11 93* 12 57*   HEMOGLOBIN g/dL 8 8* 9 2* 8 9* 9 1* 9 6* 10 3*   HEMATOCRIT % 31 5* 31 1* 29 9* 31 4* 31 8* 32 2*   PLATELETS Thousands/uL 266 340 313 316 342 388   NEUTROS PCT % 86* 89* 88* 85* 85* 87*   MONOS PCT % 7 7 7 7 7 6     Results from last 7 days   Lab Units 11/22/22  0517 11/20/22  1433 11/19/22  1408 11/18/22  0240 11/17/22  1415 11/17/22  0406 11/16/22  1626   SODIUM mmol/L 139 142 141 143 141 140 142   POTASSIUM mmol/L 3 6 3 9 3 3* 3 7 3 7 3 8 3 9   CHLORIDE mmol/L 111* 112* 113* 112* 114* 114* 113*   CO2 mmol/L 23 24 20* 23 18* 19* 19*   ANION GAP mmol/L 5 6 8 8 9 7 10   BUN mg/dL 14 13 13 19 19 21 23   CREATININE mg/dL 1 19 1 29 1 09 0 96 1 11 0 88 0 95   CALCIUM mg/dL 9 7 10 3* 10 9* 12 1* 11 5* 11 6* 12 0*   GLUCOSE RANDOM mg/dL 101 193* 141* 103 136 102 83   ALT U/L 19 23 27 25 22 21 22   AST U/L 24 23 26 25 21 24 22   ALK PHOS U/L 99 109 108 113 114 122* 151*   ALBUMIN g/dL 2 0* 2 0* 1 8* 1 9* 1 6* 1 9* 2 1*   TOTAL BILIRUBIN mg/dL 0 34 0 32 0 29 0 37 0 34 0 35 0 47     Results from last 7 days   Lab Units 22  0517   MAGNESIUM mg/dL 1 6   PHOSPHORUS mg/dL 1 4*                   ABG:    VBG:    Results from last 7 days   Lab Units 22  0632   PROCALCITONIN ng/ml 34 58*       Micro:  Results from last 7 days   Lab Units 22  2141 22  1524 22  1416   BLOOD CULTURE  No Growth After 4 Days  No Growth After 4 Days  --   --    SPUTUM CULTURE   --   --  Few Colonies of   GRAM STAIN RESULT   --  No Polys or Bacteria seen Rare Epithelial Cells  4+ Polys  No bacteria seen   BODY FLUID CULTURE, STERILE   --  No growth  --        EK/17: Sinus tachycardia with Premature supraventricular complexes;  Left axis deviation    Historical Information   Past Medical History:   Diagnosis Date   • Aortoiliac occlusive disease ([facility] )    • 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 (HCC)    • Carotid artery stenosis    • Hyperlipidemia    • Hypertension    • PVD (peripheral vascular disease) ([facility] )    • Throat cancer [facility])      Past Surgical History:   Procedure Laterality Date   • GALLBLADDER SURGERY  2019   • ILIAC ARTERY STENT Left    • IR CEREBRAL ANGIOGRAPHY / INTERVENTION  2020   • IR THORACENTESIS  2022   • LARYNX SURGERY     • LEG SURGERY  07/10/2012     Social History   Social History     Substance and Sexual Activity   Alcohol Use Never     Social History     Substance and Sexual Activity   Drug Use Never Social History     Tobacco Use   Smoking Status Former   • Types: Cigarettes   • Quit date:    • Years since quittin 9   Smokeless Tobacco Never     Family History:   Family History   Problem Relation Age of Onset   • Heart disease Brother    • No Known Problems Mother    • No Known Problems Father    • Breast cancer Sister 71   • No Known Problems Daughter    • No Known Problems Maternal Grandmother    • No Known Problems Maternal Grandfather    • No Known Problems Paternal Grandmother    • No Known Problems Paternal Grandfather    • No Known Problems Sister    • No Known Problems Maternal Aunt    • No Known Problems Maternal Aunt    • No Known Problems Maternal Aunt      I have reviewed this patient's family history and commented on sigificant items within the HPI      Medications:  Current Facility-Administered Medications   Medication Dose Route Frequency   • acetaminophen (TYLENOL) tablet 650 mg  650 mg Oral Q6H PRN   • albuterol inhalation solution 2 5 mg  2 5 mg Nebulization Q4H PRN   • atorvastatin (LIPITOR) tablet 40 mg  40 mg Oral Daily With Dinner   • cefTRIAXone (ROCEPHIN) 1,000 mg in dextrose 5 % 50 mL IVPB  1,000 mg Intravenous Q24H   • [START ON 2022] clopidogrel (PLAVIX) tablet 75 mg  75 mg Oral Daily   • glycerin-hypromellose- (ARTIFICIAL TEARS) ophthalmic solution 1 drop  1 drop Both Eyes PRN   • HYDROmorphone HCl (DILAUDID) injection 0 2 mg  0 2 mg Intravenous Q6H PRN   • iron sucrose (VENOFER) 200 mg in sodium chloride 0 9 % 100 mL IVPB  200 mg Intravenous Once per day on    • levothyroxine tablet 100 mcg  100 mcg Oral Early Morning   • loratadine (CLARITIN) tablet 5 mg  5 mg Oral Daily   • oxyCODONE (ROXICODONE) IR tablet 2 5 mg  2 5 mg Oral Q6H PRN    Or   • oxyCODONE (ROXICODONE) IR tablet 5 mg  5 mg Oral Q6H PRN   • sertraline (ZOLOFT) tablet 25 mg  25 mg Oral Daily   • sodium chloride 0 9 % infusion  100 mL/hr Intravenous Continuous     Home medications:  Prior to Admission Medications   Prescriptions Last Dose Informant Patient Reported? Taking? Calcium-Magnesium-Vitamin D - MG-MG-UNIT TB24   Yes No   Sig: Take by mouth   Cholecalciferol (VITAMIN D-3) 1000 units CAPS   Yes No   Sig: Take by mouth   Krill Oil 1000 MG CAPS   Yes No   Sig: Take by mouth   Magnesium 250 MG TABS   Yes No   Sig: Take 250 mg by mouth daily   Multiple Vitamins-Minerals (CENTRUM SILVER 50+WOMEN PO)   Yes No   Sig: Take by mouth   Omega-3 1000 MG CAPS   Yes No   Sig: Take 4 capsules by mouth daily    VENTOLIN  (90 Base) MCG/ACT inhaler   Yes No   acetaminophen (TYLENOL) 325 mg tablet   No No   Sig: Take 2 tablets (650 mg total) by mouth every 6 (six) hours as needed for mild pain   atorvastatin (LIPITOR) 40 mg tablet   Yes No   Sig: Take by mouth   cefuroxime (CEFTIN) 250 mg tablet   Yes No   cetirizine (ZyrTEC) 5 MG tablet   Yes No   Sig: Take 5 mg by mouth daily   clopidogrel (PLAVIX) 75 mg tablet   No No   Sig: TAKE 1 TABLET EVERY DAY   co-enzyme Q-10 30 mg   Yes No   Sig: Take by mouth   fluticasone (FLONASE) 50 mcg/act nasal spray   Yes No   Sig: into each nostril   levothyroxine 100 mcg tablet   Yes No   losartan (COZAAR) 50 mg tablet   No No   Sig: Take 1 tablet (50 mg total) by mouth daily   metFORMIN (GLUCOPHAGE) 500 mg tablet   Yes No   Sig: Take 0 5 tablets (250 mg total) by mouth daily with breakfast   methocarbamol (ROBAXIN) 500 mg tablet   Yes No   Sig: TK 1 T PO BID PRN   niacin 500 mg ER capsule   Yes No   Sig: Take 500 mg by mouth daily at bedtime   nystatin (MYCOSTATIN) 500,000 units/5 mL suspension   No No   Sig: Apply to provox site twice daily   pantoprazole (PROTONIX) 40 mg tablet   No No   Sig: TAKE 1 TABLET(40 MG) BY MOUTH DAILY   sertraline (ZOLOFT) 25 mg tablet   Yes No      Facility-Administered Medications: None     Allergies:   Allergies   Allergen Reactions   • Benazepril Cough   • Solifenacin Other (See Comments)   • Olmesartan Rash     ------------------------------------------------------------------------------------------------------------  Advance Directive and Living Will: Yes    Power of :    POLST:    ------------------------------------------------------------------------------------------------------------  Anticipated Length of Stay is > 2 midnights    Care Time Delivered:   Upon my evaluation, this patient had a high probability of imminent or life-threatening deterioration due to acute hypoxia, which required my direct attention, intervention, and personal management  I have personally provided 30 minutes (0500 to 0530) of critical care time, exclusive of procedures, teaching, family meetings, and any prior time recorded by providers other than myself  Kiel Welch MD        Portions of the record may have been created with voice recognition software  Occasional wrong word or "sound a like" substitutions may have occurred due to the inherent limitations of voice recognition software    Read the chart carefully and recognize, using context, where substitutions have occurred

## 2022-11-22 NOTE — ASSESSMENT & PLAN NOTE
· Known history, maintained on losartan 50 mg daily at home  · Follows PCP outpatient      Plan:  · BP on the low side with systolic high 09P to 87C on admission, systolics continued to be in the 90s to 100s   Overnight SBP went down to 80s and given 500 ccs bolus which improved pressures to 725V systolic   · Will hold home dose losartan at this time  · Continue monitoring blood pressures

## 2022-11-22 NOTE — ASSESSMENT & PLAN NOTE
· Likely secondary to hypercalcemia  · Presented with abdominal pain; labs notable for elevated lipase >2000, elevated WBC  · CT imaging suggestive pancreatitis  · Patient has elevated alk-phos with normal T bili and asymptomatic abnormal biliary strictures on imaging  · Patient reports improved appetite and improved abdominal pain    Plan:  · Supportive care with analgesics or antiemetics as needed; renal function at baseline, ECG with normal QTC  · Continue diet as tolerated   · Continued monitoring of vitals, O2 sats, urine output with goal > 0 5-1 mL/kg/hr  · Replete electrolytes as indicated    Suspected as having resolved, limiting fluid resuscitation at this time due to volume overload  Unclear source of hypercalcemia that triggered pancreatitis, hematology on board for hypercalcemia control   Ca slowly downtrending, last 11 3 today

## 2022-11-22 NOTE — PLAN OF CARE
Problem: Potential for Falls  Goal: Patient will remain free of falls  Description: INTERVENTIONS:  - Educate patient/family on patient safety including physical limitations  - Instruct patient to call for assistance with activity   - Consult OT/PT to assist with strengthening/mobility   - Keep Call bell within reach  - Keep bed low and locked with side rails adjusted as appropriate  - Keep care items and personal belongings within reach  - Initiate and maintain comfort rounds  - Make Fall Risk Sign visible to staff  - Apply yellow socks and bracelet for high fall risk patients  - Consider moving patient to room near nurses station  Outcome: Progressing     Problem: PAIN - ADULT  Goal: Verbalizes/displays adequate comfort level or baseline comfort level  Description: Interventions:  - Encourage patient to monitor pain and request assistance  - Assess pain using appropriate pain scale  - Administer analgesics based on type and severity of pain and evaluate response  - Implement non-pharmacological measures as appropriate and evaluate response  - Consider cultural and social influences on pain and pain management  - Notify physician/advanced practitioner if interventions unsuccessful or patient reports new pain  Outcome: Progressing     Problem: INFECTION - ADULT  Goal: Absence or prevention of progression during hospitalization  Description: INTERVENTIONS:  - Assess and monitor for signs and symptoms of infection  - Monitor lab/diagnostic results  - Monitor all insertion sites, i e  indwelling lines, tubes, and drains  - Monitor endotracheal if appropriate and nasal secretions for changes in amount and color  - [place] appropriate cooling/warming therapies per order  - Administer medications as ordered  - Instruct and encourage patient and family to use good hand hygiene technique  - Identify and instruct in appropriate isolation precautions for identified infection/condition  Outcome: Progressing  Goal: Absence of fever/infection during neutropenic period  Description: INTERVENTIONS:  - Monitor WBC    Outcome: Progressing     Problem: DISCHARGE PLANNING  Goal: Discharge to home or other facility with appropriate resources  Description: INTERVENTIONS:  - Identify barriers to discharge w/patient and caregiver  - Arrange for needed discharge resources and transportation as appropriate  - Identify discharge learning needs (meds, wound care, etc )  - Arrange for interpretive services to assist at discharge as needed  - Refer to Case Management Department for coordinating discharge planning if the patient needs post-hospital services based on physician/advanced practitioner order or complex needs related to functional status, cognitive ability, or social support system  Outcome: Progressing     Problem: Knowledge Deficit  Goal: Patient/family/caregiver demonstrates understanding of disease process, treatment plan, medications, and discharge instructions  Description: Complete learning assessment and assess knowledge base    Interventions:  - Provide teaching at level of understanding  - Provide teaching via preferred learning methods  Outcome: Progressing     Problem: CARDIOVASCULAR - ADULT  Goal: Absence of cardiac dysrhythmias or at baseline rhythm  Description: INTERVENTIONS:  - Continuous cardiac monitoring, vital signs, obtain 12 lead EKG if ordered  - Administer antiarrhythmic and heart rate control medications as ordered  - Monitor electrolytes and administer replacement therapy as ordered  Outcome: Progressing     Problem: RESPIRATORY - ADULT  Goal: Achieves optimal ventilation and oxygenation  Description: INTERVENTIONS:  - Assess for changes in respiratory status  - Assess for changes in mentation and behavior  - Position to facilitate oxygenation and minimize respiratory effort  - Oxygen administered by appropriate delivery if ordered  - Initiate smoking cessation education as indicated  - Encourage broncho-pulmonary hygiene including cough, deep breathe, Incentive Spirometry  - Assess the need for suctioning and aspirate as needed  - Assess and instruct to report SOB or any respiratory difficulty  - Respiratory Therapy support as indicated  Outcome: Progressing     Problem: SKIN/TISSUE INTEGRITY - ADULT  Goal: Skin Integrity remains intact(Skin Breakdown Prevention)  Description: Assess:  -Inspect skin when repositioning, toileting, and assisting with ADLS  -Assess extremities for adequate circulation and sensation     Bed Management:  -Have minimal linens on bed & keep smooth, unwrinkled  -Change linens as needed when moist or perspiring    Skin Care:  -Avoid use of baby powder, tape, friction and shearing, hot water or constrictive clothing  Outcome: Progressing     Problem: MOBILITY - ADULT  Goal: Maintain or return to baseline ADL function  Description: INTERVENTIONS:  -  Assess patient's ability to carry out ADLs; assess patient's baseline for ADL function and identify physical deficits which impact ability to perform ADLs (bathing, care of mouth/teeth, toileting, grooming, dressing, etc )  - Assess/evaluate cause of self-care deficits   - Assess range of motion  - Assess patient's mobility; develop plan if impaired  - Assess patient's need for assistive devices and provide as appropriate  - Encourage maximum independence but intervene and supervise when necessary  - Involve family in performance of ADLs  - Assess for home care needs following discharge   - Consider OT consult to assist with ADL evaluation and planning for discharge  - Provide patient education as appropriate  Outcome: Progressing  Goal: Maintains/Returns to pre admission functional level  Description: INTERVENTIONS:  - Perform BMAT or MOVE assessment daily    - Set and communicate daily mobility goal to care team and patient/family/caregiver     - Collaborate with rehabilitation services on mobility goals if consulted  - Out of bed for toileting  - Record patient progress and toleration of activity level   Outcome: Progressing

## 2022-11-22 NOTE — ASSESSMENT & PLAN NOTE
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, with a which were asymptomatic, along with left vertebral artery atresia; previously worked up in outpatient setting by mass concerning symptoms and was recommended endovascular intervention  · On 07/2020, patient electively admitted to [facility] [place] during which she underwent ultrasound-guided right CFA puncture with sternal closure, aortic arch arteriogram, left carotid during him with PTA and stenting under the care of Dr Brown  · Maintained on antiplatelet and anti-statin therapy with Plavix and Lipitor     Plan:  · Continue home dose lipitor  · Holding home Plavix for bronchoscopy  May restart now that procedure was canceled  Transferred to ICU for higher level of care at this time

## 2022-11-22 NOTE — ASSESSMENT & PLAN NOTE
· Patient history of laryngeal carcinoma status post tracheostomy/laryngectomy who presented to ED with tachycardia, hypoxia, fatigue  · CBC with downtrending WBC 12 55 today  · CTA chest notable for significant findings including:  · Large, heterogeneous airspace consolidation in the right lower lobe, with a 6 3 x 5 2 x 4 8 cm somewhat lobulated hypodense component likely a necrotizing aspiration pneumonia  · Moderate right pleural effusion  No enhancement of the pleura to suggest empyema  · Mediastinal and right hilar lymphadenopathy, likely reactive, however metastatic disease is not entirely excluded  · Legionella and strep antigens negative  · Status post 900 mL fluid thoracentesis suggestive of transudative fluid for pulmonology and may be secondary to pancreatitis  · Blood cultures 1/2 group Actinomyces gram variable rods on 11/15  · Blood cultures x2 on 11/17 show no growth 4 days hours      Plan:  · Completed 5 day course of antibiotics covering for CAP  · Follow up on blood cultures  Blood cultures x2 11/17 no growth 72 hours  · Blood cultures x1 11/15 grew Actinomyces   · 11/15 sputum culture grew S  Aureus with repeat sputum culture 11/16 negative  · Appreciate ID recommendations  · Per ID, Postprocedure pending culture results would restart ceftriaxone 1 gm Q24H IV  If actinomyces pathogen not contaminant, will need prolonged therapy 4-6 weeks IV abx with additional PO penicillin 4-6 weeks additionally  If MSSA, recovered without actinomyces then additional cefazolin IV for at least 5 days  · Plan for bronchoscopy and BAL today, however, procedure aborted due to patient becoming hemodynamically unstable after anesthesia induction  Transferred to ICU for higher level of care  · Trend WBC and Fever curve   · Follow-up chest CT in 3 months as an outpatient to ensure resolution as an underlying neoplasm may have a similar appearance

## 2022-11-22 NOTE — ASSESSMENT & PLAN NOTE
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

## 2022-11-22 NOTE — PROGRESS NOTES
INTERNAL MEDICINE RESIDENCY PROGRESS NOTE     Name: Marty Blankenship   Age & Sex: 81 y o  female   MRN: [id number]  Unit/Bed#: [facility] 834-01   Encounter: [id number]  Team: SOD Team C     PATIENT INFORMATION     Name: Marty Blankenship   Age & Sex: 81 y o  female   MRN: [id number]  Hospital Stay Days: 7    ASSESSMENT/PLAN     Principal Problem:    Sepsis with acute hypoxic respiratory failure ([facility] [address] )  Active Problems:    Right lower lobe lung mass with right pleural effusion    Hypercalcemia    Asymptomatic carotid artery stenosis    Hyperlipidemia    Hypertension    Laryngeal carcinoma (HCC)    CKD (chronic kidney disease) stage 3, GFR 30-59 ml/min    JORDAN (acute kidney injury) ([facility] [address] )    Pancreatitis    Lung nodule seen on imaging study    Anemia of chronic disease    Hypothyroidism    Gram-positive bacteria on cultures      Right lower lobe lung mass with right pleural effusion  Assessment & Plan  · Patient history of laryngeal carcinoma status post tracheostomy/laryngectomy who presented to ED with tachycardia, hypoxia, fatigue  · CBC with downtrending WBC 12 55 today  · CTA chest notable for significant findings including:  · Large, heterogeneous airspace consolidation in the right lower lobe, with a 6 3 x 5 2 x 4 8 cm somewhat lobulated hypodense component likely a necrotizing aspiration pneumonia  · Moderate right pleural effusion  No enhancement of the pleura to suggest empyema  · Mediastinal and right hilar lymphadenopathy, likely reactive, however metastatic disease is not entirely excluded  · Legionella and strep antigens negative  · Status post 900 mL fluid thoracentesis suggestive of transudative fluid for pulmonology and may be secondary to pancreatitis  · Blood cultures 1/2 group Actinomyces gram variable rods on 11/15  · Blood cultures x2 on 11/17 show no growth 4 days hours      Plan:  · Completed 5 day course of antibiotics covering for CAP  · Follow up on blood cultures   Blood cultures x2 11/17 no growth 72 hours  · Blood cultures x1 11/15 grew Actinomyces   · 11/15 sputum culture grew S  Aureus with repeat sputum culture 11/16 negative  · Appreciate ID recommendations  · Per ID, Postprocedure pending culture results would restart ceftriaxone 1 gm Q24H IV  If actinomyces pathogen not contaminant, will need prolonged therapy 4-6 weeks IV abx with additional PO penicillin 4-6 weeks additionally  If MSSA, recovered without actinomyces then additional cefazolin IV for at least 5 days  · Plan for bronchoscopy and BAL today, however, procedure aborted due to patient becoming hemodynamically unstable after anesthesia induction  Transferred to ICU for higher level of care  · Trend WBC and Fever curve   · Follow-up chest CT in 3 months as an outpatient to ensure resolution as an underlying neoplasm may have a similar appearance  Hypercalcemia  Assessment & Plan  · In setting of CKD stage 3B, excessive use of vitamin-D and calcium supplementation at home per patient  · Further evidenced by elevated corrected calcium 14 2; supported by initial presentation with abdominal pain, fatigue, decreased appetite/poor PO intake; mentation at baseline, nontoxic on exam  · ECG without arrhythmia  · Further ED work up notable for multiple derangements including Hgb 11 1, low bicarb 18, elevated BUN 34, elevated creatinine 1 47 (baseline 1 11 4), GFR 32, , hypoalbuminemia 2 1; elevated lipase 2028, uric acid 10 4  · CT imaging findings concerning for acute pancreatitis,  necrotizing pneumonia vs  malignancy  · Etiology multifactorial, possibly due to hypercalcemia malignancy in light of CT findings, further complicated by home vitamin-D supplementation; med rec without any other causative agents    Bone demineralization also possible given her age  · Per chart review, patient noted to have elevated corrected calcium level since 08/2022 however does not appear to have been further worked up by PCP  · Stephan Lomas primary parathyroidism or tertiary cause given PTH in 09/2022 was low; baseline renal function with CKD III  Vitamin-D 25 hydroxy level within normal limits  Calcium continues to be elevated   Status post 3 mg zoledronic acid     SPEP with faint gammopathy, PTH very low     Plan:  · Pending UPEP levels pending to r/o multiple myeloma  · PTHrP to assess hypercalcemia of malignancy, pending   · Calcium level slowly improving 11 3 today   · Vitamin D levels within normal limits: Vit D 25-OH 77 2, Vit D 1,25 OH 49 4  · PTH 6 3, compensating appropriately for elevated Ca level   · IV fluids stopped given patient is developing fluid overload with aggressive fluid resuscitation  Will monitor urine output closely  · Trend BMP to assess response  Calcium slowly downtrending 11 9 today from 12 7    Gram-positive bacteria on cultures  Assessment & Plan  1/2 blood cultures grew Actinomyces    Plan:  See Sepsis and Right Lower Lobe Mass A/P      Hypothyroidism  Assessment & Plan  · Known history since 11/2021  · Most recent TSH 4 2 in 08/2022  · Managed on levothyroxine 100 mcg  · Repeat TSH within normal limits    Plan  Stable  · Continue home dose levothyroxine     Anemia of chronic disease  Assessment & Plan  · Known history, in setting of CKD stage IIIB  · Follows nephrology outpatient (Dr Israel Guerrero)  · Hgb 11 1 on admission, at baseline     · Currently hemodynamically stable    Plan:  · Trend CBC, hb 8 8  · No active bleeding present   · Transfuse if Hb < 7         Lung nodule seen on imaging study  Assessment & Plan  · Patient history of laryngeal carcinoma status post tracheostomy/laryngectomy on trach collar presented with fatigue; tachycardic on exam; labs notable for multiple derangements including hypercalcemia 14 3 and bicarb 18  · CT notable for nonspecific 7 x 6 mm lobulated left lower lobe nodule; new finding; CT imaging also revealing for multiple findings large heterogeneous airspace consolidation and right lower lobe with 6 x 5 x 5 lobulated hypodense component with moderate right-sided pleural effusion  concerning for necrotizing pneumonia  · Malignancy not entirely ruled out; does endorse decreased appetite, unexpected weight loss of up to 30 lb, and is a former smoker; quit 2002; hypercalcemia also noted on admission with recent outpatient PTH wnl  · 11/19 - repeat CT suggested of necrosis/abscess    Plan  · Inpatient pulmonology following, appreciate recs; 5 day course completed abx for suspecting necrotizing pneumonia with large heterogeneous airspace consolidation in right lower lobe  · ID consulted, appreciate reccs  Holding off abx for bronch/BAL  Will reconsider abx start now that procedure canceled  Pending ID reccs on this   · Repeat CT future to assess for improvement, outpatient     Pancreatitis  Assessment & Plan  · Likely secondary to hypercalcemia  · Presented with abdominal pain; labs notable for elevated lipase >2000, elevated WBC  · CT imaging suggestive pancreatitis  · Patient has elevated alk-phos with normal T bili and asymptomatic abnormal biliary strictures on imaging  · Patient reports improved appetite and improved abdominal pain    Plan:  · Supportive care with analgesics or antiemetics as needed; renal function at baseline, ECG with normal QTC  · Continue diet as tolerated   · Continued monitoring of vitals, O2 sats, urine output with goal > 0 5-1 mL/kg/hr  · Replete electrolytes as indicated    Suspected as having resolved, limiting fluid resuscitation at this time due to volume overload  Unclear source of hypercalcemia that triggered pancreatitis, hematology on board for hypercalcemia control  Ca slowly downtrending, last 11 3 today         JORDAN (acute kidney injury) ([facility] [address] )  Assessment & Plan  Creatinine of 1 47 on admission treated with IV fluid boluses, 3L total, NSS at 100ml/hr, renal function assessment and lab monitoring      Pt has CKD 3 and noted baseline of 1-1 1    Plan:  · Cr 1 19 today, continue to trend  · Avoid nephrotoxic agents  · Monitor urine output - 925 ccs today with one unmeasured occurrence, improving slowly  · Encourage adequate intake          CKD (chronic kidney disease) stage 3, GFR 30-59 ml/min  Assessment & Plan  Lab Results   Component Value Date    EGFR 41 11/22/2022    EGFR 37 11/20/2022    EGFR 46 11/19/2022    CREATININE 1 19 11/22/2022    CREATININE 1 29 11/20/2022    CREATININE 1 09 11/19/2022     · Known history of CKD stage III states 2014  · Follows [facility] nephrology (Dr Nena Ohara)  · Cr on admission slightly elevated 1 47(baseline 1 1-1 4)  · Etiology likely 2/2 long history of hypertension, nephrosclerosis, age-related nephron loss     Plan  · One time IV Lasix 20 mg on 11/20 - 450 ccs u/o   · Better urine output overnight into 11/22 925 ccs and 1 unmeasured occurrence   · Improving - Cr 1 19 today, monitor closely with BMP   · Avoid use of nephrotoxic agents  · Maintain euvolemia       Laryngeal carcinoma (HCC)  Assessment & Plan  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  · Follows with ENT as an outpatient with Dr Luci Gunter; most recently evaluated for trich follow-up in 10/2022     Plan:  · Ben Balsam in place, functional well, at baseline  · Continue trach collar oxygen, on 8 L able to saturating appropriately, weaned to 6L     Hypertension  Assessment & Plan  · Known history, maintained on losartan 50 mg daily at home  · Follows PCP outpatient      Plan:  · BP on the low side with systolic high 30N to 06P on admission, systolics continued to be in the 90s to 100s   Overnight SBP went down to 80s and given 500 ccs bolus which improved pressures to 396F systolic   · Will hold home dose losartan at this time  · Continue monitoring blood pressures     Hyperlipidemia  Assessment & Plan  · Known history of type 2 diabetes on metformin, hypertension on losartan  · Managed on Lipitor 40  · Most recent lipid panel with good control of LDL at 56    Plan   · Continue Lipitor 40mg QD    Asymptomatic carotid artery stenosis  Assessment & Plan  · Known hx, follows with vascular surgery (Dr Mao Clayton)  · Previously found to have recurrent left ICA stenosis and critical new occlusive right IC stenosis, with a which were asymptomatic, along with left vertebral artery atresia; previously worked up in outpatient setting by mass concerning symptoms and was recommended endovascular intervention  · On 07/2020, patient electively admitted to [facility] during which she underwent ultrasound-guided right CFA puncture with sternal closure, aortic arch arteriogram, left carotid during him with PTA and stenting under the care of Dr Brown  · Maintained on antiplatelet and anti-statin therapy with Plavix and Lipitor     Plan:  · Continue home dose lipitor  · Holding home Plavix for bronchoscopy  May restart now that procedure was canceled  Transferred to ICU for higher level of care at this time  * Sepsis with acute hypoxic respiratory failure ([facility] [address] )  Assessment & Plan  · Patient presented with abdominal pain, lightheadedness, intermittent shortness of breath, recent unexpected weight loss   · Met sepsis criteria on admission  · Lactate wnl but has significant hypercalcemia 14 2 on admission  · Patient is status post tracheostomy  · Completed cephalosporin 5 day course, completed azithromycin 3 day course    Plan:  · Completed 5 day course of antibiotics covering for CAP  · Follow up on blood cultures  Blood cultures x2 11/17 no growth 4 days  · Blood cultures x1 11/15 grew Actinomyces   · 11/15 sputum culture grew S  Aureus with repeat sputum culture 11/16 negative  · Appreciate ID recommendations  · Per ID, Postprocedure pending culture results would restart ceftriaxone 1 gm Q24H IV  If actinomyces pathogen not contaminant, will need prolonged therapy 4-6 weeks IV abx with additional PO penicillin 4-6 weeks additionally   If MSSA, recovered without actinomyces then additional cefazolin IV for at least 5 days  · Bronch/BAL aborted due to patient becoming hemodynamically unstable after anesthesia induction  Will be taken to ICU for higher level of care  · Continue trach collar oxygen as needed - 6L this AM   · Trend WBC and fever curve  WBC 12 55    · Hypotensive but cannot give fluids due to volume overload at this time  Overnight was hypotensive 84/44 and 92/48, after 500 cc bolus, able to maintain systolics 765E this AM  Will continue to monitor blood pressures, can give another dose of lasix if pressures improve before considering fluids  Disposition:  Planning to transfer to ICU for higher level of care  SUBJECTIVE     Patient seen and examined, sitting on side of bed this morning  Overnight, patient became hypotensive to 45U/36F systolically  After 500 ccs bolus of fluids and maintenance fluids, systolic Bps improved to 988J  Patient has back pain on left side, started on aqua K for pain today  She is saturating appropriately at 6L O2 NC this AM  She denies any chest pain, nausea or vomiting, abdominal pain, urinary or bowel symptoms  She was planned to have bronchoscopy and bal this afternoon  However on anesthesia induction, patient became bradycardic and hypotensive  Episode of bronchospasm occurred  Patient was stabilized and extubated, was placed on low dose epinephrine drip post  She is now being transferred to ICU for higher level of care       OBJECTIVE     Vitals:    22 1219 22 1506 22 1519 22 1530   BP: 121/87 (!) 101/46 (!) 89/54 94/51   Pulse: (!) 117 (!) 107 (!) 107 (!) 106   Resp:  18 20   Temp: 97 8 °F (36 6 °C)      TempSrc: Tympanic      SpO2: 93% 96% 96% 97%   Weight:       Height:          Temperature:   Temp (24hrs), Av °F (36 7 °C), Min:97 7 °F (36 5 °C), Max:98 4 °F (36 9 °C)    Temperature: 97 8 °F (36 6 °C)  Intake & Output:  I/O        0701   0700  0701  11/22 0700 11/22 0701  11/23 0700    P  O  720 318     I V  (mL/kg)  200 (3 3) 700 (11 6)    IV Piggyback   250    Total Intake(mL/kg) 720 (11 9) 518 (8 6) 950 (15 8)    Urine (mL/kg/hr) 450 (0 3) 925 (0 6) 300 (0 5)    Stool  0 0    Total Output 450 925 300    Net +270 -407 +650           Unmeasured Urine Occurrence  1 x     Unmeasured Stool Occurrence  1 x 1 x        Weights:        Body mass index is 29 82 kg/m²  Weight (last 2 days)     None        Physical Exam  Constitutional:       Appearance: She is ill-appearing  Comments: Tired appearing, sitting on side of bed    HENT:      Mouth/Throat:      Mouth: Mucous membranes are moist       Pharynx: Oropharynx is clear  Eyes:      General: No scleral icterus  Extraocular Movements: Extraocular movements intact  Conjunctiva/sclera: Conjunctivae normal    Cardiovascular:      Rate and Rhythm: Regular rhythm  Tachycardia present  Pulses: Normal pulses  Heart sounds: Normal heart sounds  No murmur heard  No gallop  Pulmonary:      Effort: No respiratory distress  Comments: Trach collar oxygen 6L O2, saturating appropriately     Coarse breath sounds bilaterally, more on right side than left  Abdominal:      General: Bowel sounds are normal  There is no distension  Palpations: Abdomen is soft  Tenderness: There is no abdominal tenderness  There is no guarding or rebound  Musculoskeletal:      Right lower leg: Edema (+1) present  Left lower leg: Edema (+1) present  Skin:     General: Skin is warm and dry  Capillary Refill: Capillary refill takes less than 2 seconds  Neurological:      Mental Status: She is oriented to person, place, and time  LABORATORY DATA     Labs: I have personally reviewed pertinent reports    Results from last 7 days   Lab Units 11/22/22  0517 11/20/22  1433 11/19/22  1408   WBC Thousand/uL 12 55* 14 56* 12 56*   HEMOGLOBIN g/dL 8 8* 9 2* 8 9*   HEMATOCRIT % 31 5* 31 1* 29 9* PLATELETS Thousands/uL 266 340 313   NEUTROS PCT % 86* 89* 88*   MONOS PCT % 7 7 7      Results from last 7 days   Lab Units 11/22/22  0517 11/20/22  1433 11/19/22  1408   POTASSIUM mmol/L 3 6 3 9 3 3*   CHLORIDE mmol/L 111* 112* 113*   CO2 mmol/L 23 24 20*   BUN mg/dL 14 13 13   CREATININE mg/dL 1 19 1 29 1 09   CALCIUM mg/dL 9 7 10 3* 10 9*   ALK PHOS U/L 99 109 108   ALT U/L 19 23 27   AST U/L 24 23 26     Results from last 7 days   Lab Units 11/22/22  0517   MAGNESIUM mg/dL 1 6     Results from last 7 days   Lab Units 11/22/22  0517   PHOSPHORUS mg/dL 1 4*      Results from last 7 days   Lab Units 11/15/22  1913   INR  1 01   PTT seconds 31     Results from last 7 days   Lab Units 11/15/22  1913   LACTIC ACID mmol/L 1 1           IMAGING & DIAGNOSTIC TESTING     Radiology Results: I have personally reviewed pertinent reports  XR chest portable    Result Date: 11/17/2022  Impression: Near complete drainage of right effusion with trace residual effusion  Masslike opacity in the right lower lobe better shown on CT  Mild pulmonary venous congestion  Workstation performed: [id number]     CT head wo contrast    Result Date: 11/15/2022  Impression: No acute intracranial abnormality  Chronic microangiopathic changes  Workstation performed: [id number]     CT chest wo contrast    Result Date: 11/19/2022  Impression: 1  Right lower lobe consolidation appears similar  There is relative internal hypodensity, along with air, consistent with abscess/necrosis  Recommend remains for a follow-up CT  2   Stable right lower lobe airway debris, with new airway debris in the middle lobe, consistent with interval aspiration event 3  Moderate right pleural effusion, not significantly changed in size  Trace left pleural effusion  4   Interstitial opacities consistent with mild edema 5  Stable left lower lobe nodule, attention on follow-up The study was marked in EPIC for immediate notification   Workstation performed: [id number] IR IN-Patient Thoracentesis    Result Date: 11/18/2022  Impression: Impression: Ultrasound-guided thoracentesis yielding 900 mL clear yellow pleural fluid  Signed, performed, and dictated by CHANEL Morrell under the supervision of Dr Tere Navarro  Workstation performed: [id number]     PE Study with CT Abdomen and Pelvis with contrast    Result Date: 11/15/2022  Impression: No central, lobar, segmental or proximal subsegmental pulmonary embolism  Evaluation of the distal subsegmental pulmonary arteries is limited  Large, heterogeneous airspace consolidation in the right lower lobe, with a 6 3 x 5 2 x 4 8 cm somewhat lobulated hypodense component with a small droplet of air  This likely represents a necrotizing aspiration pneumonia, given mucous plugging throughout the right lower lobe and retained secretions/debris in the right mainstem bronchus and bronchus intermedius  Recommend follow-up chest CT in approximately 3 months to ensure resolution as an underlying neoplasm may have a similar appearance  Nonspecific 7 x 6 mm lobulated left lower lobe nodule  Attention on follow-up  Moderate right pleural effusion  No enhancement of the pleura to suggest empyema  Mediastinal and right hilar lymphadenopathy, likely reactive, however metastatic disease is not entirely excluded  Mild stranding around the proximal pancreas suggestive of acute interstitial pancreatitis  Recommend correlation with lipase level  No organized peripancreatic fluid collections, loss of parenchymal enhancement to suggest necrosis, or vascular complications of pancreatitis  No other acute abdominal or pelvic pathology  Multiple additional findings as above  The study was marked in [facility] for immediate notification  Workstation performed: [id number]     Other Diagnostic Testing: I have personally reviewed pertinent reports      ACTIVE MEDICATIONS     Current Facility-Administered Medications   Medication Dose Route Frequency   • acetaminophen (TYLENOL) tablet 650 mg  650 mg Oral Q6H PRN   • albuterol inhalation solution 2 5 mg  2 5 mg Nebulization Q4H PRN   • atorvastatin (LIPITOR) tablet 40 mg  40 mg Oral Daily With Dinner   • glycerin-hypromellose- (ARTIFICIAL TEARS) ophthalmic solution 1 drop  1 drop Both Eyes PRN   • HYDROmorphone HCl (DILAUDID) injection 0 2 mg  0 2 mg Intravenous Q6H PRN   • iron sucrose (VENOFER) 200 mg in sodium chloride 0 9 % 100 mL IVPB  200 mg Intravenous Once per day on Mon Wed Fri   • levothyroxine tablet 100 mcg  100 mcg Oral Early Morning   • loratadine (CLARITIN) tablet 5 mg  5 mg Oral Daily   • oxyCODONE (ROXICODONE) IR tablet 2 5 mg  2 5 mg Oral Q6H PRN    Or   • oxyCODONE (ROXICODONE) IR tablet 5 mg  5 mg Oral Q6H PRN   • sertraline (ZOLOFT) tablet 25 mg  25 mg Oral Daily   • sodium chloride 0 9 % infusion  100 mL/hr Intravenous Continuous        VTE Pharmacologic Prophylaxis: Heparin  VTE Mechanical Prophylaxis: sequential compression device    Portions of the record may have been created with voice recognition software  Occasional wrong word or "sound a like" substitutions may have occurred due to the inherent limitations of voice recognition software    Read the chart carefully and recognize, using context, where substitutions have occurred   ==  [address]  Internal Medicine Residency PGY-1

## 2022-11-22 NOTE — ASSESSMENT & PLAN NOTE
Creatinine of 1 47 on admission treated with IV fluid boluses, 3L total, NSS at 100ml/hr, renal function assessment and lab monitoring      Pt has CKD 3 and noted baseline of 1-1 1    Plan:  · Cr 1 19 today, continue to trend  · Avoid nephrotoxic agents  · Monitor urine output - 925 ccs today with one unmeasured occurrence, improving slowly  · Encourage adequate intake

## 2022-11-23 NOTE — PLAN OF CARE
Problem: Potential for Falls  Goal: Patient will remain free of falls  Description: INTERVENTIONS:  - Educate patient/family on patient safety including physical limitations  - Instruct patient to call for assistance with activity   - Consult OT/PT to assist with strengthening/mobility   - Keep Call bell within reach  - Keep bed low and locked with side rails adjusted as appropriate  - Keep care items and personal belongings within reach  - Initiate and maintain comfort rounds  - Make Fall Risk Sign visible to staff  - Offer Toileting every  Hours, in advance of need  - Initiate/Maintain alarm  - Obtain necessary fall risk management equipment:   - Apply yellow socks and bracelet for high fall risk patients  - Consider moving patient to room near nurses station  Outcome: Progressing     Problem: PAIN - ADULT  Goal: Verbalizes/displays adequate comfort level or baseline comfort level  Description: Interventions:  - Encourage patient to monitor pain and request assistance  - Assess pain using appropriate pain scale  - Administer analgesics based on type and severity of pain and evaluate response  - Implement non-pharmacological measures as appropriate and evaluate response  - Consider cultural and social influences on pain and pain management  - Notify physician/advanced practitioner if interventions unsuccessful or patient reports new pain  Outcome: Progressing     Problem: INFECTION - ADULT  Goal: Absence or prevention of progression during hospitalization  Description: INTERVENTIONS:  - Assess and monitor for signs and symptoms of infection  - Monitor lab/diagnostic results  - Monitor all insertion sites, i e  indwelling lines, tubes, and drains  - Monitor endotracheal if appropriate and nasal secretions for changes in amount and color  - Gobler appropriate cooling/warming therapies per order  - Administer medications as ordered  - Instruct and encourage patient and family to use good hand hygiene technique  - Identify and instruct in appropriate isolation precautions for identified infection/condition  Outcome: Progressing  Goal: Absence of fever/infection during neutropenic period  Description: INTERVENTIONS:  - Monitor WBC    Outcome: Progressing     Problem: SAFETY ADULT  Goal: Patient will remain free of falls  Description: INTERVENTIONS:  - Educate patient/family on patient safety including physical limitations  - Instruct patient to call for assistance with activity   - Consult OT/PT to assist with strengthening/mobility   - Keep Call bell within reach  - Keep bed low and locked with side rails adjusted as appropriate  - Keep care items and personal belongings within reach  - Initiate and maintain comfort rounds  - Make Fall Risk Sign visible to staff  - Offer Toileting every  Hours, in advance of need  - Initiate/Maintain alarm  - Obtain necessary fall risk management equipment:   - Apply yellow socks and bracelet for high fall risk patients  - Consider moving patient to room near nurses station  Outcome: Progressing  Goal: Maintain or return to baseline ADL function  Description: INTERVENTIONS:  -  Assess patient's ability to carry out ADLs; assess patient's baseline for ADL function and identify physical deficits which impact ability to perform ADLs (bathing, care of mouth/teeth, toileting, grooming, dressing, etc )  - Assess/evaluate cause of self-care deficits   - Assess range of motion  - Assess patient's mobility; develop plan if impaired  - Assess patient's need for assistive devices and provide as appropriate  - Encourage maximum independence but intervene and supervise when necessary  - Involve family in performance of ADLs  - Assess for home care needs following discharge   - Consider OT consult to assist with ADL evaluation and planning for discharge  - Provide patient education as appropriate  Outcome: Progressing  Goal: Maintains/Returns to pre admission functional level  Description: INTERVENTIONS:  - Perform BMAT or MOVE assessment daily    - Set and communicate daily mobility goal to care team and patient/family/caregiver  - Collaborate with rehabilitation services on mobility goals if consulted  - Perform Range of Motion  times a day  - Reposition patient every  hours  - Dangle patient  times a day  - Stand patient  times a day  - Ambulate patient  times a day  - Out of bed to chair  times a day   - Out of bed for meals  times a day  - Out of bed for toileting  - Record patient progress and toleration of activity level   Outcome: Progressing     Problem: DISCHARGE PLANNING  Goal: Discharge to home or other facility with appropriate resources  Description: INTERVENTIONS:  - Identify barriers to discharge w/patient and caregiver  - Arrange for needed discharge resources and transportation as appropriate  - Identify discharge learning needs (meds, wound care, etc )  - Arrange for interpretive services to assist at discharge as needed  - Refer to Case Management Department for coordinating discharge planning if the patient needs post-hospital services based on physician/advanced practitioner order or complex needs related to functional status, cognitive ability, or social support system  Outcome: Progressing     Problem: Knowledge Deficit  Goal: Patient/family/caregiver demonstrates understanding of disease process, treatment plan, medications, and discharge instructions  Description: Complete learning assessment and assess knowledge base    Interventions:  - Provide teaching at level of understanding  - Provide teaching via preferred learning methods  Outcome: Progressing     Problem: CARDIOVASCULAR - ADULT  Goal: Absence of cardiac dysrhythmias or at baseline rhythm  Description: INTERVENTIONS:  - Continuous cardiac monitoring, vital signs, obtain 12 lead EKG if ordered  - Administer antiarrhythmic and heart rate control medications as ordered  - Monitor electrolytes and administer replacement therapy as ordered  Outcome: Progressing     Problem: RESPIRATORY - ADULT  Goal: Achieves optimal ventilation and oxygenation  Description: INTERVENTIONS:  - Assess for changes in respiratory status  - Assess for changes in mentation and behavior  - Position to facilitate oxygenation and minimize respiratory effort  - Oxygen administered by appropriate delivery if ordered  - Initiate smoking cessation education as indicated  - Encourage broncho-pulmonary hygiene including cough, deep breathe, Incentive Spirometry  - Assess the need for suctioning and aspirate as needed  - Assess and instruct to report SOB or any respiratory difficulty  - Respiratory Therapy support as indicated  Outcome: Progressing     Problem: SKIN/TISSUE INTEGRITY - ADULT  Goal: Skin Integrity remains intact(Skin Breakdown Prevention)  Description: Assess:  -Perform Wilfrid assessment every   -Clean and moisturize skin every   -Inspect skin when repositioning, toileting, and assisting with ADLS  -Assess under medical devices such as  every   -Assess extremities for adequate circulation and sensation     Bed Management:  -Have minimal linens on bed & keep smooth, unwrinkled  -Change linens as needed when moist or perspiring  -Avoid sitting or lying in one position for more than  hours while in bed  -Keep HOB at degrees     Toileting:  -Offer bedside commode  -Assess for incontinence every   -Use incontinent care products after each incontinent episode such as     Activity:  -Mobilize patient  times a day  -Encourage activity and walks on unit  -Encourage or provide ROM exercises   -Turn and reposition patient every  Hours  -Use appropriate equipment to lift or move patient in bed  -Instruct/ Assist with weight shifting every  when out of bed in chair  -Consider limitation of chair time  hour intervals    Skin Care:  -Avoid use of baby powder, tape, friction and shearing, hot water or constrictive clothing  -Relieve pressure over bony prominences using   -Do not massage red bony areas    Next Steps:  -Teach patient strategies to minimize risks such as    -Consider consults to  interdisciplinary teams such as   Outcome: Progressing     Problem: Prexisting or High Potential for Compromised Skin Integrity  Goal: Skin integrity is maintained or improved  Description: INTERVENTIONS:  - Identify patients at risk for skin breakdown  - Assess and monitor skin integrity  - Assess and monitor nutrition and hydration status  - Monitor labs   - Assess for incontinence   - Turn and reposition patient  - Assist with mobility/ambulation  - Relieve pressure over bony prominences  - Avoid friction and shearing  - Provide appropriate hygiene as needed including keeping skin clean and dry  - Evaluate need for skin moisturizer/barrier cream  - Collaborate with interdisciplinary team   - Patient/family teaching  - Consider wound care consult   Outcome: Progressing     Problem: MOBILITY - ADULT  Goal: Maintain or return to baseline ADL function  Description: INTERVENTIONS:  -  Assess patient's ability to carry out ADLs; assess patient's baseline for ADL function and identify physical deficits which impact ability to perform ADLs (bathing, care of mouth/teeth, toileting, grooming, dressing, etc )  - Assess/evaluate cause of self-care deficits   - Assess range of motion  - Assess patient's mobility; develop plan if impaired  - Assess patient's need for assistive devices and provide as appropriate  - Encourage maximum independence but intervene and supervise when necessary  - Involve family in performance of ADLs  - Assess for home care needs following discharge   - Consider OT consult to assist with ADL evaluation and planning for discharge  - Provide patient education as appropriate  Outcome: Progressing  Goal: Maintains/Returns to pre admission functional level  Description: INTERVENTIONS:  - Perform BMAT or MOVE assessment daily    - Set and communicate daily mobility goal to care team and patient/family/caregiver  - Collaborate with rehabilitation services on mobility goals if consulted  - Perform Range of Motion  times a day  - Reposition patient every  hours    - Dangle patient  times a day  - Stand patient  times a day  - Ambulate patient  times a day  - Out of bed to chair  times a day   - Out of bed for meal times a day  - Out of bed for toileting  - Record patient progress and toleration of activity level   Outcome: Progressing

## 2022-11-23 NOTE — OCCUPATIONAL THERAPY NOTE
Occupational Therapy Treatment Note      Annette Whatley    11/23/2022    Principal Problem:    Sepsis with acute hypoxic respiratory failure (HCC)  Active Problems:    Asymptomatic carotid artery stenosis    Hyperlipidemia    Hypertension    Laryngeal carcinoma (HCC)    CKD (chronic kidney disease) stage 3, GFR 30-59 ml/min    JORDAN (acute kidney injury) ([address] )    Hypercalcemia    Right lower lobe lung mass with right pleural effusion    Pancreatitis    Lung nodule seen on imaging study    Anemia of chronic disease    Hypothyroidism    Gram-positive bacteria on cultures      Past Medical History:   Diagnosis Date    Aortoiliac occlusive disease (HCC)     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 (HCC)     Carotid artery stenosis     Hyperlipidemia     Hypertension     PVD (peripheral vascular disease) ([address] )     Throat cancer [facility])        Past Surgical History:   Procedure Laterality Date    GALLBLADDER SURGERY  03/30/2019    ILIAC ARTERY STENT Left     IR CEREBRAL ANGIOGRAPHY / INTERVENTION  7/22/2020    IR THORACENTESIS  11/16/2022    LARYNX SURGERY  2002    LEG SURGERY  07/10/2012        11/23/22 0927   OT Last Visit   OT Visit Date 11/23/22   Note Type   Note Type Treatment   Pain Assessment   Pain Assessment Tool 0-10   Pain Score No Pain   Restrictions/Precautions   Weight Bearing Precautions Per Order No   Other Precautions Multiple lines;Telemetry;O2;Fall Risk  (TRACH)   Lifestyle   Autonomy I with ADLs and completes functional mobility with RW   Reciprocal Relationships Support of facility   Service to Others Retired   [address] Enjoys going on the facility bus to activities within the community   ADL   Where Assessed Edge of bed   Grooming Assistance 5  Supervision/Setup   Grooming Deficit Setup; Increased time to complete;Wash/dry hands; Wash/dry face;Brushing hair   UB Bathing Assistance 4  Minimal Assistance   UB Bathing Deficit Increased time to complete   LB Bathing Assistance 3  Moderate Assistance   LB Bathing Deficit Increased time to complete   UB Dressing Assistance 4  Minimal Assistance   UB Dressing Deficit Increased time to complete   LB Dressing Assistance 4  Minimal Assistance   Bed Mobility   Rolling R 5  Supervision   Rolling L 5  Supervision   Supine to Sit 4  Minimal assistance   Additional items Assist x 1; Increased time required;LE management   Sit to Supine 4  Minimal assistance   Additional items LE management   Cognition   Overall Cognitive Status [facility]   Arousal/Participation Alert; Cooperative   Attention Attends with cues to redirect   Orientation Level Oriented to person;Oriented to place;Oriented to time   Activity Tolerance   Activity Tolerance Patient limited by fatigue   Medical Staff Made Aware ok to see per RN [facility]   Assessment   Assessment pt seen for OT treatment session w focus on bedmobility, sitting balance/tolerance at EOB, and simple self care  pt can complete most of grooming self w set up, increased time, minassist for UB self care, mod assist LB bathing  she does need increased time as she fatigues easily  pt educated on self pacing skills  Good balance noted at EOB  OT jelani continue to follow while in acute care   Plan   Treatment Interventions ADL retraining;Functional transfer training; Endurance training;Patient/family training; Compensatory technique education; Energy conservation; Activityengagement   Goal Expiration Date 11/30/22   OT Treatment Day 2   OT Frequency Other (comment)  (2-4X/week)   Recommendation   OT Discharge Recommendation Home with home health rehabilitation   AM-PAC Daily Activity Inpatient   Lower Body Dressing 3   Bathing 3   Toileting 3   Upper Body Dressing 3   Grooming 3   Eating 4   Daily Activity Raw Score 19   Daily Activity Standardized Score (Calc for Raw Score >=11) 40 22

## 2022-11-23 NOTE — ASSESSMENT & PLAN NOTE
· Patient presented with abdominal pain, lightheadedness, intermittent shortness of breath, recent unexpected weight loss   · Met sepsis criteria on admission  · Lactate wnl but has significant hypercalcemia 14 2 on admission  · Patient is status post tracheostomy  · Completed cephalosporin 5 day course, completed azithromycin 3 day course    Plan:  · Completed 5 day course of antibiotics covering for CAP  · Follow up on blood cultures  Blood cultures x2 11/17 no growth 4 days  · Blood cultures x1 11/15 grew Actinomyces   · 11/15 sputum culture grew S  Aureus with repeat sputum culture 11/16 negative   · Bronch/BAL aborted due to patient becoming hemodynamically unstable after anesthesia induction  Patient was briefly taken to ICU for higher level of care  Now transferred to SOD   · Bronchial aspirates collected, pending results   · Will continue on ceftriaxone 1 gm Q24H per ID recommendations  Day 3   · Continue trach collar oxygen as needed - 6-8L this AM   · Trend WBC and fever curve   WBC 14 43

## 2022-11-23 NOTE — PROGRESS NOTES
ICU Downgrade Transfer Note    Code Status: Level 3 - DNAR and DNI  POA:    POLST:      Reason for ICU admission:   Hypotension, bradycardia during anesthesia induction     Active problems:   Principal Problem:    Sepsis with acute hypoxic respiratory failure ([facility] [address] )  Active Problems:    Asymptomatic carotid artery stenosis    Hyperlipidemia    Hypertension    Laryngeal carcinoma (HCC)    CKD (chronic kidney disease) stage 3, GFR 30-59 ml/min    JORDAN (acute kidney injury) ([facility] [address] )    Hypercalcemia    Right lower lobe lung mass with right pleural effusion    Pancreatitis    Lung nodule seen on imaging study    Anemia of chronic disease    Hypothyroidism    Gram-positive bacteria on cultures  Resolved Problems:    * No resolved hospital problems  *      Consultants:   Pulmonology, ID, Hematology    History of Present Illness:   Jr Tello a 81 y o  female with a past medical history of 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, CKD stage IIIB (baseline Cr 1 1-1 4), HLD and 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, PAD with intermittent claudication, asymptomatic bilateral carotid stenosis 2/2 radiation s/p bilateral  TCAR followed by R carotid PTA stent (7/2020) on Plavix and Lipitor, hypothyroidism on levothyroxine, HTN controlled with losartan, and T2DM controlled with metformin, who presented to ED from SNF on 11/15 with 1mo  of vague symptoms including abdominal pain, lightheadedness, intermittent SOB, and unexpected recent 20-30 lb weight loss  Found to have a large airspace consolidation with moderate right pleural effusion and hilar lymphadenopathy concerning for necrotizing pneumonia versus malignancy; furthermore, CT imaging also revealed new 7 x 6 mm left lower lobe nodule  11/15 BCx 1/2 growing actinomyces, but 11/17 BCx no growth to date  11/15 sputum culture grew S  Aureus, repeat sputum culture 11/16 negative   Patient is on ceftriaxone per ID recommendations  Patient planned to go for bronchoscopy and BAL on 11/22 but was aborted following anesthesia induction when patient became bradycardic and hypotensive, thought to have bronchospasm      Summary of clinical course:   Patient transferred to stepdown level 1 following aborted bronchoscopy on near baseline trach oxygen  Was transferred on 100cc/hr mIVF that was discontinued in the evening of 11/22  Has not required pressors during ICCU stay  Patient became slightly hypertensive at one point but was normotensive on transfer  Patient remained slightly tachycardic at her baseline  Patient remained at or near baseline trach oxygen requirements prior to transfer  Medications held for bronchoscopy, ceftriaxone, plavix, and heparin, were restarted  Recent or scheduled procedures:   Bronchoscopy for 11/22 aborted 2/2 suspected bronchospasm and hypotension during anesthesia induction    Outstanding/pending diagnostics:   None     Cultures:   No new cultures since transfer to stepdown 1       Mobilization Plan:   PT/OT    Nutrition Plan:   Regular diet    Invasive Devices Review  Invasive Devices     Peripheral Intravenous Line  Duration           Peripheral IV 11/21/22 Distal;Dorsal (posterior); Left Forearm 2 days    Peripheral IV 11/22/22 Left Antecubital <1 day    Peripheral IV 11/22/22 Right Hand <1 day          Drain  Duration           External Urinary Catheter <1 day          Airway  Duration           Surgical Airway -- days                Rationale for remaining devices: N/A    VTE Pharmacologic Prophylaxis: Heparin  VTE Mechanical Prophylaxis: sequential compression device    Discharge Plan:   Patient should be ready for discharge post diagnosis, treatment, and resolution of pneumonia    Initial Physical Therapy Recommendations: Home with home health  Initial Occupational Therapy Recommendations: Home with home health  Initial /Plan: pending      Spoke with Dr Maldonado regarding transfer  Please contact critical care via [employer] with any questions or concerns  Portions of the record may have been created with voice recognition software  Occasional wrong word or "sound a like" substitutions may have occurred due to the inherent limitations of voice recognition software    Read the chart carefully and recognize, using context, where substitutions have occurred    Jorje Romberg, MD  Internal Medicine Resident, PGY1  12:22 PM

## 2022-11-23 NOTE — ASSESSMENT & PLAN NOTE
· In setting of CKD stage 3B, excessive use of vitamin-D and calcium supplementation at home per patient  · Further evidenced by elevated corrected calcium 14 2; supported by initial presentation with abdominal pain, fatigue, decreased appetite/poor PO intake; mentation at baseline, nontoxic on exam  · ECG without arrhythmia  · Further ED work up notable for multiple derangements including Hgb 11 1, low bicarb 18, elevated BUN 34, elevated creatinine 1 47 (baseline 1 11 4), GFR 32, , hypoalbuminemia 2 1; elevated lipase 2028, uric acid 10 4  · CT imaging findings concerning for acute pancreatitis,  necrotizing pneumonia vs  malignancy  · Etiology multifactorial, possibly due to hypercalcemia malignancy in light of CT findings, further complicated by home vitamin-D supplementation; med rec without any other causative agents  Bone demineralization also possible given her age  · Per chart review, patient noted to have elevated corrected calcium level since 08/2022 however does not appear to have been further worked up by PCP  · Unlikely primary parathyroidism or tertiary cause given PTH in 09/2022 was low; baseline renal function with CKD III  Vitamin-D 25 hydroxy level within normal limits  Calcium continues to be elevated   Status post 3 mg zoledronic acid     SPEP with faint gammopathy, PTH very low  UPEP shows selective proteinuria (54) with no monoclonal bands noted  Plan:  · PTHrP to assess hypercalcemia of malignancy, pending   · Calcium level slowly improving 10 2 today from 10 7   Continue trending   · Vitamin D levels within normal limits: Vit D 25-OH 77 2, Vit D 1,25 OH 49 4  · PTH 6 3, compensating appropriately for elevated Ca level

## 2022-11-23 NOTE — ASSESSMENT & PLAN NOTE
· Known history, maintained on losartan 50 mg daily at home  · Follows PCP outpatient      Plan:  · Bps 787C systolic, will continue to monitor   · Given 100 ccs/hr fluids isolyte due to tachycardia in the 140s/150s overnight, stopped fluids this AM  · Will hold home dose losartan at this time  · Continue monitoring blood pressures

## 2022-11-23 NOTE — ASSESSMENT & PLAN NOTE
Lab Results   Component Value Date    EGFR 45 11/24/2022    EGFR 42 11/23/2022    EGFR 41 11/22/2022    CREATININE 1 11 11/24/2022    CREATININE 1 16 11/23/2022    CREATININE 1 19 11/22/2022     · Known history of CKD stage III states 2014  · Follows B nephrology (Dr Jeff Vail)  · Cr on admission slightly elevated 1 47(baseline 1 1-1 4)  · Etiology likely 2/2 long history of hypertension, nephrosclerosis, age-related nephron loss     Plan  · Improving - Cr 1 11 today, monitor closely with BMP   · Avoid use of nephrotoxic agents  · Maintain euvolemia

## 2022-11-23 NOTE — PROGRESS NOTES
INTERNAL MEDICINE RESIDENCY TRANSFER ACCEPTANCE NOTE     Name: Mike Mc   Age & Sex: 81 y o  female   MRN: [id number]  Unit/Bed#: [facility] 203-01   Encounter: [id number]  Hospital Stay Days: 8    Accepting team: [facility] Team C   Code Status: Level 3 - DNAR and DNI  Disposition: Patient requires Med/Surg    ASSESSMENT/PLAN     Principal Problem:    Sepsis with acute hypoxic respiratory failure ([facility] )  Active Problems:    Asymptomatic carotid artery stenosis    Hyperlipidemia    Hypertension    Laryngeal carcinoma ([facility] )    CKD (chronic kidney disease) stage 3, GFR 30-59 ml/min    JORDAN (acute kidney injury) ([facility] )    Hypercalcemia    Right lower lobe lung mass with right pleural effusion    Pancreatitis    Lung nodule seen on imaging study    Anemia of chronic disease    Hypothyroidism    Gram-positive bacteria on cultures      Gram-positive bacteria on cultures  Assessment & Plan  1/2 blood cultures grew Actinomyces    Plan:  See Sepsis and Right Lower Lobe Mass A/P      Hypothyroidism  Assessment & Plan  · Known history since 11/2021  · Most recent TSH 4 2 in 08/2022  · Managed on levothyroxine 100 mcg  · Repeat TSH within normal limits    Plan  Stable  · Continue home dose levothyroxine     Anemia of chronic disease  Assessment & Plan  · Known history, in setting of CKD stage IIIB  · Follows nephrology outpatient (Dr Alba Session)  · Hgb 11 1 on admission, at baseline     · Currently hemodynamically stable    Plan:  · Trend CBC, hb 8 9  · No active bleeding present   · Transfuse if Hb < 7         Lung nodule seen on imaging study  Assessment & Plan  · Patient history of laryngeal carcinoma status post tracheostomy/laryngectomy on trach collar presented with fatigue; tachycardic on exam; labs notable for multiple derangements including hypercalcemia 14 3 and bicarb 18  · CT notable for nonspecific 7 x 6 mm lobulated left lower lobe nodule; new finding; CT imaging also revealing for multiple findings large heterogeneous airspace consolidation and right lower lobe with 6 x 5 x 5 lobulated hypodense component with moderate right-sided pleural effusion  concerning for necrotizing pneumonia  · Malignancy not entirely ruled out; does endorse decreased appetite, unexpected weight loss of up to 30 lb, and is a former smoker; quit 2002; hypercalcemia also noted on admission with recent outpatient PTH wnl  · 11/19 - repeat CT suggested of necrosis/abscess    Plan  · Inpatient pulmonology following, appreciate recs; 5 day course completed abx for suspecting necrotizing pneumonia with large heterogeneous airspace consolidation in right lower lobe  · ID consulted, appreciate reccs  Holding off abx for bronch/BAL  Will reconsider abx start now that procedure canceled  Pending ID reccs on this   · Repeat CT future to assess for improvement, outpatient     Pancreatitis  Assessment & Plan  · Likely secondary to hypercalcemia  · Presented with abdominal pain; labs notable for elevated lipase >2000, elevated WBC  · CT imaging suggestive pancreatitis  · Patient has elevated alk-phos with normal T bili and asymptomatic abnormal biliary strictures on imaging  · Patient reports improved appetite and improved abdominal pain    Plan:  · Supportive care with analgesics or antiemetics as needed; renal function at baseline, ECG with normal QTC  · Continue diet as tolerated   · Continued monitoring of vitals, O2 sats, urine output with goal > 0 5-1 mL/kg/hr  · Replete electrolytes as indicated    Suspected as having resolved, limiting fluid resuscitation at this time due to volume overload  Unclear source of hypercalcemia that triggered pancreatitis, hematology on board for hypercalcemia control  Ca slowly downtrending, last 11 3 today         Right lower lobe lung mass with right pleural effusion  Assessment & Plan  · Patient history of laryngeal carcinoma status post tracheostomy/laryngectomy who presented to ED with tachycardia, hypoxia, fatigue     · CBC with downtrending WBC 12 55 today  · CTA chest notable for significant findings including:  · Large, heterogeneous airspace consolidation in the right lower lobe, with a 6 3 x 5 2 x 4 8 cm somewhat lobulated hypodense component likely a necrotizing aspiration pneumonia  · Moderate right pleural effusion  No enhancement of the pleura to suggest empyema  · Mediastinal and right hilar lymphadenopathy, likely reactive, however metastatic disease is not entirely excluded  · Legionella and strep antigens negative  · Status post 900 mL fluid thoracentesis suggestive of transudative fluid for pulmonology and may be secondary to pancreatitis  · Blood cultures 1/2 group Actinomyces gram variable rods on 11/15  · Blood cultures x2 on 11/17 show no growth 4 days hours      Plan:  · Completed 5 day course of antibiotics covering for CAP  · Follow up on blood cultures  Blood cultures x2 11/17 no growth 72 hours  · Blood cultures x1 11/15 grew Actinomyces   · 11/15 sputum culture grew S  Aureus with repeat sputum culture 11/16 negative  · Appreciate ID recommendations  · Per ID, Postprocedure pending culture results would restart ceftriaxone 1 gm Q24H IV  If actinomyces pathogen not contaminant, will need prolonged therapy 4-6 weeks IV abx with additional PO penicillin 4-6 weeks additionally  If MSSA, recovered without actinomyces then additional cefazolin IV for at least 5 days  · Plan for bronchoscopy and BAL 11/22, however, procedure aborted due to patient becoming hemodynamically unstable after anesthesia induction  · Trend WBC and Fever curve   · Follow-up chest CT in 3 months as an outpatient to ensure resolution as an underlying neoplasm may have a similar appearance      Hypercalcemia  Assessment & Plan  · In setting of CKD stage 3B, excessive use of vitamin-D and calcium supplementation at home per patient  · Further evidenced by elevated corrected calcium 14 2; supported by initial presentation with abdominal pain, fatigue, decreased appetite/poor PO intake; mentation at baseline, nontoxic on exam  · ECG without arrhythmia  · Further ED work up notable for multiple derangements including Hgb 11 1, low bicarb 18, elevated BUN 34, elevated creatinine 1 47 (baseline 1 11 4), GFR 32, , hypoalbuminemia 2 1; elevated lipase 2028, uric acid 10 4  · CT imaging findings concerning for acute pancreatitis,  necrotizing pneumonia vs  malignancy  · Etiology multifactorial, possibly due to hypercalcemia malignancy in light of CT findings, further complicated by home vitamin-D supplementation; med rec without any other causative agents  Bone demineralization also possible given her age  · Per chart review, patient noted to have elevated corrected calcium level since 08/2022 however does not appear to have been further worked up by PCP  · Unlikely primary parathyroidism or tertiary cause given PTH in 09/2022 was low; baseline renal function with CKD III  Vitamin-D 25 hydroxy level within normal limits  Calcium continues to be elevated   Status post 3 mg zoledronic acid     SPEP with faint gammopathy, PTH very low     Plan:  · Pending UPEP levels pending to r/o multiple myeloma  · PTHrP to assess hypercalcemia of malignancy, pending   · Calcium level slowly improving 9 2 today   · Vitamin D levels within normal limits: Vit D 25-OH 77 2, Vit D 1,25 OH 49 4  · PTH 6 3, compensating appropriately for elevated Ca level   · IV fluids stopped given patient is developing fluid overload with aggressive fluid resuscitation  Will monitor urine output closely  · Trend BMP to assess response  Calcium slowly downtrending 11 9 today from 12 7    JORDAN (acute kidney injury) ([facility] )  Assessment & Plan  Creatinine of 1 47 on admission treated with IV fluid boluses, 3L total, NSS at 100ml/hr, renal function assessment and lab monitoring      Pt has CKD 3 and noted baseline of 1-1 1    Plan:  · Cr 1 16 today, continue to trend  · Avoid nephrotoxic agents  · Monitor urine output   · Encourage adequate intake    CKD (chronic kidney disease) stage 3, GFR 30-59 ml/min  Assessment & Plan  Lab Results   Component Value Date    EGFR 42 11/23/2022    EGFR 41 11/22/2022    EGFR 37 11/20/2022    CREATININE 1 16 11/23/2022    CREATININE 1 19 11/22/2022    CREATININE 1 29 11/20/2022     · Known history of CKD stage III states 2014  · Follows [facility] nephrology (Dr Yassine Gilman)  · Cr on admission slightly elevated 1 47(baseline 1 1-1 4)  · Etiology likely 2/2 long history of hypertension, nephrosclerosis, age-related nephron loss     Plan  · One time IV Lasix 20 mg on 11/20   · Improving - Cr 1 16 today, monitor closely with BMP   · Avoid use of nephrotoxic agents  · Maintain euvolemia       Laryngeal carcinoma (HCC)  Assessment & Plan  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  · Follows with ENT as an outpatient with Dr Magdaleno Williamson; most recently evaluated for trich follow-up in 10/2022     Plan:  · Concetta Nipple in place, functional well, at baseline  · Continue trach collar oxygen, on 8 L able to saturating appropriately, weaned to 6L     Hypertension  Assessment & Plan  · Known history, maintained on losartan 50 mg daily at home  · Follows PCP outpatient      Plan:  · BP on the low side with systolic high 02M to 51E on admission, systolics continued to be in the 90s to 100s   Overnight SBP went down to 80s and given 500 ccs bolus which improved pressures to 944I systolic   · Will hold home dose losartan at this time  · Continue monitoring blood pressures     Hyperlipidemia  Assessment & Plan  · Known history of type 2 diabetes on metformin, hypertension on losartan  · Managed on Lipitor 40  · Most recent lipid panel with good control of LDL at 56    Plan   · Continue Lipitor 40mg QD    Asymptomatic carotid artery stenosis  Assessment & Plan  · Known hx, follows with vascular surgery (Dr Aidan Avelar)  · Previously found to have recurrent left ICA stenosis and critical new occlusive right IC stenosis, with a which were asymptomatic, along with left vertebral artery atresia; previously worked up in outpatient setting by mass concerning symptoms and was recommended endovascular intervention  · On 07/2020, patient electively admitted to [facility] during which she underwent ultrasound-guided right CFA puncture with sternal closure, aortic arch arteriogram, left carotid during him with PTA and stenting under the care of Dr Brown  · Maintained on antiplatelet and anti-statin therapy with Plavix and Lipitor     Plan:  · Continue home dose lipitor  · Restarted home Plavix after bronch was cancelled    * Sepsis with acute hypoxic respiratory failure ([facility] )  Assessment & Plan  · Patient presented with abdominal pain, lightheadedness, intermittent shortness of breath, recent unexpected weight loss   · Met sepsis criteria on admission  · Lactate wnl but has significant hypercalcemia 14 2 on admission  · Patient is status post tracheostomy  · Completed cephalosporin 5 day course, completed azithromycin 3 day course    Plan:  · Completed 5 day course of antibiotics covering for CAP  · Follow up on blood cultures  Blood cultures x2 11/17 no growth 4 days  · Blood cultures x1 11/15 grew Actinomyces   · 11/15 sputum culture grew S  Aureus with repeat sputum culture 11/16 negative  · Appreciate ID recommendations  · Per ID, Postprocedure pending culture results would restart ceftriaxone 1 gm Q24H IV  If actinomyces pathogen not contaminant, will need prolonged therapy 4-6 weeks IV abx with additional PO penicillin 4-6 weeks additionally  If MSSA, recovered without actinomyces then additional cefazolin IV for at least 5 days  · Bronch/BAL aborted due to patient becoming hemodynamically unstable after anesthesia induction  Patient was briefly taken to ICU for higher level of care     · Continue trach collar oxygen as needed - 6L this AM   · Trend WBC and fever curve  WBC 12 55    · Hypotensive but cannot give fluids due to volume overload at this time  Overnight was hypotensive 84/44 and 92/48, after 500 cc bolus, able to maintain systolics 179I this AM  Will continue to monitor blood pressures, can give another dose of lasix if pressures improve before considering fluids  VTE Pharmacologic Prophylaxis: Heparin  VTE Mechanical Prophylaxis: sequential compression device    HOSPITAL COURSE     Per Dr Levi Buitrago: "Neema Andrews a 81 y o  female with a past medical history of 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, CKD stage IIIB (baseline Cr 1 1-1 4), HLD and 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, PAD with intermittent claudication, asymptomatic bilateral carotid stenosis 2/2 radiation s/p bilateral  TCAR followed by R carotid PTA stent (7/2020) on Plavix and Lipitor, hypothyroidism on levothyroxine, HTN controlled with losartan, and T2DM controlled with metformin, who presented to ED from [facility] on 11/15 with 1mo  of vague symptoms including abdominal pain, lightheadedness, intermittent SOB, and unexpected recent 20-30 lb weight loss  Found to have a large airspace consolidation with moderate right pleural effusion and hilar lymphadenopathy concerning for necrotizing pneumonia versus malignancy; furthermore, CT imaging also revealed new 7 x 6 mm left lower lobe nodule  11/15 BCx 1/2 growing actinomyces, but 11/17 BCx no growth to date  11/15 sputum culture grew S  Aureus, repeat sputum culture 11/16 negative  Patient is on ceftriaxone per ID recommendations  Patient planned to go for bronchoscopy and BAL on 11/22 but was aborted following anesthesia induction when patient became bradycardic and hypotensive, thought to have bronchospasm       Summary of clinical course:   Patient transferred to stepdown level 1 following aborted bronchoscopy on near baseline trach oxygen   Was transferred on 100cc/hr mIVF that was discontinued in the evening of 11/22  Has not required pressors during [facility] stay  Patient became slightly hypertensive at one point but was normotensive on transfer  Patient remained slightly tachycardic at her baseline  Patient remained at or near baseline trach oxygen requirements prior to transfer  Medications held for bronchoscopy, ceftriaxone, plavix, and heparin, were restarted "    SUBJECTIVE     Patient seen and examined at bedside  She reports feeling better than yesterday, however, she is bothered by the fact that she is not able to talk  Patient denies chest pain, SOB, fevers, chills  OBJECTIVE     Vitals:    11/23/22 0530 11/23/22 0715 11/23/22 0722 11/23/22 0830   BP:  116/59     Pulse:  102     Resp:       Temp:  97 8 °F (36 6 °C)     TempSrc:  Oral     SpO2:  99% 99% 94%   Weight: 65 7 kg (144 lb 12 8 oz)      Height:         I/O last 24 hours: In: 1050 [I V :700; IV Piggyback:350]  Out: 868 [Urine:799]    Physical Exam  Vitals and nursing note reviewed  Constitutional:       General: She is not in acute distress  Appearance: She is well-developed  HENT:      Head: Normocephalic and atraumatic  Eyes:      Conjunctiva/sclera: Conjunctivae normal    Neck:      Comments: Trach collar in place  Cardiovascular:      Rate and Rhythm: Normal rate and regular rhythm  Heart sounds: No murmur heard  Pulmonary:      Effort: Pulmonary effort is normal  No respiratory distress  Breath sounds: Normal breath sounds  Abdominal:      Palpations: Abdomen is soft  Tenderness: There is no abdominal tenderness  Musculoskeletal:         General: No swelling  Cervical back: Neck supple  Skin:     General: Skin is warm and dry  Capillary Refill: Capillary refill takes less than 2 seconds  Neurological:      Mental Status: She is alert  Psychiatric:         Mood and Affect: Mood normal        LABORATORY DATA     Labs:  I have personally reviewed pertinent reports  Results from last 7 days   Lab Units 11/23/22  0512 11/22/22  0517 11/20/22  1433   WBC Thousand/uL 12 50* 12 55* 14 56*   HEMOGLOBIN g/dL 8 9* 8 8* 9 2*   HEMATOCRIT % 30 8* 31 5* 31 1*   PLATELETS Thousands/uL 258 266 340   NEUTROS PCT % 88* 86* 89*   MONOS PCT % 7 7 7      Results from last 7 days   Lab Units 11/23/22  0512 11/22/22  0517 11/20/22  1433   POTASSIUM mmol/L 3 3* 3 6 3 9   CHLORIDE mmol/L 112* 111* 112*   CO2 mmol/L 23 23 24   BUN mg/dL 13 14 13   CREATININE mg/dL 1 16 1 19 1 29   CALCIUM mg/dL 9 2 9 7 10 3*   ALK PHOS U/L 97 99 109   ALT U/L 20 19 23   AST U/L 27 24 23     Results from last 7 days   Lab Units 11/23/22  0512 11/22/22  0517   MAGNESIUM mg/dL 2 3 1 6     Results from last 7 days   Lab Units 11/23/22  0512 11/22/22  0517   PHOSPHORUS mg/dL 1 7* 1 4*                  Micro:  Lab Results   Component Value Date    BLOODCX No Growth After 5 Days  11/17/2022    BLOODCX No Growth After 5 Days  11/17/2022    BLOODCX Actinomyces species (A) 11/15/2022    BLOODCX No Growth After 5 Days  11/15/2022    URINECX 10,000-19,000 cfu/ml 11/11/2021    SPUTUMCULTUR [place] of 11/16/2022    SPUTUMCULTUR 1+ Growth of Staphylococcus aureus (A) 11/15/2022    SPUTUMCULTUR [place] of 11/15/2022     IMAGING & DIAGNOSTIC TESTING     Imaging: I have personally reviewed pertinent reports  XR chest portable    Result Date: 11/17/2022  Impression: Near complete drainage of right effusion with trace residual effusion  Masslike opacity in the right lower lobe better shown on CT  Mild pulmonary venous congestion  Workstation performed: [id number]     CT head wo contrast    Result Date: 11/15/2022  Impression: No acute intracranial abnormality  Chronic microangiopathic changes  Workstation performed: [id number]     CT chest wo contrast    Result Date: 11/19/2022  Impression: 1  Right lower lobe consolidation appears similar    There is relative internal hypodensity, along with air, consistent with abscess/necrosis  Recommend remains for a follow-up CT  2   Stable right lower lobe airway debris, with new airway debris in the middle lobe, consistent with interval aspiration event 3  Moderate right pleural effusion, not significantly changed in size  Trace left pleural effusion  4   Interstitial opacities consistent with mild edema 5  Stable left lower lobe nodule, attention on follow-up The study was marked in EPIC for immediate notification  Workstation performed: [id number]     IR IN-Patient Thoracentesis    Result Date: 11/18/2022  Impression: Impression: Ultrasound-guided thoracentesis yielding 900 mL clear yellow pleural fluid  Signed, performed, and dictated by CHANEL Silva under the supervision of Dr Raimundo Pickens  Workstation performed: [id number]     PE Study with CT Abdomen and Pelvis with contrast    Result Date: 11/15/2022  Impression: No central, lobar, segmental or proximal subsegmental pulmonary embolism  Evaluation of the distal subsegmental pulmonary arteries is limited  Large, heterogeneous airspace consolidation in the right lower lobe, with a 6 3 x 5 2 x 4 8 cm somewhat lobulated hypodense component with a small droplet of air  This likely represents a necrotizing aspiration pneumonia, given mucous plugging throughout the right lower lobe and retained secretions/debris in the right mainstem bronchus and bronchus intermedius  Recommend follow-up chest CT in approximately 3 months to ensure resolution as an underlying neoplasm may have a similar appearance  Nonspecific 7 x 6 mm lobulated left lower lobe nodule  Attention on follow-up  Moderate right pleural effusion  No enhancement of the pleura to suggest empyema  Mediastinal and right hilar lymphadenopathy, likely reactive, however metastatic disease is not entirely excluded  Mild stranding around the proximal pancreas suggestive of acute interstitial pancreatitis  Recommend correlation with lipase level    No organized peripancreatic fluid collections, loss of parenchymal enhancement to suggest necrosis, or vascular complications of pancreatitis  No other acute abdominal or pelvic pathology  Multiple additional findings as above  The study was marked in [facility] for immediate notification  Workstation performed: [id number]     EKG, Pathology, and Other Studies: I have personally reviewed pertinent reports       ALLERGIES     Allergies   Allergen Reactions   • Benazepril Cough   • Solifenacin Other (See Comments)   • Olmesartan Rash     MEDICATIONS     Current Facility-Administered Medications   Medication Dose Route Frequency Provider Last Rate   • acetaminophen  650 mg Oral Q6H PRN Linette Gannon DO     • albuterol  2 5 mg Nebulization Q4H PRN Sivakumar Mojica MD     • atorvastatin  40 mg Oral Daily With [address] JOHN PAUL Fonseca DO     • cefTRIAXone  1,000 mg Intravenous Q24H Ha Ba MD 1,000 mg (11/22/22 1817)   • clopidogrel  75 mg Oral Daily Ha Ba MD     • glycerin-hypromellose-  1 drop Both Eyes PRN Mario Roland MD     • heparin (porcine)  5,000 Units Subcutaneous [facility] Ha Ba MD     • HYDROmorphone  0 2 mg Intravenous Q6H PRN Sivakumar Mojica MD     • iron sucrose  200 mg Intravenous Once per day on Mon Wed Fri CHANEL Shaffer Stopped (11/23/22 9295)   • levothyroxine  100 mcg Oral Early Morning London Gails, DO     • loratadine  5 mg Oral Daily [place] Gails, DO     • oxyCODONE  2 5 mg Oral Q6H PRN London Gails, DO      Or   • oxyCODONE  5 mg Oral Q6H PRN London Gails, DO     • potassium-sodium phosphateS  1 tablet Oral Daily With Breakfast Adelina Richardo Goltz, MD     • sertraline  25 mg Oral Daily Junaidfrantz Fonseca DO          acetaminophen, 650 mg, Q6H PRN  albuterol, 2 5 mg, Q4H PRN  glycerin-hypromellose-, 1 drop, PRN  HYDROmorphone, 0 2 mg, Q6H PRN  oxyCODONE, 2 5 mg, Q6H PRN   Or  oxyCODONE, 5 mg, Q6H PRN        Portions of the record may have been created with voice recognition software  Occasional wrong word or "sound a like" substitutions may have occurred due to the inherent limitations of voice recognition software    Read the chart carefully and recognize, using context, where substitutions have occurred     ==  Sanjiv Maldonado MD  [address]  Internal Medicine Residency PGY-2

## 2022-11-23 NOTE — ASSESSMENT & PLAN NOTE
· Patient history of laryngeal carcinoma status post tracheostomy/laryngectomy who presented to ED with tachycardia, hypoxia, fatigue  · CTA chest notable for significant findings including:  · Large, heterogeneous airspace consolidation in the right lower lobe, with a 6 3 x 5 2 x 4 8 cm somewhat lobulated hypodense component likely a necrotizing aspiration pneumonia  · Moderate right pleural effusion  No enhancement of the pleura to suggest empyema  · Mediastinal and right hilar lymphadenopathy, likely reactive, however metastatic disease is not entirely excluded  · Legionella and strep antigens negative  · Status post 900 mL fluid thoracentesis suggestive of transudative fluid for pulmonology and may be secondary to pancreatitis  · Blood cultures 1/2 group Actinomyces gram variable rods on 11/15  · Blood cultures x2 on 11/17 final: no growth   · Sputum culture on 11/15 grew Staph aureus  · Sputum culture on 11/16 final: no growth  · Plan for bronchoscopy and BAL 11/22, however, procedure aborted due to patient becoming hemodynamically unstable after anesthesia induction  Was in the ICU 11/22-11/23, now transferred back to [facility]       Plan:  · Ceftriaxone 1 gm Q24H Day 3, continue per ID recommendations   · 11/22 bronchial, viral, and fungal cultures pending from tracheal aspirate - partial bronch completed at that time   · Trend WBC and Fever curve  WBC 14 43 today, increased from 12 50 yesterday   · Follow-up chest CT in 3 months as an outpatient to ensure resolution as an underlying neoplasm may have a similar appearance

## 2022-11-23 NOTE — PLAN OF CARE
Problem: OCCUPATIONAL THERAPY ADULT  Goal: Performs self-care activities at highest level of function for planned discharge setting  See evaluation for individualized goals  Description: Treatment Interventions: ADL retraining, Functional transfer training, UE strengthening/ROM, Cognitive reorientation, Endurance training, Patient/family training, Equipment evaluation/education, Compensatory technique education, Activityengagement, Energy conservation          See flowsheet documentation for full assessment, interventions and recommendations  Note: Limitation: Decreased ADL status, Decreased Safe judgement during ADL, Decreased UE ROM, Decreased UE strength, Decreased endurance, Decreased cognition, Decreased self-care trans, Decreased high-level ADLs  Prognosis: Fair  Assessment: pt seen for OT treatment session w focus on bedmobility, sitting balance/tolerance at EOB, and simple self care  pt can complete most of grooming self w set up, increased time, minassist for UB self care, mod assist LB bathing  she does need increased time as she fatigues easily  pt educated on self pacing skills  Good balance noted at EOB   OT jelani continue to follow while in acute care     OT Discharge Recommendation: Home with home health rehabilitation

## 2022-11-23 NOTE — QUICK NOTE
Patient is an 52-year-old female with multiple comorbid conditions including remote history of laryngeal carcinoma status post laryngectomy in 2004  She seems to have also CKD stage IIIB, hypertension hyperlipidemia  Since 8/2022, she started to have significant hypercalcemia with no interventions from the PCP  For this inpatient stay, she has already received Zometa  We added Calcitonin every 12 hours x2 days while waiting for the Zometa effect to kick in  Hypercalcemia slowly improving over time from 13 8 on initial consultation down to 10 7 today  The exact etiology of her hypercalcemia is not entirely clear  Her recent imaging showed significant right pleural effusion status post thoracentesis  The cytology was negative for malignant cells  She seems to have large airspace consolidation in the right lower lobe which may be due to pneumonia  Patient was scheduled for bronchoscopy earlier this week, but was aborted due to becoming hemodynamically unstable after anesthesia induction  Will follow patient as we await completion of biopsy  Please do not hesitate to reach out for any questions or concerns via [employer]    Rafi Clark, [address]   Hematology-Oncology

## 2022-11-23 NOTE — ASSESSMENT & PLAN NOTE
· Patient history of laryngeal carcinoma status post tracheostomy/laryngectomy on trach collar presented with fatigue; tachycardic on exam; labs notable for multiple derangements including hypercalcemia 14 3 and bicarb 18  · CT notable for nonspecific 7 x 6 mm lobulated left lower lobe nodule; new finding; CT imaging also revealing for multiple findings large heterogeneous airspace consolidation and right lower lobe with 6 x 5 x 5 lobulated hypodense component with moderate right-sided pleural effusion  concerning for necrotizing pneumonia  · Malignancy not entirely ruled out; does endorse decreased appetite, unexpected weight loss of up to 30 lb, and is a former smoker; quit 2002; hypercalcemia also noted on admission with recent outpatient PTH wnl  · 11/19 - repeat CT suggested of necrosis/abscess    Plan  · Inpatient pulmonology following, appreciate recs; 5 day course completed abx for suspecting necrotizing pneumonia with large heterogeneous airspace consolidation in right lower lobe  · ID consulted, appreciate reccs  On ceftriaxone 1 gm Q24H, day 3 today   Bronchial aspirates pending results   · Repeat CT future to assess for improvement, outpatient

## 2022-11-23 NOTE — ASSESSMENT & PLAN NOTE
· Known hx, follows with vascular surgery (Dr Markus Wu)  · Previously found to have recurrent left ICA stenosis and critical new occlusive right IC stenosis, with a which were asymptomatic, along with left vertebral artery atresia; previously worked up in outpatient setting by mass concerning symptoms and was recommended endovascular intervention  · On 07/2020, patient [place] [facility] [place] which she underwent ultrasound-guided right CFA puncture with sternal closure, aortic arch arteriogram, left carotid during him with PTA and stenting under the care of Dr Brown  · Maintained on antiplatelet and anti-statin therapy with Plavix and Lipitor     Plan:   · Continue home dose lipitor  · Restarted home Plavix after bronch was cancelled

## 2022-11-23 NOTE — SPEECH THERAPY NOTE
SLP Laryngectomy Evaluation    [facility] Name: [facility] Date: 11/23/2022     Problem List  Principal Problem:    Sepsis with acute hypoxic respiratory failure ([facility] )  Active Problems:    Asymptomatic carotid artery stenosis    Hyperlipidemia    Hypertension    Laryngeal carcinoma (HCC)    CKD (chronic kidney disease) stage 3, GFR 30-59 ml/min    JORDAN (acute kidney injury) ([facility] )    Hypercalcemia    Right lower lobe lung mass with right pleural effusion    Pancreatitis    Lung nodule seen on imaging study    Anemia of chronic disease    Hypothyroidism    Gram-positive bacteria on cultures    Past Medical History  Past Medical History:   Diagnosis Date   • [facility]    • 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 (HCC)    • Carotid artery stenosis    • Hyperlipidemia    • Hypertension    • PVD (peripheral vascular disease) ([facility] )    • Throat cancer [facility])      Past Surgical History  Past Surgical History:   Procedure Laterality Date   • GALLBLADDER SURGERY  03/30/2019   • ILIAC ARTERY STENT Left    • IR CEREBRAL ANGIOGRAPHY / INTERVENTION  7/22/2020   • IR THORACENTESIS  11/16/2022   • LARYNX SURGERY  2002   • LEG SURGERY  07/10/2012       Summary  Pt is a total laryngectomee having had the surgery reportedly about 20 years ago  She generally tolerates a regular diet, communicates with a voice prosthesis, and independently cleans/manages her stoma and HMEs  ST was consulted due to pt having difficulty with her voice prosthesis not functioning correctly  She was seen at bedside, examined with and without HME in place (base plate had been changed earlier today), and voice prostheses was visualized appearing to be securely in place in the TEP  While applying pressure to seal her HME, pt was able to speak freely at the conversational level  Pt suspects the prosthesis was blocked but appears to have cleared now   Also visualized TEP while pt drank cranberry juice and no leak was visible  She is aware she may be approaching the need to have a new prosthesis placed  Will f/u while pt is in the hospital  If prosthesis continues to malfunction, will consult ENT, however if it continues to work well recommend ENT f/u as scheduled on an OP basis  Current Medical Status per H&P 11/15/22  Damien Richard is a 81y o  year old female with a past medical history of 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, CKD stage IIIB (baseline Cr 1 1-1 4), HLD and 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, PAD with intermittent claudication, asymptomatic bilateral carotid stenosis 2/2 radiation s/p bilateral  TCAR followed by R carotid PTA stent (7/2020) on Plavix and Lipitor, hypothyroidism on levothyroxine, HTN controlled with losartan, and T2DM controlled with metformin, who presented to ED from SNF on 11/15 with 1mo  of vague symptoms including abdominal pain, lightheadedness, intermittent SOB, and unexpected recent 20-30 lb weight loss  Denies chest pain  Denies syncope/LOC, falls, recent head strikes  Denies fever or chills, n/v/d  Denies overt signs of bleeding including hematuria, melena/hematochezia  Denies urinary symptoms  Mentions she has been frequently taking vitamin-D-calcium-magnesium supplementation  Compliant with all other home meds  Denies recent long distance travel or sick contacts  No recent hospital admissions or ED evaluations  Per chart review, patient regularly follows with Nephrology, ENT, vascular surgery, and PCP  Does not have an established care with outpatient pulmonology  ED course: On initial presentation, patient nontoxic appearing, VS significant for tachycardia with HR in 120s, tachypnea RR 20s, borderline hypotensive systolic in the 39C to 78W and hypoxia requiring trach O2 5 L   Exam notable for tachycardia, systolic ejection murmur at right upper sternal border, rales at the R lower base, diffuse abdominal tenderness to palpation; trach with voice box functioning well  Workup in the ED significant for multiple derangements including elevated WBC 15 58, hemoglobin 11 1 (baseline 12s), low bicarb 18, elevated BUN 34, elevated CR 1 47 (baseline 1 1-1 4), corrected calcium 14 3, , hypoalbuminemia 2 1 gamma gap 5 6, lipase around 2000, elevated procalcitonin 14 9, uric acid 10 4  Subsequent imaging including CT head showed chronic microangiopathic changes; CT C/A/P showed large airspace consolidation in our LB with heterogeneous hypodense component measuring at 4 8 x 5 2 x 6 3 cm with moderate right pleural effusion and hilar lymphadenopathy concerning for necrotizing pneumonia versus malignancy; furthermore, CT imaging also revealed new 7 x 6 mm left lower lobe nodule and mild fat stranding surrounding the pancreas concerning for acute pancreatitis  Patient initially received 1 L bolus isolate attempts to however was subsequently given 1L bolus 0 9% on NS it treat acute pancreatitis, and was initiated on IV Unasyn 3 g treat suspected necrotizing PNA  Was subsequently admitted to [facility] further evaluation     Code status: Code status discussed in detail with patient, who expresses clear desire to be level level 3 DNR DNI    Special Studies:  CT chest 11/19/22 IMPRESSION:  1  Right lower lobe consolidation appears similar  There is relative internal hypodensity, along with air, consistent with abscess/necrosis  Recommend remains for a follow-up CT  2   Stable right lower lobe airway debris, with new airway debris in the middle lobe, consistent with interval aspiration event   3  Moderate right pleural effusion, not significantly changed in size  Trace left pleural effusion  4   Interstitial opacities consistent with mild edema  5    Stable left lower lobe nodule, attention on follow-up    Social/Education/Vocational Hx:  Pt lives alone    EDUCATION  Reviewed stoma care and voice prostheses care  Pt has prosthesis changed by ENT as needed  She changes her base plate every 1-2 days, and changes HME daily  She does not wear a shower cover on her stoma but reported the way her shower is set up the water hits her below her stoma  PLAN:  1  Speech therapy to continue as an inpatient and at next level of care as indicated   2  Pt will independently communicate with voice prosthesis effectively throughout the course of her hospitalization and optimally f/u with ENT as OP

## 2022-11-23 NOTE — PROGRESS NOTES
Progress Note - Infectious Disease   Sumeet Whatley 81 y o  female MRN: [id number]  Unit/Bed#: [facility] 203-01 Encounter: [id number]      Impression:  1  Necrotizing RLL pneumonia R/0 abscess S/P bronchoscopy/BAL  2  History of laryngeal carcinoma s/p laryngectomy/tracheitis  3  DM type 2  4  CAD  5  Extensive PVD    Recommendations:  Patient is afebrile with elevated but decreased WBC count 12,500  Was unable to complete bronchoscopy/BAL secondary to bronchospasm  1  Bronchoscopy/BAL was only partially complete  2  In light of above as discussed continue ceftriaxone 1 g Q 24 hours IV pending bronchoscopy/BAL culture results to determine if actinomycosis is contaminant or pathogen  Antibiotics:  1  Ceftriaxone 1 g Q 24 hours IV, day 3 Rx     Subjective: The patient has no complaints  Denies shortness of breath or cough  Denies fevers, chills, or sweats  Denies nausea, vomiting, or diarrhea  Objective:  Vitals:  Temp:  [97 5 °F (36 4 °C)-98 4 °F (36 9 °C)] 97 6 °F (36 4 °C)  HR:  [102-108] 102  Resp:  [18] 18  BP: (115-151)/(53-71) 142/66  SpO2:  [94 %-99 %] 94 %  Temp (24hrs), Av 9 °F (36 6 °C), Min:97 5 °F (36 4 °C), Max:98 4 °F (36 9 °C)  Current: Temperature: 97 6 °F (36 4 °C)    Physical Exam:     General Appearance:    Eyes:   Alert, responsive, chronically ill-appearing nontoxic, no acute distress  Conjunctiva pale   Throat: Oropharynx moist without lesions  Lips, mucosa, and tongue normal   Neck: Tracheostomy, surgical scars   Lungs:   Bibasilar dullness   Heart:  Regular rate with ectopics and rhythm, S1, S2 normal, no murmur, rub or gallop   Abdomen:   Soft, non-tender, non-distended, positive bowel sounds  No masses, no organomegaly    No CVA tenderness   Extremities: Extremities normal, atraumatic, no clubbing, cyanosis or edema   Skin: Skin color pale, surgical scars, as above texture, turgor normal, no rashes or lesions  No draining wounds noted           Invasive Devices     Peripheral Intravenous Line  Duration           Peripheral IV 11/21/22 Distal;Dorsal (posterior); Left Forearm 2 days    Peripheral IV 11/22/22 Left Antecubital 1 day    Peripheral IV 11/22/22 Right Hand 1 day          Drain  Duration           External Urinary Catheter <1 day          Airway  Duration           Surgical Airway -- days                Labs, Imaging, & Other studies:   All pertinent labs were personally reviewed  Results from last 7 days   Lab Units 11/23/22  0512 11/22/22  0517 11/20/22  1433   WBC Thousand/uL 12 50* 12 55* 14 56*   HEMOGLOBIN g/dL 8 9* 8 8* 9 2*   PLATELETS Thousands/uL 258 266 340     Results from last 7 days   Lab Units 11/23/22  0512 11/22/22  0517 11/20/22  1433   SODIUM mmol/L 142 139 142   POTASSIUM mmol/L 3 3* 3 6 3 9   CHLORIDE mmol/L 112* 111* 112*   CO2 mmol/L 23 23 24   BUN mg/dL 13 14 13   CREATININE mg/dL 1 16 1 19 1 29   EGFR ml/min/1 73sq m 42 41 37   CALCIUM mg/dL 9 2 9 7 10 3*   AST U/L 27 24 23   ALT U/L 20 19 23   ALK PHOS U/L 97 99 109     Results from last 7 days   Lab Units 11/17/22  2141   BLOOD CULTURE  No Growth After 5 Days  No Growth After 5 Days

## 2022-11-23 NOTE — ASSESSMENT & PLAN NOTE
· Likely secondary to hypercalcemia  · Presented with abdominal pain; labs notable for elevated lipase >2000, elevated WBC  · CT imaging suggestive pancreatitis  · Patient has elevated alk-phos with normal T bili and asymptomatic abnormal biliary strictures on imaging  · Patient reports improved appetite and improved abdominal pain    Plan:  · Supportive care with analgesics or antiemetics as needed; renal function at baseline, ECG with normal QTC  · Continue diet as tolerated   · Continued monitoring of vitals, O2 sats, urine output with goal > 0 5-1 mL/kg/hr  · Replete electrolytes as indicated    Suspected as having resolved, limiting fluid resuscitation at this time due to volume overload  Unclear source of hypercalcemia that triggered pancreatitis, hematology on board for hypercalcemia control   Ca slowly downtrending, last 10 2 today

## 2022-11-23 NOTE — PROGRESS NOTES
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, CKD stage IIIB (baseline Cr 1 1-1 4), HLD and 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, PAD with intermittent claudication, asymptomatic bilateral carotid stenosis 2/2 radiation s/p bilateral  TCAR followed by R carotid PTA stent (7/2020) on Plavix and Lipitor, hypothyroidism on levothyroxine, HTN controlled with losartan, and T2DM controlled with metformin, who presented to ED from  on 11/15 with 1mo  of vague symptoms including abdominal pain, lightheadedness, intermittent SOB, and unexpected recent 20-30 lb weight loss  Found to have a large airspace consolidation with moderate right pleural effusion and hilar lymphadenopathy concerning for necrotizing pneumonia versus malignancy; furthermore, CT imaging also revealed new 7 x 6 mm left lower lobe nodule  11/15 BCx 1/2 growing actinomyces, but 11/17 BCx no growth to date  11/15 sputum culture grew S  Aureus, repeat sputum culture 11/16 negative  Patient is on ceftriaxone per ID recommendations  Patient planned to go for bronchoscopy and BAL on 11/22 but was aborted following anesthesia induction when patient became bradycardic and hypotensive, thought to have bronchospasm  24hr events:  • No acute events    ---------------------------------------------------------------------------------------  SUBJECTIVE  Patient seen and examined  Review of Systems   Constitutional: Negative for chills, diaphoresis and fever  HENT: Negative for ear pain and hearing loss  Eyes: Negative for visual disturbance     Respiratory: Negative for cough, choking, chest tightness and shortness of breath  Cardiovascular: Negative for chest pain, palpitations and leg swelling  Gastrointestinal: Positive for abdominal pain  Negative for abdominal distention, constipation, diarrhea, nausea and vomiting  Endocrine: Negative  Genitourinary: Negative for difficulty urinating and dysuria  Musculoskeletal: Positive for arthralgias  Neurological: Negative for dizziness and syncope      ---------------------------------------------------------------------------------------  Assessment and Plan:    Problem List:  1  Bradycardia  2  Hypotension  3  Bronchospasm s/p attempted bronchoscopy  4  HyperCa  5  History of 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 Plan: tylenol prn for mild pain, oxycodone 2 5 for moderate pain, oxycodone 5 for severe pain, dilaudid 0 2mg for breakthrough pain  • Diagnosis: Anxiety/Depression  ? Plan: sertraline        CV:   • Diagnosis: Hypotension  ? Plan: Monitor off IVF and pressors  ? Holding home losartan  • Diagnosis: Hyperlipidemia  ? Plan: Continue home lipitor  • Diagnosis: Asymptomatic carotid artery stenosis  ? Plan: Continue home lipitor, plavix        Pulm:  • Diagnosis: Acute hypoxic respiratory failure, tracheostomy 2/2 laryngeal carcinoma  ? Plan: continue trach collar oxygen, wean as tolerated  • Diagnosis: RLL Mass likely necrotizing pneumonia, possible malignancy  ? Plan: Antibiotics per ID, Bronch/BAL planned per pulmonology        GI:   • Diagnosis: Pancreatitis 2/2 hypercalcemia - resolved  ?  Plan: Supportive care, diet        :   • Diagnosis: JORDAN on CKD3 (b/l 1-1 1) - resolving  ? Plan: Trend Cr; avoid nephrotoxic agents        F/E/N:   • F: no fluids  • E: replete prn  • N: reg diet         Heme/Onc:   • Diagnosis: Anemia of chronic disease  ? Plan: IV Venofer; Trend CBC; plan to transfuse if Hgb < 7  • Diagnosis: DVT PPX  ? Plan: Heparin subQ        Endo:   • Diagnosis: Hypercalcemia  ? Elevated corrected calcium, low PTH in setting of CKD3, possible malignancy  ? Vit-D 25hydroxy within normal limits, SPEP with faint gammopathy  ? Plan: Pending UPEP, PTHrP  • Diagnosis: Hypothyroidism  ? Plan: Continue home Levothyroxine 100mcg  • Diagnosis: Type 2 Diabetes  ? Plan: Holding metformin, BS appropriate off insulin inpatient        ID:   • Diagnosis: Sepsis with acute hypoxic respiratory failure 2/2 pneumonia  ? Sputum Cx 11/15: staph aureus, Sputum Cx 11/16: no organisms seen  ? BCx 11/15: 1/2 growing actinomyces; BCx 11/17 no growth to date  ? Plan: Ceftriaxone 1g q24 per ID recommendations        MSK/Skin:   • Diagnosis: Mobilization goals  ? Plan: PT/OT       Appropriate for transfer out of ICU today  Disposition: Transfer to Med-Surg   Code Status: Level 3 - DNAR and DNI  ---------------------------------------------------------------------------------------  ICU CORE MEASURES     Prophylaxis   VTE Pharmacologic Prophylaxis: Heparin  VTE Mechanical Prophylaxis: sequential compression device  Stress Ulcer Prophylaxis: Prophylaxis Not Indicated     Invasive Devices Review  Invasive Devices     Peripheral Intravenous Line  Duration           Peripheral IV 11/21/22 Distal;Dorsal (posterior); Left Forearm 1 day    Peripheral IV 11/22/22 Left Antecubital <1 day    Peripheral IV 11/22/22 Right Hand <1 day          Airway  Duration           Surgical Airway -- days              Can any invasive devices be discontinued today?  Not applicable  ---------------------------------------------------------------------------------------  OBJECTIVE    Vitals:  Temp:  [97 5 °F (36 4 °C)-98 4 °F (36 9 °C)] 97 5 °F (36 4 °C)  HR:  [106-117] 108  Resp:  [18-20] 18  BP: ()/(46-87) 151/65  FiO2 (%):  [35-60] 60  Vitals:    11/22/22 2330 11/23/22 0405 11/23/22 0530 11/23/22 0722   BP: 151/65      Pulse: (!) 108      Resp:  18     Temp:  97 5 °F (36 4 °C)     TempSrc:  Oral     SpO2: 98%   99%   Weight:   65 7 kg (144 lb 12 8 oz)    Height:           Physical Exam:  Physical Exam  Vitals and nursing note reviewed  Constitutional:       General: She is not in acute distress  HENT:      Head: Normocephalic and atraumatic  Eyes:      General: No scleral icterus  Extraocular Movements: Extraocular movements intact  Conjunctiva/sclera: Conjunctivae normal    Cardiovascular:      Rate and Rhythm: Regular rhythm  Tachycardia present  Pulses: Normal pulses  Pulmonary:      Effort: Pulmonary effort is normal  No respiratory distress  Abdominal:      Tenderness: There is no abdominal tenderness  Musculoskeletal:      Cervical back: Normal range of motion  Right lower leg: No edema  Left lower leg: No edema  Skin:     Capillary Refill: Capillary refill takes less than 2 seconds  Neurological:      Motor: No weakness  Psychiatric:         Mood and Affect: Mood normal          Behavior: Behavior normal          Intake and Output:  I/O       11/21 0701 11/22 0700 11/22 0701 11/23 0700    P  O  318     I V  (mL/kg) 200 (3 3) 700 (10 7)    IV Piggyback  250    Total Intake(mL/kg) 518 (8 6) 950 (14 5)    Urine (mL/kg/hr) 925 (0 6) 449 (0 3)    Stool 0 0    Total Output 925 449    Net -407 +501          Unmeasured Urine Occurrence 1 x 1 x    Unmeasured Stool Occurrence 1 x 1 x          Drips:       Laboratory and Diagnostics:  Results from last 7 days   Lab Units 11/23/22  0512 11/22/22  0517 11/20/22  1433 11/19/22  1407 22  0240 22  0406   WBC Thousand/uL 12 50* 12 55* 14 56* 12 56* 10 15 11 93*   HEMOGLOBIN g/dL 8 9* 8 8* 9 2* 8 9* 9 1* 9 6*   HEMATOCRIT % 30 8* 31 5* 31 1* 29 9* 31 4* 31 8*   PLATELETS Thousands/uL 258 266 340 313 316 342   NEUTROS PCT % 88* 86* 89* 88* 85* 85*   MONOS PCT % 7 7 7 7 7 7     Results from last 7 days   Lab Units 22  0512 22  0517 22  1433 22  1408 22  0240 22  1415 22  0406   SODIUM mmol/L 142 139 142 141 143 141 140   POTASSIUM mmol/L 3 3* 3 6 3 9 3 3* 3 7 3 7 3 8   CHLORIDE mmol/L 112* 111* 112* 113* 112* 114* 114*   CO2 mmol/L 23 23 24 20* 23 18* 19*   ANION GAP mmol/L 7 5 6 8 8 9 7   BUN mg/dL 13 14 13 13 19 19 21   CREATININE mg/dL 1 16 1 19 1 29 1 09 0 96 1 11 0 88   CALCIUM mg/dL 9 2 9 7 10 3* 10 9* 12 1* 11 5* 11 6*   GLUCOSE RANDOM mg/dL 103 101 193* 141* 103 136 102   ALT U/L 20 19 23 27 25 22 21   AST U/L 27 24 23 26 25 21 24   ALK PHOS U/L 97 99 109 108 113 114 122*   ALBUMIN g/dL 2 1* 2 0* 2 0* 1 8* 1 9* 1 6* 1 9*   TOTAL BILIRUBIN mg/dL 0 23 0 34 0 32 0 29 0 37 0 34 0 35     Results from last 7 days   Lab Units 22  0512 22  0517   MAGNESIUM mg/dL 2 3 1 6   PHOSPHORUS mg/dL 1 7* 1 4*                       ABG:    VBG:        Results from last 7 days   Lab Units 22  0406   TRIGLYCERIDES mg/dL 116       Micro  Results from last 7 days   Lab Units 22  2141 22  1524 22  1416   BLOOD CULTURE  No Growth After 5 Days  No Growth After 5 Days  --   --    SPUTUM CULTURE   --   --  Few Colonies of   GRAM STAIN RESULT   --  No Polys or Bacteria seen Rare Epithelial Cells  4+ Polys  No bacteria seen   BODY FLUID CULTURE, STERILE   --  No growth  --        Respiratory:  SpO2: SpO2: 99 %, SpO2 Activity: SpO2 Activity: At Rest, SpO2 Device: O2 Device: Trach mask, Capnography:       EK/17: Sinus tachycardia with Premature supraventricular complexes;  Left axis deviation    Imaging:   XR chest portable    Result Date: 11/17/2022  Impression: Near complete drainage of right effusion with trace residual effusion  Masslike opacity in the right lower lobe better shown on CT  Mild pulmonary venous congestion  Workstation performed: [id number]     CT head wo contrast    Result Date: 11/15/2022  Impression: No acute intracranial abnormality  Chronic microangiopathic changes  Workstation performed: [id number]     CT chest wo contrast    Result Date: 11/19/2022  Impression: 1  Right lower lobe consolidation appears similar  There is relative internal hypodensity, along with air, consistent with abscess/necrosis  Recommend remains for a follow-up CT  2   Stable right lower lobe airway debris, with new airway debris in the middle lobe, consistent with interval aspiration event 3  Moderate right pleural effusion, not significantly changed in size  Trace left pleural effusion  4   Interstitial opacities consistent with mild edema 5  Stable left lower lobe nodule, attention on follow-up The study was marked in [facility] for immediate notification  Workstation performed: [id number]     IR IN-Patient Thoracentesis    Result Date: 11/18/2022  Impression: Impression: Ultrasound-guided thoracentesis yielding 900 mL clear yellow pleural fluid  Signed, performed, and dictated by CHANEL Fierro under the supervision of Dr Vonnie Tinajero  Workstation performed: [id number]     PE Study with CT Abdomen and Pelvis with contrast    Result Date: 11/15/2022  Impression: No central, lobar, segmental or proximal subsegmental pulmonary embolism  Evaluation of the distal subsegmental pulmonary arteries is limited  Large, heterogeneous airspace consolidation in the right lower lobe, with a 6 3 x 5 2 x 4 8 cm somewhat lobulated hypodense component with a small droplet of air    This likely represents a necrotizing aspiration pneumonia, given mucous plugging throughout the right lower lobe and retained secretions/debris in the right mainstem bronchus and bronchus intermedius  Recommend follow-up chest CT in approximately 3 months to ensure resolution as an underlying neoplasm may have a similar appearance  Nonspecific 7 x 6 mm lobulated left lower lobe nodule  Attention on follow-up  Moderate right pleural effusion  No enhancement of the pleura to suggest empyema  Mediastinal and right hilar lymphadenopathy, likely reactive, however metastatic disease is not entirely excluded  Mild stranding around the proximal pancreas suggestive of acute interstitial pancreatitis  Recommend correlation with lipase level  No organized peripancreatic fluid collections, loss of parenchymal enhancement to suggest necrosis, or vascular complications of pancreatitis  No other acute abdominal or pelvic pathology  Multiple additional findings as above  The study was marked in [facility] for immediate notification  Workstation performed: [id number]       Invasive/non-invasive ventilation settings   Respiratory    Lab Data (Last 4 hours)    None         O2/Vent Data (Last 4 hours)    None                      Height and Weights   Height: 4' 8" (142 2 cm)     Body mass index is 32 46 kg/m²  Weight (last 2 days)     Date/Time Weight    11/23/22 0530 65 7 (144 8)          Nutrition       Diet Orders   (From admission, onward)             Start     Ordered    11/22/22 1735  Diet Regular; Regular House  Diet effective now        References:    Nutrtion Support Algorithm Enteral vs  Parenteral   Question Answer Comment   Diet Type Regular    Regular Regular House    RD to adjust diet per protocol?  Yes        11/22/22 1739                TF details if applicable: N/A    Active Medications  Scheduled Meds:  Current Facility-Administered Medications   Medication Dose Route Frequency Provider Last Rate   • acetaminophen  650 mg Oral Q6H PRN Linette Gannon DO     • albuterol  2 5 mg Nebulization Q4H PRN Baron Chandra MD     • atorvastatin  40 mg Oral Daily With [employer] P Jamil Luna, DO     • cefTRIAXone  1,000 mg Intravenous Q24H Sriram Riddle MD 1,000 mg (11/22/22 1817)   • clopidogrel  75 mg Oral Daily Sriram Riddle MD     • glycerin-hypromellose-  1 drop Both Eyes PRN Pearlie Dakins, MD     • heparin (porcine)  5,000 Units Subcutaneous [facility] Sriram Riddle MD     • HYDROmorphone  0 2 mg Intravenous Q6H PRN Nancy Poe MD     • iron sucrose  200 mg Intravenous Once per day on Mon Wed Fri CHANEL Maria     • levothyroxine  100 mcg Oral Early Morning Tammi Garcia, DO     • loratadine  5 mg Oral Daily Tammi Garcia, DO     • oxyCODONE  2 5 mg Oral Q6H PRN Tammi Garcia, DO      Or   • oxyCODONE  5 mg Oral Q6H PRN Tammi Garcia, DO     • potassium chloride  40 mEq Oral Once Sriram Riddle MD     • potassium-sodium phosphateS  1 tablet Oral Daily With Breakfast Alexa Breen MD     • sertraline  25 mg Oral Daily Junaid Fonseca DO       Continuous Infusions:     PRN Meds:   acetaminophen, 650 mg, Q6H PRN  albuterol, 2 5 mg, Q4H PRN  glycerin-hypromellose-, 1 drop, PRN  HYDROmorphone, 0 2 mg, Q6H PRN  oxyCODONE, 2 5 mg, Q6H PRN   Or  oxyCODONE, 5 mg, Q6H PRN        Allergies   Allergies   Allergen Reactions   • Benazepril Cough   • Solifenacin Other (See Comments)   • Olmesartan Rash     ---------------------------------------------------------------------------------------  Advance Directive and Living Will: Yes    Power of :    POLST:    ---------------------------------------------------------------------------------------  Care Time Delivered:   I spent 30 mins delivering care for the patient  Sriram Riddle MD, PGY1  [facility] Internal Medicine Residency    Portions of the record may have been created with voice recognition software  Occasional wrong word or "sound a like" substitutions may have occurred due to the inherent limitations of voice recognition software    Read the chart carefully and recognize, using context, where substitutions have occurred

## 2022-11-23 NOTE — ASSESSMENT & PLAN NOTE
Creatinine of 1 47 on admission treated with IV fluid boluses, 3L total, NSS at 100ml/hr, renal function assessment and lab monitoring  Pt has CKD 3 and noted baseline of 1-1 1    Plan:  · Cr slowly improving, 1 11 today which is close to her baseline   Will continue to monitor   · Avoid nephrotoxic agents  · Monitor urine output   · Encourage adequate intake

## 2022-11-23 NOTE — ASSESSMENT & PLAN NOTE
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  · Follows with ENT as an outpatient with Dr Cathi Reina; most recently evaluated for trich follow-up in 10/2022     Plan:  · Hafsa Factor in place, functional well, at baseline  · Continue trach collar oxygen, on 8 L able to saturating appropriately, weaned to 6L   · Patient has concerns that prosthesis is not functioning properly  Speech evaluated her yesterday, no leakage around trach and prosthesis  Pt did have this new prothesis placed in June 2022, since then has been having voice issues  Currently suctioning repeatedly from the trach  ENT consulted, pending reccs

## 2022-11-23 NOTE — PROGRESS NOTES
Progress Note - Infectious Disease   Manju Whatley 81 y o  female MRN: [id number]  Unit/Bed#: [facility] 203-01 Encounter: [id number]      Impression:  1  Necrotizing RLL pneumonia R/0 abscess   2  History of laryngeal carcinoma s/p laryngectomy/tracheitis  3  DM type 2  4  CAD  5  Extensive PVD    Recommendations:  Patient is afebrile with elevated but decreased WBC count 12,550  Was unable to undergo bronchoscopy/BAL secondary to bronchospasm  1  Bronchoscopy/BAL was postponed  2  In light of above as discussed would restart ceftriaxone 1 g Q 24 hours IV pending reschedule bronchoscopy/BAL to determine if actinomycosis is contaminant or pathogen would still try to do bronchoscopy with BAL ASAP     Antibiotics:  1  Ceftriaxone 1 g Q 24 hours IV, day 2 Rx     Subjective: The patient has no complaints  Denies shortness of breath or cough  Denies fevers, chills, or sweats  Denies nausea, vomiting, or diarrhea  Objective:  Vitals:  Temp:  [97 7 °F (36 5 °C)-98 4 °F (36 9 °C)] 98 1 °F (36 7 °C)  HR:  [106-117] 106  Resp:  [18-24] 20  BP: ()/(46-87) 115/53  SpO2:  [90 %-97 %] 96 %  Temp (24hrs), Av 9 °F (36 6 °C), Min:97 7 °F (36 5 °C), Max:98 4 °F (36 9 °C)  Current: Temperature: 98 1 °F (36 7 °C)    Physical Exam:     General Appearance:    Eyes:   Alert, responsive, chronically ill-appearing nontoxic, no acute distress  Conjunctiva pale   Throat: Oropharynx moist without lesions  Lips, mucosa, and tongue normal   Neck: Tracheostomy, surgical scars   Lungs:   Bibasilar dullness   Heart:  Regular rate with ectopics and rhythm, S1, S2 normal, no murmur, rub or gallop   Abdomen:   Soft, non-tender, non-distended, positive bowel sounds  No masses, no organomegaly    No CVA tenderness   Extremities: Extremities normal, atraumatic, no clubbing, cyanosis or edema   Skin: Skin color pale, surgical scars, as above texture, turgor normal, no rashes or lesions  No draining wounds noted           Invasive Devices     Peripheral Intravenous Line  Duration           Peripheral IV 11/21/22 Distal;Dorsal (posterior); Left Forearm 1 day    Peripheral IV 11/22/22 Left Antecubital <1 day    Peripheral IV 11/22/22 Right Hand <1 day          Airway  Duration           Surgical Airway -- days                Labs, Imaging, & Other studies:   All pertinent labs were personally reviewed  Results from last 7 days   Lab Units 11/22/22  0517 11/20/22  1433 11/19/22  1408   WBC Thousand/uL 12 55* 14 56* 12 56*   HEMOGLOBIN g/dL 8 8* 9 2* 8 9*   PLATELETS Thousands/uL 266 340 313     Results from last 7 days   Lab Units 11/22/22  0517 11/20/22  1433 11/19/22  1408   SODIUM mmol/L 139 142 141   POTASSIUM mmol/L 3 6 3 9 3 3*   CHLORIDE mmol/L 111* 112* 113*   CO2 mmol/L 23 24 20*   BUN mg/dL 14 13 13   CREATININE mg/dL 1 19 1 29 1 09   EGFR ml/min/1 73sq m 41 37 46   CALCIUM mg/dL 9 7 10 3* 10 9*   AST U/L 24 23 26   ALT U/L 19 23 27   ALK PHOS U/L 99 109 108     Results from last 7 days   Lab Units 11/17/22  2141 11/16/22  1524 11/16/22  1416 11/15/22  2033 11/15/22  2025 11/15/22  2003   BLOOD CULTURE  No Growth After 4 Days  No Growth After 4 Days    --   --   --   --   --    SPUTUM CULTURE   --   --  Few Colonies of  --  1+ Growth of Staphylococcus aureus*  Few Colonies of  --    GRAM STAIN RESULT   --  No Polys or Bacteria seen Rare Epithelial Cells  4+ Polys  No bacteria seen  --  1+ Polys*  Rare Gram positive cocci in pairs, chains and clusters*  --    BODY FLUID CULTURE, STERILE   --  No growth  --   --   --   --    MRSA CULTURE ONLY   --   --   --  No Methicillin Resistant Staphlyococcus aureus (MRSA) isolated  --   --    LEGIONELLA URINARY ANTIGEN   --   --   --   --   --  Negative

## 2022-11-24 NOTE — PROGRESS NOTES
INTERNAL MEDICINE RESIDENCY PROGRESS NOTE     Name: Brigida Kitchen   Age & Sex: 81 y o  female   MRN: [id number]  [facility]   Encounter: [id number]  Team: [facility] Team C     PATIENT INFORMATION     Name: Brigdia Kitchen   Age & Sex: 81 y o  female   MRN: [id number]  Hospital Stay Days: 9    ASSESSMENT/PLAN     Principal Problem:    Sepsis with acute hypoxic respiratory failure ([facility] [address] )  Active Problems:    Right lower lobe lung mass with right pleural effusion    Hypercalcemia    Laryngeal carcinoma (HCC)    Asymptomatic carotid artery stenosis    Hyperlipidemia    Hypertension    CKD (chronic kidney disease) stage 3, GFR 30-59 ml/min    JORDAN (acute kidney injury) ([facility] [address] )    Pancreatitis    Lung nodule seen on imaging study    Anemia of chronic disease    Hypothyroidism    Gram-positive bacteria on cultures      Right lower lobe lung mass with right pleural effusion  Assessment & Plan  · Patient history of laryngeal carcinoma status post tracheostomy/laryngectomy who presented to ED with tachycardia, hypoxia, fatigue  · CTA chest notable for significant findings including:  · Large, heterogeneous airspace consolidation in the right lower lobe, with a 6 3 x 5 2 x 4 8 cm somewhat lobulated hypodense component likely a necrotizing aspiration pneumonia  · Moderate right pleural effusion  No enhancement of the pleura to suggest empyema  · Mediastinal and right hilar lymphadenopathy, likely reactive, however metastatic disease is not entirely excluded    · Legionella and strep antigens negative  · Status post 900 mL fluid thoracentesis suggestive of transudative fluid for pulmonology and may be secondary to pancreatitis  · Blood cultures 1/2 group Actinomyces gram variable rods on 11/15  · Blood cultures x2 on 11/17 final: no growth   · Sputum culture on 11/15 grew Staph aureus  · Sputum culture on 11/16 final: no growth  · Plan for bronchoscopy and BAL 11/22, however, procedure aborted due to patient becoming hemodynamically unstable after anesthesia induction  Was in the ICU 11/22-11/23, now transferred back to SOD       Plan:  · Ceftriaxone 1 gm Q24H Day 3, continue per ID recommendations   · 11/22 bronchial, viral, and fungal cultures pending from tracheal aspirate - partial bronch completed at that time   · Trend WBC and Fever curve  WBC 14 43 today, increased from 12 50 yesterday   · Follow-up chest CT in 3 months as an outpatient to ensure resolution as an underlying neoplasm may have a similar appearance  Hypercalcemia  Assessment & Plan  · In setting of CKD stage 3B, excessive use of vitamin-D and calcium supplementation at home per patient  · Further evidenced by elevated corrected calcium 14 2; supported by initial presentation with abdominal pain, fatigue, decreased appetite/poor PO intake; mentation at baseline, nontoxic on exam  · ECG without arrhythmia  · Further ED work up notable for multiple derangements including Hgb 11 1, low bicarb 18, elevated BUN 34, elevated creatinine 1 47 (baseline 1 11 4), GFR 32, , hypoalbuminemia 2 1; elevated lipase 2028, uric acid 10 4  · CT imaging findings concerning for acute pancreatitis,  necrotizing pneumonia vs  malignancy  · Etiology multifactorial, possibly due to hypercalcemia malignancy in light of CT findings, further complicated by home vitamin-D supplementation; med rec without any other causative agents  Bone demineralization also possible given her age  · Per chart review, patient noted to have elevated corrected calcium level since 08/2022 however does not appear to have been further worked up by PCP  · Unlikely primary parathyroidism or tertiary cause given PTH in 09/2022 was low; baseline renal function with CKD III  Vitamin-D 25 hydroxy level within normal limits  Calcium continues to be elevated   Status post 3 mg zoledronic acid     SPEP with faint gammopathy, PTH very low    UPEP shows selective proteinuria (54) with no monoclonal bands noted  Plan:  · PTHrP to assess hypercalcemia of malignancy, pending   · Calcium level slowly improving 10 2 today from 10 7  Continue trending   · Vitamin D levels within normal limits: Vit D 25-OH 77 2, Vit D 1,25 OH 49 4  · PTH 6 3, compensating appropriately for elevated Ca level     Laryngeal carcinoma (HCC)  Assessment & Plan  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  · Follows with ENT as an outpatient with Dr Anoop Nelson; most recently evaluated for trich follow-up in 10/2022     Plan:  · Trach in place, functional well, at baseline  · Continue trach collar oxygen, on 8 L able to saturating appropriately, weaned to 6L   · Patient has concerns that prosthesis is not functioning properly  Speech evaluated her yesterday, no leakage around trach and prosthesis  Pt did have this new prothesis placed in June 2022, since then has been having voice issues  Currently suctioning repeatedly from the trach  ENT consulted, pending reccs  Gram-positive bacteria on cultures  Assessment & Plan  1/2 blood cultures grew Actinomyces    Plan:  See Sepsis and Right Lower Lobe Mass A/P       Hypothyroidism  Assessment & Plan  · Known history since 11/2021  · Most recent TSH 4 2 in 08/2022  · Managed on levothyroxine 100 mcg  · Repeat TSH within normal limits    Plan  Stable   · Continue home dose levothyroxine     Anemia of chronic disease  Assessment & Plan  · Known history, in setting of CKD stage IIIB  · Follows nephrology outpatient (Dr Tere Bocanegra)  · Hgb 11 1 on admission, at baseline     · Currently hemodynamically stable    Plan:  · Trend CBC, Hb 9 4  · No active bleeding present   · Transfuse if Hb < 7         Lung nodule seen on imaging study  Assessment & Plan  · Patient history of laryngeal carcinoma status post tracheostomy/laryngectomy on trach collar presented with fatigue; tachycardic on exam; labs notable for multiple derangements including hypercalcemia 14 3 and bicarb 18  · CT notable for nonspecific 7 x 6 mm lobulated left lower lobe nodule; new finding; CT imaging also revealing for multiple findings large heterogeneous airspace consolidation and right lower lobe with 6 x 5 x 5 lobulated hypodense component with moderate right-sided pleural effusion  concerning for necrotizing pneumonia  · Malignancy not entirely ruled out; does endorse decreased appetite, unexpected weight loss of up to 30 lb, and is a former smoker; quit 2002; hypercalcemia also noted on admission with recent outpatient PTH wnl  · 11/19 - repeat CT suggested of necrosis/abscess    Plan  · Inpatient pulmonology following, appreciate recs; 5 day course completed abx for suspecting necrotizing pneumonia with large heterogeneous airspace consolidation in right lower lobe  · ID consulted, appreciate reccs  On ceftriaxone 1 gm Q24H, day 3 today  Bronchial aspirates pending results   · Repeat CT future to assess for improvement, outpatient     Pancreatitis  Assessment & Plan  · Likely secondary to hypercalcemia  · Presented with abdominal pain; labs notable for elevated lipase >2000, elevated WBC  · CT imaging suggestive pancreatitis  · Patient has elevated alk-phos with normal T bili and asymptomatic abnormal biliary strictures on imaging  · Patient reports improved appetite and improved abdominal pain    Plan:  · Supportive care with analgesics or antiemetics as needed; renal function at baseline, ECG with normal QTC  · Continue diet as tolerated   · Continued monitoring of vitals, O2 sats, urine output with goal > 0 5-1 mL/kg/hr  · Replete electrolytes as indicated    Suspected as having resolved, limiting fluid resuscitation at this time due to volume overload  Unclear source of hypercalcemia that triggered pancreatitis, hematology on board for hypercalcemia control   Ca slowly downtrending, last 10 2 today         JORDAN (acute kidney injury) ([facility] [address] )  Assessment & Plan  Creatinine of 1 47 on admission treated with IV fluid boluses, 3L total, NSS at 100ml/hr, renal function assessment and lab monitoring  Pt has CKD 3 and noted baseline of 1-1 1    Plan:  · Cr slowly improving, 1 11 today which is close to her baseline   Will continue to monitor   · Avoid nephrotoxic agents  · Monitor urine output   · Encourage adequate intake    CKD (chronic kidney disease) stage 3, GFR 30-59 ml/min  Assessment & Plan  Lab Results   Component Value Date    EGFR 45 11/24/2022    EGFR 42 11/23/2022    EGFR 41 11/22/2022    CREATININE 1 11 11/24/2022    CREATININE 1 16 11/23/2022    CREATININE 1 19 11/22/2022     · Known history of CKD stage III states 2014  · Follows [facility] nephrology (Dr Sandrita Hamilton)  · Cr on admission slightly elevated 1 47(baseline 1 1-1 4)  · Etiology likely 2/2 long history of hypertension, nephrosclerosis, age-related nephron loss     Plan  · Improving - Cr 1 11 today, monitor closely with BMP   · Avoid use of nephrotoxic agents  · Maintain euvolemia       Hypertension  Assessment & Plan  · Known history, maintained on losartan 50 mg daily at home  · Follows PCP outpatient      Plan:  · Bps 177P systolic, will continue to monitor   · Given 100 ccs/hr fluids isolyte due to tachycardia in the 140s/150s overnight, stopped fluids this AM  · Will hold home dose losartan at this time  · Continue monitoring blood pressures      Hyperlipidemia  Assessment & Plan  · Known history of type 2 diabetes on metformin, hypertension on losartan  · Managed on Lipitor 40  · Most recent lipid panel with good control of LDL at 56    Plan   · Continue Lipitor 40mg QD     Asymptomatic carotid artery stenosis  Assessment & Plan  · Known hx, follows with vascular surgery (Dr Tanisha Tapia)  · Previously found to have recurrent left ICA stenosis and critical new occlusive right IC stenosis, with a which were asymptomatic, along with left vertebral artery atresia; previously worked up in outpatient setting by mass concerning symptoms and was recommended endovascular intervention  · On 07/2020, patient electively admitted to [facility] during which she underwent ultrasound-guided right CFA puncture with sternal closure, aortic arch arteriogram, left carotid during him with PTA and stenting under the care of Dr Brown  · Maintained on antiplatelet and anti-statin therapy with Plavix and Lipitor     Plan:   · Continue home dose lipitor  · Restarted home Plavix after bronch was cancelled    * Sepsis with acute hypoxic respiratory failure ([address] )  Assessment & Plan  · Patient presented with abdominal pain, lightheadedness, intermittent shortness of breath, recent unexpected weight loss   · Met sepsis criteria on admission  · Lactate wnl but has significant hypercalcemia 14 2 on admission  · Patient is status post tracheostomy  · Completed cephalosporin 5 day course, completed azithromycin 3 day course    Plan:  · Completed 5 day course of antibiotics covering for CAP  · Follow up on blood cultures  Blood cultures x2 11/17 no growth 4 days  · Blood cultures x1 11/15 grew Actinomyces   · 11/15 sputum culture grew S  Aureus with repeat sputum culture 11/16 negative   · Bronch/BAL aborted due to patient becoming hemodynamically unstable after anesthesia induction  Patient was briefly taken to ICU for higher level of care  Now transferred to [facility]   · Bronchial aspirates collected, pending results   · Will continue on ceftriaxone 1 gm Q24H per ID recommendations  Day 3   · Continue trach collar oxygen as needed - 6-8L this AM   · Trend WBC and fever curve  WBC 14 43        Disposition: On IV abx for sepsis, transferred from critical care to [facility] service  SUBJECTIVE     Patient seen and examined, sitting in bed  Overnight patient was tachycardic to the 140s/150s  She was asymptomatic on exam   She did have sustained tachycardia and she appeared to be volume down    Night team started her on isolate 100 cc/hour and this morning we discontinued her fluids since she appears to have volume retention  Her her heart rate ranges from 100s-120s at this time  This morning, patient's concern is that she keeps suctioning her trach, which causes her to become tachycardic repeatedly  She states that she can't help it and feels the need to continue to do this  I reassured her that her trach is currently not leaking any fluid  We will have ENT come to evaluate and provide recommendations regarding her trach and prothesis  Per patient, she has been having voice issues since having this recent prosthesis placed in 2022  Patient otherwise has no acute complaints  She denies any fevers, chills, CP, SOB, nausea or vomiting, abdominal pain, diarrhea or constipation  Plan is to continue IV abx with bronchial aspirates pending results  ENT consulted for evaluation of her prosthesis  OBJECTIVE     Vitals:    22 0600 22 0740 22 0741 22 1100   BP:  (!) 111/49 (!) 111/49 120/56   Pulse:   (!) 115    Resp:       Temp:  98 3 °F (36 8 °C)  97 5 °F (36 4 °C)   TempSrc:  Oral  Oral   SpO2:   96%    Weight: 66 7 kg (147 lb)      Height:          Temperature:   Temp (24hrs), Av 9 °F (36 6 °C), Min:97 5 °F (36 4 °C), Max:98 3 °F (36 8 °C)    Temperature: 97 5 °F (36 4 °C)  Intake & Output:  I/O        07 07 07 07 07 0700    P  O   540 200    I V  (mL/kg) 700 (10 7) 238 3 (3 6)     IV Piggyback 250 100     Total Intake(mL/kg) 950 (14 5) 878 3 (13 2) 200 (3)    Urine (mL/kg/hr) 449 (0 3) 1489 (0 9) 300 (0 7)    Stool 0      Total Output 449 1489 300    Net +501 -610 7 -100           Unmeasured Urine Occurrence 1 x      Unmeasured Stool Occurrence 1 x          Weights:        Body mass index is 32 96 kg/m²  Weight (last 2 days)     Date/Time Weight    22 0600 66 7 (147)    22 0530 65 7 (144 8)        Physical Exam  Constitutional:       General: She is not in acute distress       Appearance: She is ill-appearing  HENT:      Head: Normocephalic and atraumatic  Mouth/Throat:      Mouth: Mucous membranes are moist       Pharynx: Oropharynx is clear  Eyes:      General: No scleral icterus  Extraocular Movements: Extraocular movements intact  Conjunctiva/sclera: Conjunctivae normal    Neck:      Comments: Suctioning trachea on exam - about 300 ccs in suction container  No erythema, purulent or any type of discharge, or warmth noted around tracheostomy  Appears patent  Cardiovascular:      Rate and Rhythm: Regular rhythm  Tachycardia present  Pulses: Normal pulses  Heart sounds: Normal heart sounds  No murmur heard  No gallop  Pulmonary:      Effort: Pulmonary effort is normal  No respiratory distress  Breath sounds: No wheezing (faint expiratory wheezing) or rales  Comments: Decreased breath sounds on right compared to left  Abdominal:      General: Bowel sounds are normal  There is no distension  Palpations: Abdomen is soft  Tenderness: There is no abdominal tenderness  There is no guarding or rebound  Musculoskeletal:      Right lower leg: Edema (+1 to below knee) present  Left lower leg: Edema (+1 to below knee) present  Skin:     General: Skin is warm and dry  Capillary Refill: Capillary refill takes less than 2 seconds  Neurological:      Mental Status: She is alert and oriented to person, place, and time  Psychiatric:         Mood and Affect: Mood normal          Behavior: Behavior normal         LABORATORY DATA     Labs: I have personally reviewed pertinent reports    Results from last 7 days   Lab Units 11/24/22 0615 11/23/22 0512 11/22/22  0517   WBC Thousand/uL 14 43* 12 50* 12 55*   HEMOGLOBIN g/dL 9 4* 8 9* 8 8*   HEMATOCRIT % 32 2* 30 8* 31 5*   PLATELETS Thousands/uL 253 258 266   NEUTROS PCT % 89* 88* 86*   MONOS PCT % 6 7 7      Results from last 7 days   Lab Units 11/24/22  0615 11/23/22  0512 11/22/22  0517 POTASSIUM mmol/L 4 1 3 3* 3 6   CHLORIDE mmol/L 110* 112* 111*   CO2 mmol/L 24 23 23   BUN mg/dL 10 13 14   CREATININE mg/dL 1 11 1 16 1 19   CALCIUM mg/dL 8 6 9 2 9 7   ALK PHOS U/L 87 97 99   ALT U/L 18 20 19   AST U/L 36 27 24     Results from last 7 days   Lab Units 11/23/22  0512 11/22/22  0517   MAGNESIUM mg/dL 2 3 1 6     Results from last 7 days   Lab Units 11/23/22  0512 11/22/22  0517   PHOSPHORUS mg/dL 1 7* 1 4*                    IMAGING & DIAGNOSTIC TESTING     Radiology Results: I have personally reviewed pertinent reports  XR chest portable    Result Date: 11/17/2022  Impression: Near complete drainage of right effusion with trace residual effusion  Masslike opacity in the right lower lobe better shown on CT  Mild pulmonary venous congestion  Workstation performed: [id number]     CT head wo contrast    Result Date: 11/15/2022  Impression: No acute intracranial abnormality  Chronic microangiopathic changes  Workstation performed: [id number]     CT chest wo contrast    Result Date: 11/19/2022  Impression: 1  Right lower lobe consolidation appears similar  There is relative internal hypodensity, along with air, consistent with abscess/necrosis  Recommend remains for a follow-up CT  2   Stable right lower lobe airway debris, with new airway debris in the middle lobe, consistent with interval aspiration event 3  Moderate right pleural effusion, not significantly changed in size  Trace left pleural effusion  4   Interstitial opacities consistent with mild edema 5  Stable left lower lobe nodule, attention on follow-up The study was marked in EPIC for immediate notification  Workstation performed: [id number]     IR IN-Patient Thoracentesis    Result Date: 11/18/2022  Impression: Impression: Ultrasound-guided thoracentesis yielding 900 mL clear yellow pleural fluid  Signed, performed, and dictated by CHANEL Andrew under the supervision of Dr Markos Cordon   Workstation performed: [id number] PE Study with CT Abdomen and Pelvis with contrast    Result Date: 11/15/2022  Impression: No central, lobar, segmental or proximal subsegmental pulmonary embolism  Evaluation of the distal subsegmental pulmonary arteries is limited  Large, heterogeneous airspace consolidation in the right lower lobe, with a 6 3 x 5 2 x 4 8 cm somewhat lobulated hypodense component with a small droplet of air  This likely represents a necrotizing aspiration pneumonia, given mucous plugging throughout the right lower lobe and retained secretions/debris in the right mainstem bronchus and bronchus intermedius  Recommend follow-up chest CT in approximately 3 months to ensure resolution as an underlying neoplasm may have a similar appearance  Nonspecific 7 x 6 mm lobulated left lower lobe nodule  Attention on follow-up  Moderate right pleural effusion  No enhancement of the pleura to suggest empyema  Mediastinal and right hilar lymphadenopathy, likely reactive, however metastatic disease is not entirely excluded  Mild stranding around the proximal pancreas suggestive of acute interstitial pancreatitis  Recommend correlation with lipase level  No organized peripancreatic fluid collections, loss of parenchymal enhancement to suggest necrosis, or vascular complications of pancreatitis  No other acute abdominal or pelvic pathology  Multiple additional findings as above  The study was marked in [facility] for immediate notification  Workstation performed: [id number]     Other Diagnostic Testing: I have personally reviewed pertinent reports      ACTIVE MEDICATIONS     Current Facility-Administered Medications   Medication Dose Route Frequency   • acetaminophen (TYLENOL) tablet 650 mg  650 mg Oral Q6H PRN   • albuterol inhalation solution 2 5 mg  2 5 mg Nebulization Q4H PRN   • atorvastatin (LIPITOR) tablet 40 mg  40 mg Oral Daily With Dinner   • cefTRIAXone (ROCEPHIN) 1,000 mg in dextrose 5 % 50 mL IVPB  1,000 mg Intravenous Q24H   • clopidogrel (PLAVIX) tablet 75 mg  75 mg Oral Daily   • glycerin-hypromellose- (ARTIFICIAL TEARS) ophthalmic solution 1 drop  1 drop Both Eyes PRN   • heparin (porcine) subcutaneous injection 5,000 Units  5,000 Units Subcutaneous Q8H [address]   • HYDROmorphone HCl (DILAUDID) injection 0 2 mg  0 2 mg Intravenous Q6H PRN   • iron sucrose (VENOFER) 200 mg in sodium chloride 0 9 % 100 mL IVPB  200 mg Intravenous Once per day on Mon Wed Fri   • levothyroxine tablet 100 mcg  100 mcg Oral Early Morning   • loratadine (CLARITIN) tablet 5 mg  5 mg Oral Daily   • oxyCODONE (ROXICODONE) IR tablet 2 5 mg  2 5 mg Oral Q6H PRN    Or   • oxyCODONE (ROXICODONE) IR tablet 5 mg  5 mg Oral Q6H PRN   • potassium-sodium phosphateS (K-PHOS,PHOSPHA 250) -250 mg tablet 1 tablet  1 tablet Oral Daily With Breakfast   • sertraline (ZOLOFT) tablet 25 mg  25 mg Oral Daily       VTE Pharmacologic Prophylaxis: Heparin  VTE Mechanical Prophylaxis: sequential compression device    Portions of the record may have been created with voice recognition software  Occasional wrong word or "sound a like" substitutions may have occurred due to the inherent limitations of voice recognition software    Read the chart carefully and recognize, using context, where substitutions have occurred   ==  [address]  Internal Medicine Residency PGY-1

## 2022-11-24 NOTE — SPEECH THERAPY NOTE
Speech/Language Pathology Progress Note    Patient Name: Fili Vela  DYJFH'I Date: 11/24/2022    Subjective:  Pt awake and alert, sitting up in bed  Objective:  Pt seen for speech therapy following report that he voice prosthesis was not working again  TEP speech was successful when SLP arrived, pt reported it has been intermittently malfunctioning  Pt requested to change to yankauer tip suctioning, and used the yankauer to suction her stoma  Recommended switching back to small suction tube and NOT using yankauer on trach to prevent injury/damage, and pt was agreeable  Voice prosthesis was visualized and looked to be clean and securely in place  Assessment:  Pt continues to self-manage her suctioning and stoma care  Yankauer suctioning stoma is not recommended  Plan/Recommendations:  Continue using voice prosthesis as able, currently working well  ST will f/u as needed

## 2022-11-24 NOTE — PROGRESS NOTES
Patient continues to have heart rates in the 120's-150's  Patient asymptomatic  SOD C made aware  Will continue to monitor

## 2022-11-24 NOTE — CONSULTS
Consultation - ENT   Jony Wolf 81 y o  female MRN: [id number]  Unit/Bed#: [facility] 203-01 Encounter: [id number]      Assessment/Plan     Assessment:  Poorly functioning TEP - difficulty with keeping secretions/mucous from plugging up the valve mechanism, better after brush insertion at bedside  Although no overt leakage around the TEP flange, I suspect she could have mild amt leaking vs secretion from pulmonary process migrating more proximally giving this appearance  CT does suggest some aspiration into the RLL/RML which could only come through the TEP site  RLL consolidation, possible mass - suspicious given hx and size at 6cm    Laryngectomy 2004  Intermittent hypoxia/bradycardia - improved with suctioning through stoma, deep breaths, and FiO2 trach collar at 40%    Hypercalcemia    Plan:  Continue workup/diagnosis of RLL pathology  No further interventions for her TEP at this time; as outpatient, can continue to work on keeping secretions thin around TEP, cleaning the mechanism (new brushes needed), possible placement of TEP reinforcing ring under flange  History of Present Illness   Physician Requesting Consult: Nova Leos*  Reason for Consult / Principal Problem: TEP problem  HPI: Jony Wolf is a 81y o  year old female known to ENT group (Dr Magdaleno Williamson) for hx of laryngectomy, TEP  Most recently evaluated in the office in Oct with issues related to the TEP getting clogged with thick mucous/secretions  She was encouraged to hydrate/humidify  She notes that she needs more TEP cleaning brushes to unclog the valve  No new issues reported  Also inpatient due to concerns for pneumonia/possible necrotizing process/mass  Bronch had to be aborted due to bronchospasm, desats, bradycardia/hypotension  Sputum cultures sent  CT chest 11/9/22  1  Right lower lobe consolidation appears similar    There is relative internal hypodensity, along with air, consistent with abscess/necrosis  Recommend remains for a follow-up CT  2   Stable right lower lobe airway debris, with new airway debris in the middle lobe, consistent with interval aspiration event   3  Moderate right pleural effusion, not significantly changed in size  Trace left pleural effusion  4   Interstitial opacities consistent with mild edema  5    Stable left lower lobe nodule, attention on follow-up    Consults    Review of Systems  Gen: no fatigue, no fevers/chills  ENT: as per HPI      Historical Information   Past Medical History:   Diagnosis Date   • Aortoiliac occlusive disease ([facility] )    • 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 (H[facility])    • Carotid artery stenosis    • Hyperlipidemia    • Hypertension    • PVD (peripheral vascular disease) ([facility] )    • Throat cancer [facility])      Past Surgical History:   Procedure Laterality Date   • GALLBLADDER SURGERY  2019   • ILIAC ARTERY STENT Left    • IR CEREBRAL ANGIOGRAPHY / INTERVENTION  2020   • IR THORACENTESIS  2022   • LARYNX SURGERY     • LEG SURGERY  07/10/2012     Social History   Social History     Substance and Sexual Activity   Alcohol Use Never     Social History     Substance and Sexual Activity   Drug Use Never     Social History     Tobacco Use   Smoking Status Former   • Types: Cigarettes   • Quit date:    • Years since quittin 9   Smokeless Tobacco Never     Family History: non-contributory    Meds/Allergies   current meds:   Current Facility-Administered Medications   Medication Dose Route Frequency   • acetaminophen (TYLENOL) tablet 650 mg  650 mg Oral Q6H PRN   • albuterol inhalation solution 2 5 mg  2 5 mg Nebulization Q4H PRN   • atorvastatin (LIPITOR) tablet 40 mg  40 mg Oral Daily With Dinner   • cefTRIAXone (ROCEPHIN) 1,000 mg in dextrose 5 % 50 mL IVPB  1,000 mg Intravenous Q24H   • clopidogrel (PLAVIX) tablet 75 mg  75 mg Oral Daily   • glycerin-hypromellose- (ARTIFICIAL TEARS) ophthalmic solution 1 drop  1 drop Both Eyes PRN   • heparin (porcine) subcutaneous injection 5,000 Units  5,000 Units Subcutaneous Q8H [address]   • HYDROmorphone HCl (DILAUDID) injection 0 2 mg  0 2 mg Intravenous Q6H PRN   • iron sucrose (VENOFER) 200 mg in sodium chloride 0 9 % 100 mL IVPB  200 mg Intravenous Once per day on Mon Wed Fri   • levothyroxine tablet 100 mcg  100 mcg Oral Early Morning   • loratadine (CLARITIN) tablet 5 mg  5 mg Oral Daily   • oxyCODONE (ROXICODONE) IR tablet 2 5 mg  2 5 mg Oral Q6H PRN    Or   • oxyCODONE (ROXICODONE) IR tablet 5 mg  5 mg Oral Q6H PRN   • potassium-sodium phosphateS (K-PHOS,PHOSPHA 250) -250 mg tablet 1 tablet  1 tablet Oral Daily With Breakfast   • sertraline (ZOLOFT) tablet 25 mg  25 mg Oral Daily    and PTA meds:   Prior to Admission Medications   Prescriptions Last Dose Informant Patient Reported? Taking?    Calcium-Magnesium-Vitamin D - MG-MG-UNIT TB24   Yes No   Sig: Take by mouth   Cholecalciferol (VITAMIN D-3) 1000 units CAPS   Yes No   Sig: Take by mouth   Krill Oil 1000 MG CAPS   Yes No   Sig: Take by mouth   Magnesium 250 MG TABS   Yes No   Sig: Take 250 mg by mouth daily   Multiple Vitamins-Minerals (CENTRUM SILVER 50+WOMEN PO)   Yes No   Sig: Take by mouth   Omega-3 1000 MG CAPS   Yes No   Sig: Take 4 capsules by mouth daily    VENTOLIN  (90 Base) MCG/ACT inhaler   Yes No   acetaminophen (TYLENOL) 325 mg tablet   No No   Sig: Take 2 tablets (650 mg total) by mouth every 6 (six) hours as needed for mild pain   atorvastatin (LIPITOR) 40 mg tablet   Yes No   Sig: Take by mouth   cefuroxime (CEFTIN) 250 mg tablet   Yes No   cetirizine (ZyrTEC) 5 MG tablet   Yes No   Sig: Take 5 mg by mouth daily   clopidogrel (PLAVIX) 75 mg tablet   No No   Sig: TAKE 1 TABLET EVERY DAY   co-enzyme Q-10 30 mg   Yes No   Sig: Take by mouth   fluticasone (FLONASE) 50 mcg/act nasal spray   Yes No   Sig: into each nostril   levothyroxine 100 mcg tablet   Yes No losartan (COZAAR) 50 mg tablet   No No   Sig: Take 1 tablet (50 mg total) by mouth daily   metFORMIN (GLUCOPHAGE) 500 mg tablet   Yes No   Sig: Take 0 5 tablets (250 mg total) by mouth daily with breakfast   methocarbamol (ROBAXIN) 500 mg tablet   Yes No   Sig: TK 1 T PO BID PRN   niacin 500 mg ER capsule   Yes No   Sig: Take 500 mg by mouth daily at bedtime   nystatin (MYCOSTATIN) 500,000 units/5 mL suspension   No No   Sig: Apply to provox site twice daily   pantoprazole (PROTONIX) 40 mg tablet   No No   Sig: TAKE 1 TABLET(40 MG) BY MOUTH DAILY   sertraline (ZOLOFT) 25 mg tablet   Yes No      Facility-Administered Medications: None     No current facility-administered medications on file prior to encounter       Current Outpatient Medications on File Prior to Encounter   Medication Sig Dispense Refill   • acetaminophen (TYLENOL) 325 mg tablet Take 2 tablets (650 mg total) by mouth every 6 (six) hours as needed for mild pain     • atorvastatin (LIPITOR) 40 mg tablet Take by mouth     • Calcium-Magnesium-Vitamin D - MG-MG-UNIT TB24 Take by mouth     • cefuroxime (CEFTIN) 250 mg tablet      • cetirizine (ZyrTEC) 5 MG tablet Take 5 mg by mouth daily     • Cholecalciferol (VITAMIN D-3) 1000 units CAPS Take by mouth     • clopidogrel (PLAVIX) 75 mg tablet TAKE 1 TABLET EVERY DAY 90 tablet 3   • co-enzyme Q-10 30 mg Take by mouth     • fluticasone (FLONASE) 50 mcg/act nasal spray into each nostril     • Krill Oil 1000 MG CAPS Take by mouth     • levothyroxine 100 mcg tablet      • losartan (COZAAR) 50 mg tablet Take 1 tablet (50 mg total) by mouth daily 30 tablet 0   • Magnesium 250 MG TABS Take 250 mg by mouth daily     • metFORMIN (GLUCOPHAGE) 500 mg tablet Take 0 5 tablets (250 mg total) by mouth daily with breakfast     • methocarbamol (ROBAXIN) 500 mg tablet TK 1 T PO BID PRN  1   • Multiple Vitamins-Minerals (CENTRUM SILVER 50+WOMEN PO) Take by mouth     • niacin 500 mg ER capsule Take 500 mg by mouth daily at bedtime     • nystatin (MYCOSTATIN) 500,000 units/5 mL suspension Apply to provox site twice daily 500 mL 6   • Omega-3 1000 MG CAPS Take 4 capsules by mouth daily      • pantoprazole (PROTONIX) 40 mg tablet TAKE 1 TABLET(40 MG) BY MOUTH DAILY 90 tablet 0   • sertraline (ZOLOFT) 25 mg tablet      • VENTOLIN  (90 Base) MCG/ACT inhaler          Allergies   Allergen Reactions   • Benazepril Cough   • Solifenacin Other (See Comments)   • Olmesartan Rash         Objective     Vitals:    11/24/22 0600 11/24/22 0740 11/24/22 0741 11/24/22 1100   BP:  (!) 111/49 (!) 111/49 120/56   Pulse:   (!) 115    Resp:       Temp:  98 3 °F (36 8 °C)  97 5 °F (36 4 °C)   TempSrc:  Oral  Oral   SpO2:   96%    Weight: 66 7 kg (147 lb)      Height:             Intake/Output Summary (Last 24 hours) at 11/24/2022 1302  Last data filed at 11/24/2022 1100  Gross per 24 hour   Intake 798 33 ml   Output 1439 ml   Net -640 67 ml       Invasive Devices     Peripheral Intravenous Line  Duration           Peripheral IV 11/21/22 Distal;Dorsal (posterior); Left Forearm 3 days    Peripheral IV 11/22/22 Left Antecubital 1 day    Peripheral IV 11/22/22 Right Hand 1 day          Drain  Duration           External Urinary Catheter 1 day          Airway  Duration           Surgical Airway -- days                Physical Exam  Gen: NAD, awake/alert, no stridor  Voice: aphonic; after cleaning brush was inserted through the TEP, some vibration through prosthesis was audible  Head: Normocephalic/atraumatic  Face: symmetric  Neck: thin, nontender; laryngectomy stoma with HME button in place; removed, patent stoma, TEP in place, increased moisture  Trachea was suctioned, moderate clear mucous/secretions removed  After deep breaths, sats improved from 80's to 90%  FiO2 inc from 35-40% with improvement in sats to 95%      Lab Results:   Imaging Studies:   EKG, Pathology, and Other Studies:     Code Status: Level 3 - DNAR and DNI  Advance Directive and Living Will: Yes    Power of :    POLST:

## 2022-11-25 NOTE — ASSESSMENT & PLAN NOTE
Creatinine of 1 47 on admission treated with IV fluid boluses, 3L total, NSS at 100ml/hr, renal function assessment and lab monitoring  Pt has CKD 3 and noted baseline of 1-1 1    Plan:  · Cr slowly improving, 1 22 today which is close to her baseline   Will continue to monitor   · Avoid nephrotoxic agents  · Monitor urine output   · Encourage adequate intake

## 2022-11-25 NOTE — PROGRESS NOTES
Progress Note - Infectious Disease   Annamarie Whatley 81 y o  female MRN: [id number]  Unit/Bed#: [facility] 421-01 Encounter: [id number]      Impression:  1  Necrotizing RLL pneumonia R/0 abscess S/P bronchoscopy/BAL  2  History of laryngeal carcinoma s/p laryngectomy/tracheitis  3  DM type 2  4  CAD  5  Extensive PVD    Recommendations:  Patient is afebrile with elevated but decreasing WBC count 13,710    1  Bronchoscopy/BAL was only partially complete  A CTA done today shows a large right-sided pleural effusion with worsened RML and RLL consolidation compatible with pneumonia/atelectasis  The effusion is likely creating the RML, RLL issues and will likely need thoracentesis with possible chest tube as per Pulmonary  2  In light of above as discussed continue ceftriaxone 1 g Q 24 hours IV pending bronchoscopy/BAL culture results to determine if actinomycosis is contaminant or pathogen  So far cultures are negative  Sputum Staph aureus was from 11/15  Antibiotics:  1  Ceftriaxone 1 g Q 24 hours IV, day 5 Rx     Subjective: The patient has no complaints  Denies shortness of breath or cough  Denies fevers, chills, or sweats  Denies nausea, vomiting, or diarrhea  Objective:  Vitals:  Temp:  [97 4 °F (36 3 °C)-99 1 °F (37 3 °C)] 97 7 °F (36 5 °C)  HR:  [] 113  Resp:  [18-24] 18  BP: ()/(45-67) 94/62  SpO2:  [71 %-99 %] 88 %  Temp (24hrs), Av 1 °F (36 7 °C), Min:97 4 °F (36 3 °C), Max:99 1 °F (37 3 °C)  Current: Temperature: 97 7 °F (36 5 °C)    Physical Exam:     General Appearance:    Eyes:   Alert, responsive, chronically ill-appearing nontoxic, no acute distress  Conjunctiva pale   Throat: Oropharynx moist without lesions  Lips, mucosa, and tongue normal   Neck: Tracheostomy, surgical scars   Lungs:   Bibasilar dullness > right   Heart:  Regular rate with ectopics and rhythm, S1, S2 normal, no murmur, rub or gallop   Abdomen:   Soft, non-tender, non-distended, positive bowel sounds    No masses, no organomegaly    No CVA tenderness, external urinary catheter   Extremities: Extremities normal, atraumatic, no clubbing, cyanosis or edema   Skin: Skin color pale, surgical scars, as above texture, turgor normal, no rashes or lesions  No draining wounds noted           Invasive Devices     Peripheral Intravenous Line  Duration           Peripheral IV 11/22/22 Right Hand 3 days    Peripheral IV 11/25/22 Left Antecubital <1 day          Drain  Duration           External Urinary Catheter 2 days          Airway  Duration           Surgical Airway -- days                Labs, Imaging, & Other studies:   All pertinent labs were personally reviewed  Results from last 7 days   Lab Units 11/25/22  0626 11/24/22  0615 11/23/22  0512   WBC Thousand/uL 13 71* 14 43* 12 50*   HEMOGLOBIN g/dL 9 2* 9 4* 8 9*   PLATELETS Thousands/uL 239 253 258     Results from last 7 days   Lab Units 11/25/22  0626 11/24/22  0615 11/23/22  0512   SODIUM mmol/L 143 143 142   POTASSIUM mmol/L 3 5 4 1 3 3*   CHLORIDE mmol/L 110* 110* 112*   CO2 mmol/L 27 24 23   BUN mg/dL 10 10 13   CREATININE mg/dL 1 22 1 11 1 16   EGFR ml/min/1 73sq m 40 45 42   CALCIUM mg/dL 8 6 8 6 9 2   AST U/L 17 36 27   ALT U/L 17 18 20   ALK PHOS U/L 86 87 97 Gastric ulcer    HLD (hyperlipidemia)    HTN (hypertension)    Osteoarthritis of knee, unilateral  left knee SORENSON (dyspnea on exertion)    Gastric ulcer    HLD (hyperlipidemia)    HTN (hypertension)    Osteoarthritis of knee, unilateral  left knee  Rash of back

## 2022-11-25 NOTE — PROGRESS NOTES
INTERNAL MEDICINE RESIDENCY PROGRESS NOTE     Name: Brianna Clayton   Age & Sex: 81 y o  female   MRN: [id number]  [facility]   Encounter: [id number]  Team: [facility] Team C     PATIENT INFORMATION     Name: Brianna Clayton   Age & Sex: 81 y o  female   MRN: [id number]  Hospital Stay Days: 10    ASSESSMENT/PLAN     Principal Problem:    Sepsis with acute hypoxic respiratory failure ([facility] [address] )  Active Problems:    Right lower lobe lung mass with right pleural effusion    Hypercalcemia    Laryngeal carcinoma (HCC)    Asymptomatic carotid artery stenosis    Hyperlipidemia    Hypertension    CKD (chronic kidney disease) stage 3, GFR 30-59 ml/min    JORDAN (acute kidney injury) ([facility] [address] )    Pancreatitis    Lung nodule seen on imaging study    Anemia of chronic disease    Hypothyroidism    Gram-positive bacteria on cultures      Right lower lobe lung mass with right pleural effusion  Assessment & Plan  · Patient history of laryngeal carcinoma status post tracheostomy/laryngectomy who presented to ED with tachycardia, hypoxia, fatigue  · CTA chest notable for significant findings including:  · Large, heterogeneous airspace consolidation in the right lower lobe, with a 6 3 x 5 2 x 4 8 cm somewhat lobulated hypodense component likely a necrotizing aspiration pneumonia  · Moderate right pleural effusion  No enhancement of the pleura to suggest empyema  · Mediastinal and right hilar lymphadenopathy, likely reactive, however metastatic disease is not entirely excluded    · Legionella and strep antigens negative  · Status post 900 mL fluid thoracentesis suggestive of transudative fluid for pulmonology and may be secondary to pancreatitis  · Blood cultures 1/2 group Actinomyces gram variable rods on 11/15  · Blood cultures x2 on 11/17 final: no growth   · Sputum culture on 11/15 grew Staph aureus  · Sputum culture on 11/16 final: no growth  · Plan for bronchoscopy and BAL 11/22, however, procedure aborted due to patient becoming hemodynamically unstable after anesthesia induction  Was in the ICU 11/22-11/23, now transferred back to SOD       Plan:  · Ceftriaxone 1 gm Q24H Day 5, continue per ID recommendations   · 11/22 bronchial, viral, and fungal cultures pending from tracheal aspirate - partial bronch completed at that time  · AFB stain from aspirate prelim negative    · Trend WBC and Fever curve  WBC 13 71 today, downtrending  · Since patient has sinus tachycardia, ordered a d-dimer today which is 2 73  CTA ordered and pending   · Following up with pulm team, pending recommendations and if repeat bronch is needed  · Today, Will give X1 dose of lasix 20 mg QD to off load fluids given to her previously  · Follow-up chest CT in 3 months as an outpatient to ensure resolution as an underlying neoplasm may have a similar appearance  Hypercalcemia  Assessment & Plan  · In setting of CKD stage 3B, excessive use of vitamin-D and calcium supplementation at home per patient  · Further evidenced by elevated corrected calcium 14 2; supported by initial presentation with abdominal pain, fatigue, decreased appetite/poor PO intake; mentation at baseline, nontoxic on exam  · ECG without arrhythmia  · Further ED work up notable for multiple derangements including Hgb 11 1, low bicarb 18, elevated BUN 34, elevated creatinine 1 47 (baseline 1 11 4), GFR 32, , hypoalbuminemia 2 1; elevated lipase 2028, uric acid 10 4  · CT imaging findings concerning for acute pancreatitis,  necrotizing pneumonia vs  malignancy  · Etiology multifactorial, possibly due to hypercalcemia malignancy in light of CT findings, further complicated by home vitamin-D supplementation; med rec without any other causative agents    Bone demineralization also possible given her age  · Per chart review, patient noted to have elevated corrected calcium level since 08/2022 however does not appear to have been further worked up by PCP  · Unlikely primary parathyroidism or tertiary cause given PTH in 09/2022 was low; baseline renal function with CKD III  Vitamin-D 25 hydroxy level within normal limits  Calcium continues to be elevated   Status post 3 mg zoledronic acid     SPEP with faint gammopathy, PTH very low  UPEP shows selective proteinuria (54) with no monoclonal bands noted  Plan:  · PTHrP to assess hypercalcemia of malignancy, pending   · Calcium level slowly improving 10 4  · Vitamin D levels within normal limits: Vit D 25-OH 77 2, Vit D 1,25 OH 49 4  · PTH 6 3, compensating appropriately for elevated Ca level     Laryngeal carcinoma (HCC)  Assessment & Plan  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  · Follows with ENT as an outpatient with Dr Niels Rosario; most recently evaluated for trich follow-up in 10/2022     Plan:  · Toño Pritchett in place, functional well, at baseline  · Continue trach collar oxygen, on 8 L able to saturating appropriately, weaned to 6L   · Patient has concerns that prosthesis is not functioning properly  Speech evaluated her yesterday, no leakage around trach and prosthesis  Pt did have this new prothesis placed in June 2022, since then has been having voice issues  Currently suctioning repeatedly from the trach  · ENT consulted, appreciate reccs   · No further interventions for her TPN at this time  As outpatient can continue to work on keeping secretions then around TEP, cleaning the mechanism with new brushes, possible placement of TEP reinforcing bring under flange      Gram-positive bacteria on cultures  Assessment & Plan  1/2 blood cultures grew Actinomyces    Plan:  See Sepsis and Right Lower Lobe Mass A/P        Hypothyroidism  Assessment & Plan  · Known history since 11/2021  · Most recent TSH 4 2 in 08/2022  · Managed on levothyroxine 100 mcg  · Repeat TSH within normal limits    Plan  Stable    · Continue home dose levothyroxine     Anemia of chronic disease  Assessment & Plan  · Known history, in setting of CKD stage IIIB  · Follows nephrology outpatient (Dr Sandrita Hamilton)  · Hgb 11 1 on admission, at baseline  · Currently hemodynamically stable    Plan:  · Trend CBC, Hb 9 2  · No active bleeding present   · Transfuse if Hb < 7         Lung nodule seen on imaging study  Assessment & Plan  · Patient history of laryngeal carcinoma status post tracheostomy/laryngectomy on trach collar presented with fatigue; tachycardic on exam; labs notable for multiple derangements including hypercalcemia 14 3 and bicarb 18  · CT notable for nonspecific 7 x 6 mm lobulated left lower lobe nodule; new finding; CT imaging also revealing for multiple findings large heterogeneous airspace consolidation and right lower lobe with 6 x 5 x 5 lobulated hypodense component with moderate right-sided pleural effusion  concerning for necrotizing pneumonia  · Malignancy not entirely ruled out; does endorse decreased appetite, unexpected weight loss of up to 30 lb, and is a former smoker; quit 2002; hypercalcemia also noted on admission with recent outpatient PTH wnl  · 11/19 - repeat CT suggested of necrosis/abscess    Plan  · Inpatient pulmonology following, appreciate recs; 5 day course completed abx for suspecting necrotizing pneumonia with large heterogeneous airspace consolidation in right lower lobe  · ID consulted, appreciate reccs  On ceftriaxone 1 gm Q24H, day 5 today   Bronchial aspirates pending results   · Repeat CT future to assess for improvement, outpatient     Pancreatitis  Assessment & Plan  · Likely secondary to hypercalcemia  · Presented with abdominal pain; labs notable for elevated lipase >2000, elevated WBC  · CT imaging suggestive pancreatitis  · Patient has elevated alk-phos with normal T bili and asymptomatic abnormal biliary strictures on imaging  · Patient reports improved appetite and improved abdominal pain    Plan:  · Supportive care with analgesics or antiemetics as needed; renal function at baseline, ECG with normal QTC  · Continue diet as tolerated   · Continued monitoring of vitals, O2 sats, urine output with goal > 0 5-1 mL/kg/hr  · Replete electrolytes as indicated    Suspected as having resolved, limiting fluid resuscitation at this time due to volume overload  Unclear source of hypercalcemia that triggered pancreatitis, hematology on board for hypercalcemia control  Ca slowly downtrending, last 10 4 today         JORDAN (acute kidney injury) ([facility] [address] )  Assessment & Plan  Creatinine of 1 47 on admission treated with IV fluid boluses, 3L total, NSS at 100ml/hr, renal function assessment and lab monitoring  Pt has CKD 3 and noted baseline of 1-1 1    Plan:  · Cr slowly improving, 1 22 today which is close to her baseline  Will continue to monitor   · Avoid nephrotoxic agents  · Monitor urine output   · Encourage adequate intake    CKD (chronic kidney disease) stage 3, GFR 30-59 ml/min  Assessment & Plan  Lab Results   Component Value Date    EGFR 40 11/25/2022    EGFR 45 11/24/2022    EGFR 42 11/23/2022    CREATININE 1 22 11/25/2022    CREATININE 1 11 11/24/2022    CREATININE 1 16 11/23/2022     · Known history of CKD stage III states 2014  · Follows [facility] nephrology (Dr Jose Mcfadden)  · Cr on admission slightly elevated 1 47(baseline 1 1-1 4)  · Etiology likely 2/2 long history of hypertension, nephrosclerosis, age-related nephron loss     Plan  · Improving - Cr 1 22, which is around her baseline    Will continue to monitor BMP  · Avoid use of nephrotoxic agents  · Maintain euvolemia       Hypertension  Assessment & Plan  · Known history, maintained on losartan 50 mg daily at home  · Follows PCP outpatient      Plan:  · Bps systolic [id number]  · Given 100 ccs/hr fluids isolyte due to tachycardia in the 140s/150s overnight, stopped fluids this AM  · Will hold home dose losartan at this time  · Continue monitoring blood pressures      Hyperlipidemia  Assessment & Plan  · Known history of type 2 diabetes on metformin, hypertension on losartan  · Managed on Lipitor 40  · Most recent lipid panel with good control of LDL at 56    Plan   · Continue Lipitor 40mg QD      Asymptomatic carotid artery stenosis  Assessment & Plan  · Known hx, follows with vascular surgery (Dr Karen Marshall)  · Previously found to have recurrent left ICA stenosis and critical new occlusive right IC stenosis, with a which were asymptomatic, along with left vertebral artery atresia; previously worked up in outpatient setting by mass concerning symptoms and was recommended endovascular intervention  · On 07/2020, patient electively admitted to [facility] during which she underwent ultrasound-guided right CFA puncture with sternal closure, aortic arch arteriogram, left carotid during him with PTA and stenting under the care of Dr Brown  · Maintained on antiplatelet and anti-statin therapy with Plavix and Lipitor     Plan:   · Continue home dose lipitor  · Restarted home Plavix after bronch was cancelled    * Sepsis with acute hypoxic respiratory failure ([facility] [address] )  Assessment & Plan  · Patient presented with abdominal pain, lightheadedness, intermittent shortness of breath, recent unexpected weight loss   · Met sepsis criteria on admission  · Lactate wnl but has significant hypercalcemia 14 2 on admission  · Patient is status post tracheostomy  · Completed cephalosporin 5 day course, completed azithromycin 3 day course    Plan:  · Completed 5 day course of antibiotics covering for CAP  · Follow up on blood cultures  Blood cultures x2 11/17 no growth 4 days  · Blood cultures x1 11/15 grew Actinomyces   · 11/15 sputum culture grew S  Aureus with repeat sputum culture 11/16 negative   · Bronch/BAL aborted due to patient becoming hemodynamically unstable after anesthesia induction  Patient was briefly taken to ICU for higher level of care   Now transferred to [facility]   · Bronchial aspirates collected, pending results   · AFB prelim from aspirate negative   · Will continue on ceftriaxone 1 gm Q24H per ID recommendations  Day 5  · Pending pulm new reccs and if repeat bronch is needed   · Continue trach collar oxygen as needed - around 10 L this AM   · Trend WBC and fever curve  WBC 13 71      Disposition:  Continue IV antibiotics  CTA pending      SUBJECTIVE     Patient seen and examined, sitting in bed comfortably  Overnight, patient continues to be sinus tachycardia with HR 140s to 180s  However she is asymptomatic  IV Lopressor 5 mg was given and patient's heart rate went down to 110s to 140s  Will start oral metoprolol 12 5 q 12 today  Patient is still needing oxygen requirements  Patient has no acute complaints at this time  Denies any fevers, chills, CP, SOB, N+V, abd pain, diarrhea constipation  Plan is to do a CTA which is pending and continue IV abx at this time  OBJECTIVE     Vitals:    22 0640 22 0802 22 0910 22 0952   BP:   119/57 135/61   BP Location:   Left arm    Pulse: (!) 140  (!) 114 98   Resp: 20  18    Temp:   97 6 °F (36 4 °C)    TempSrc:   Oral    SpO2:  99% 95% 94%   Weight:       Height:          Temperature:   Temp (24hrs), Av 4 °F (36 9 °C), Min:97 6 °F (36 4 °C), Max:99 1 °F (37 3 °C)    Temperature: 97 6 °F (36 4 °C)  Intake & Output:  I/O        0701   0700  0701   0700  0701   0700    P  O  540 440     I V  (mL/kg) 238 3 (3 6)      IV Piggyback 100 150 100    Total Intake(mL/kg) 878 3 (13 2) 590 (8 8) 100 (1 5)    Urine (mL/kg/hr) 1489 (0 9) 900 (0 6)     Stool  0     Total Output 1489 900     Net -610 7 -310 +100           Unmeasured Stool Occurrence  1 x         Weights:        Body mass index is 33 21 kg/m²  Weight (last 2 days)     Date/Time Weight    22 0600 67 2 (148 15)    22 0600 66 7 (147)    22 0530 65 7 (144 8)        Physical Exam  Constitutional:       General: She is not in acute distress  HENT:      Head: Normocephalic and atraumatic  Mouth/Throat:      Mouth: Mucous membranes are moist       Pharynx: Oropharynx is clear  Eyes:      General: No scleral icterus  Extraocular Movements: Extraocular movements intact  Conjunctiva/sclera: Conjunctivae normal    Neck:      Comments: No erythema, purulent or any type of discharge, or warmth noted around tracheostomy  Appears patent  Cardiovascular:      Rate and Rhythm: Regular rhythm  Tachycardia present  Pulses: Normal pulses  Heart sounds: Normal heart sounds  No murmur heard  No gallop  Pulmonary:      Effort: Pulmonary effort is normal  No respiratory distress  Breath sounds: No wheezing (faint expiratory wheezing) or rales  Comments: Decreased breath sounds on right compared to left  Abdominal:      General: Bowel sounds are normal  There is no distension  Palpations: Abdomen is soft  Tenderness: There is no abdominal tenderness  There is no guarding or rebound  Musculoskeletal:      Right lower leg: Edema (+1 to below knee) present  Left lower leg: Edema (+1 to below knee) present  Skin:     General: Skin is warm and dry  Capillary Refill: Capillary refill takes less than 2 seconds  Neurological:      Mental Status: She is alert and oriented to person, place, and time  Psychiatric:         Mood and Affect: Mood normal          Behavior: Behavior normal        LABORATORY DATA     Labs: I have personally reviewed pertinent reports    Results from last 7 days   Lab Units 11/25/22 0626 11/24/22 0615 11/23/22  0512   WBC Thousand/uL 13 71* 14 43* 12 50*   HEMOGLOBIN g/dL 9 2* 9 4* 8 9*   HEMATOCRIT % 31 9* 32 2* 30 8*   PLATELETS Thousands/uL 239 253 258   NEUTROS PCT % 86* 89* 88*   MONOS PCT % 7 6 7      Results from last 7 days   Lab Units 11/25/22 0626 11/24/22 0615 11/23/22  0512   POTASSIUM mmol/L 3 5 4 1 3 3*   CHLORIDE mmol/L 110* 110* 112*   CO2 mmol/L 27 24 23   BUN mg/dL 10 10 13   CREATININE mg/dL 1 22 1 11 1 16   CALCIUM mg/dL 8 6 8 6 9 2   ALK PHOS U/L 86 87 97   ALT U/L 17 18 20   AST U/L 17 36 27     Results from last 7 days   Lab Units 11/25/22  0626 11/23/22  0512 11/22/22  0517   MAGNESIUM mg/dL 1 7 2 3 1 6     Results from last 7 days   Lab Units 11/25/22  0626 11/23/22  0512 11/22/22  0517   PHOSPHORUS mg/dL 1 5* 1 7* 1 4*                    IMAGING & DIAGNOSTIC TESTING     Radiology Results: I have personally reviewed pertinent reports  XR chest portable    Result Date: 11/17/2022  Impression: Near complete drainage of right effusion with trace residual effusion  Masslike opacity in the right lower lobe better shown on CT  Mild pulmonary venous congestion  Workstation performed: [id number]     CT head wo contrast    Result Date: 11/15/2022  Impression: No acute intracranial abnormality  Chronic microangiopathic changes  Workstation performed: [id number]     CT chest wo contrast    Result Date: 11/19/2022  Impression: 1  Right lower lobe consolidation appears similar  There is relative internal hypodensity, along with air, consistent with abscess/necrosis  Recommend remains for a follow-up CT  2   Stable right lower lobe airway debris, with new airway debris in the middle lobe, consistent with interval aspiration event 3  Moderate right pleural effusion, not significantly changed in size  Trace left pleural effusion  4   Interstitial opacities consistent with mild edema 5  Stable left lower lobe nodule, attention on follow-up The study was marked in EPIC for immediate notification  Workstation performed: [id number]     IR IN-Patient Thoracentesis    Result Date: 11/18/2022  Impression: Impression: Ultrasound-guided thoracentesis yielding 900 mL clear yellow pleural fluid  Signed, performed, and dictated by CHANEL Leblanc under the supervision of Dr Whitley Duarte   Workstation performed: [id number]     PE Study with CT Abdomen and Pelvis with contrast    Result Date: 11/15/2022  Impression: No central, lobar, segmental or proximal subsegmental pulmonary embolism  Evaluation of the distal subsegmental pulmonary arteries is limited  Large, heterogeneous airspace consolidation in the right lower lobe, with a 6 3 x 5 2 x 4 8 cm somewhat lobulated hypodense component with a small droplet of air  This likely represents a necrotizing aspiration pneumonia, given mucous plugging throughout the right lower lobe and retained secretions/debris in the right mainstem bronchus and bronchus intermedius  Recommend follow-up chest CT in approximately 3 months to ensure resolution as an underlying neoplasm may have a similar appearance  Nonspecific 7 x 6 mm lobulated left lower lobe nodule  Attention on follow-up  Moderate right pleural effusion  No enhancement of the pleura to suggest empyema  Mediastinal and right hilar lymphadenopathy, likely reactive, however metastatic disease is not entirely excluded  Mild stranding around the proximal pancreas suggestive of acute interstitial pancreatitis  Recommend correlation with lipase level  No organized peripancreatic fluid collections, loss of parenchymal enhancement to suggest necrosis, or vascular complications of pancreatitis  No other acute abdominal or pelvic pathology  Multiple additional findings as above  The study was marked in [facility] for immediate notification  Workstation performed: [id number]     Other Diagnostic Testing: I have personally reviewed pertinent reports      ACTIVE MEDICATIONS     Current Facility-Administered Medications   Medication Dose Route Frequency   • acetaminophen (TYLENOL) tablet 650 mg  650 mg Oral Q6H PRN   • albuterol inhalation solution 2 5 mg  2 5 mg Nebulization Q4H PRN   • atorvastatin (LIPITOR) tablet 40 mg  40 mg Oral Daily With Dinner   • cefTRIAXone (ROCEPHIN) 1,000 mg in dextrose 5 % 50 mL IVPB  1,000 mg Intravenous Q24H   • clopidogrel (PLAVIX) tablet 75 mg  75 mg Oral Daily   • dextromethorphan-guaiFENesin (ROBITUSSIN DM) oral syrup 10 mL 10 mL Oral Q6H PRN   • glycerin-hypromellose- (ARTIFICIAL TEARS) ophthalmic solution 1 drop  1 drop Both Eyes PRN   • heparin (porcine) subcutaneous injection 5,000 Units  5,000 Units Subcutaneous Q8H [address]   • HYDROmorphone HCl (DILAUDID) injection 0 2 mg  0 2 mg Intravenous Q6H PRN   • hydrOXYzine HCL (ATARAX) tablet 25 mg  25 mg Oral Q6H PRN   • iron sucrose (VENOFER) 200 mg in sodium chloride 0 9 % 100 mL IVPB  200 mg Intravenous Once per day on Mon Wed Fri   • levothyroxine tablet 100 mcg  100 mcg Oral Early Morning   • loratadine (CLARITIN) tablet 5 mg  5 mg Oral Daily   • metoprolol tartrate (LOPRESSOR) partial tablet 12 5 mg  12 5 mg Oral Q12H HARRIS   • oxyCODONE (ROXICODONE) IR tablet 2 5 mg  2 5 mg Oral Q6H PRN    Or   • oxyCODONE (ROXICODONE) IR tablet 5 mg  5 mg Oral Q6H PRN   • [START ON 11/26/2022] potassium-sodium phosphateS (K-PHOS,PHOSPHA 250) -250 mg tablet 1 tablet  1 tablet Oral Daily With Breakfast   • sertraline (ZOLOFT) tablet 25 mg  25 mg Oral Daily       VTE Pharmacologic Prophylaxis: Heparin  VTE Mechanical Prophylaxis: sequential compression device    Portions of the record may have been created with voice recognition software  Occasional wrong word or "sound a like" substitutions may have occurred due to the inherent limitations of voice recognition software    Read the chart carefully and recognize, using context, where substitutions have occurred   ==  [address]  Internal Medicine Residency PGY-1

## 2022-11-25 NOTE — PROGRESS NOTES
Progress Note - Infectious Disease   Neda Whatley 81 y o  female MRN: [id number]  Unit/Bed#: [facility] 203-01 Encounter: [id number]      Impression:  1  Necrotizing RLL pneumonia R/0 abscess S/P bronchoscopy/BAL  2  History of laryngeal carcinoma s/p laryngectomy/tracheitis  3  DM type 2  4  CAD  5  Extensive PVD    Recommendations:  Patient is afebrile with elevated WBC count 14,430  Was unable to complete bronchoscopy/BAL  secondary to bronchospasm  1  Bronchoscopy/BAL was only partially complete  2  In light of above as discussed continue ceftriaxone 1 g Q 24 hours IV pending bronchoscopy/BAL culture results to determine if actinomycosis is contaminant or pathogen  So far cultures are negative  Antibiotics:  1  Ceftriaxone 1 g Q 24 hours IV, day 4 Rx     Subjective: The patient has no complaints  Denies shortness of breath or cough  Denies fevers, chills, or sweats  Denies nausea, vomiting, or diarrhea  Objective:  Vitals:  Temp:  [97 5 °F (36 4 °C)-98 9 °F (37 2 °C)] 98 1 °F (36 7 °C)  HR:  [] 84  BP: ()/(45-82) 134/45  SpO2:  [94 %-98 %] 98 %  Temp (24hrs), Av 2 °F (36 8 °C), Min:97 5 °F (36 4 °C), Max:98 9 °F (37 2 °C)  Current: Temperature: 98 1 °F (36 7 °C)    Physical Exam:     General Appearance:    Eyes:   Alert, responsive, chronically ill-appearing nontoxic, no acute distress  Conjunctiva pale   Throat: Oropharynx moist without lesions  Lips, mucosa, and tongue normal   Neck: Tracheostomy, surgical scars   Lungs:   Bibasilar dullness   Heart:  Regular rate with ectopics and rhythm, S1, S2 normal, no murmur, rub or gallop   Abdomen:   Soft, non-tender, non-distended, positive bowel sounds  No masses, no organomegaly    No CVA tenderness   Extremities: Extremities normal, atraumatic, no clubbing, cyanosis or edema   Skin: Skin color pale, surgical scars, as above texture, turgor normal, no rashes or lesions  No draining wounds noted           Invasive Devices Peripheral Intravenous Line  Duration           Peripheral IV 11/22/22 Right Hand 2 days          Drain  Duration           External Urinary Catheter 1 day          Airway  Duration           Surgical Airway -- days                Labs, Imaging, & Other studies:   All pertinent labs were personally reviewed  Results from last 7 days   Lab Units 11/24/22  0615 11/23/22  0512 11/22/22  0517   WBC Thousand/uL 14 43* 12 50* 12 55*   HEMOGLOBIN g/dL 9 4* 8 9* 8 8*   PLATELETS Thousands/uL 253 258 266     Results from last 7 days   Lab Units 11/24/22  0615 11/23/22  0512 11/22/22  0517   SODIUM mmol/L 143 142 139   POTASSIUM mmol/L 4 1 3 3* 3 6   CHLORIDE mmol/L 110* 112* 111*   CO2 mmol/L 24 23 23   BUN mg/dL 10 13 14   CREATININE mg/dL 1 11 1 16 1 19   EGFR ml/min/1 73sq m 45 42 41   CALCIUM mg/dL 8 6 9 2 9 7   AST U/L 36 27 24   ALT U/L 18 20 19   ALK PHOS U/L 87 97 99     Results from last 7 days   Lab Units 11/17/22  2141   BLOOD CULTURE  No Growth After 5 Days  No Growth After 5 Days

## 2022-11-25 NOTE — OCCUPATIONAL THERAPY NOTE
Occupational Therapy Cancel Note:    Occupational therapy attempted however pt reported that she did not want therapy  OT will attempt again next treatment       Darryle MinistBHARATHI ying     11/25/22 1233   Note Type   Note Type Cancelled Session   Cancel Reasons Refusal

## 2022-11-25 NOTE — PHYSICAL THERAPY NOTE
Physical Therapy Cancellation Note       11/25/22 1200   PT Last Visit   PT Visit Date 11/25/22   Note Type   Note type Cancelled Session   Cancel Reasons Refusal

## 2022-11-25 NOTE — ASSESSMENT & PLAN NOTE
· Known history, in setting of CKD stage IIIB  · Follows nephrology outpatient (Dr Janice Sharma)  · Hgb 11 1 on admission, at baseline     · Currently hemodynamically stable    Plan:  · Trend CBC, Hb 9 2  · No active bleeding present   · Transfuse if Hb < 7

## 2022-11-25 NOTE — ASSESSMENT & PLAN NOTE
Lab Results   Component Value Date    EGFR 40 11/25/2022    EGFR 45 11/24/2022    EGFR 42 11/23/2022    CREATININE 1 22 11/25/2022    CREATININE 1 11 11/24/2022    CREATININE 1 16 11/23/2022     · Known history of CKD stage III states 2014  · Follows B nephrology (Dr Ruth Parsons)  · Cr on admission slightly elevated 1 47(baseline 1 1-1 4)  · Etiology likely 2/2 long history of hypertension, nephrosclerosis, age-related nephron loss     Plan  · Improving - Cr 1 22, which is around her baseline    Will continue to monitor BMP  · Avoid use of nephrotoxic agents  · Maintain euvolemia

## 2022-11-25 NOTE — PROGRESS NOTES
PULMONOLOGY PROGRESS NOTE     Name: Rohit Franklin   Age & Sex: 81 y o  female   MRN: [id number]  Unit/Bed#: [facility] 203-01   Encounter: [id number]    PATIENT INFORMATION     Name: Rohit Franklin   Age & Sex: 81 y o  female   MRN: [id number]  Hospital Stay Days: 10    ASSESSMENT/PLAN     Assessment:   1  RLL lung mass/consolidation  2  Acute hypoxic respiratory failure  3  Right pleural effusion - s/p thoracentesis on  with neutrophilic predominant transudate  4  Actinomyces bacteremia  5  Bronchospasm after induction of anesthesia with bradycardia and hypotension - Resolved  6  Hypercalcemia  7  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  8  Emphysema with possible underlying COPD - not in acute exacerbation    Plan:  • Wean FiO2 as tolerated to maintain SpO2 > 88%  • Antibiotics per ID  • Follow up results for bronchial culture  • Bronchoscopy canceled due to bronchospasm, hypotension and bradycardia after induction of anesthesia  Patient is at high risk if procedure is repeated  • Start chest PT and suction  • Diuresis per primary team  • CTA PE study ordered by primary team to r/o PE due to persistent tachycardia since last night  Will follow up results as well  • Pulmonology will re-evaluate patient on   Please, TigerText with questions or concerns  • Rest of care per primary team       SUBJECTIVE     Patient seen and examined  She was tachycardic overnight  She denies any worsening shortness of breath, chest pain, nausea, vomiting       OBJECTIVE     Vitals:    22 0630 22 0640 22 0802 22 0910   BP:    119/57   BP Location:    Left arm   Pulse: (!) 144 (!) 140  (!) 114   Resp:  20  18   Temp:    97 6 °F (36 4 °C)   TempSrc:    Oral   SpO2:   99% 95%   Weight:       Height:          Temperature:   Temp (24hrs), Av 3 °F (36 8 °C), Min:97 5 °F (36 4 °C), Max:99 1 °F (37 3 °C)    Temperature: 97 6 °F (36 4 °C)  Intake & Output:  I/O       11/23 0701 11/24 0700 11/24 0701  11/25 0700 11/25 0701  11/26 0700    P  O  540 440     I V  (mL/kg) 238 3 (3 6)      IV Piggyback 100 150     Total Intake(mL/kg) 878 3 (13 2) 590 (8 8)     Urine (mL/kg/hr) 1489 (0 9) 900 (0 6)     Stool  0     Total Output 1489 900     Net -610 7 -310            Unmeasured Stool Occurrence  1 x         Weights:        Body mass index is 33 21 kg/m²  Weight (last 2 days)     Date/Time Weight    11/25/22 0600 67 2 (148 15)    11/24/22 0600 66 7 (147)    11/23/22 0530 65 7 (144 8)        Physical Exam  Vitals and nursing note reviewed  Constitutional:       General: She is not in acute distress  Appearance: She is not ill-appearing or toxic-appearing  HENT:      Head: Normocephalic  Eyes:      General: No scleral icterus  Pupils: Pupils are equal, round, and reactive to light  Neck:      Trachea: Tracheostomy present  Cardiovascular:      Rate and Rhythm: Regular rhythm  Tachycardia present  Heart sounds: No murmur heard  No gallop  Pulmonary:      Effort: Pulmonary effort is normal  No respiratory distress  Breath sounds: Decreased breath sounds (Right middle and lower lobes) present  No wheezing, rhonchi or rales  Abdominal:      General: Bowel sounds are normal  There is no distension  Palpations: Abdomen is soft  Tenderness: There is no abdominal tenderness  There is no guarding  Musculoskeletal:      Cervical back: Normal range of motion  Right lower leg: Edema present  Left lower leg: Edema present  Skin:     General: Skin is warm  Capillary Refill: Capillary refill takes less than 2 seconds  Neurological:      Mental Status: She is alert and oriented to person, place, and time  Mental status is at baseline  Cranial Nerves: No cranial nerve deficit  Psychiatric:         Mood and Affect: Mood normal            LABORATORY DATA     Labs:  I have personally reviewed pertinent reports  Results from last 7 days   Lab Units 11/25/22  0626 11/24/22  0615 11/23/22  0512   WBC Thousand/uL 13 71* 14 43* 12 50*   HEMOGLOBIN g/dL 9 2* 9 4* 8 9*   HEMATOCRIT % 31 9* 32 2* 30 8*   PLATELETS Thousands/uL 239 253 258   NEUTROS PCT % 86* 89* 88*   MONOS PCT % 7 6 7      Results from last 7 days   Lab Units 11/25/22  0626 11/24/22  0615 11/23/22  0512   POTASSIUM mmol/L 3 5 4 1 3 3*   CHLORIDE mmol/L 110* 110* 112*   CO2 mmol/L 27 24 23   BUN mg/dL 10 10 13   CREATININE mg/dL 1 22 1 11 1 16   CALCIUM mg/dL 8 6 8 6 9 2   ALK PHOS U/L 86 87 97   ALT U/L 17 18 20   AST U/L 17 36 27     Results from last 7 days   Lab Units 11/25/22  0626 11/23/22  0512 11/22/22  0517   MAGNESIUM mg/dL 1 7 2 3 1 6     Results from last 7 days   Lab Units 11/25/22  0626 11/23/22  0512 11/22/22  0517   PHOSPHORUS mg/dL 1 5* 1 7* 1 4*                      ABG:       Micro:         IMAGING & DIAGNOSTIC TESTING     Radiology Results: I have personally reviewed pertinent reports  XR chest portable    Result Date: 11/17/2022  Impression: Near complete drainage of right effusion with trace residual effusion  Masslike opacity in the right lower lobe better shown on CT  Mild pulmonary venous congestion  Workstation performed: [id number]     CT head wo contrast    Result Date: 11/15/2022  Impression: No acute intracranial abnormality  Chronic microangiopathic changes  Workstation performed: [id number]     CT chest wo contrast    Result Date: 11/19/2022  Impression: 1  Right lower lobe consolidation appears similar  There is relative internal hypodensity, along with air, consistent with abscess/necrosis  Recommend remains for a follow-up CT  2   Stable right lower lobe airway debris, with new airway debris in the middle lobe, consistent with interval aspiration event 3  Moderate right pleural effusion, not significantly changed in size  Trace left pleural effusion  4   Interstitial opacities consistent with mild edema 5  Stable left lower lobe nodule, attention on follow-up The study was marked in EPIC for immediate notification  Workstation performed: [id number]     IR IN-Patient Thoracentesis    Result Date: 11/18/2022  Impression: Impression: Ultrasound-guided thoracentesis yielding 900 mL clear yellow pleural fluid  Signed, performed, and dictated by CHANEL Freed under the supervision of Dr Macho Perrin  Workstation performed: [id number]     PE Study with CT Abdomen and Pelvis with contrast    Result Date: 11/15/2022  Impression: No central, lobar, segmental or proximal subsegmental pulmonary embolism  Evaluation of the distal subsegmental pulmonary arteries is limited  Large, heterogeneous airspace consolidation in the right lower lobe, with a 6 3 x 5 2 x 4 8 cm somewhat lobulated hypodense component with a small droplet of air  This likely represents a necrotizing aspiration pneumonia, given mucous plugging throughout the right lower lobe and retained secretions/debris in the right mainstem bronchus and bronchus intermedius  Recommend follow-up chest CT in approximately 3 months to ensure resolution as an underlying neoplasm may have a similar appearance  Nonspecific 7 x 6 mm lobulated left lower lobe nodule  Attention on follow-up  Moderate right pleural effusion  No enhancement of the pleura to suggest empyema  Mediastinal and right hilar lymphadenopathy, likely reactive, however metastatic disease is not entirely excluded  Mild stranding around the proximal pancreas suggestive of acute interstitial pancreatitis  Recommend correlation with lipase level  No organized peripancreatic fluid collections, loss of parenchymal enhancement to suggest necrosis, or vascular complications of pancreatitis  No other acute abdominal or pelvic pathology  Multiple additional findings as above  The study was marked in [facility] for immediate notification   Workstation performed: [id number]     Other Diagnostic Testing: I have personally reviewed pertinent reports  ACTIVE MEDICATIONS     Current Facility-Administered Medications   Medication Dose Route Frequency   • acetaminophen (TYLENOL) tablet 650 mg  650 mg Oral Q6H PRN   • albuterol inhalation solution 2 5 mg  2 5 mg Nebulization Q4H PRN   • atorvastatin (LIPITOR) tablet 40 mg  40 mg Oral Daily With Dinner   • cefTRIAXone (ROCEPHIN) 1,000 mg in dextrose 5 % 50 mL IVPB  1,000 mg Intravenous Q24H   • clopidogrel (PLAVIX) tablet 75 mg  75 mg Oral Daily   • glycerin-hypromellose- (ARTIFICIAL TEARS) ophthalmic solution 1 drop  1 drop Both Eyes PRN   • heparin (porcine) subcutaneous injection 5,000 Units  5,000 Units Subcutaneous Q8H [address]   • HYDROmorphone HCl (DILAUDID) injection 0 2 mg  0 2 mg Intravenous Q6H PRN   • hydrOXYzine HCL (ATARAX) tablet 25 mg  25 mg Oral Q6H PRN   • iron sucrose (VENOFER) 200 mg in sodium chloride 0 9 % 100 mL IVPB  200 mg Intravenous Once per day on Mon Wed Fri   • levothyroxine tablet 100 mcg  100 mcg Oral Early Morning   • loratadine (CLARITIN) tablet 5 mg  5 mg Oral Daily   • metoprolol tartrate (LOPRESSOR) partial tablet 12 5 mg  12 5 mg Oral Q12H HARRIS   • oxyCODONE (ROXICODONE) IR tablet 2 5 mg  2 5 mg Oral Q6H PRN    Or   • oxyCODONE (ROXICODONE) IR tablet 5 mg  5 mg Oral Q6H PRN   • [START ON 11/26/2022] potassium-sodium phosphateS (K-PHOS,PHOSPHA 250) -250 mg tablet 1 tablet  1 tablet Oral Daily With Breakfast   • sertraline (ZOLOFT) tablet 25 mg  25 mg Oral Daily       VTE Pharmacologic Prophylaxis: Heparin  VTE Mechanical Prophylaxis: sequential compression device      Disclaimer: Portions of the record may have been created with voice recognition software  Occasional wrong word or "sound a like" substitutions may have occurred due to the inherent limitations of voice recognition software  Careful consideration should be taken to recognize, using context, where substitutions have occurred      Felton Walker MD   Pulmonary and Critical Care Fellow, PGY-4  [facility]

## 2022-11-25 NOTE — ASSESSMENT & PLAN NOTE
· Patient history of laryngeal carcinoma status post tracheostomy/laryngectomy who presented to ED with tachycardia, hypoxia, fatigue  · CTA chest notable for significant findings including:  · Large, heterogeneous airspace consolidation in the right lower lobe, with a 6 3 x 5 2 x 4 8 cm somewhat lobulated hypodense component likely a necrotizing aspiration pneumonia  · Moderate right pleural effusion  No enhancement of the pleura to suggest empyema  · Mediastinal and right hilar lymphadenopathy, likely reactive, however metastatic disease is not entirely excluded  · Legionella and strep antigens negative  · Status post 900 mL fluid thoracentesis suggestive of transudative fluid for pulmonology and may be secondary to pancreatitis  · Blood cultures 1/2 group Actinomyces gram variable rods on 11/15  · Blood cultures x2 on 11/17 final: no growth   · Sputum culture on 11/15 grew Staph aureus  · Sputum culture on 11/16 final: no growth  · Plan for bronchoscopy and BAL 11/22, however, procedure aborted due to patient becoming hemodynamically unstable after anesthesia induction  Was in the ICU 11/22-11/23, now transferred back to [facility]       Plan:  · Ceftriaxone 1 gm Q24H Day 5, continue per ID recommendations   · 11/22 bronchial, viral, and fungal cultures pending from tracheal aspirate - partial bronch completed at that time  · AFB stain from aspirate prelim negative    · Trend WBC and Fever curve  WBC 13 71 today, downtrending  · Since patient has sinus tachycardia, ordered a d-dimer today which is 2 73  CTA ordered and pending   · Following up with pulm team, pending recommendations and if repeat bronch is needed  · Today, Will give X1 dose of lasix 20 mg QD to off load fluids given to her previously  · Follow-up chest CT in 3 months as an outpatient to ensure resolution as an underlying neoplasm may have a similar appearance

## 2022-11-25 NOTE — ASSESSMENT & PLAN NOTE
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  · Follows with ENT as an outpatient with Dr Corie Pringle; most recently evaluated for trich follow-up in 10/2022     Plan:  · Patricia Frees in place, functional well, at baseline  · Continue trach collar oxygen, on 8 L able to saturating appropriately, weaned to 6L   · Patient has concerns that prosthesis is not functioning properly  Speech evaluated her yesterday, no leakage around trach and prosthesis  Pt did have this new prothesis placed in June 2022, since then has been having voice issues  Currently suctioning repeatedly from the trach  · ENT consulted, appreciate reccs   · No further interventions for her TPN at this time  As outpatient can continue to work on keeping secretions then around TEP, cleaning the mechanism with new brushes, possible placement of TEP reinforcing bring under flange

## 2022-11-25 NOTE — ASSESSMENT & PLAN NOTE
· Known history, maintained on losartan 50 mg daily at home  · Follows PCP outpatient      Plan:  · Bps systolic 085B-059Q  · Given 100 ccs/hr fluids isolyte due to tachycardia in the 140s/150s overnight, stopped fluids this AM  · Will hold home dose losartan at this time  · Continue monitoring blood pressures

## 2022-11-25 NOTE — ASSESSMENT & PLAN NOTE
· Patient history of laryngeal carcinoma status post tracheostomy/laryngectomy on trach collar presented with fatigue; tachycardic on exam; labs notable for multiple derangements including hypercalcemia 14 3 and bicarb 18  · CT notable for nonspecific 7 x 6 mm lobulated left lower lobe nodule; new finding; CT imaging also revealing for multiple findings large heterogeneous airspace consolidation and right lower lobe with 6 x 5 x 5 lobulated hypodense component with moderate right-sided pleural effusion  concerning for necrotizing pneumonia  · Malignancy not entirely ruled out; does endorse decreased appetite, unexpected weight loss of up to 30 lb, and is a former smoker; quit 2002; hypercalcemia also noted on admission with recent outpatient PTH wnl  · 11/19 - repeat CT suggested of necrosis/abscess    Plan  · Inpatient pulmonology following, appreciate recs; 5 day course completed abx for suspecting necrotizing pneumonia with large heterogeneous airspace consolidation in right lower lobe  · ID consulted, appreciate reccs  On ceftriaxone 1 gm Q24H, day 5 today   Bronchial aspirates pending results   · Repeat CT future to assess for improvement, outpatient

## 2022-11-25 NOTE — ASSESSMENT & PLAN NOTE
· In setting of CKD stage 3B, excessive use of vitamin-D and calcium supplementation at home per patient  · Further evidenced by elevated corrected calcium 14 2; supported by initial presentation with abdominal pain, fatigue, decreased appetite/poor PO intake; mentation at baseline, nontoxic on exam  · ECG without arrhythmia  · Further ED work up notable for multiple derangements including Hgb 11 1, low bicarb 18, elevated BUN 34, elevated creatinine 1 47 (baseline 1 11 4), GFR 32, , hypoalbuminemia 2 1; elevated lipase 2028, uric acid 10 4  · CT imaging findings concerning for acute pancreatitis,  necrotizing pneumonia vs  malignancy  · Etiology multifactorial, possibly due to hypercalcemia malignancy in light of CT findings, further complicated by home vitamin-D supplementation; med rec without any other causative agents  Bone demineralization also possible given her age  · Per chart review, patient noted to have elevated corrected calcium level since 08/2022 however does not appear to have been further worked up by PCP  · Unlikely primary parathyroidism or tertiary cause given PTH in 09/2022 was low; baseline renal function with CKD III  Vitamin-D 25 hydroxy level within normal limits  Calcium continues to be elevated   Status post 3 mg zoledronic acid     SPEP with faint gammopathy, PTH very low  UPEP shows selective proteinuria (54) with no monoclonal bands noted       Plan:  · PTHrP to assess hypercalcemia of malignancy, pending   · Calcium level slowly improving 10 4  · Vitamin D levels within normal limits: Vit D 25-OH 77 2, Vit D 1,25 OH 49 4  · PTH 6 3, compensating appropriately for elevated Ca level

## 2022-11-25 NOTE — PLAN OF CARE
Problem: PAIN - ADULT  Goal: Verbalizes/displays adequate comfort level or baseline comfort level  Description: Interventions:  - Encourage patient to monitor pain and request assistance  - Assess pain using appropriate pain scale  - Administer analgesics based on type and severity of pain and evaluate response  - Implement non-pharmacological measures as appropriate and evaluate response  - Consider cultural and social influences on pain and pain management  - Notify physician/advanced practitioner if interventions unsuccessful or patient reports new pain  Outcome: Progressing     Problem: INFECTION - ADULT  Goal: Absence or prevention of progression during hospitalization  Description: INTERVENTIONS:  - Assess and monitor for signs and symptoms of infection  - Monitor lab/diagnostic results  - Monitor all insertion sites, i e  indwelling lines, tubes, and drains  - Monitor endotracheal if appropriate and nasal secretions for changes in amount and color  - [place] appropriate cooling/warming therapies per order  - Administer medications as ordered  - Instruct and encourage patient and family to use good hand hygiene technique  - Identify and instruct in appropriate isolation precautions for identified infection/condition  Outcome: Progressing     Problem: INFECTION - ADULT  Goal: Absence of fever/infection during neutropenic period  Description: INTERVENTIONS:  - Monitor WBC    Outcome: Progressing     Problem: DISCHARGE PLANNING  Goal: Discharge to home or other facility with appropriate resources  Description: INTERVENTIONS:  - Identify barriers to discharge w/patient and caregiver  - Arrange for needed discharge resources and transportation as appropriate  - Identify discharge learning needs (meds, wound care, etc )  - Arrange for interpretive services to assist at discharge as needed  - Refer to Case Management Department for coordinating discharge planning if the patient needs post-hospital services based on physician/advanced practitioner order or complex needs related to functional status, cognitive ability, or social support system  Outcome: Progressing     Problem: Knowledge Deficit  Goal: Patient/family/caregiver demonstrates understanding of disease process, treatment plan, medications, and discharge instructions  Description: Complete learning assessment and assess knowledge base    Interventions:  - Provide teaching at level of understanding  - Provide teaching via preferred learning methods  Outcome: Progressing     Problem: CARDIOVASCULAR - ADULT  Goal: Absence of cardiac dysrhythmias or at baseline rhythm  Description: INTERVENTIONS:  - Continuous cardiac monitoring, vital signs, obtain 12 lead EKG if ordered  - Administer antiarrhythmic and heart rate control medications as ordered  - Monitor electrolytes and administer replacement therapy as ordered  Outcome: Progressing     Problem: RESPIRATORY - ADULT  Goal: Achieves optimal ventilation and oxygenation  Description: INTERVENTIONS:  - Assess for changes in respiratory status  - Assess for changes in mentation and behavior  - Position to facilitate oxygenation and minimize respiratory effort  - Oxygen administered by appropriate delivery if ordered  - Initiate smoking cessation education as indicated  - Encourage broncho-pulmonary hygiene including cough, deep breathe, Incentive Spirometry  - Assess the need for suctioning and aspirate as needed  - Assess and instruct to report SOB or any respiratory difficulty  - Respiratory Therapy support as indicated  Outcome: Progressing     Problem: Prexisting or High Potential for Compromised Skin Integrity  Goal: Skin integrity is maintained or improved  Description: INTERVENTIONS:  - Identify patients at risk for skin breakdown  - Assess and monitor skin integrity  - Assess and monitor nutrition and hydration status  - Monitor labs   - Assess for incontinence   - Turn and reposition patient  - Assist with mobility/ambulation  - Relieve pressure over bony prominences  - Avoid friction and shearing  - Provide appropriate hygiene as needed including keeping skin clean and dry  - Evaluate need for skin moisturizer/barrier cream  - Collaborate with interdisciplinary team   - Patient/family teaching  - Consider wound care consult   Outcome: Progressing

## 2022-11-25 NOTE — ASSESSMENT & PLAN NOTE
· Known hx, follows with vascular surgery (Dr Frederick Gabriel)  · Previously found to have recurrent left ICA stenosis and critical new occlusive right IC stenosis, with a which were asymptomatic, along with left vertebral artery atresia; previously worked up in outpatient setting by mass concerning symptoms and was recommended endovascular intervention  · On 07/2020, patient electively admitted to [facility] during which she underwent ultrasound-guided right CFA puncture with sternal closure, aortic arch arteriogram, left carotid during him with PTA and stenting under the care of Dr Brown  · Maintained on antiplatelet and anti-statin therapy with Plavix and Lipitor     Plan:   · Continue home dose lipitor  · Restarted home Plavix after bronch was cancelled

## 2022-11-25 NOTE — ASSESSMENT & PLAN NOTE
· Patient presented with abdominal pain, lightheadedness, intermittent shortness of breath, recent unexpected weight loss   · Met sepsis criteria on admission  · Lactate wnl but has significant hypercalcemia 14 2 on admission  · Patient is status post tracheostomy  · Completed cephalosporin 5 day course, completed azithromycin 3 day course    Plan:  · Completed 5 day course of antibiotics covering for CAP  · Follow up on blood cultures  Blood cultures x2 11/17 no growth 4 days  · Blood cultures x1 11/15 grew Actinomyces   · 11/15 sputum culture grew S  Aureus with repeat sputum culture 11/16 negative   · Bronch/BAL aborted due to patient becoming hemodynamically unstable after anesthesia induction  Patient was briefly taken to ICU for higher level of care  Now transferred to SOD   · Bronchial aspirates collected, pending results   · AFB prelim from aspirate negative   · Will continue on ceftriaxone 1 gm Q24H per ID recommendations  Day 5  · Pending pulm new reccs and if repeat bronch is needed   · Continue trach collar oxygen as needed - around 10 L this AM   · Trend WBC and fever curve   WBC 13 71

## 2022-11-25 NOTE — ASSESSMENT & PLAN NOTE
· Likely secondary to hypercalcemia  · Presented with abdominal pain; labs notable for elevated lipase >2000, elevated WBC  · CT imaging suggestive pancreatitis  · Patient has elevated alk-phos with normal T bili and asymptomatic abnormal biliary strictures on imaging  · Patient reports improved appetite and improved abdominal pain    Plan:  · Supportive care with analgesics or antiemetics as needed; renal function at baseline, ECG with normal QTC  · Continue diet as tolerated   · Continued monitoring of vitals, O2 sats, urine output with goal > 0 5-1 mL/kg/hr  · Replete electrolytes as indicated    Suspected as having resolved, limiting fluid resuscitation at this time due to volume overload  Unclear source of hypercalcemia that triggered pancreatitis, hematology on board for hypercalcemia control   Ca slowly downtrending, last 10 4 today

## 2022-11-25 NOTE — QUICK NOTE
Tried to reach patient's family on the patient's care  Could not reach them to provide any update today  Will try again tomorrow

## 2022-11-26 NOTE — ASSESSMENT & PLAN NOTE
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  · Follows with ENT as an outpatient with Dr Vidal Suarez; most recently evaluated for trich follow-up in 10/2022     Plan:  · Bandar Summer in place, functional well, at baseline  · Continue trach collar oxygen, on 8 L able to saturating appropriately, weaned to 6L   · Patient has concerns that prosthesis is not functioning properly  Speech evaluated her yesterday, no leakage around trach and prosthesis  Pt did have this new prothesis placed in June 2022, since then has been having voice issues  Currently suctioning repeatedly from the trach  · ENT consulted, appreciate reccs   · No further interventions for her TPN at this time while inpatient  As outpatient can continue to work on keeping secretions then around TEP, cleaning the mechanism with new brushes, possible placement of TEP reinforcing bring under flange    · Recommend potential outpatient replacement or reinforcement of TEP   · Will have nursing assist with TEP maintanence at this time

## 2022-11-26 NOTE — ASSESSMENT & PLAN NOTE
Lab Results   Component Value Date    EGFR 37 11/26/2022    EGFR 40 11/25/2022    EGFR 45 11/24/2022    CREATININE 1 28 11/26/2022    CREATININE 1 22 11/25/2022    CREATININE 1 11 11/24/2022     · Known history of CKD stage III states 2014  · Follows B nephrology (Dr Philippe Roche)  · Cr on admission slightly elevated 1 47(baseline 1 1-1 4)  · Etiology likely 2/2 long history of hypertension, nephrosclerosis, age-related nephron loss     Plan  · Improving - Cr 1 28, which is around her baseline    Will continue to monitor BMP  · Avoid use of nephrotoxic agents  · Maintain euvolemia

## 2022-11-26 NOTE — ASSESSMENT & PLAN NOTE
· In setting of CKD stage 3B, excessive use of vitamin-D and calcium supplementation at home per patient  · Further evidenced by elevated corrected calcium 14 2; supported by initial presentation with abdominal pain, fatigue, decreased appetite/poor PO intake; mentation at baseline, nontoxic on exam  · ECG without arrhythmia  · Further ED work up notable for multiple derangements including Hgb 11 1, low bicarb 18, elevated BUN 34, elevated creatinine 1 47 (baseline 1 11 4), GFR 32, , hypoalbuminemia 2 1; elevated lipase 2028, uric acid 10 4  · CT imaging findings concerning for acute pancreatitis,  necrotizing pneumonia vs  malignancy  · Etiology multifactorial, possibly due to hypercalcemia malignancy in light of CT findings, further complicated by home vitamin-D supplementation; med rec without any other causative agents  Bone demineralization also possible given her age  · Per chart review, patient noted to have elevated corrected calcium level since 08/2022 however does not appear to have been further worked up by PCP  · Unlikely primary parathyroidism or tertiary cause given PTH in 09/2022 was low; baseline renal function with CKD III  Vitamin-D 25 hydroxy level within normal limits  Calcium continues to be elevated   Status post 3 mg zoledronic acid     SPEP with faint gammopathy, PTH very low  UPEP shows selective proteinuria (54) with no monoclonal bands noted       Plan:  · PTHrP to assess hypercalcemia of malignancy, pending   · Calcium level slowly improving 10 2  · Vitamin D levels within normal limits: Vit D 25-OH 77 2, Vit D 1,25 OH 49 4  · PTH 6 3, compensating appropriately for elevated Ca level

## 2022-11-26 NOTE — PROGRESS NOTES
Progress Note - Pulmonary   Sharla General 81 y o  female MRN: [id number]  Unit/Bed#: [facility] 421-01 Encounter: [id number]    Assessment/Plan:  Acute hypoxic respiratory failure secondary to Staph pneumonia with questionable Acinetobacter in the blood/chronic stoma status post laryngeal cancer/emphysema/large right-sided pleural effusion transudative  -patient has increased oxygen requirements likely secondary to large right-sided pleural effusion awaiting thoracentesis versus chest tube by IR, continue antibiotics per ID  -patient has had 2 separate near cardiac arrest with anesthesia per family recommend against any kind of anesthesia if possible deep tracheal aspirate patient is making mucus    Chief Complaint:   Nonverbal    Subjective:   Patient does not appear uncomfortable is able to suction herself    Objective:     Vitals: Blood pressure 123/52, pulse (!) 124, temperature 98 3 °F (36 8 °C), resp  rate 19, height 4' 8" (1 422 m), weight 62 7 kg (138 lb 3 7 oz), SpO2 90 %, not currently breastfeeding  ,Body mass index is 30 99 kg/m²        Intake/Output Summary (Last 24 hours) at 11/26/2022 0807  Last data filed at 11/26/2022 0656  Gross per 24 hour   Intake 100 ml   Output 1300 ml   Net -1200 ml       Invasive Devices     Peripheral Intravenous Line  Duration           Peripheral IV 11/25/22 Left Antecubital <1 day          Drain  Duration           External Urinary Catheter 3 days          Airway  Duration           Surgical Airway -- days                Physical Exam: /52   Pulse (!) 124   Temp 98 3 °F (36 8 °C)   Resp 19   Ht 4' 8" (1 422 m)   Wt 62 7 kg (138 lb 3 7 oz)   SpO2 90%   BMI 30 99 kg/m²   General appearance: alert and oriented, in no acute distress  Neck: no adenopathy, no carotid bruit, no JVD, supple, symmetrical, trachea midline and thyroid not enlarged, symmetric, no tenderness/mass/nodules  Lungs: diminished breath sounds  Heart: regular rate and rhythm, S1, S2 normal, no murmur, click, rub or gallop  Abdomen: soft, non-tender; bowel sounds normal; no masses,  no organomegaly  Extremities: extremities normal, warm and well-perfused; no cyanosis, clubbing, or edema  Pulses: 2+ and symmetric  Lymph nodes: Cervical, supraclavicular, and axillary nodes normal   Neurologic: Grossly normal     Labs:   CBC:   Lab Results   Component Value Date    WBC 13 27 (H) 11/26/2022    HGB 9 6 (L) 11/26/2022    HCT 32 7 (L) 11/26/2022    MCV 91 11/26/2022     11/26/2022    MCH 26 6 (L) 11/26/2022    MCHC 29 4 (L) 11/26/2022    RDW 21 5 (H) 11/26/2022    MPV 10 8 11/26/2022    NRBC 1 11/26/2022   , CMP:   Lab Results   Component Value Date    SODIUM 143 11/26/2022    K 2 8 (L) 11/26/2022     11/26/2022    CO2 29 11/26/2022    BUN 10 11/26/2022    CREATININE 1 28 11/26/2022    CALCIUM 8 4 11/26/2022    AST 19 11/26/2022    ALT 16 11/26/2022    ALKPHOS 85 11/26/2022    EGFR 37 11/26/2022     Imaging and other studies: I have personally reviewed pertinent films in PACS

## 2022-11-26 NOTE — ASSESSMENT & PLAN NOTE
· Known history, in setting of CKD stage IIIB  · Follows nephrology outpatient (Dr Simran Cortés)  · Hgb 11 1 on admission, at baseline     · Currently hemodynamically stable    Plan:  · Trend CBC, Hb 9 6  · No active bleeding present   · Transfuse if Hb < 7

## 2022-11-26 NOTE — PROGRESS NOTES
INTERNAL MEDICINE RESIDENCY PROGRESS NOTE     Name: Jony Wolf   Age & Sex: 81 y o  female   MRN: [id number]  Unit/Bed#: [facility] 421-01   Encounter: [id number]  Team: [facility] Team C     PATIENT INFORMATION     Name: Jony Wolf   Age & Sex: 81 y o  female   MRN: [id number]  Hospital Stay Days: 11    ASSESSMENT/PLAN     Principal Problem:    Sepsis with acute hypoxic respiratory failure ([facility] [address] )  Active Problems:    Right lower lobe lung mass with right pleural effusion    Hypercalcemia    Laryngeal carcinoma (HCC)    Asymptomatic carotid artery stenosis    Hyperlipidemia    Hypertension    CKD (chronic kidney disease) stage 3, GFR 30-59 ml/min    JORDNA (acute kidney injury) ([facility] [address] )    Pancreatitis    Lung nodule seen on imaging study    Anemia of chronic disease    Hypothyroidism    Gram-positive bacteria on cultures      Right lower lobe lung mass with right pleural effusion  Assessment & Plan  · Patient history of laryngeal carcinoma status post tracheostomy/laryngectomy who presented to ED with tachycardia, hypoxia, fatigue  · 11/15 CTA chest notable for significant findings including:  · Large, heterogeneous airspace consolidation in the right lower lobe, with a 6 3 x 5 2 x 4 8 cm somewhat lobulated hypodense component likely a necrotizing aspiration pneumonia  · Moderate right pleural effusion  No enhancement of the pleura to suggest empyema  · Mediastinal and right hilar lymphadenopathy, likely reactive, however metastatic disease is not entirely excluded    · Legionella and strep antigens negative  · 11/18 IR Thoracentesis - Status post 900 mL fluid thoracentesis suggestive of transudative fluid for pulmonology and may be secondary to pancreatitis  · Blood cultures 1/2 group Actinomyces gram variable rods on 11/15  · Blood cultures x2 on 11/17 final: no growth   · Sputum culture on 11/15 grew Staph aureus  · Sputum culture on 11/16 final: no growth  · Planned for bronchoscopy and BAL 11/22, however, procedure aborted due to patient becoming hemodynamically unstable after anesthesia induction  Was in the ICU 11/22-11/23, transferred back to [facility]       Plan:  · Ceftriaxone 1 gm Q24H Day 6, continue per ID recommendations   · 11/22 bronchial, viral, and fungal cultures pending from tracheal aspirate - partial bronch completed at that time  · AFB stain from aspirate prelim negative    · Trend WBC and Fever curve  WBC 13 27 today, downtrending  · Since patient has sinus tachycardia, ordered a d-dimer 11/26 which is 2 73    · CTA 11/26 shows:  No PE  Right-sided pleural effusion is now larger in size  Worsened right middle lobe and right lower lobe consolidation keeping with a combination patient's known in pneumonia and superimposed atelectasis  Worsened mucus plugging throughout the bronchus intermedius, right middle lobe and right lower lobe  Low-density necrotic/abscess component of the right lower lobe consolidation appears unchanged  Unchanged pulmonary edema  · Following up with pulm team, appreciate recommendations  · Patient is not stable for bronchoscopy given episode earlier in the week with initial bronch initiated  At this time recommend thoracentesis versus chest tube placement for right effusion  · Pulm will continue to follow and if patient becomes more stable can consider repeat bronchoscopy without sedation  · Consulted IR for repeat thoracentesis and fluid studies given increased right sided pleural effusion  · Continue Chest PT and Mucomyst   · Follow-up chest CT in 3 months as an outpatient to ensure resolution as an underlying neoplasm may have a similar appearance      Hypercalcemia  Assessment & Plan  · In setting of CKD stage 3B, excessive use of vitamin-D and calcium supplementation at home per patient  · Further evidenced by elevated corrected calcium 14 2; supported by initial presentation with abdominal pain, fatigue, decreased appetite/poor PO intake; mentation at baseline, nontoxic on exam  · ECG without arrhythmia  · Further ED work up notable for multiple derangements including Hgb 11 1, low bicarb 18, elevated BUN 34, elevated creatinine 1 47 (baseline 1 11 4), GFR 32, , hypoalbuminemia 2 1; elevated lipase 2028, uric acid 10 4  · CT imaging findings concerning for acute pancreatitis,  necrotizing pneumonia vs  malignancy  · Etiology multifactorial, possibly due to hypercalcemia malignancy in light of CT findings, further complicated by home vitamin-D supplementation; med rec without any other causative agents  Bone demineralization also possible given her age  · Per chart review, patient noted to have elevated corrected calcium level since 08/2022 however does not appear to have been further worked up by PCP  · Unlikely primary parathyroidism or tertiary cause given PTH in 09/2022 was low; baseline renal function with CKD III  Vitamin-D 25 hydroxy level within normal limits  Calcium continues to be elevated   Status post 3 mg zoledronic acid     SPEP with faint gammopathy, PTH very low  UPEP shows selective proteinuria (54) with no monoclonal bands noted  Plan:  · PTHrP to assess hypercalcemia of malignancy, pending   · Calcium level slowly improving 10 2  · Vitamin D levels within normal limits: Vit D 25-OH 77 2, Vit D 1,25 OH 49 4  · PTH 6 3, compensating appropriately for elevated Ca level     Laryngeal carcinoma (HCC)  Assessment & Plan  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  · Follows with ENT as an outpatient with Dr Jie Strange; most recently evaluated for trich follow-up in 10/2022     Plan:  · Ashley Blu in place, functional well, at baseline  · Continue trach collar oxygen, on 8 L able to saturating appropriately, weaned to 6L   · Patient has concerns that prosthesis is not functioning properly  Speech evaluated her yesterday, no leakage around trach and prosthesis   Pt did have this new prothesis placed in June 2022, since then has been having voice issues  Currently suctioning repeatedly from the trach  · ENT consulted, appreciate reccs   · No further interventions for her TPN at this time while inpatient  As outpatient can continue to work on keeping secretions then around TEP, cleaning the mechanism with new brushes, possible placement of TEP reinforcing bring under flange  · Recommend potential outpatient replacement or reinforcement of TEP   · Will have nursing assist with TEP maintanence at this time    Gram-positive bacteria on cultures  Assessment & Plan  1/2 blood cultures grew Actinomyces    Plan:  See Sepsis and Right Lower Lobe Mass A/P         Hypothyroidism  Assessment & Plan  · Known history since 11/2021  · Most recent TSH 4 2 in 08/2022  · Managed on levothyroxine 100 mcg  · Repeat TSH within normal limits    Plan  Stable    · Continue home dose levothyroxine      Anemia of chronic disease  Assessment & Plan  · Known history, in setting of CKD stage IIIB  · Follows nephrology outpatient (Dr Karen Yeboah)  · Hgb 11 1 on admission, at baseline     · Currently hemodynamically stable    Plan:  · Trend CBC, Hb 9 6  · No active bleeding present   · Transfuse if Hb < 7         Lung nodule seen on imaging study  Assessment & Plan  · Patient history of laryngeal carcinoma status post tracheostomy/laryngectomy on trach collar presented with fatigue; tachycardic on exam; labs notable for multiple derangements including hypercalcemia 14 3 and bicarb 18  · CT notable for nonspecific 7 x 6 mm lobulated left lower lobe nodule; new finding; CT imaging also revealing for multiple findings large heterogeneous airspace consolidation and right lower lobe with 6 x 5 x 5 lobulated hypodense component with moderate right-sided pleural effusion  concerning for necrotizing pneumonia  · Malignancy not entirely ruled out; does endorse decreased appetite, unexpected weight loss of up to 30 lb, and is a former smoker; quit 2002; hypercalcemia also noted on admission with recent outpatient PTH wnl  · 11/19 - repeat CT suggested of necrosis/abscess    Plan  · Inpatient pulmonology following, appreciate recs; 5 day course completed abx for suspecting necrotizing pneumonia with large heterogeneous airspace consolidation in right lower lobe  · ID consulted, appreciate reccs  On ceftriaxone 1 gm Q24H, day 6 today  Bronchial aspirates pending results   · Repeat CT future to assess for improvement, outpatient     Pancreatitis  Assessment & Plan  · Likely secondary to hypercalcemia  · Presented with abdominal pain; labs notable for elevated lipase >2000, elevated WBC  · CT imaging suggestive pancreatitis  · Patient has elevated alk-phos with normal T bili and asymptomatic abnormal biliary strictures on imaging  · Patient reports improved appetite and improved abdominal pain - pancreatitis is now resolved     Plan:  · Supportive care with analgesics or antiemetics as needed; renal function at baseline, ECG with normal QTC  · Continue diet as tolerated   · Continued monitoring of vitals, O2 sats, urine output with goal > 0 5-1 mL/kg/hr  · Replete electrolytes as indicated    Suspected as having resolved, limiting fluid resuscitation at this time due to volume overload  Unclear source of hypercalcemia that triggered pancreatitis, hematology on board for hypercalcemia control  Ca slowly downtrending, last 10 2 today         JORDAN (acute kidney injury) ([facility] [address] )  Assessment & Plan  Creatinine of 1 47 on admission treated with IV fluid boluses, 3L total, NSS at 100ml/hr, renal function assessment and lab monitoring  Pt has CKD 3 and noted baseline of 1-1 1    Plan:  · Cr slowly improving, 1 28 today which is close to her baseline   Will continue to monitor   · Avoid nephrotoxic agents  · Monitor urine output   · Encourage adequate intake    CKD (chronic kidney disease) stage 3, GFR 30-59 ml/min  Assessment & Plan  Lab Results   Component Value Date    EGFR 37 11/26/2022    EGFR 40 11/25/2022    EGFR 45 11/24/2022    CREATININE 1 28 11/26/2022    CREATININE 1 22 11/25/2022    CREATININE 1 11 11/24/2022     · Known history of CKD stage III states 2014  · Follows [facility] nephrology (Dr Farhad Damon)  · Cr on admission slightly elevated 1 47(baseline 1 1-1 4)  · Etiology likely 2/2 long history of hypertension, nephrosclerosis, age-related nephron loss     Plan  · Improving - Cr 1 28, which is around her baseline    Will continue to monitor BMP  · Avoid use of nephrotoxic agents  · Maintain euvolemia       Hypertension  Assessment & Plan  · Known history, maintained on losartan 50 mg daily at home  · Follows PCP outpatient      Plan:  · Bps systolic 11Z-368H  · Will hold home dose losartan at this time  · Continue monitoring blood pressures      Hyperlipidemia  Assessment & Plan  · Known history of type 2 diabetes on metformin, hypertension on losartan  · Managed on Lipitor 40  · Most recent lipid panel with good control of LDL at 56    Plan    · Continue Lipitor 40mg QD      Asymptomatic carotid artery stenosis  Assessment & Plan  · Known hx, follows with vascular surgery (Dr Armando Gonzalez)  · Previously found to have recurrent left ICA stenosis and critical new occlusive right IC stenosis, with a which were asymptomatic, along with left vertebral artery atresia; previously worked up in outpatient setting by mass concerning symptoms and was recommended endovascular intervention  · On 07/2020, patient electively admitted to [facility] during which she underwent ultrasound-guided right CFA puncture with sternal closure, aortic arch arteriogram, left carotid during him with PTA and stenting under the care of Dr Brown  · Maintained on antiplatelet and anti-statin therapy with Plavix and Lipitor     Plan:   · Continue home dose lipitor  · Restarted home Plavix after bronch was cancelled     * Sepsis with acute hypoxic respiratory failure ([facility] [address] )  Assessment & Plan  · Patient presented with abdominal pain, lightheadedness, intermittent shortness of breath, recent unexpected weight loss   · Met sepsis criteria on admission  · Lactate wnl but has significant hypercalcemia 14 2 on admission  · Patient is status post tracheostomy  · Completed cephalosporin 5 day course, completed azithromycin 3 day course    Plan:  · Completed 5 day course of antibiotics covering for CAP  · Follow up on blood cultures  Blood cultures x2 11/17 no growth 4 days  · Blood cultures x1 11/15 grew Actinomyces   · 11/15 sputum culture grew S  Aureus with repeat sputum culture 11/16 negative   · Bronch/BAL aborted due to patient becoming hemodynamically unstable after anesthesia induction  Patient was briefly taken to ICU for higher level of care  Now transferred to [facility]   · Bronchial aspirates collected, pending results   · AFB prelim from aspirate negative   · Will continue on ceftriaxone 1 gm Q24H per ID recommendations  Day 6  · CTA 11/26 shows:  No PE  Right-sided pleural effusion is now larger in size  Worsened right middle lobe and right lower lobe consolidation keeping with a combination patient's known in pneumonia and superimposed atelectasis  Worsened mucus plugging throughout the bronchus intermedius, right middle lobe and right lower lobe  Low-density necrotic/abscess component of the right lower lobe consolidation appears unchanged  Unchanged pulmonary edema  · Following up with pulm team, appreciate recommendations  · Patient is not stable for bronchoscopy given episode earlier in the week with initial bronch initiated  At this time recommend thoracentesis versus chest tube placement for right effusion  · Pulm will continue to follow and if patient becomes more stable can consider repeat bronchoscopy without sedation  · Consulted IR for repeat thoracentesis and fluid studies given increased right sided pleural effusion   · Continue trach collar oxygen as needed - around 10 L this AM   · Trend WBC and fever curve   WBC 13 71      Disposition:  Plan for IR thoracentesis repeat after CT imaging showed right-sided pleural effusion now large in size     SUBJECTIVE     Patient seen and examined, sitting in bed  No acute events overnight  Patient's major concern is her tracheostomy which she feels like she needs increased suctioning  Patient is able to saturate well after weaning from 10 L to about 5 L this morning  Advised patient to continue using brushes with assistance from nursing  ENT evaluated her and advised to use brushes as well and maintaining TEP  Otherwise patient has no other complaints  Denies any fevers, chills, chest pain, shortness a breath, nausea vomiting, abdominal pain, diarrhea or constipation  Plan is for IR thoracentesis to drain the right sided pleural effusion that is seen on CT imaging  Will continue with IV antibiotics as well  Initiated chest PT and Mucomyst to help clear secretions  Attempted to call daughter Florence Escalante today but could not reach her at this time to provide an update on her mother  The team will call again tomorrow to provide an update  OBJECTIVE     Vitals:    22 0600 22 0733 22 0800 22 0802   BP:    123/52   BP Location:       Pulse:    (!) 124   Resp:    19   Temp:    98 3 °F (36 8 °C)   TempSrc:       SpO2:  (!) 89% 90% 90%   Weight: 62 7 kg (138 lb 3 7 oz)      Height:          Temperature:   Temp (24hrs), Av 9 °F (36 6 °C), Min:97 4 °F (36 3 °C), Max:98 3 °F (36 8 °C)    Temperature: 98 3 °F (36 8 °C)  Intake & Output:  I/O        07 0700  0701   07 07 0700    P  O  440      I V  (mL/kg)       IV Piggyback 150 100     Total Intake(mL/kg) 590 (8 8) 100 (1 6)     Urine (mL/kg/hr) 900 (0 6) 1300 (0 9) 200 (0 7)    Stool 0 0 0    Total Output 900 1300 200    Net -310 -1200 -200           Unmeasured Urine Occurrence  2 x     Unmeasured Stool Occurrence 1 x 2 x 1 x        Weights:        Body mass index is 30 99 kg/m²  Weight (last 2 days)     Date/Time Weight    11/26/22 0600 62 7 (138 23)    11/25/22 0600 67 2 (148 15)    11/24/22 0600 66 7 (147)        Physical Exam  Constitutional:       General: She is not in acute distress  HENT:      Head: Normocephalic and atraumatic  Mouth/Throat:      Mouth: Mucous membranes are moist       Pharynx: Oropharynx is clear  Eyes:      General: No scleral icterus  Extraocular Movements: Extraocular movements intact  Conjunctiva/sclera: Conjunctivae normal    Neck:      Comments: No erythema, purulent or any type of discharge, or warmth noted around tracheostomy  Appears patent  Cardiovascular:      Rate and Rhythm: Regular rhythm  Tachycardia present  Pulses: Normal pulses  Heart sounds: Normal heart sounds  No murmur heard  No gallop  Pulmonary:      Effort: Pulmonary effort is normal  No respiratory distress  Breath sounds: No wheezing (faint expiratory wheezing) or rales  Comments: Decreased breath sounds on right compared to left    Weaned down to 5L and patient was tolerating well, saturating appropriately   Abdominal:      General: Bowel sounds are normal  There is no distension  Palpations: Abdomen is soft  Tenderness: There is no abdominal tenderness  There is no guarding or rebound  Musculoskeletal:      Right lower leg: Edema (+1 to below knee) present  Left lower leg: Edema (+1 to below knee) present  Skin:     General: Skin is warm and dry  Capillary Refill: Capillary refill takes less than 2 seconds  Neurological:      Mental Status: She is alert and oriented to person, place, and time  Psychiatric:         Mood and Affect: Mood normal          Behavior: Behavior normal        LABORATORY DATA     Labs: I have personally reviewed pertinent reports    Results from last 7 days   Lab Units 11/26/22  0624 11/25/22  0626 11/24/22  0615   WBC Thousand/uL 13 27* 13 71* 14 43*   HEMOGLOBIN g/dL 9 6* 9 2* 9 4*   HEMATOCRIT % 32 7* 31 9* 32 2*   PLATELETS Thousands/uL 228 239 253   NEUTROS PCT % 88* 86* 89*   MONOS PCT % 7 7 6      Results from last 7 days   Lab Units 11/26/22  0624 11/25/22  0626 11/24/22  0615   POTASSIUM mmol/L 2 8* 3 5 4 1   CHLORIDE mmol/L 106 110* 110*   CO2 mmol/L 29 27 24   BUN mg/dL 10 10 10   CREATININE mg/dL 1 28 1 22 1 11   CALCIUM mg/dL 8 4 8 6 8 6   ALK PHOS U/L 85 86 87   ALT U/L 16 17 18   AST U/L 19 17 36     Results from last 7 days   Lab Units 11/26/22  0624 11/25/22  0626 11/23/22  0512   MAGNESIUM mg/dL 1 5* 1 7 2 3     Results from last 7 days   Lab Units 11/26/22  0624 11/25/22  0626 11/23/22  0512   PHOSPHORUS mg/dL 1 7* 1 5* 1 7*                    IMAGING & DIAGNOSTIC TESTING     Radiology Results: I have personally reviewed pertinent reports  XR chest portable    Result Date: 11/17/2022  Impression: Near complete drainage of right effusion with trace residual effusion  Masslike opacity in the right lower lobe better shown on CT  Mild pulmonary venous congestion  Workstation performed: [id number]     CT head wo contrast    Result Date: 11/15/2022  Impression: No acute intracranial abnormality  Chronic microangiopathic changes  Workstation performed: [id number]     CT chest wo contrast    Result Date: 11/19/2022  Impression: 1  Right lower lobe consolidation appears similar  There is relative internal hypodensity, along with air, consistent with abscess/necrosis  Recommend remains for a follow-up CT  2   Stable right lower lobe airway debris, with new airway debris in the middle lobe, consistent with interval aspiration event 3  Moderate right pleural effusion, not significantly changed in size  Trace left pleural effusion  4   Interstitial opacities consistent with mild edema 5  Stable left lower lobe nodule, attention on follow-up The study was marked in EPIC for immediate notification   Workstation performed: [id number]     IR IN-Patient Thoracentesis    Result Date: 11/18/2022  Impression: Impression: Ultrasound-guided thoracentesis yielding 900 mL clear yellow pleural fluid  Signed, performed, and dictated by CHANEL Hardwick under the supervision of Dr Maria M Paz  Workstation performed: [id number]     PE Study with CT Abdomen and Pelvis with contrast    Result Date: 11/15/2022  Impression: No central, lobar, segmental or proximal subsegmental pulmonary embolism  Evaluation of the distal subsegmental pulmonary arteries is limited  Large, heterogeneous airspace consolidation in the right lower lobe, with a 6 3 x 5 2 x 4 8 cm somewhat lobulated hypodense component with a small droplet of air  This likely represents a necrotizing aspiration pneumonia, given mucous plugging throughout the right lower lobe and retained secretions/debris in the right mainstem bronchus and bronchus intermedius  Recommend follow-up chest CT in approximately 3 months to ensure resolution as an underlying neoplasm may have a similar appearance  Nonspecific 7 x 6 mm lobulated left lower lobe nodule  Attention on follow-up  Moderate right pleural effusion  No enhancement of the pleura to suggest empyema  Mediastinal and right hilar lymphadenopathy, likely reactive, however metastatic disease is not entirely excluded  Mild stranding around the proximal pancreas suggestive of acute interstitial pancreatitis  Recommend correlation with lipase level  No organized peripancreatic fluid collections, loss of parenchymal enhancement to suggest necrosis, or vascular complications of pancreatitis  No other acute abdominal or pelvic pathology  Multiple additional findings as above  The study was marked in [facility] for immediate notification  Workstation performed: [id number]     Other Diagnostic Testing: I have personally reviewed pertinent reports      ACTIVE MEDICATIONS     Current Facility-Administered Medications   Medication Dose Route Frequency   • acetaminophen (TYLENOL) tablet 650 mg  650 mg Oral Q6H PRN   • acetylcysteine (MUCOMYST) 200 mg/mL inhalation solution 600 mg  3 mL Nebulization TID   • albuterol inhalation solution 2 5 mg  2 5 mg Nebulization Q4H PRN   • atorvastatin (LIPITOR) tablet 40 mg  40 mg Oral Daily With Dinner   • cefTRIAXone (ROCEPHIN) 1,000 mg in dextrose 5 % 50 mL IVPB  1,000 mg Intravenous Q24H   • clopidogrel (PLAVIX) tablet 75 mg  75 mg Oral Daily   • dextromethorphan-guaiFENesin (ROBITUSSIN DM) oral syrup 10 mL  10 mL Oral Q6H PRN   • glycerin-hypromellose- (ARTIFICIAL TEARS) ophthalmic solution 1 drop  1 drop Both Eyes PRN   • heparin (porcine) subcutaneous injection 5,000 Units  5,000 Units Subcutaneous Q8H [address]   • HYDROmorphone HCl (DILAUDID) injection 0 2 mg  0 2 mg Intravenous Q6H PRN   • hydrOXYzine HCL (ATARAX) tablet 25 mg  25 mg Oral Q6H PRN   • iron sucrose (VENOFER) 200 mg in sodium chloride 0 9 % 100 mL IVPB  200 mg Intravenous Once per day on Mon Wed Fri   • levalbuterol (XOPENEX) inhalation solution 1 25 mg  1 25 mg Nebulization TID   • levothyroxine tablet 100 mcg  100 mcg Oral Early Morning   • loratadine (CLARITIN) tablet 5 mg  5 mg Oral Daily   • metoprolol tartrate (LOPRESSOR) partial tablet 12 5 mg  12 5 mg Oral Q12H HARRIS   • oxyCODONE (ROXICODONE) IR tablet 2 5 mg  2 5 mg Oral Q6H PRN    Or   • oxyCODONE (ROXICODONE) IR tablet 5 mg  5 mg Oral Q6H PRN   • potassium phosphates 12 mmol in sodium chloride 0 9 % 250 mL infusion  12 mmol Intravenous Once   • potassium-sodium phosphateS (K-PHOS,PHOSPHA 250) -250 mg tablet 1 tablet  1 tablet Oral Daily With Breakfast   • sertraline (ZOLOFT) tablet 25 mg  25 mg Oral Daily       VTE Pharmacologic Prophylaxis: Heparin  VTE Mechanical Prophylaxis: sequential compression device    Portions of the record may have been created with voice recognition software  Occasional wrong word or "sound a like" substitutions may have occurred due to the inherent limitations of voice recognition software    Read the chart carefully and recognize, using context, where substitutions have occurred   ==  [address]  Internal Medicine Residency PGY-1

## 2022-11-26 NOTE — PLAN OF CARE
Problem: RESPIRATORY - ADULT  Goal: Achieves optimal ventilation and oxygenation  Description: INTERVENTIONS:  - Assess for changes in respiratory status  - Assess for changes in mentation and behavior  - Position to facilitate oxygenation and minimize respiratory effort  - Oxygen administered by appropriate delivery if ordered  - Initiate smoking cessation education as indicated  - Encourage broncho-pulmonary hygiene including cough, deep breathe, Incentive Spirometry  - Assess the need for suctioning and aspirate as needed  - Assess and instruct to report SOB or any respiratory difficulty  - Respiratory Therapy support as indicated  11/26/2022 1429 by Guy Schmitt RN  Outcome: Progressing  11/26/2022 1426 by Guy Schmitt RN  Outcome: Progressing     Problem: INFECTION - ADULT  Goal: Absence or prevention of progression during hospitalization  Description: INTERVENTIONS:  - Assess and monitor for signs and symptoms of infection  - Monitor lab/diagnostic results  - Monitor all insertion sites, i e  indwelling lines, tubes, and drains  - Monitor endotracheal if appropriate and nasal secretions for changes in amount and color  - [place] appropriate cooling/warming therapies per order  - Administer medications as ordered  - Instruct and encourage patient and family to use good hand hygiene technique  - Identify and instruct in appropriate isolation precautions for identified infection/condition  11/26/2022 1429 by Guy Schmitt RN  Outcome: Progressing  11/26/2022 1426 by Guy Schmitt RN  Outcome: Progressing

## 2022-11-26 NOTE — PROGRESS NOTES
S: Patient is doing well except TEP is clogged again  No other acute symptoms or concerns  Denies SOB on trach tent 8L 40% FiO2       O:   , SpO2 89-90%  CTA: Large R sided pleural effusion, worse RML RLL consolidation, mucous plugging through bronchus intermedius, unchanged pulmonary edema     TEP clogged without phonation ability     A: 80 yo female with a poorly functioning TEP that is currently clogged  Suspected aspiration with worsening R lung pathology that could only come through the TEP site    Laryngectomy 2004    Hypoxia-8L trach mask 40% FiO2      P:   Continue workup RLL/RML pathology and Pleural effusion  F/u outpatient for replacement or reinforcement of TEP  -clean as needed and keep secretions thin around TEP

## 2022-11-26 NOTE — ASSESSMENT & PLAN NOTE
· Known hx, follows with vascular surgery (Dr Amber Mcgowan)  · Previously found to have recurrent left ICA stenosis and critical new occlusive right IC stenosis, with a which were asymptomatic, along with left vertebral artery atresia; previously worked up in outpatient setting by mass concerning symptoms and was recommended endovascular intervention  · On 07/2020, patient electively admitted to [facility] [place] [facility] during which she underwent ultrasound-guided right CFA puncture with sternal closure, aortic arch arteriogram, left carotid during him with PTA and stenting under the care of Dr Brown  · Maintained on antiplatelet and anti-statin therapy with Plavix and Lipitor     Plan:   · Continue home dose lipitor  · Restarted home Plavix after bronch was cancelled

## 2022-11-26 NOTE — ASSESSMENT & PLAN NOTE
· Likely secondary to hypercalcemia  · Presented with abdominal pain; labs notable for elevated lipase >2000, elevated WBC  · CT imaging suggestive pancreatitis  · Patient has elevated alk-phos with normal T bili and asymptomatic abnormal biliary strictures on imaging  · Patient reports improved appetite and improved abdominal pain - pancreatitis is now resolved     Plan:  · Supportive care with analgesics or antiemetics as needed; renal function at baseline, ECG with normal QTC  · Continue diet as tolerated   · Continued monitoring of vitals, O2 sats, urine output with goal > 0 5-1 mL/kg/hr  · Replete electrolytes as indicated    Suspected as having resolved, limiting fluid resuscitation at this time due to volume overload  Unclear source of hypercalcemia that triggered pancreatitis, hematology on board for hypercalcemia control   Ca slowly downtrending, last 10 2 today

## 2022-11-26 NOTE — ASSESSMENT & PLAN NOTE
· Patient presented with abdominal pain, lightheadedness, intermittent shortness of breath, recent unexpected weight loss   · Met sepsis criteria on admission  · Lactate wnl but has significant hypercalcemia 14 2 on admission  · Patient is status post tracheostomy  · Completed cephalosporin 5 day course, completed azithromycin 3 day course    Plan:  · Completed 5 day course of antibiotics covering for CAP  · Follow up on blood cultures  Blood cultures x2 11/17 no growth 4 days  · Blood cultures x1 11/15 grew Actinomyces   · 11/15 sputum culture grew S  Aureus with repeat sputum culture 11/16 negative   · Bronch/BAL aborted due to patient becoming hemodynamically unstable after anesthesia induction  Patient was briefly taken to ICU for higher level of care  Now transferred to SOD   · Bronchial aspirates collected, pending results   · AFB prelim from aspirate negative   · Will continue on ceftriaxone 1 gm Q24H per ID recommendations  Day 6  · CTA 11/26 shows:  No PE  Right-sided pleural effusion is now larger in size  Worsened right middle lobe and right lower lobe consolidation keeping with a combination patient's known in pneumonia and superimposed atelectasis  Worsened mucus plugging throughout the bronchus intermedius, right middle lobe and right lower lobe  Low-density necrotic/abscess component of the right lower lobe consolidation appears unchanged  Unchanged pulmonary edema  · Following up with pulm team, appreciate recommendations  · Patient is not stable for bronchoscopy given episode earlier in the week with initial bronch initiated  At this time recommend thoracentesis versus chest tube placement for right effusion    · Pulm will continue to follow and if patient becomes more stable can consider repeat bronchoscopy without sedation  · Consulted IR for repeat thoracentesis and fluid studies given increased right sided pleural effusion   · Continue trach collar oxygen as needed - around 10 L this AM · Trend WBC and fever curve   WBC 13 71

## 2022-11-26 NOTE — ASSESSMENT & PLAN NOTE
· Patient history of laryngeal carcinoma status post tracheostomy/laryngectomy on trach collar presented with fatigue; tachycardic on exam; labs notable for multiple derangements including hypercalcemia 14 3 and bicarb 18  · CT notable for nonspecific 7 x 6 mm lobulated left lower lobe nodule; new finding; CT imaging also revealing for multiple findings large heterogeneous airspace consolidation and right lower lobe with 6 x 5 x 5 lobulated hypodense component with moderate right-sided pleural effusion  concerning for necrotizing pneumonia  · Malignancy not entirely ruled out; does endorse decreased appetite, unexpected weight loss of up to 30 lb, and is a former smoker; quit 2002; hypercalcemia also noted on admission with recent outpatient PTH wnl  · 11/19 - repeat CT suggested of necrosis/abscess    Plan  · Inpatient pulmonology following, appreciate recs; 5 day course completed abx for suspecting necrotizing pneumonia with large heterogeneous airspace consolidation in right lower lobe  · ID consulted, appreciate reccs  On ceftriaxone 1 gm Q24H, day 6 today   Bronchial aspirates pending results   · Repeat CT future to assess for improvement, outpatient

## 2022-11-26 NOTE — ASSESSMENT & PLAN NOTE
· Known history, maintained on losartan 50 mg daily at home  · Follows PCP outpatient      Plan:  · Bps systolic 77N-244Z  · Will hold home dose losartan at this time  · Continue monitoring blood pressures

## 2022-11-26 NOTE — QUICK NOTE
Pt weighed 11/26 twice with standing scale 62 6 and 62 7 kg consecutively  Previous weights recorded using bed scale are ~67 kg  Initial weight 11/16 1300 ~60 kg but no method charted

## 2022-11-26 NOTE — ASSESSMENT & PLAN NOTE
· Patient history of laryngeal carcinoma status post tracheostomy/laryngectomy who presented to ED with tachycardia, hypoxia, fatigue  · 11/15 CTA chest notable for significant findings including:  · Large, heterogeneous airspace consolidation in the right lower lobe, with a 6 3 x 5 2 x 4 8 cm somewhat lobulated hypodense component likely a necrotizing aspiration pneumonia  · Moderate right pleural effusion  No enhancement of the pleura to suggest empyema  · Mediastinal and right hilar lymphadenopathy, likely reactive, however metastatic disease is not entirely excluded  · Legionella and strep antigens negative  · 11/18 IR Thoracentesis - Status post 900 mL fluid thoracentesis suggestive of transudative fluid for pulmonology and may be secondary to pancreatitis  · Blood cultures 1/2 group Actinomyces gram variable rods on 11/15  · Blood cultures x2 on 11/17 final: no growth   · Sputum culture on 11/15 grew Staph aureus  · Sputum culture on 11/16 final: no growth  · Planned for bronchoscopy and BAL 11/22, however, procedure aborted due to patient becoming hemodynamically unstable after anesthesia induction  Was in the ICU 11/22-11/23, transferred back to SOD       Plan:  · Ceftriaxone 1 gm Q24H Day 6, continue per ID recommendations   · 11/22 bronchial, viral, and fungal cultures pending from tracheal aspirate - partial bronch completed at that time  · AFB stain from aspirate prelim negative    · Trend WBC and Fever curve  WBC 13 27 today, downtrending  · Since patient has sinus tachycardia, ordered a d-dimer 11/26 which is 2 73    · CTA 11/26 shows:  No PE  Right-sided pleural effusion is now larger in size  Worsened right middle lobe and right lower lobe consolidation keeping with a combination patient's known in pneumonia and superimposed atelectasis  Worsened mucus plugging throughout the bronchus intermedius, right middle lobe and right lower lobe    Low-density necrotic/abscess component of the right lower lobe consolidation appears unchanged  Unchanged pulmonary edema  · Following up with pulm team, appreciate recommendations  · Patient is not stable for bronchoscopy given episode earlier in the week with initial bronch initiated  At this time recommend thoracentesis versus chest tube placement for right effusion  · Pulm will continue to follow and if patient becomes more stable can consider repeat bronchoscopy without sedation  · Consulted IR for repeat thoracentesis and fluid studies given increased right sided pleural effusion  · Continue Chest PT and Mucomyst   · Follow-up chest CT in 3 months as an outpatient to ensure resolution as an underlying neoplasm may have a similar appearance

## 2022-11-26 NOTE — ASSESSMENT & PLAN NOTE
Creatinine of 1 47 on admission treated with IV fluid boluses, 3L total, NSS at 100ml/hr, renal function assessment and lab monitoring  Pt has CKD 3 and noted baseline of 1-1 1    Plan:  · Cr slowly improving, 1 28 today which is close to her baseline   Will continue to monitor   · Avoid nephrotoxic agents  · Monitor urine output   · Encourage adequate intake

## 2022-11-27 NOTE — PROGRESS NOTES
Progress Note - Pulmonary   Cathryne Harry Whatley 81 y o  female MRN: [id number]  Unit/Bed#: [facility] 421-01 Encounter: [id number]    Assessment:  Acute hypoxic respiratory failure  Pneumonia  Recurrent transudative effusion    Plan:  Continue oxygen antibiotics per ID, await thoracentesis  Prefer to not pursue bronchoscopy given patient's advanced age and high risk with recurrent cardiac arrest from anesthesia    Chief Complaint:   Patient nonverbal    Subjective:   Patient appears comfortable    Objective:     Vitals: Blood pressure 128/50, pulse (!) 136, temperature 98 2 °F (36 8 °C), resp  rate 19, height 4' 8" (1 422 m), weight 61 8 kg (136 lb 3 9 oz), SpO2 95 %, not currently breastfeeding  ,Body mass index is 30 55 kg/m²        Intake/Output Summary (Last 24 hours) at 11/27/2022 1210  Last data filed at 11/27/2022 1030  Gross per 24 hour   Intake --   Output 150 ml   Net -150 ml       Invasive Devices     Peripheral Intravenous Line  Duration           Peripheral IV 11/25/22 Left Antecubital 1 day          Airway  Duration           Surgical Airway -- days                Physical Exam: /50   Pulse (!) 136   Temp 98 2 °F (36 8 °C)   Resp 19   Ht 4' 8" (1 422 m)   Wt 61 8 kg (136 lb 3 9 oz)   SpO2 95%   BMI 30 55 kg/m²   General appearance: alert  Neck: no adenopathy, no carotid bruit, no JVD, supple, symmetrical, trachea midline and thyroid not enlarged, symmetric, no tenderness/mass/nodules  Lungs: diminished breath sounds  Heart: regular rate and rhythm, S1, S2 normal, no murmur, click, rub or gallop  Abdomen: soft, non-tender; bowel sounds normal; no masses,  no organomegaly  Extremities: extremities normal, warm and well-perfused; no cyanosis, clubbing, or edema     Labs:   CBC:   Lab Results   Component Value Date    WBC 13 63 (H) 11/27/2022    HGB 9 4 (L) 11/27/2022    HCT 31 7 (L) 11/27/2022    MCV 90 11/27/2022     11/27/2022    MCH 26 8 11/27/2022    MCHC 29 7 (L) 11/27/2022    RDW 21 6 (H) 11/27/2022    MPV 10 4 11/27/2022   , CMP:   Lab Results   Component Value Date    SODIUM 139 11/27/2022    K 3 2 (L) 11/27/2022     11/27/2022    CO2 27 11/27/2022    BUN 10 11/27/2022    CREATININE 1 27 11/27/2022    CALCIUM 8 3 11/27/2022    AST 32 11/27/2022    ALT 17 11/27/2022    ALKPHOS 80 11/27/2022    EGFR 38 11/27/2022     Imaging and other studies: I have personally reviewed pertinent films in PACS

## 2022-11-27 NOTE — ASSESSMENT & PLAN NOTE
· Known history, maintained on losartan 50 mg daily at home  · Follows PCP outpatient      Plan:  · Bps systolic 759B-108F  · Will hold home dose losartan at this time  · Continue monitoring blood pressures

## 2022-11-27 NOTE — PROGRESS NOTES
Progress Note - Infectious Disease   Isatu Whatley 81 y o  female MRN: [id number]  Unit/Bed#: [facility] 421-01 Encounter: [id number]      Impression:  1  Necrotizing RLL pneumonia R/0 abscess S/P bronchoscopy/BAL  2  History of laryngeal carcinoma s/p laryngectomy/tracheitis  3  DM type 2  4  CAD  5  Extensive PVD    Recommendations:  Patient is afebrile with elevated but decreasing WBC count 13,710    1  Bronchoscopy/BAL was only partially complete  A CTA done today shows a large right-sided pleural effusion with worsened RML and RLL consolidation compatible with pneumonia/atelectasis  The effusion is likely creating the RML, RLL issues and will likely need thoracentesis with possible chest tube as per Pulmonary  2  In light of above as discussed continue ceftriaxone 1 g Q 24 hours IV pending bronchoscopy/BAL culture results and thoracentesis to determine if actinomycosis is contaminant or pathogen  So far cultures are negative  Sputum Staph aureus was from 11/15  Antibiotics:  1  Ceftriaxone 1 g Q 24 hours IV, day 6 Rx     Subjective: The patient has no complaints  Denies shortness of breath or cough  Denies fevers, chills, or sweats  Denies nausea, vomiting, or diarrhea  Objective:  Vitals:  Temp:  [97 7 °F (36 5 °C)-98 6 °F (37 °C)] 98 6 °F (37 °C)  HR:  [105-171] 105  Resp:  [19-21] 21  BP: ()/(48-58) 111/58  SpO2:  [84 %-93 %] 90 %  Temp (24hrs), Av 2 °F (36 8 °C), Min:97 7 °F (36 5 °C), Max:98 6 °F (37 °C)  Current: Temperature: 98 6 °F (37 °C)    Physical Exam:     General Appearance:    Eyes:   Alert, responsive, chronically ill-appearing nontoxic, no acute distress  Conjunctiva pale   Throat: Oropharynx moist without lesions    Lips, mucosa, and tongue normal   Neck: Tracheostomy, surgical scars   Lungs:   Bibasilar dullness > right   Heart:  Regular rate with ectopics and rhythm, S1, S2 normal, no murmur, rub or gallop   Abdomen:   Soft, non-tender, non-distended, positive bowel sounds  No masses, no organomegaly    No CVA tenderness, external urinary catheter   Extremities: Extremities normal, atraumatic, no clubbing, cyanosis or edema   Skin: Skin color pale, surgical scars, as above texture, turgor normal, no rashes or lesions  No draining wounds noted           Invasive Devices     Peripheral Intravenous Line  Duration           Peripheral IV 11/25/22 Left Antecubital 1 day          Drain  Duration           External Urinary Catheter 3 days          Airway  Duration           Surgical Airway -- days                Labs, Imaging, & Other studies:   All pertinent labs were personally reviewed  Results from last 7 days   Lab Units 11/26/22 0624 11/25/22 0626 11/24/22 0615   WBC Thousand/uL 13 27* 13 71* 14 43*   HEMOGLOBIN g/dL 9 6* 9 2* 9 4*   PLATELETS Thousands/uL 228 239 253     Results from last 7 days   Lab Units 11/26/22  0624 11/25/22 0626 11/24/22 0615   SODIUM mmol/L 143 143 143   POTASSIUM mmol/L 2 8* 3 5 4 1   CHLORIDE mmol/L 106 110* 110*   CO2 mmol/L 29 27 24   BUN mg/dL 10 10 10   CREATININE mg/dL 1 28 1 22 1 11   EGFR ml/min/1 73sq m 37 40 45   CALCIUM mg/dL 8 4 8 6 8 6   AST U/L 19 17 36   ALT U/L 16 17 18   ALK PHOS U/L 85 86 87

## 2022-11-27 NOTE — ASSESSMENT & PLAN NOTE
· In setting of CKD stage 3B, excessive use of vitamin-D and calcium supplementation at home per patient  · Further evidenced by elevated corrected calcium 14 2; supported by initial presentation with abdominal pain, fatigue, decreased appetite/poor PO intake; mentation at baseline, nontoxic on exam  · ECG without arrhythmia  · Further ED work up notable for multiple derangements including Hgb 11 1, low bicarb 18, elevated BUN 34, elevated creatinine 1 47 (baseline 1 11 4), GFR 32, , hypoalbuminemia 2 1; elevated lipase 2028, uric acid 10 4  · CT imaging findings concerning for acute pancreatitis,  necrotizing pneumonia vs  malignancy  · Etiology multifactorial, possibly due to hypercalcemia malignancy in light of CT findings, further complicated by home vitamin-D supplementation; med rec without any other causative agents  Bone demineralization also possible given her age  · Per chart review, patient noted to have elevated corrected calcium level since 08/2022 however does not appear to have been further worked up by PCP  · Unlikely primary parathyroidism or tertiary cause given PTH in 09/2022 was low; baseline renal function with CKD III  Vitamin-D 25 hydroxy level within normal limits  Calcium continues to be elevated   Status post 3 mg zoledronic acid     SPEP with faint gammopathy, PTH very low  UPEP shows selective proteinuria (54) with no monoclonal bands noted   PTHrP <2, WNL    Plan:  · Calcium level slowly improving 9 6  · Vitamin D levels within normal limits: Vit D 25-OH 77 2, Vit D 1,25 OH 49 4  · PTH 6 3, compensating appropriately for elevated Ca level

## 2022-11-27 NOTE — ASSESSMENT & PLAN NOTE
Creatinine of 1 47 on admission treated with IV fluid boluses, 3L total, NSS at 100ml/hr, renal function assessment and lab monitoring      Pt has CKD 3 and noted baseline of 1-1 2    Plan:  · Cr 1 36, will continue to monitor   · Avoid nephrotoxic agents  · Monitor urine output   · Encourage adequate intake

## 2022-11-27 NOTE — ASSESSMENT & PLAN NOTE
Lab Results   Component Value Date    EGFR 35 11/28/2022    EGFR 38 11/27/2022    EGFR 37 11/26/2022    CREATININE 1 36 (H) 11/28/2022    CREATININE 1 27 11/27/2022    CREATININE 1 28 11/26/2022     · Known history of CKD stage III states 2014  · Follows B nephrology (Dr Chidi Hicks)  · Cr on admission slightly elevated 1 47(baseline 1 1-1 4)  · Etiology likely 2/2 long history of hypertension, nephrosclerosis, age-related nephron loss     Plan  · Cr 1 36, which is around her baseline    Will continue to monitor BMP  · Avoid use of nephrotoxic agents  · Maintain euvolemia

## 2022-11-27 NOTE — ASSESSMENT & PLAN NOTE
· Patient history of laryngeal carcinoma status post tracheostomy/laryngectomy on trach collar presented with fatigue; tachycardic on exam; labs notable for multiple derangements including hypercalcemia 14 3 and bicarb 18  · CT notable for nonspecific 7 x 6 mm lobulated left lower lobe nodule; new finding; CT imaging also revealing for multiple findings large heterogeneous airspace consolidation and right lower lobe with 6 x 5 x 5 lobulated hypodense component with moderate right-sided pleural effusion  concerning for necrotizing pneumonia  · Malignancy not entirely ruled out; does endorse decreased appetite, unexpected weight loss of up to 30 lb, and is a former smoker; quit 2002; hypercalcemia also noted on admission with recent outpatient PTH wnl  · 11/19 - repeat CT suggested of necrosis/abscess    Plan  · Inpatient pulmonology following, appreciate recs; 5 day course completed abx for suspecting necrotizing pneumonia with large heterogeneous airspace consolidation in right lower lobe  · ID consulted, appreciate reccs  On ceftriaxone 1 gm Q24H, day 8 today   Bronchial aspirates pending results   · Repeat CT future to assess for improvement, outpatient

## 2022-11-27 NOTE — ASSESSMENT & PLAN NOTE
· Known history, in setting of CKD stage IIIB  · Follows nephrology outpatient (Dr Jeanette Steele)  · Hgb 11 1 on admission, at baseline     · Currently hemodynamically stable    Plan:  · Trend CBC, Hb 10 1  · No active bleeding present   · Transfuse if Hb < 7

## 2022-11-27 NOTE — ASSESSMENT & PLAN NOTE
· Patient presented with abdominal pain, lightheadedness, intermittent shortness of breath, recent unexpected weight loss   · Met sepsis criteria on admission  · Lactate wnl but has significant hypercalcemia 14 2 on admission  · Patient is status post tracheostomy  · Completed cephalosporin 5 day course, completed azithromycin 3 day course    Plan:  · Completed 5 day course of antibiotics covering for CAP  · Follow up on blood cultures  Blood cultures x2 11/17 no growth 4 days  · Blood cultures x1 11/15 grew Actinomyces   · 11/15 sputum culture grew S  Aureus with repeat sputum culture 11/16 negative   · Bronch/BAL aborted due to patient becoming hemodynamically unstable after anesthesia induction  Patient was briefly taken to ICU for higher level of care  Now transferred to SOD   · Bronchial aspirates collected, pending results   · AFB prelim from aspirate negative   · Will continue on ceftriaxone 1 gm Q24H per ID recommendations  Day 8  · CTA 11/26 shows:  No PE  Right-sided pleural effusion is now larger in size  Worsened right middle lobe and right lower lobe consolidation keeping with a combination patient's known in pneumonia and superimposed atelectasis  Worsened mucus plugging throughout the bronchus intermedius, right middle lobe and right lower lobe  Low-density necrotic/abscess component of the right lower lobe consolidation appears unchanged  Unchanged pulmonary edema  · Following up with pulm team, appreciate recommendations  · Patient is not stable for bronchoscopy given episode earlier in the week with initial bronch initiated  At this time recommend thoracentesis versus chest tube placement for right effusion  · Pulm will continue to follow and if patient becomes more stable can consider repeat bronchoscopy without sedation  · IR 11/28 drained approximately 2L of fluid from right side   Will follow up on fluid studies  · Plan is for IJ line to be placed by IR for antibiotics given that patient unable to have PICC line due to CKD stage 3 with low GFR  · Continue trach collar oxygen as needed - around 8 L this AM   · Trend WBC and fever curve

## 2022-11-27 NOTE — ASSESSMENT & PLAN NOTE
· Patient history of laryngeal carcinoma status post tracheostomy/laryngectomy who presented to ED with tachycardia, hypoxia, fatigue  · 11/15 CTA chest notable for significant findings including:  · Large, heterogeneous airspace consolidation in the right lower lobe, with a 6 3 x 5 2 x 4 8 cm somewhat lobulated hypodense component likely a necrotizing aspiration pneumonia  · Moderate right pleural effusion  No enhancement of the pleura to suggest empyema  · Mediastinal and right hilar lymphadenopathy, likely reactive, however metastatic disease is not entirely excluded  · Legionella and strep antigens negative  · 11/18 IR Thoracentesis - Status post 900 mL fluid thoracentesis suggestive of transudative fluid for pulmonology and may be secondary to pancreatitis  · Blood cultures 1/2 group Actinomyces gram variable rods on 11/15  · Blood cultures x2 on 11/17 final: no growth   · Sputum culture on 11/15 grew Staph aureus  · Sputum culture on 11/16 final: no growth  · Planned for bronchoscopy and BAL 11/22, however, procedure aborted due to patient becoming hemodynamically unstable after anesthesia induction  Was in the ICU 11/22-11/23, transferred back to [facility]       Plan:  · Ceftriaxone 1 gm Q24H Day 8, continue per ID recommendations   · 11/22 bronchial, viral, and fungal cultures pending from tracheal aspirate - partial bronch completed at that time  · AFB stain from aspirate prelim negative    · Trend WBC and Fever curve  WBC 15 53 today  · Since patient has sinus tachycardia, ordered a d-dimer 11/26 which is 2 73    · CTA 11/26 shows:  No PE  Right-sided pleural effusion is now larger in size  Worsened right middle lobe and right lower lobe consolidation keeping with a combination patient's known in pneumonia and superimposed atelectasis  Worsened mucus plugging throughout the bronchus intermedius, right middle lobe and right lower lobe    Low-density necrotic/abscess component of the right lower lobe consolidation appears unchanged  Unchanged pulmonary edema  · Following up with pulm team, appreciate recommendations  · Patient is not stable for bronchoscopy given episode earlier in the week with initial bronch initiated  At this time recommend thoracentesis versus chest tube placement for right effusion  · Pulm will continue to follow and if patient becomes more stable can consider repeat bronchoscopy without sedation  · IR 11/28 drained approximately 2L - fluid studies pending  · Patient will likely have IJ placed by IR given that PICC line cannot be placed due to stage III CKD with low GFR  · Continue Chest PT and Mucomyst   · Follow-up chest CT in 3 months as an outpatient to ensure resolution as an underlying neoplasm may have a similar appearance

## 2022-11-27 NOTE — ASSESSMENT & PLAN NOTE
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  · Follows with ENT as an outpatient with Dr Patrick Horn; most recently evaluated for trich follow-up in 10/2022     Plan:  · Bobie Cam in place, functional well, at baseline  · Continue trach collar oxygen, on 8 L able to saturating appropriately, weaned to 6L   · Patient has concerns that prosthesis is not functioning properly  Speech evaluated her yesterday, no leakage around trach and prosthesis  Pt did have this new prothesis placed in June 2022, since then has been having voice issues  Currently suctioning repeatedly from the trach  · ENT consulted, appreciate reccs   · No further interventions for her TPN at this time while inpatient  As outpatient can continue to work on keeping secretions then around TEP, cleaning the mechanism with new brushes, possible placement of TEP reinforcing bring under flange    · Recommend potential outpatient replacement or reinforcement of TEP   · Will have nursing assist with TEP maintanence at this time

## 2022-11-27 NOTE — ASSESSMENT & PLAN NOTE
· Likely secondary to hypercalcemia  · Presented with abdominal pain; labs notable for elevated lipase >2000, elevated WBC  · CT imaging suggestive pancreatitis  · Patient has elevated alk-phos with normal T bili and asymptomatic abnormal biliary strictures on imaging  · Patient reports improved appetite and improved abdominal pain - pancreatitis is now resolved     Plan:  · Supportive care with analgesics or antiemetics as needed; renal function at baseline, ECG with normal QTC  · Continue diet as tolerated   · Continued monitoring of vitals, O2 sats, urine output with goal > 0 5-1 mL/kg/hr  · Replete electrolytes as indicated    Suspected as having resolved, limiting fluid resuscitation at this time due to volume overload  Unclear source of hypercalcemia that triggered pancreatitis, hematology on board for hypercalcemia control   Ca slowly downtrending, last 9 6 today Detail Level: Detailed Add 39403 Cpt? (Important Note: In 2017 The Use Of 00952 Is Being Tracked By Cms To Determine Future Global Period Reimbursement For Global Periods): no Wound Evaluated By: Shaq Torre MD

## 2022-11-27 NOTE — PROGRESS NOTES
INTERNAL MEDICINE RESIDENCY PROGRESS NOTE     Name: Raysa Sanders   Age & Sex: 81 y o  female   MRN: [id number]  Unit/Bed#: [facility] 421-01   Encounter: [id number]  Team: SOD Team C     PATIENT INFORMATION     Name: Raysa Sanders   Age & Sex: 81 y o  female   MRN: [id number]  Hospital Stay Days: 12    ASSESSMENT/PLAN     Principal Problem:    Sepsis with acute hypoxic respiratory failure ([facility] )  Active Problems:    Hypertension    JORDAN (acute kidney injury) ([facility] )    Hypercalcemia    CKD (chronic kidney disease) stage 3, GFR 30-59 ml/min    Anemia of chronic disease    Asymptomatic carotid artery stenosis    Gram-positive bacteria on cultures    Hyperlipidemia    Laryngeal carcinoma (HCC)    Right lower lobe lung mass with right pleural effusion    Pancreatitis    Lung nodule seen on imaging study    Hypothyroidism      * Sepsis with acute hypoxic respiratory failure ([facility] )  Assessment & Plan  · Patient presented with abdominal pain, lightheadedness, intermittent shortness of breath, recent unexpected weight loss   · Met sepsis criteria on admission  · Lactate wnl but has significant hypercalcemia 14 2 on admission  · Patient is status post tracheostomy  · Completed cephalosporin 5 day course, completed azithromycin 3 day course    Plan:  · Completed 5 day course of antibiotics covering for CAP  · Follow up on blood cultures  Blood cultures x2 11/17 no growth 4 days  · Blood cultures x1 11/15 grew Actinomyces   · 11/15 sputum culture grew S  Aureus with repeat sputum culture 11/16 negative   · Bronch/BAL aborted due to patient becoming hemodynamically unstable after anesthesia induction  Patient was briefly taken to ICU for higher level of care  Now transferred to SOD   · Bronchial aspirates collected, pending results   · AFB prelim from aspirate negative   · Will continue on ceftriaxone 1 gm Q24H per ID recommendations  Day 7  · CTA 11/26 shows:  No PE  Right-sided pleural effusion is now larger in size  Worsened right middle lobe and right lower lobe consolidation keeping with a combination patient's known in pneumonia and superimposed atelectasis  Worsened mucus plugging throughout the bronchus intermedius, right middle lobe and right lower lobe  Low-density necrotic/abscess component of the right lower lobe consolidation appears unchanged  Unchanged pulmonary edema  · Following up with pulm team, appreciate recommendations  · Patient is not stable for bronchoscopy given episode earlier in the week with initial bronch initiated  At this time recommend thoracentesis versus chest tube placement for right effusion  · Pulm will continue to follow and if patient becomes more stable can consider repeat bronchoscopy without sedation  · Consulted IR for repeat thoracentesis and fluid studies given increased right sided pleural effusion   · Continue trach collar oxygen as needed - around 10 L this AM   · Trend WBC and fever curve  Hypercalcemia  Assessment & Plan  · In setting of CKD stage 3B, excessive use of vitamin-D and calcium supplementation at home per patient  · Further evidenced by elevated corrected calcium 14 2; supported by initial presentation with abdominal pain, fatigue, decreased appetite/poor PO intake; mentation at baseline, nontoxic on exam  · ECG without arrhythmia  · Further ED work up notable for multiple derangements including Hgb 11 1, low bicarb 18, elevated BUN 34, elevated creatinine 1 47 (baseline 1 11 4), GFR 32, , hypoalbuminemia 2 1; elevated lipase 2028, uric acid 10 4  · CT imaging findings concerning for acute pancreatitis,  necrotizing pneumonia vs  malignancy  · Etiology multifactorial, possibly due to hypercalcemia malignancy in light of CT findings, further complicated by home vitamin-D supplementation; med rec without any other causative agents    Bone demineralization also possible given her age  · Per chart review, patient noted to have elevated corrected calcium level since 08/2022 however does not appear to have been further worked up by PCP  · Unlikely primary parathyroidism or tertiary cause given PTH in 09/2022 was low; baseline renal function with CKD III  Vitamin-D 25 hydroxy level within normal limits  Calcium continues to be elevated   Status post 3 mg zoledronic acid     SPEP with faint gammopathy, PTH very low  UPEP shows selective proteinuria (54) with no monoclonal bands noted  Plan:  · PTHrP to assess hypercalcemia of malignancy, pending   · Calcium level slowly improving 10 2  · Vitamin D levels within normal limits: Vit D 25-OH 77 2, Vit D 1,25 OH 49 4  · PTH 6 3, compensating appropriately for elevated Ca level     JORDAN (acute kidney injury) ([facility] )  Assessment & Plan  Creatinine of 1 47 on admission treated with IV fluid boluses, 3L total, NSS at 100ml/hr, renal function assessment and lab monitoring  Pt has CKD 3 and noted baseline of 1-1 1    Plan:  · Cr slowly improving, 1 28 today which is close to her baseline  Will continue to monitor   · Avoid nephrotoxic agents  · Monitor urine output   · Encourage adequate intake    Hypertension  Assessment & Plan  · Known history, maintained on losartan 50 mg daily at home  · Follows PCP outpatient      Plan:  · Bps systolic 39U-434H  · Will hold home dose losartan at this time  · Continue monitoring blood pressures      Anemia of chronic disease  Assessment & Plan  · Known history, in setting of CKD stage IIIB  · Follows nephrology outpatient (Dr Agatha Villafana)  · Hgb 11 1 on admission, at baseline     · Currently hemodynamically stable    Plan:  · Trend CBC, Hb 9 6  · No active bleeding present   · Transfuse if Hb < 7         CKD (chronic kidney disease) stage 3, GFR 30-59 ml/min  Assessment & Plan  Lab Results   Component Value Date    EGFR 38 11/27/2022    EGFR 37 11/26/2022    EGFR 40 11/25/2022    CREATININE 1 27 11/27/2022    CREATININE 1 28 11/26/2022    CREATININE 1 22 11/25/2022     · Known history of CKD stage III states 2014  · Follows [facility] nephrology (Dr Heavenly Sweet)  · Cr on admission slightly elevated 1 47(baseline 1 1-1 4)  · Etiology likely 2/2 long history of hypertension, nephrosclerosis, age-related nephron loss     Plan  · Improving - Cr 1 28, which is around her baseline    Will continue to monitor BMP  · Avoid use of nephrotoxic agents  · Maintain euvolemia       Gram-positive bacteria on cultures  Assessment & Plan  1/2 blood cultures grew Actinomyces    Plan:  See Sepsis and Right Lower Lobe Mass A/P         Asymptomatic carotid artery stenosis  Assessment & Plan  · Known hx, follows with vascular surgery (Dr Mao Clayton)  · Previously found to have recurrent left ICA stenosis and critical new occlusive right IC stenosis, with a which were asymptomatic, along with left vertebral artery atresia; previously worked up in outpatient setting by [facility] concerning symptoms and was recommended endovascular intervention  · On 07/2020, patient electively admitted to [facility] during which she underwent ultrasound-guided right CFA puncture with sternal closure, aortic arch arteriogram, left carotid during him with PTA and stenting under the care of Dr Brown  · Maintained on antiplatelet and anti-statin therapy with Plavix and Lipitor     Plan:   · Continue home dose lipitor  · Restarted home Plavix after bronch was cancelled     Hyperlipidemia  Assessment & Plan  · Known history of type 2 diabetes on metformin, hypertension on losartan  · Managed on Lipitor 40  · Most recent lipid panel with good control of LDL at 56    Plan    · Continue Lipitor 40mg QD      Hypothyroidism  Assessment & Plan  · Known history since 11/2021  · Most recent TSH 4 2 in 08/2022  · Managed on levothyroxine 100 mcg  · Repeat TSH within normal limits    Plan  Stable    · Continue home dose levothyroxine      Lung nodule seen on imaging study  Assessment & Plan  · Patient history of laryngeal carcinoma status post tracheostomy/laryngectomy on trach collar presented with fatigue; tachycardic on exam; labs notable for multiple derangements including hypercalcemia 14 3 and bicarb 18  · CT notable for nonspecific 7 x 6 mm lobulated left lower lobe nodule; new finding; CT imaging also revealing for multiple findings large heterogeneous airspace consolidation and right lower lobe with 6 x 5 x 5 lobulated hypodense component with moderate right-sided pleural effusion  concerning for necrotizing pneumonia  · Malignancy not entirely ruled out; does endorse decreased appetite, unexpected weight loss of up to 30 lb, and is a former smoker; quit 2002; hypercalcemia also noted on admission with recent outpatient PTH wnl  · 11/19 - repeat CT suggested of necrosis/abscess    Plan  · Inpatient pulmonology following, appreciate recs; 5 day course completed abx for suspecting necrotizing pneumonia with large heterogeneous airspace consolidation in right lower lobe  · ID consulted, appreciate reccs  On ceftriaxone 1 gm Q24H, day 6 today  Bronchial aspirates pending results   · Repeat CT future to assess for improvement, outpatient     Pancreatitis  Assessment & Plan  · Likely secondary to hypercalcemia  · Presented with abdominal pain; labs notable for elevated lipase >2000, elevated WBC  · CT imaging suggestive pancreatitis  · Patient has elevated alk-phos with normal T bili and asymptomatic abnormal biliary strictures on imaging  · Patient reports improved appetite and improved abdominal pain - pancreatitis is now resolved     Plan:  · Supportive care with analgesics or antiemetics as needed; renal function at baseline, ECG with normal QTC  · Continue diet as tolerated   · Continued monitoring of vitals, O2 sats, urine output with goal > 0 5-1 mL/kg/hr  · Replete electrolytes as indicated    Suspected as having resolved, limiting fluid resuscitation at this time due to volume overload   Unclear source of hypercalcemia that triggered pancreatitis, hematology on board for hypercalcemia control  Ca slowly downtrending, last 10 2 today         Right lower lobe lung mass with right pleural effusion  Assessment & Plan  · Patient history of laryngeal carcinoma status post tracheostomy/laryngectomy who presented to ED with tachycardia, hypoxia, fatigue  · 11/15 CTA chest notable for significant findings including:  · Large, heterogeneous airspace consolidation in the right lower lobe, with a 6 3 x 5 2 x 4 8 cm somewhat lobulated hypodense component likely a necrotizing aspiration pneumonia  · Moderate right pleural effusion  No enhancement of the pleura to suggest empyema  · Mediastinal and right hilar lymphadenopathy, likely reactive, however metastatic disease is not entirely excluded  · Legionella and strep antigens negative  · 11/18 IR Thoracentesis - Status post 900 mL fluid thoracentesis suggestive of transudative fluid for pulmonology and may be secondary to pancreatitis  · Blood cultures 1/2 group Actinomyces gram variable rods on 11/15  · Blood cultures x2 on 11/17 final: no growth   · Sputum culture on 11/15 grew Staph aureus  · Sputum culture on 11/16 final: no growth  · Planned for bronchoscopy and BAL 11/22, however, procedure aborted due to patient becoming hemodynamically unstable after anesthesia induction  Was in the ICU 11/22-11/23, transferred back to SOD       Plan:  · Ceftriaxone 1 gm Q24H Day 6, continue per ID recommendations   · 11/22 bronchial, viral, and fungal cultures pending from tracheal aspirate - partial bronch completed at that time  · AFB stain from aspirate prelim negative    · Trend WBC and Fever curve  WBC 13 27 today, downtrending  · Since patient has sinus tachycardia, ordered a d-dimer 11/26 which is 2 73    · CTA 11/26 shows:  No PE  Right-sided pleural effusion is now larger in size  Worsened right middle lobe and right lower lobe consolidation keeping with a combination patient's known in pneumonia and superimposed atelectasis    Worsened mucus plugging throughout the bronchus intermedius, right middle lobe and right lower lobe  Low-density necrotic/abscess component of the right lower lobe consolidation appears unchanged  Unchanged pulmonary edema  · Following up with pulm team, appreciate recommendations  · Patient is not stable for bronchoscopy given episode earlier in the week with initial bronch initiated  At this time recommend thoracentesis versus chest tube placement for right effusion  · Pulm will continue to follow and if patient becomes more stable can consider repeat bronchoscopy without sedation  · Consulted IR for repeat thoracentesis and fluid studies given increased right sided pleural effusion  · Continue Chest PT and Mucomyst   · Follow-up chest CT in 3 months as an outpatient to ensure resolution as an underlying neoplasm may have a similar appearance  Laryngeal carcinoma [facility])  Assessment & Plan  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  · Follows with ENT as an outpatient with Dr Tika Eid; most recently evaluated for trich follow-up in 10/2022     Plan:  · Pelkie Rosaura in place, functional well, at baseline  · Continue trach collar oxygen, on 8 L able to saturating appropriately, weaned to 6L   · Patient has concerns that prosthesis is not functioning properly  Speech evaluated her yesterday, no leakage around trach and prosthesis  Pt did have this new prothesis placed in June 2022, since then has been having voice issues  Currently suctioning repeatedly from the trach  · ENT consulted, appreciate reccs   · No further interventions for her TPN at this time while inpatient  As outpatient can continue to work on keeping secretions then around TEP, cleaning the mechanism with new brushes, possible placement of TEP reinforcing bring under flange    · Recommend potential outpatient replacement or reinforcement of TEP   · Will have nursing assist with TEP maintanence at this time      Disposition:  Continue inpatient care while awaiting likely thoracentesis  SUBJECTIVE     Patient seen and examined  No acute events overnight  Patient this morning with subjective sore shortness of breath though patient was satting at 98%, improved with suctioning  Decided that patient will no longer suction herself in the nursing will take on this role as patient leaving her suction tube on the bed unhygienically  Mild loose stool this AM that the patient attributes to the chocolate ensure that she received  No other concerns  Slept well  Patient denies any fever or chills, sore throat, cough or wheeze, chest pain or heart palpitations, abdominal pain, nausea vomiting or constipation, extremity pain or swelling  OBJECTIVE     Vitals:    22 2320 22 0530   BP: 112/52 112/52 102/53    Pulse: 105 105 (!) 106    Resp:       Temp:   97 8 °F (36 6 °C)    TempSrc:       SpO2:  92% 92%    Weight:    61 8 kg (136 lb 3 9 oz)   Height:          Temperature:   Temp (24hrs), Av 2 °F (36 8 °C), Min:97 8 °F (36 6 °C), Max:98 6 °F (37 °C)    Temperature: 97 8 °F (36 6 °C)  Intake & Output:  I/O        07 07 07 07    IV Piggyback 100     Total Intake(mL/kg) 100 (1 6)     Urine (mL/kg/hr) 1300 (0 9) 200 (0 1)    Stool 0 0    Total Output 1300 200    Net -1200 -200          Unmeasured Urine Occurrence 2 x 1 x    Unmeasured Stool Occurrence 2 x 2 x        Weights:        Body mass index is 30 55 kg/m²  Weight (last 2 days)     Date/Time Weight    22 0530 61 8 (136 24)    22 0600 62 7 (138 23)    22 0600 67 2 (148 15)        Physical Exam  Vitals and nursing note reviewed  Constitutional:       General: She is not in acute distress  Appearance: Normal appearance  She is well-developed  She is not ill-appearing  HENT:      Head: Normocephalic and atraumatic        Right Ear: External ear normal       Left Ear: External ear normal       Mouth/Throat:      Mouth: Mucous membranes are moist    Eyes:      Extraocular Movements: Extraocular movements intact  Conjunctiva/sclera: Conjunctivae normal       Pupils: Pupils are equal, round, and reactive to light  Cardiovascular:      Rate and Rhythm: Normal rate  Rhythm irregular  Pulses: Normal pulses  Heart sounds: Murmur heard  No friction rub  No gallop  Comments: Euvolemic  Pulmonary:      Effort: Pulmonary effort is normal  No respiratory distress  Breath sounds: Rhonchi (mild diffuse) present  No wheezing or rales  Abdominal:      General: Bowel sounds are normal  There is no distension  Palpations: Abdomen is soft  Tenderness: There is no abdominal tenderness  There is no guarding  Hernia: No hernia is present  Musculoskeletal:         General: No swelling or deformity  Cervical back: Neck supple  Right lower leg: No edema  Left lower leg: No edema  Skin:     General: Skin is warm and dry  Coloration: Skin is not jaundiced  Findings: No bruising, lesion or rash  Neurological:      General: No focal deficit present  Mental Status: She is alert and oriented to person, place, and time  LABORATORY DATA     Labs: I have personally reviewed pertinent reports    Results from last 7 days   Lab Units 11/27/22  0552 11/26/22  0624 11/25/22 0626 11/24/22  0615   WBC Thousand/uL 13 63* 13 27* 13 71* 14 43*   HEMOGLOBIN g/dL 9 4* 9 6* 9 2* 9 4*   HEMATOCRIT % 31 7* 32 7* 31 9* 32 2*   PLATELETS Thousands/uL 199 228 239 253   NEUTROS PCT %  --  88* 86* 89*   MONOS PCT %  --  7 7 6      Results from last 7 days   Lab Units 11/26/22  0624 11/25/22 0626 11/24/22  0615   POTASSIUM mmol/L 2 8* 3 5 4 1   CHLORIDE mmol/L 106 110* 110*   CO2 mmol/L 29 27 24   BUN mg/dL 10 10 10   CREATININE mg/dL 1 28 1 22 1 11   CALCIUM mg/dL 8 4 8 6 8 6   ALK PHOS U/L 85 86 87   ALT U/L 16 17 18   AST U/L 19 17 36 Results from last 7 days   Lab Units 11/26/22  0624 11/25/22  0626 11/23/22  0512   MAGNESIUM mg/dL 1 5* 1 7 2 3     Results from last 7 days   Lab Units 11/26/22  0624 11/25/22  0626 11/23/22  0512   PHOSPHORUS mg/dL 1 7* 1 5* 1 7*                    IMAGING & DIAGNOSTIC TESTING     Radiology Results: I have personally reviewed pertinent reports  XR chest portable    Result Date: 11/17/2022  Impression: Near complete drainage of right effusion with trace residual effusion  Masslike opacity in the right lower lobe better shown on CT  Mild pulmonary venous congestion  Workstation performed: [id number]     CT head wo contrast    Result Date: 11/15/2022  Impression: No acute intracranial abnormality  Chronic microangiopathic changes  Workstation performed: [id number]     CT chest wo contrast    Result Date: 11/19/2022  Impression: 1  Right lower lobe consolidation appears similar  There is relative internal hypodensity, along with air, consistent with abscess/necrosis  Recommend remains for a follow-up CT  2   Stable right lower lobe airway debris, with new airway debris in the middle lobe, consistent with interval aspiration event 3  Moderate right pleural effusion, not significantly changed in size  Trace left pleural effusion  4   Interstitial opacities consistent with mild edema 5  Stable left lower lobe nodule, attention on follow-up The study was marked in EPIC for immediate notification  Workstation performed: [id number]     IR IN-Patient Thoracentesis    Result Date: 11/18/2022  Impression: Impression: Ultrasound-guided thoracentesis yielding 900 mL clear yellow pleural fluid  Signed, performed, and dictated by CHANEL Horner under the supervision of Dr Salena Valera  Workstation performed: [id number]     PE Study with CT Abdomen and Pelvis with contrast    Result Date: 11/15/2022  Impression: No central, lobar, segmental or proximal subsegmental pulmonary embolism    Evaluation of the distal subsegmental pulmonary arteries is limited  Large, heterogeneous airspace consolidation in the right lower lobe, with a 6 3 x 5 2 x 4 8 cm somewhat lobulated hypodense component with a small droplet of air  This likely represents a necrotizing aspiration pneumonia, given mucous plugging throughout the right lower lobe and retained secretions/debris in the right mainstem bronchus and bronchus intermedius  Recommend follow-up chest CT in approximately 3 months to ensure resolution as an underlying neoplasm may have a similar appearance  Nonspecific 7 x 6 mm lobulated left lower lobe nodule  Attention on follow-up  Moderate right pleural effusion  No enhancement of the pleura to suggest empyema  Mediastinal and right hilar lymphadenopathy, likely reactive, however metastatic disease is not entirely excluded  Mild stranding around the proximal pancreas suggestive of acute interstitial pancreatitis  Recommend correlation with lipase level  No organized peripancreatic fluid collections, loss of parenchymal enhancement to suggest necrosis, or vascular complications of pancreatitis  No other acute abdominal or pelvic pathology  Multiple additional findings as above  The study was marked in [facility] for immediate notification  Workstation performed: [id number]     Other Diagnostic Testing: I have personally reviewed pertinent reports      ACTIVE MEDICATIONS     Current Facility-Administered Medications   Medication Dose Route Frequency   • acetaminophen (TYLENOL) tablet 650 mg  650 mg Oral Q6H PRN   • acetylcysteine (MUCOMYST) 200 mg/mL inhalation solution 600 mg  3 mL Nebulization TID   • albuterol inhalation solution 2 5 mg  2 5 mg Nebulization Q4H PRN   • atorvastatin (LIPITOR) tablet 40 mg  40 mg Oral Daily With Dinner   • cefTRIAXone (ROCEPHIN) 1,000 mg in dextrose 5 % 50 mL IVPB  1,000 mg Intravenous Q24H   • clopidogrel (PLAVIX) tablet 75 mg  75 mg Oral Daily   • dextromethorphan-guaiFENesin (ROBITUSSIN DM) oral syrup 10 mL  10 mL Oral Q6H PRN   • glycerin-hypromellose- (ARTIFICIAL TEARS) ophthalmic solution 1 drop  1 drop Both Eyes PRN   • heparin (porcine) subcutaneous injection 5,000 Units  5,000 Units Subcutaneous Q8H [facility]   • HYDROmorphone HCl (DILAUDID) injection 0 2 mg  0 2 mg Intravenous Q6H PRN   • hydrOXYzine HCL (ATARAX) tablet 25 mg  25 mg Oral Q6H PRN   • iron sucrose (VENOFER) 200 mg in sodium chloride 0 9 % 100 mL IVPB  200 mg Intravenous Once per day on Mon Wed Fri   • levalbuterol (XOPENEX) inhalation solution 1 25 mg  1 25 mg Nebulization TID   • levothyroxine tablet 100 mcg  100 mcg Oral Early Morning   • loratadine (CLARITIN) tablet 5 mg  5 mg Oral Daily   • metoprolol tartrate (LOPRESSOR) partial tablet 12 5 mg  12 5 mg Oral Q12H HARRIS   • oxyCODONE (ROXICODONE) IR tablet 2 5 mg  2 5 mg Oral Q6H PRN    Or   • oxyCODONE (ROXICODONE) IR tablet 5 mg  5 mg Oral Q6H PRN   • potassium-sodium phosphateS (K-PHOS,PHOSPHA 250) -250 mg tablet 1 tablet  1 tablet Oral Daily With Breakfast   • sertraline (ZOLOFT) tablet 25 mg  25 mg Oral Daily       VTE Pharmacologic Prophylaxis: Heparin  VTE Mechanical Prophylaxis: sequential compression device    Portions of the record may have been created with voice recognition software  Occasional wrong word or "sound a like" substitutions may have occurred due to the inherent limitations of voice recognition software    Read the chart carefully and recognize, using context, where substitutions have occurred   ==  Alysha Rene MD  [address] [facility]  Internal Medicine Residency PGY-2

## 2022-11-27 NOTE — ASSESSMENT & PLAN NOTE
· Known hx, follows with vascular surgery (Dr Sanna Eller)  · Previously found to have recurrent left ICA stenosis and critical new occlusive right IC stenosis, with a which were asymptomatic, along with left vertebral artery atresia; previously worked up in outpatient setting by mass concerning symptoms and was recommended endovascular intervention  · On 07/2020, [place] [facility] [place] which she underwent ultrasound-guided right CFA puncture with sternal closure, aortic arch arteriogram, left carotid during him with PTA and stenting under the care of Dr Brown  · Maintained on antiplatelet and anti-statin therapy with Plavix and Lipitor     Plan:   · Continue home dose lipitor  · Restarted home Plavix after bronch was cancelled

## 2022-11-28 PROBLEM — K85.90 PANCREATITIS: Status: RESOLVED | Noted: 2022-01-01 | Resolved: 2022-01-01

## 2022-11-28 NOTE — BRIEF OP NOTE (RAD/CATH)
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[facility] )    14  Tracheostomy care [facility])      Post-op Diagnosis:   1  Necrotizing pneumonia ([address] )    2  Hypercalcemia    3  Pleural effusion on right    4  Generalized weakness    5  Abdominal pain    6  Hypoxemia requiring supplemental oxygen    7  Pancreatitis    8  Sepsis ([facility] [address] )    9  Positive blood culture    10  Right lower lobe lung mass with right pleural effusion    11  Pleural effusion, right    12  Right lower lobe lung mass    13  Laryngeal carcinoma ([facility] [address] )    14  Tracheostomy care [facility])        Provider:  Irma Bergman PA-C    No qualified resident was available, Resident is only observing    Estimated Blood Loss: minimal    Findings: right sided thoracentesis  2 1L clear magdiel fluid removed       Specimens: sent    Complications:  None immediate    Anesthesia: local    Irma Bergman PA-C     Date: 2022  Time: 3:46 PM

## 2022-11-28 NOTE — OCCUPATIONAL THERAPY NOTE
Occupational Therapy Cancellation Note        Patient Name: Raysa MARY Date: 11/28/2022 11/28/22 1130   OT Last Visit   OT Visit Date 11/28/22   Note Type   Note Type Cancelled Session   Cancel Reasons Other       Chart reviewed, OT treatment attempted  Pt currently off the floor at IR  OT will continue to follow and see as medically appropriate and able       SHIRLEY Izquierdo, OTR/L

## 2022-11-28 NOTE — ACP (ADVANCE CARE PLANNING)
Dr Jose E Marie and myself had conversation with patient and her daughter at bedside on 11/28/22  Patient's nurse unable to place new IV line  Patient refusing further attempts  Plan was to obtain PICC, but with CKD, nephrology recommended IR IJ placement  Patient refusing IR intervention  Will attempt new IV placement in AM, and if unable will proceed with PICC placement as patient adamantly refuses dialysis  Discussed goals of care with patient and her daughter at bedside  Current goal is to continue full treatment in anticipation of returning home  Discussed that unfortunately patient is very high risk for further anesthesia and as such bronch will likely not be pursued  With re-accumulation of significant pleural effusion over the course of 1 week, did discuss the chance that she may require chest tube/pleurX cath in future  Patient reports she would not want this procedure done  Did broach the idea of hospice/comfort care in future should her infection worsen or her fluid re-accumulate and she not wish to pursue treatment  Currently, patient feeling better after thoracentesis and anxious to work with PT  She ultimately wants to return home if possible with oxygen if needed  She very clearly puts limits on her care including placement of chest tube, dialysis, and DNR/DNI status  However, at this time she would like to continue with medical therapy  Discussed that this does include IV antibiotics, and if unable to obtain IV in AM will need to readdress this conversation  Patient aware and understanding  Discussed with patient's daughter outside of room  She inquired as to what hospice would look like  We discussed this in depth, and advised that even if she should pursue this option, a pleurX cath may provide significant ongoing comfort  Will continue medication at this time and titrating O2 as needed  PT re-engaged   Palliative care consulted for assistance in symptom management, as well as to establish should patient require their assistance in outpatient setting  Appreciate all assistance and recommendations

## 2022-11-28 NOTE — PROGRESS NOTES
PULMONOLOGY PROGRESS NOTE     Name: Raysa Sanders   Age & Sex: 81 y o  female   MRN: [id number]  Unit/Bed#: [facility] 401-01   Encounter: [id number]    PATIENT INFORMATION     Name: Raysa Sanders   Age & Sex: 81 y o  female   MRN: [id number]  Hospital Stay Days: 13    ASSESSMENT/PLAN     Assessment:   1  RLL lung mass/consolidation  2  Acute hypoxic respiratory failure  3  Right pleural effusion - s/p thoracentesis on  with neutrophilic predominant transudate  4  Actinomyces bacteremia  5  Bronchospasm after induction of anesthesia with bradycardia and hypotension - Resolved  6  Hypercalcemia  7  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   8  Emphysema with possible underlying COPD - not in acute exacerbation    Plan:  • CT chest from  with evidence of worsening right sided pleural effusion as well as worsening RML and RLL consolidation  • Awaiting IR thoracentesis scheduled for today  • Please, check labs LDH and protein (serum and fluid), cell count with differential, gram stain and culture, cytology, and albumin level (pleural fluid)  • Antibiotics per ID  • Defer bronchoscopy for now since patient became hypotensive, bradycardic and developed bronchospasm after previous induction of anesthesia, making her high risk to repeat the procedure at this time  • Pulmonology will continue to follow  • Rest of care per primary team        SUBJECTIVE     Patient seen and examined  Overnight events reviewed  Patient this morning states she feels the same  Still reports shortness of breath  Denies chest pain       OBJECTIVE     Vitals:    22 0533 22 0733 22 0832 22 0834   BP:  141/68 135/68 135/68   Pulse:  (!) 113 (!) 125 (!) 115   Resp:  19     Temp:  98 3 °F (36 8 °C)     TempSrc:  Oral     SpO2:  (!) 89%  94%   Weight: 62 8 kg (138 lb 7 2 oz)      Height:          Temperature:   Temp (24hrs), Av 2 °F (36 8 °C), Min:97 9 °F (36 6 °C), Max:98 4 °F (36 9 °C)    Temperature: 98 3 °F (36 8 °C)  Intake & Output:  I/O       11/26 0701 11/27 0700 11/27 0701 11/28 0700 11/28 0701 11/29 0700    IV Piggyback       Total Intake(mL/kg)       Urine (mL/kg/hr) 200 (0 1) 150 (0 1)     Stool 0 0     Total Output 200 150     Net -200 -150            Unmeasured Urine Occurrence 1 x 1 x     Unmeasured Stool Occurrence 2 x 1 x         Weights:        Body mass index is 31 04 kg/m²  Weight (last 2 days)     Date/Time Weight    11/28/22 0533 62 8 (138 45)    11/27/22 0530 61 8 (136 24)    11/26/22 0600 62 7 (138 23)        Physical Exam  Vitals and nursing note reviewed  Constitutional:       General: She is not in acute distress  Appearance: She is not ill-appearing or toxic-appearing  HENT:      Head: Normocephalic  Eyes:      General: No scleral icterus  Pupils: Pupils are equal, round, and reactive to light  Neck:      Trachea: Tracheostomy present  Cardiovascular:      Rate and Rhythm: Regular rhythm  Tachycardia present  Heart sounds: No murmur heard  No gallop  Pulmonary:      Effort: Pulmonary effort is normal  No respiratory distress  Breath sounds: Decreased breath sounds (Right middle and lower lobes) present  No wheezing, rhonchi or rales  Abdominal:      General: Bowel sounds are normal  There is no distension  Palpations: Abdomen is soft  Tenderness: There is no abdominal tenderness  There is no guarding  Musculoskeletal:      Cervical back: Normal range of motion  Right lower leg: Edema present  Left lower leg: Edema present  Skin:     General: Skin is warm  Capillary Refill: Capillary refill takes less than 2 seconds  Neurological:      Mental Status: She is alert and oriented to person, place, and time  Mental status is at baseline  Cranial Nerves: No cranial nerve deficit  Psychiatric:         Mood and Affect: Mood normal            LABORATORY DATA     Labs:  I have personally reviewed pertinent reports  Results from last 7 days   Lab Units 11/28/22  0506 11/27/22  0552 11/26/22  0624 11/25/22  0626   WBC Thousand/uL 15 53* 13 63* 13 27* 13 71*   HEMOGLOBIN g/dL 10 1* 9 4* 9 6* 9 2*   HEMATOCRIT % 35 4 31 7* 32 7* 31 9*   PLATELETS Thousands/uL 229 199 228 239   NEUTROS PCT % 89*  --  88* 86*   MONOS PCT % 6  --  7 7   MONO PCT %  --  2*  --   --       Results from last 7 days   Lab Units 11/28/22  0506 11/27/22  0552 11/26/22  0624   POTASSIUM mmol/L 3 6 3 2* 2 8*   CHLORIDE mmol/L 106 105 106   CO2 mmol/L 27 27 29   BUN mg/dL 11 10 10   CREATININE mg/dL 1 36* 1 27 1 28   CALCIUM mg/dL 7 9* 8 3 8 4   ALK PHOS U/L 88 80 85   ALT U/L 19 17 16   AST U/L 26 32 19     Results from last 7 days   Lab Units 11/28/22  0506 11/27/22  0552 11/26/22  0624   MAGNESIUM mg/dL 1 6 1 8 1 5*     Results from last 7 days   Lab Units 11/28/22  0506 11/27/22  0552 11/26/22  0624   PHOSPHORUS mg/dL 1 7* 2 1* 1 7*                      ABG:       Micro:         IMAGING & DIAGNOSTIC TESTING     Radiology Results: I have personally reviewed pertinent reports  XR chest portable    Result Date: 11/17/2022  Impression: Near complete drainage of right effusion with trace residual effusion  Masslike opacity in the right lower lobe better shown on CT  Mild pulmonary venous congestion  Workstation performed: [id number]     CT head wo contrast    Result Date: 11/15/2022  Impression: No acute intracranial abnormality  Chronic microangiopathic changes  Workstation performed: [id number]     CT chest wo contrast    Result Date: 11/19/2022  Impression: 1  Right lower lobe consolidation appears similar  There is relative internal hypodensity, along with air, consistent with abscess/necrosis  Recommend remains for a follow-up CT  2   Stable right lower lobe airway debris, with new airway debris in the middle lobe, consistent with interval aspiration event 3    Moderate right pleural effusion, not significantly changed in size  Trace left pleural effusion  4   Interstitial opacities consistent with mild edema 5  Stable left lower lobe nodule, attention on follow-up The study was marked in EPIC for immediate notification  Workstation performed: [id number]     IR IN-Patient Thoracentesis    Result Date: 11/18/2022  Impression: Impression: Ultrasound-guided thoracentesis yielding 900 mL clear yellow pleural fluid  Signed, performed, and dictated by CHANEL Wyman under the supervision of Dr Diamond Mota  Workstation performed: [id number]     PE Study with CT Abdomen and Pelvis with contrast    Result Date: 11/15/2022  Impression: No central, lobar, segmental or proximal subsegmental pulmonary embolism  Evaluation of the distal subsegmental pulmonary arteries is limited  Large, heterogeneous airspace consolidation in the right lower lobe, with a 6 3 x 5 2 x 4 8 cm somewhat lobulated hypodense component with a small droplet of air  This likely represents a necrotizing aspiration pneumonia, given mucous plugging throughout the right lower lobe and retained secretions/debris in the right mainstem bronchus and bronchus intermedius  Recommend follow-up chest CT in approximately 3 months to ensure resolution as an underlying neoplasm may have a similar appearance  Nonspecific 7 x 6 mm lobulated left lower lobe nodule  Attention on follow-up  Moderate right pleural effusion  No enhancement of the pleura to suggest empyema  Mediastinal and right hilar lymphadenopathy, likely reactive, however metastatic disease is not entirely excluded  Mild stranding around the proximal pancreas suggestive of acute interstitial pancreatitis  Recommend correlation with lipase level  No organized peripancreatic fluid collections, loss of parenchymal enhancement to suggest necrosis, or vascular complications of pancreatitis  No other acute abdominal or pelvic pathology  Multiple additional findings as above   The study was marked in EPIC for immediate notification  Workstation performed: [id number]     Other Diagnostic Testing: I have personally reviewed pertinent reports      ACTIVE MEDICATIONS     Current Facility-Administered Medications   Medication Dose Route Frequency   • acetaminophen (TYLENOL) tablet 650 mg  650 mg Oral Q6H PRN   • acetylcysteine (MUCOMYST) 200 mg/mL inhalation solution 600 mg  3 mL Nebulization TID   • albuterol inhalation solution 2 5 mg  2 5 mg Nebulization Q4H PRN   • atorvastatin (LIPITOR) tablet 40 mg  40 mg Oral Daily With Dinner   • cefTRIAXone (ROCEPHIN) 1,000 mg in dextrose 5 % 50 mL IVPB  1,000 mg Intravenous Q24H   • clopidogrel (PLAVIX) tablet 75 mg  75 mg Oral Daily   • dextromethorphan-guaiFENesin (ROBITUSSIN DM) oral syrup 10 mL  10 mL Oral Q6H PRN   • glycerin-hypromellose- (ARTIFICIAL TEARS) ophthalmic solution 1 drop  1 drop Both Eyes PRN   • heparin (porcine) subcutaneous injection 5,000 Units  5,000 Units Subcutaneous Q8H [address]   • HYDROmorphone HCl (DILAUDID) injection 0 2 mg  0 2 mg Intravenous Q6H PRN   • hydrOXYzine HCL (ATARAX) tablet 25 mg  25 mg Oral Q6H PRN   • iron sucrose (VENOFER) 200 mg in sodium chloride 0 9 % 100 mL IVPB  200 mg Intravenous Once per day on Mon Wed Fri   • levalbuterol (XOPENEX) inhalation solution 1 25 mg  1 25 mg Nebulization TID   • levothyroxine tablet 100 mcg  100 mcg Oral Early Morning   • loratadine (CLARITIN) tablet 5 mg  5 mg Oral Daily   • metoprolol tartrate (LOPRESSOR) partial tablet 12 5 mg  12 5 mg Oral Q12H HARRIS   • oxyCODONE (ROXICODONE) IR tablet 2 5 mg  2 5 mg Oral Q6H PRN    Or   • oxyCODONE (ROXICODONE) IR tablet 5 mg  5 mg Oral Q6H PRN   • potassium phosphate 6 mmol in sodium chloride 0 9 % 100 mL Infusion  6 mmol Intravenous Once   • potassium-sodium phosphateS (K-PHOS,PHOSPHA 250) -250 mg tablet 1 tablet  1 tablet Oral Daily With Breakfast   • sertraline (ZOLOFT) tablet 25 mg  25 mg Oral Daily       VTE Pharmacologic Prophylaxis: Heparin  VTE Mechanical Prophylaxis: sequential compression device      Disclaimer: Portions of the record may have been created with voice recognition software  Occasional wrong word or "sound a like" substitutions may have occurred due to the inherent limitations of voice recognition software  Careful consideration should be taken to recognize, using context, where substitutions have occurred      Mary Ellen Haas MD   Pulmonary and Critical Care Fellow, PGY-4  [facility]

## 2022-11-28 NOTE — NURSING NOTE
Pt increasingly confused about why she is still in the hospital, what the plan is for her moving forward, and why she cannot go home  Had lengthy discussion with pt discussing emotional needs and her concerns stated above  Pt expressed understanding at that time, however, continues to ask the same question  Pt removed her oxygen sensor and trach mask with unsuccessful redirection  Pt attempted to get out of bed and is unsteady on her feet  Bed alarm applied  Pt did not sleep 11/27 into 11/28 and stated that "someone must be behind the bed because everytime you leave, the bed moves around the room " [facility] resident Jeremiah King contacted through [employer] at [phone] regarding same concerns  Deemed appropriate and received no new orders  Pt continued same behavior throughout the morning  At approximately 0500, pt was moved from rm 421 to rm 401 for removing her o2 sensor and oxygen again and attempting to get out of bed

## 2022-11-28 NOTE — PROGRESS NOTES
Progress Note - Infectious Disease   Ghanshyam Whatley 81 y o  female MRN: [id number]  Unit/Bed#: [facility] 421-01 Encounter: [id number]      Impression:  1  Necrotizing RLL pneumonia R/0 abscess S/P bronchoscopy/BAL  2  History of laryngeal carcinoma s/p laryngectomy/tracheitis  3  DM type 2  4  CAD  5  Extensive PVD    Recommendations:  Patient is afebrile with elevated but decreasing WBC count 13,710    1  Bronchoscopy/BAL was only partially complete  A CTA done today shows a large right-sided pleural effusion with worsened RML and RLL consolidation compatible with pneumonia/atelectasis  The effusion is likely creating the RML, RLL issues and will likely need thoracentesis with possible chest tube as per Pulmonary  2  In light of above as discussed continue ceftriaxone 1 g Q 24 hours IV pending bronchoscopy/BAL culture results and thoracentesis to determine if actinomycosis is contaminant or pathogen  So far cultures are negative  Sputum Staph aureus was from 11/15  Antibiotics:  1  Ceftriaxone 1 g Q 24 hours IV, day 7 Rx     Subjective: The patient has no complaints  Denies shortness of breath or cough  Denies fevers, chills, or sweats  Denies nausea, vomiting, or diarrhea  Objective:  Vitals:  Temp:  [97 8 °F (36 6 °C)-98 4 °F (36 9 °C)] 98 4 °F (36 9 °C)  HR:  [105-136] 134  Resp:  [19] 19  BP: (102-128)/(50-58) 117/58  SpO2:  [92 %-96 %] 96 %  Temp (24hrs), Av 1 °F (36 7 °C), Min:97 8 °F (36 6 °C), Max:98 4 °F (36 9 °C)  Current: Temperature: 98 4 °F (36 9 °C)    Physical Exam:     General Appearance:    Eyes:   Alert, responsive, chronically ill-appearing nontoxic, no acute distress  Conjunctiva pale   Throat: Oropharynx moist without lesions    Lips, mucosa, and tongue normal   Neck: Tracheostomy, surgical scars   Lungs:   Bibasilar dullness > right   Heart:  Regular rate with ectopics and rhythm, S1, S2 normal, no murmur, rub or gallop   Abdomen:   Soft, non-tender, non-distended, positive bowel sounds  No masses, no organomegaly    No CVA tenderness, external urinary catheter   Extremities: Extremities normal, atraumatic, no clubbing, cyanosis or edema   Skin: Skin color pale, surgical scars, as above texture, turgor normal, no rashes or lesions  No draining wounds noted           Invasive Devices     Peripheral Intravenous Line  Duration           Peripheral IV 11/25/22 Left Antecubital 2 days          Airway  Duration           Surgical Airway -- days                Labs, Imaging, & Other studies:   All pertinent labs were personally reviewed  Results from last 7 days   Lab Units 11/27/22 0552 11/26/22 0624 11/25/22  0626   WBC Thousand/uL 13 63* 13 27* 13 71*   HEMOGLOBIN g/dL 9 4* 9 6* 9 2*   PLATELETS Thousands/uL 199 228 239     Results from last 7 days   Lab Units 11/27/22 0552 11/26/22 0624 11/25/22  0626   SODIUM mmol/L 139 143 143   POTASSIUM mmol/L 3 2* 2 8* 3 5   CHLORIDE mmol/L 105 106 110*   CO2 mmol/L 27 29 27   BUN mg/dL 10 10 10   CREATININE mg/dL 1 27 1 28 1 22   EGFR ml/min/1 73sq m 38 37 40   CALCIUM mg/dL 8 3 8 4 8 6   AST U/L 32 19 17   ALT U/L 17 16 17   ALK PHOS U/L 80 85 86

## 2022-11-28 NOTE — RESTORATIVE TECHNICIAN NOTE
Restorative Technician Note      Patient Name: Rohit Franklin     Restorative Tech Visit Date: 11/28/22  Note Type: Mobility  Patient Position Upon Consult: Supine  Activity Performed: Ambulated  Assistive Device: Roller walker  Patient Position at End of Consult:  Other (comment) (on the stretcher)

## 2022-11-28 NOTE — SEDATION DOCUMENTATION
2 1 L magdiel pleural fluid removed from Right lung, labs sent, dry dressing applied to site  Patient tolerated procedure well, report called to nurse

## 2022-11-28 NOTE — PROGRESS NOTES
INTERNAL MEDICINE RESIDENCY PROGRESS NOTE     Name: Damien Richard   Age & Sex: 81 y o  female   MRN: [id number]  Unit/Bed#: [facility] 401-01   Encounter: [id number]  Team: SOD Team C     PATIENT INFORMATION     Name: Damien Richard   Age & Sex: 81 y o  female   MRN: [id number]  Hospital Stay Days: 15    ASSESSMENT/PLAN     Principal Problem:    Sepsis with acute hypoxic respiratory failure ([facility] [address] )  Active Problems:    Right lower lobe lung mass with right pleural effusion    Hypercalcemia    Laryngeal carcinoma (HCC)    Asymptomatic carotid artery stenosis    Hyperlipidemia    Hypertension    CKD (chronic kidney disease) stage 3, GFR 30-59 ml/min    JORDAN (acute kidney injury) ([facility] [address] )    Lung nodule seen on imaging study    Anemia of chronic disease    Hypothyroidism    Gram-positive bacteria on cultures      Right lower lobe lung mass with right pleural effusion  Assessment & Plan  · Patient history of laryngeal carcinoma status post tracheostomy/laryngectomy who presented to ED with tachycardia, hypoxia, fatigue  · 11/15 CTA chest notable for significant findings including:  · Large, heterogeneous airspace consolidation in the right lower lobe, with a 6 3 x 5 2 x 4 8 cm somewhat lobulated hypodense component likely a necrotizing aspiration pneumonia  · Moderate right pleural effusion  No enhancement of the pleura to suggest empyema  · Mediastinal and right hilar lymphadenopathy, likely reactive, however metastatic disease is not entirely excluded    · Legionella and strep antigens negative  · 11/18 IR Thoracentesis - Status post 900 mL fluid thoracentesis suggestive of transudative fluid for pulmonology and may be secondary to pancreatitis  · Blood cultures 1/2 group Actinomyces gram variable rods on 11/15  · Blood cultures x2 on 11/17 final: no growth   · Sputum culture on 11/15 grew Staph aureus  · Sputum culture on 11/16 final: no growth  · Planned for bronchoscopy and BAL 11/22, however, procedure aborted due to patient becoming hemodynamically unstable after anesthesia induction  Was in the ICU 11/22-11/23, transferred back to SOD       Plan:  · Ceftriaxone 1 gm Q24H Day 8, continue per ID recommendations   · 11/22 bronchial, viral, and fungal cultures pending from tracheal aspirate - partial bronch completed at that time  · AFB stain from aspirate prelim negative    · Trend WBC and Fever curve  WBC 15 53 today  · Since patient has sinus tachycardia, ordered a d-dimer 11/26 which is 2 73    · CTA 11/26 shows:  No PE  Right-sided pleural effusion is now larger in size  Worsened right middle lobe and right lower lobe consolidation keeping with a combination patient's known in pneumonia and superimposed atelectasis  Worsened mucus plugging throughout the bronchus intermedius, right middle lobe and right lower lobe  Low-density necrotic/abscess component of the right lower lobe consolidation appears unchanged  Unchanged pulmonary edema  · Following up with pulm team, appreciate recommendations  · Patient is not stable for bronchoscopy given episode earlier in the week with initial bronch initiated  At this time recommend thoracentesis versus chest tube placement for right effusion  · Pulm will continue to follow and if patient becomes more stable can consider repeat bronchoscopy without sedation  · IR 11/28 drained approximately 2L - fluid studies pending  · Patient will likely have IJ placed by IR given that PICC line cannot be placed due to stage III CKD with low GFR  · Continue Chest PT and Mucomyst   · Follow-up chest CT in 3 months as an outpatient to ensure resolution as an underlying neoplasm may have a similar appearance      Hypercalcemia  Assessment & Plan  · In setting of CKD stage 3B, excessive use of vitamin-D and calcium supplementation at home per patient  · Further evidenced by elevated corrected calcium 14 2; supported by initial presentation with abdominal pain, fatigue, decreased appetite/poor PO intake; mentation at baseline, nontoxic on exam  · ECG without arrhythmia  · Further ED work up notable for multiple derangements including Hgb 11 1, low bicarb 18, elevated BUN 34, elevated creatinine 1 47 (baseline 1 11 4), GFR 32, , hypoalbuminemia 2 1; elevated lipase 2028, uric acid 10 4  · CT imaging findings concerning for acute pancreatitis,  necrotizing pneumonia vs  malignancy  · Etiology multifactorial, possibly due to hypercalcemia malignancy in light of CT findings, further complicated by home vitamin-D supplementation; med rec without any other causative agents  Bone demineralization also possible given her age  · Per chart review, patient noted to have elevated corrected calcium level since 08/2022 however does not appear to have been further worked up by PCP  · Unlikely primary parathyroidism or tertiary cause given PTH in 09/2022 was low; baseline renal function with CKD III  Vitamin-D 25 hydroxy level within normal limits  Calcium continues to be elevated   Status post 3 mg zoledronic acid     SPEP with faint gammopathy, PTH very low  UPEP shows selective proteinuria (54) with no monoclonal bands noted  PTHrP <2, WNL    Plan:  · Calcium level slowly improving 9 6  · Vitamin D levels within normal limits: Vit D 25-OH 77 2, Vit D 1,25 OH 49 4  · PTH 6 3, compensating appropriately for elevated Ca level     * Sepsis with acute hypoxic respiratory failure (HCC)  Assessment & Plan  · Patient presented with abdominal pain, lightheadedness, intermittent shortness of breath, recent unexpected weight loss   · Met sepsis criteria on admission  · Lactate wnl but has significant hypercalcemia 14 2 on admission  · Patient is status post tracheostomy  · Completed cephalosporin 5 day course, completed azithromycin 3 day course    Plan:  · Completed 5 day course of antibiotics covering for CAP  · Follow up on blood cultures  Blood cultures x2 11/17 no growth 4 days     · Blood cultures x1 11/15 grew Actinomyces   · 11/15 sputum culture grew S  Aureus with repeat sputum culture 11/16 negative   · Bronch/BAL aborted due to patient becoming hemodynamically unstable after anesthesia induction  Patient was briefly taken to ICU for higher level of care  Now transferred to SOD   · Bronchial aspirates collected, pending results   · AFB prelim from aspirate negative   · Will continue on ceftriaxone 1 gm Q24H per ID recommendations  Day 8  · CTA 11/26 shows:  No PE  Right-sided pleural effusion is now larger in size  Worsened right middle lobe and right lower lobe consolidation keeping with a combination patient's known in pneumonia and superimposed atelectasis  Worsened mucus plugging throughout the bronchus intermedius, right middle lobe and right lower lobe  Low-density necrotic/abscess component of the right lower lobe consolidation appears unchanged  Unchanged pulmonary edema  · Following up with pulm team, appreciate recommendations  · Patient is not stable for bronchoscopy given episode earlier in the week with initial bronch initiated  At this time recommend thoracentesis versus chest tube placement for right effusion  · Pulm will continue to follow and if patient becomes more stable can consider repeat bronchoscopy without sedation  · IR 11/28 drained approximately 2L of fluid from right side  Will follow up on fluid studies  · Plan is for IJ line to be placed by IR for antibiotics given that patient unable to have PICC line due to CKD stage 3 with low GFR  · Continue trach collar oxygen as needed - around 8 L this AM   · Trend WBC and fever curve       Laryngeal carcinoma [facility])  Assessment & Plan  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  · Follows with ENT as an outpatient with Dr Pito Maldonado; most recently evaluated for trich follow-up in 10/2022     Plan:  · Trach in place, functional well, at baseline  · Continue trach collar oxygen, on 8 L able to saturating appropriately, weaned to 6L   · Patient has concerns that prosthesis is not functioning properly  Speech evaluated her yesterday, no leakage around trach and prosthesis  Pt did have this new prothesis placed in June 2022, since then has been having voice issues  Currently suctioning repeatedly from the trach  · ENT consulted, appreciate reccs   · No further interventions for her TPN at this time while inpatient  As outpatient can continue to work on keeping secretions then around TEP, cleaning the mechanism with new brushes, possible placement of TEP reinforcing bring under flange  · Recommend potential outpatient replacement or reinforcement of TEP   · Will have nursing assist with TEP maintanence at this time    Gram-positive bacteria on cultures  Assessment & Plan  1/2 blood cultures grew Actinomyces    Plan:  See Sepsis and Right Lower Lobe Mass A/P          Hypothyroidism  Assessment & Plan  · Known history since 11/2021  · Most recent TSH 4 2 in 08/2022  · Managed on levothyroxine 100 mcg  · Repeat TSH within normal limits    Plan  Stable    · Continue home dose levothyroxine    Anemia of chronic disease  Assessment & Plan  · Known history, in setting of CKD stage IIIB  · Follows nephrology outpatient (Dr Yassine Gilman)  · Hgb 11 1 on admission, at baseline     · Currently hemodynamically stable    Plan:  · Trend CBC, Hb 10 1  · No active bleeding present   · Transfuse if Hb < 7         Lung nodule seen on imaging study  Assessment & Plan  · Patient history of laryngeal carcinoma status post tracheostomy/laryngectomy on trach collar presented with fatigue; tachycardic on exam; labs notable for multiple derangements including hypercalcemia 14 3 and bicarb 18  · CT notable for nonspecific 7 x 6 mm lobulated left lower lobe nodule; new finding; CT imaging also revealing for multiple findings large heterogeneous airspace consolidation and right lower lobe with 6 x 5 x 5 lobulated hypodense component with moderate right-sided pleural effusion  concerning for necrotizing pneumonia  · Malignancy not entirely ruled out; does endorse decreased appetite, unexpected weight loss of up to 30 lb, and is a former smoker; quit 2002; hypercalcemia also noted on admission with recent outpatient PTH wnl  · 11/19 - repeat CT suggested of necrosis/abscess    Plan  · Inpatient pulmonology following, appreciate recs; 5 day course completed abx for suspecting necrotizing pneumonia with large heterogeneous airspace consolidation in right lower lobe  · ID consulted, appreciate reccs  On ceftriaxone 1 gm Q24H, day 8 today  Bronchial aspirates pending results   · Repeat CT future to assess for improvement, outpatient     JORDAN (acute kidney injury) ([facility] [address] )  Assessment & Plan  Creatinine of 1 47 on admission treated with IV fluid boluses, 3L total, NSS at 100ml/hr, renal function assessment and lab monitoring  Pt has CKD 3 and noted baseline of 1-1 2    Plan:  · Cr 1 36, will continue to monitor   · Avoid nephrotoxic agents  · Monitor urine output   · Encourage adequate intake    CKD (chronic kidney disease) stage 3, GFR 30-59 ml/min  Assessment & Plan  Lab Results   Component Value Date    EGFR 35 11/28/2022    EGFR 38 11/27/2022    EGFR 37 11/26/2022    CREATININE 1 36 (H) 11/28/2022    CREATININE 1 27 11/27/2022    CREATININE 1 28 11/26/2022     · Known history of CKD stage III states 2014  · Follows [facility] nephrology (Dr Sahara Reyes)  · Cr on admission slightly elevated 1 47(baseline 1 1-1 4)  · Etiology likely 2/2 long history of hypertension, nephrosclerosis, age-related nephron loss     Plan  · Cr 1 36, which is around her baseline    Will continue to monitor BMP  · Avoid use of nephrotoxic agents  · Maintain euvolemia       Hypertension  Assessment & Plan  · Known history, maintained on losartan 50 mg daily at home  · Follows PCP outpatient      Plan:  · Bps systolic 802M-231F  · Will hold home dose losartan at this time  · Continue monitoring blood pressures      Hyperlipidemia  Assessment & Plan  · Known history of type 2 diabetes on metformin, hypertension on losartan  · Managed on Lipitor 40  · Most recent lipid panel with good control of LDL at 56    Plan    · Continue Lipitor 40mg QD       Asymptomatic carotid artery stenosis  Assessment & Plan  · Known hx, follows with vascular surgery (Dr Anish Hager)  · Previously found to have recurrent left ICA stenosis and critical new occlusive right IC stenosis, with a which were asymptomatic, along with left vertebral artery atresia; previously worked up in outpatient setting by mass concerning symptoms and was recommended endovascular intervention  · On 07/2020, patient electively admitted to [facility] during which she underwent ultrasound-guided right CFA puncture with sternal closure, aortic arch arteriogram, left carotid during him with PTA and stenting under the care of Dr Brown  · Maintained on antiplatelet and anti-statin therapy with Plavix and Lipitor     Plan:   · Continue home dose lipitor  · Restarted home Plavix after bronch was cancelled    Pancreatitis-resolved as of 11/28/2022  Assessment & Plan  · Likely secondary to hypercalcemia  · Presented with abdominal pain; labs notable for elevated lipase >2000, elevated WBC  · CT imaging suggestive pancreatitis  · Patient has elevated alk-phos with normal T bili and asymptomatic abnormal biliary strictures on imaging  · Patient reports improved appetite and improved abdominal pain - pancreatitis is now resolved     Plan:  · Supportive care with analgesics or antiemetics as needed; renal function at baseline, ECG with normal QTC  · Continue diet as tolerated   · Continued monitoring of vitals, O2 sats, urine output with goal > 0 5-1 mL/kg/hr  · Replete electrolytes as indicated    Suspected as having resolved, limiting fluid resuscitation at this time due to volume overload   Unclear source of hypercalcemia that triggered pancreatitis, hematology on board for hypercalcemia control  Ca slowly downtrending, last 9 6 today             Disposition: IR drained right pleural effusion today, pending fluid studies  Plan for IJ line to be placed for continued antibiotics      SUBJECTIVE     Patient seen and examined, sitting in bed with chest PT done at time of exam   Overnight was told by night team the patient woke up confused from deep sleep  However was A&O x3 on exam at that time  This morning, patient is also A&O x3 to person, place and time  She stated that she had some diarrhea this morning that she felt was related to her ensures  No fevers, chills, chest pain, SOB, abdominal pain, nausea or vomiting  She does have some back pain on the right side  Likely due to prolonged sitting  Encouraged patient to ambulate or try to OOB with assistance and with PT/OT  Plan is for analysis of fluid studies and IR to place IJ for continued IV antibiotics  Will test for C  Diff given patient has been here for some time and has been having abx treatment  OBJECTIVE     Vitals:    22 1141 22 1204 22 1405 22 1452   BP: 112/64 (!) 97/46  105/57   Pulse: (!) 109 (!) 118 100 (!) 117   Resp:       Temp:    98 2 °F (36 8 °C)   TempSrc:       SpO2: 96% 98% 96% 95%   Weight:       Height:          Temperature:   Temp (24hrs), Av 2 °F (36 8 °C), Min:97 9 °F (36 6 °C), Max:98 4 °F (36 9 °C)    Temperature: 98 2 °F (36 8 °C)  Intake & Output:  I/O        0701   0700  0701   0700  0701   0700    P  O    180    I V  (mL/kg)   30 (0 5)    IV Piggyback   100    Total Intake(mL/kg)   310 (4 9)    Urine (mL/kg/hr) 200 (0 1) 150 (0 1) 0 (0)    Other   2100    Stool 0 0 0    Total Output     Net -200 -150 -1790           Unmeasured Urine Occurrence 1 x 1 x 1 x    Unmeasured Stool Occurrence 2 x 1 x 2 x        Weights:        Body mass index is 31 04 kg/m²    Weight (last 2 days)     Date/Time Weight    11/28/22 0533 62 8 (138 45)    11/27/22 0530 61 8 (136 24)    11/26/22 0600 62 7 (138 23)        Physical Exam  Vitals and nursing note reviewed  Constitutional:       General: She is not in acute distress  Appearance: She is not toxic-appearing  Comments: Tired appearing, sitting in bed but interactive    HENT:      Head: Normocephalic and atraumatic  Mouth/Throat:      Mouth: Mucous membranes are moist       Pharynx: Oropharynx is clear  Eyes:      General: No scleral icterus  Extraocular Movements: Extraocular movements intact  Conjunctiva/sclera: Conjunctivae normal    Neck:      Trachea: Tracheostomy present  Cardiovascular:      Rate and Rhythm: Regular rhythm  Tachycardia present  Pulses: Normal pulses  Heart sounds: No murmur heard  No gallop  Pulmonary:      Effort: Pulmonary effort is normal  No respiratory distress  Breath sounds: Decreased breath sounds (Right middle and lower lobes) present  No wheezing, rhonchi or rales  Comments: On 10 L O2 on exam     Decreased breath sounds heard more on right side than left  Abdominal:      General: Bowel sounds are normal  There is no distension  Palpations: Abdomen is soft  Tenderness: There is no abdominal tenderness  There is no guarding or rebound  Musculoskeletal:      Right lower leg: Edema (+1 up to below knee) present  Left lower leg: Edema (improving ) present  Skin:     General: Skin is warm and dry  Capillary Refill: Capillary refill takes less than 2 seconds  Neurological:      Mental Status: She is alert and oriented to person, place, and time  Psychiatric:         Mood and Affect: Mood normal        LABORATORY DATA     Labs: I have personally reviewed pertinent reports    Results from last 7 days   Lab Units 11/28/22  0506 11/27/22  0552 11/26/22  0624 11/25/22  0626   WBC Thousand/uL 15 53* 13 63* 13 27* 13 71*   HEMOGLOBIN g/dL 10 1* 9 4* 9 6* 9 2* HEMATOCRIT % 35 4 31 7* 32 7* 31 9*   PLATELETS Thousands/uL 229 199 228 239   NEUTROS PCT % 89*  --  88* 86*   MONOS PCT % 6  --  7 7   MONO PCT %  --  2*  --   --       Results from last 7 days   Lab Units 11/28/22  0506 11/27/22  0552 11/26/22  0624   POTASSIUM mmol/L 3 6 3 2* 2 8*   CHLORIDE mmol/L 106 105 106   CO2 mmol/L 27 27 29   BUN mg/dL 11 10 10   CREATININE mg/dL 1 36* 1 27 1 28   CALCIUM mg/dL 7 9* 8 3 8 4   ALK PHOS U/L 88 80 85   ALT U/L 19 17 16   AST U/L 26 32 19     Results from last 7 days   Lab Units 11/28/22  0506 11/27/22  0552 11/26/22  0624   MAGNESIUM mg/dL 1 6 1 8 1 5*     Results from last 7 days   Lab Units 11/28/22  0506 11/27/22  0552 11/26/22  0624   PHOSPHORUS mg/dL 1 7* 2 1* 1 7*                    IMAGING & DIAGNOSTIC TESTING     Radiology Results: I have personally reviewed pertinent reports  XR chest portable    Result Date: 11/17/2022  Impression: Near complete drainage of right effusion with trace residual effusion  Masslike opacity in the right lower lobe better shown on CT  Mild pulmonary venous congestion  Workstation performed: [id number]     CT head wo contrast    Result Date: 11/15/2022  Impression: No acute intracranial abnormality  Chronic microangiopathic changes  Workstation performed: [id number]     CT chest wo contrast    Result Date: 11/19/2022  Impression: 1  Right lower lobe consolidation appears similar  There is relative internal hypodensity, along with air, consistent with abscess/necrosis  Recommend remains for a follow-up CT  2   Stable right lower lobe airway debris, with new airway debris in the middle lobe, consistent with interval aspiration event 3  Moderate right pleural effusion, not significantly changed in size  Trace left pleural effusion  4   Interstitial opacities consistent with mild edema 5  Stable left lower lobe nodule, attention on follow-up The study was marked in EPIC for immediate notification   Workstation performed: [id number] IR IN-Patient Thoracentesis    Result Date: 11/18/2022  Impression: Impression: Ultrasound-guided thoracentesis yielding 900 mL clear yellow pleural fluid  Signed, performed, and dictated by CHANEL Min under the supervision of Dr Eloise Em  Workstation performed: [id number]     PE Study with CT Abdomen and Pelvis with contrast    Result Date: 11/15/2022  Impression: No central, lobar, segmental or proximal subsegmental pulmonary embolism  Evaluation of the distal subsegmental pulmonary arteries is limited  Large, heterogeneous airspace consolidation in the right lower lobe, with a 6 3 x 5 2 x 4 8 cm somewhat lobulated hypodense component with a small droplet of air  This likely represents a necrotizing aspiration pneumonia, given mucous plugging throughout the right lower lobe and retained secretions/debris in the right mainstem bronchus and bronchus intermedius  Recommend follow-up chest CT in approximately 3 months to ensure resolution as an underlying neoplasm may have a similar appearance  Nonspecific 7 x 6 mm lobulated left lower lobe nodule  Attention on follow-up  Moderate right pleural effusion  No enhancement of the pleura to suggest empyema  Mediastinal and right hilar lymphadenopathy, likely reactive, however metastatic disease is not entirely excluded  Mild stranding around the proximal pancreas suggestive of acute interstitial pancreatitis  Recommend correlation with lipase level  No organized peripancreatic fluid collections, loss of parenchymal enhancement to suggest necrosis, or vascular complications of pancreatitis  No other acute abdominal or pelvic pathology  Multiple additional findings as above  The study was marked in [facility] for immediate notification  Workstation performed: [id number]     Other Diagnostic Testing: I have personally reviewed pertinent reports      ACTIVE MEDICATIONS     Current Facility-Administered Medications   Medication Dose Route Frequency   • acetaminophen (TYLENOL) tablet 650 mg  650 mg Oral Q6H PRN   • acetylcysteine (MUCOMYST) 200 mg/mL inhalation solution 600 mg  3 mL Nebulization TID   • albuterol inhalation solution 2 5 mg  2 5 mg Nebulization Q4H PRN   • atorvastatin (LIPITOR) tablet 40 mg  40 mg Oral Daily With Dinner   • cefTRIAXone (ROCEPHIN) 1,000 mg in dextrose 5 % 50 mL IVPB  1,000 mg Intravenous Q24H   • clopidogrel (PLAVIX) tablet 75 mg  75 mg Oral Daily   • dextromethorphan-guaiFENesin (ROBITUSSIN DM) oral syrup 10 mL  10 mL Oral Q6H PRN   • glycerin-hypromellose- (ARTIFICIAL TEARS) ophthalmic solution 1 drop  1 drop Both Eyes PRN   • heparin (porcine) subcutaneous injection 5,000 Units  5,000 Units Subcutaneous Q8H [address]   • HYDROmorphone HCl (DILAUDID) injection 0 2 mg  0 2 mg Intravenous Q6H PRN   • hydrOXYzine HCL (ATARAX) tablet 25 mg  25 mg Oral Q6H PRN   • iron sucrose (VENOFER) 200 mg in sodium chloride 0 9 % 100 mL IVPB  200 mg Intravenous Once per day on Mon Wed Fri   • levalbuterol (XOPENEX) inhalation solution 1 25 mg  1 25 mg Nebulization TID   • levothyroxine tablet 100 mcg  100 mcg Oral Early Morning   • loratadine (CLARITIN) tablet 5 mg  5 mg Oral Daily   • metoprolol tartrate (LOPRESSOR) partial tablet 12 5 mg  12 5 mg Oral Q12H HARRIS   • oxyCODONE (ROXICODONE) IR tablet 2 5 mg  2 5 mg Oral Q6H PRN    Or   • oxyCODONE (ROXICODONE) IR tablet 5 mg  5 mg Oral Q6H PRN   • potassium phosphate 6 mmol in sodium chloride 0 9 % 100 mL Infusion  6 mmol Intravenous Once   • potassium-sodium phosphateS (K-PHOS,PHOSPHA 250) -250 mg tablet 1 tablet  1 tablet Oral Daily With Breakfast   • sertraline (ZOLOFT) tablet 25 mg  25 mg Oral Daily       VTE Pharmacologic Prophylaxis: Heparin  VTE Mechanical Prophylaxis: sequential compression device    Portions of the record may have been created with voice recognition software    Occasional wrong word or "sound a like" substitutions may have occurred due to the inherent limitations of voice recognition software    Read the chart carefully and recognize, using context, where substitutions have occurred   ==  [address]  Internal Medicine Residency PGY-1

## 2022-11-29 PROBLEM — E83.52 HYPERCALCEMIA: Status: RESOLVED | Noted: 2022-01-01 | Resolved: 2022-01-01

## 2022-11-29 NOTE — WOUND OSTOMY CARE
Progress Note - Wound   Bonita Push Chacorta 81 y o  female MRN: [id number]  Unit/Bed#: [facility] 401-01 Encounter: [id number]        Assessment:   Patient seen today for wound care follow up visit  Patient is continent of bowel and bladder  Patient is min assist with turning from side to side  Patient is independent with meals  1  Redness sacral/buttocks- bright red areas, slow to petros and patient states tenderness  Placed adaptic and Allevyn foam for protection  2  Left leg wound- small partial thickness wound with yellow tissue, scant drainage  3  B/L leg/ankle wounds-  Multiple, scattered partial thickness skin loss from patient having multiple blisters that have now ruptured  Small amount of serous drainage  No induration, fluctuance, odor, warmth/temperature differences, redness, or purulence noted to the above noted wounds and skin areas assessed  New dressings applied per orders listed below  Patient tolerated well- no s/s of non-verbal pain or discomfort observed during the encounter  Bedside nurse aware of plan of care  See flow sheets for more detailed assessment findings  Wound care will continue to follow  Skin Care Plan:  1-Turn/reposition q2h or when medically stable for pressure re-distribution on skin   2-Elevate heels to offload pressure  3-Apply adaptic and then Allevyn foam to sacral/buttocks every other day and PRN soilage/displacement  4-Moisturize skin daily with skin nourishing cream  5-Ehob cushion in chair when out of bed  6-Left Pretibial Wound: Cleanse wound w/ NS and pat dry  Apply small clover allevyn foam dressing  Harry with T for Treatment and change every other day or PRN soilage  7-Left & Right Foot and heel Wounds: Cleanse wound beds and pat dry  Apply adaptic/maxorb to wound beds, cover with ABDs, and secure with rubina wraps to feet and Allevyn foams to heels  Change every other day or PRN soilage       Wound Pretibial Distal;Left (Active)   Wound Image 11/29/22 1144   Wound Description Yellow 11/29/22 1144   Alem-wound Assessment Edema 11/29/22 1144   Wound Length (cm) 0 5 cm 11/29/22 1144   Wound Width (cm) 0 5 cm 11/29/22 1144   Wound Depth (cm) 0 1 cm 11/29/22 1144   Wound Surface Area (cm^2) 0 25 cm^2 11/29/22 1144   Wound Volume (cm^3) 0 025 cm^3 11/29/22 1144   Calculated Wound Volume (cm^3) 0 03 cm^3 11/29/22 1144   Change in Wound Size % 40 11/29/22 1144   Tunneling 0 cm 11/29/22 1144   Tunneling in depth located at 0 11/29/22 1144   Undermining 0 11/29/22 1144   Undermining is depth extending from 0 11/29/22 1144   Drainage Amount Scant 11/29/22 1144   Drainage Description Serosanguineous 11/29/22 1144   Non-staged Wound Description Partial thickness 11/29/22 1144   Treatments Cleansed 11/29/22 1144   Dressing Foam, Silicon (eg  Allevyn, etc) 11/29/22 1144   Wound packed? No 11/29/22 1144   Packing- # removed 0 11/29/22 1144   Packing- # inserted 0 11/29/22 1144   Dressing Changed New 11/29/22 1144   Patient Tolerance Tolerated well 11/29/22 1144   Dressing Status Clean;Dry; Intact 11/29/22 1144       Wound 11/21/22 Foot Anterior; Left (Active)   Wound Image   11/29/22 1145   Wound Description Epithelialization;Fragile;Pink;Yellow 11/29/22 1145   Alem-wound Assessment Hyperpigmented;Edema 11/29/22 1145   Wound Length (cm) 7 5 cm 11/29/22 1145   Wound Width (cm) 5 5 cm 11/29/22 1145   Wound Depth (cm) 0 1 cm 11/29/22 1145   Wound Surface Area (cm^2) 41 25 cm^2 11/29/22 1145   Wound Volume (cm^3) 4 125 cm^3 11/29/22 1145   Calculated Wound Volume (cm^3) 4 13 cm^3 11/29/22 1145   Change in Wound Size % -37 67 11/29/22 1145   Tunneling 0 cm 11/29/22 1145   Tunneling in depth located at 0 11/29/22 1145   Undermining 0 11/29/22 1145   Undermining is depth extending from 0 11/29/22 1145   Drainage Amount Small 11/29/22 1145   Drainage Description Serous 11/29/22 1145   Non-staged Wound Description Partial thickness 11/29/22 1145   Treatments Cleansed 11/29/22 1145 Dressing Calcium Alginate;ABD;Dry dressing;Non adherent 11/29/22 1145   Wound packed? No 11/29/22 1145   Packing- # removed 0 11/29/22 1145   Packing- # inserted 0 11/29/22 1145   Dressing Changed New 11/29/22 1145   Patient Tolerance Tolerated well 11/29/22 1145   Dressing Status Clean;Dry; Intact 11/29/22 1145       Wound 11/21/22 Foot Anterior;Right (Active)   Wound Image    11/29/22 1145   Wound Description Epithelialization;Fragile; Yellow;Pink 11/29/22 1145   Alem-wound Assessment Edema;Fragile 11/29/22 1145   Wound Length (cm) 13 cm 11/29/22 1145   Wound Width (cm) 8 cm 11/29/22 1145   Wound Depth (cm) 0 1 cm 11/29/22 1145   Wound Surface Area (cm^2) 104 cm^2 11/29/22 1145   Wound Volume (cm^3) 10 4 cm^3 11/29/22 1145   Calculated Wound Volume (cm^3) 10 4 cm^3 11/29/22 1145   Tunneling 0 cm 11/29/22 1145   Tunneling in depth located at 0 11/29/22 1145   Undermining 0 11/29/22 1145   Undermining is depth extending from 0 11/29/22 1145   Wound Site Closure ARELIS 11/29/22 1145   Drainage Amount Small 11/29/22 1145   Drainage Description Serous 11/29/22 1145   Non-staged Wound Description Partial thickness 11/29/22 1145   Treatments Cleansed 11/29/22 1145   Dressing ABD;Calcium Alginate;Dry dressing;Non adherent 11/29/22 1145   Wound packed? No 11/29/22 1145   Packing- # removed 0 11/29/22 1145   Packing- # inserted 0 11/29/22 1145   Dressing Changed New 11/29/22 1145   Patient Tolerance Tolerated well 11/29/22 1145   Dressing Status Clean;Dry; Intact 11/29/22 1145         Cecilia MCKEONN, RN, [facility]

## 2022-11-29 NOTE — PROGRESS NOTES
Progress Note - Infectious Disease   Ghanshyam Whatley 81 y o  female MRN: [id number]  Unit/Bed#: [facility] 401-01 Encounter: [id number]      Impression:  1  Necrotizing RLL pneumonia R/0 abscess S/P bronchoscopy/BAL  2  History of laryngeal carcinoma s/p laryngectomy/tracheitis  3  DM type 2  4  CAD  5  Extensive PVD    Recommendations:  Patient is afebrile with elevated WBC count 15,530    1  Bronchoscopy/BAL was only partially complete  A CTA done  shows a large right-sided pleural effusion with worsened RML and RLL consolidation compatible with pneumonia/atelectasis  Underwent IR right thoracentesis   2  IV access problematic  Possible PICC placement in a  m  However, she tells me she "doesn't want any needles "  2  In light of above as discussed continue ceftriaxone 1 g Q 24 hours IV pending thoracentesis C/S to determine if actinomycosis is contaminant or pathogen if patient allows  So far all othercultures are negative  Sputum Staph aureus was from 11/15  Antibiotics:  1  Ceftriaxone 1 g Q 24 hours IV, day 8 Rx     Subjective: The patient has no complaints  Denies shortness of breath or cough  Refusing IV  Denies fevers, chills, or sweats  Denies nausea, vomiting, or diarrhea  Objective:  Vitals:  Temp:  [97 9 °F (36 6 °C)-98 4 °F (36 9 °C)] 98 2 °F (36 8 °C)  HR:  [100-125] 117  Resp:  [10-19] 19  BP: ()/(46-83) 105/57  SpO2:  [89 %-98 %] 95 %  Temp (24hrs), Av 2 °F (36 8 °C), Min:97 9 °F (36 6 °C), Max:98 4 °F (36 9 °C)  Current: Temperature: 98 2 °F (36 8 °C)    Physical Exam:     General Appearance:    Eyes:   Alert, responsive, chronically ill-appearing nontoxic, no acute distress  Conjunctiva pale   Throat: Oropharynx moist without lesions    Lips, mucosa, and tongue normal   Neck: Tracheostomy, surgical scars   Lungs:   Bibasilar dullness > right   Heart:  Regular rate with ectopics and rhythm, S1, S2 normal, no murmur, rub or gallop   Abdomen:   Soft, non-tender, non-distended, positive bowel sounds  No masses, no organomegaly    No CVA tenderness, external urinary catheter   Extremities: Extremities normal, atraumatic, no clubbing, cyanosis or edema   Skin: Skin color pale, surgical scars, as above texture, turgor normal, no rashes or lesions  No draining wounds noted           Invasive Devices     Airway  Duration           Surgical Airway -- days                Labs, Imaging, & Other studies:   All pertinent labs were personally reviewed  Results from last 7 days   Lab Units 11/28/22  0506 11/27/22  0552 11/26/22  0624   WBC Thousand/uL 15 53* 13 63* 13 27*   HEMOGLOBIN g/dL 10 1* 9 4* 9 6*   PLATELETS Thousands/uL 229 199 228     Results from last 7 days   Lab Units 11/28/22  0506 11/27/22  0552 11/26/22  0624   SODIUM mmol/L 142 139 143   POTASSIUM mmol/L 3 6 3 2* 2 8*   CHLORIDE mmol/L 106 105 106   CO2 mmol/L 27 27 29   BUN mg/dL 11 10 10   CREATININE mg/dL 1 36* 1 27 1 28   EGFR ml/min/1 73sq m 35 38 37   CALCIUM mg/dL 7 9* 8 3 8 4   AST U/L 26 32 19   ALT U/L 19 17 16   ALK PHOS U/L 88 80 85     Results from last 7 days   Lab Units 11/28/22  1159   GRAM STAIN RESULT  No Polys or Bacteria seen

## 2022-11-29 NOTE — TELEPHONE ENCOUNTER
Appointment Confirmation   Person confirmed appointment with  If not patient, name of the person Left message     Date and time of appointment 11/30   Patient acknowledged and will be at appointment? no   Did you advise the patient that they will need a urine sample if they are a new patient? no   Did you advise the patient to bring their current medications for verification? (including any OTC) yes   Additional Information

## 2022-11-29 NOTE — PROGRESS NOTES
INTERNAL MEDICINE RESIDENCY PROGRESS NOTE     Name: Vandana Ramos   Age & Sex: 81 y o  female   MRN: [id number]  Unit/Bed#: [facility] 401-01   Encounter: [id number]  Team: [facility] Team C     PATIENT INFORMATION     Name: Vandana Ramos   Age & Sex: 81 y o  female   MRN: [id number]  Hospital Stay Days: 14    ASSESSMENT/PLAN     Principal Problem:    Sepsis with acute hypoxic respiratory failure ([facility] [address] )  Active Problems:    Right lower lobe lung mass with right pleural effusion    Laryngeal carcinoma ([facility] )    Asymptomatic carotid artery stenosis    Hyperlipidemia    Hypertension    CKD (chronic kidney disease) stage 3, GFR 30-59 ml/min    JORDAN (acute kidney injury) ([facility] )    Lung nodule seen on imaging study    Anemia of chronic disease    Hypothyroidism    Gram-positive bacteria on cultures      Right lower lobe lung mass with right pleural effusion  Assessment & Plan  · Patient history of laryngeal carcinoma status post tracheostomy/laryngectomy who presented to ED with tachycardia, hypoxia, fatigue  · 11/15 CTA chest notable for significant findings including:  · Large, heterogeneous airspace consolidation in the right lower lobe, with a 6 3 x 5 2 x 4 8 cm somewhat lobulated hypodense component likely a necrotizing aspiration pneumonia  · Moderate right pleural effusion  No enhancement of the pleura to suggest empyema  · Mediastinal and right hilar lymphadenopathy, likely reactive, however metastatic disease is not entirely excluded    · Legionella and strep antigens negative  · 11/18 IR Thoracentesis - Status post 900 mL fluid thoracentesis suggestive of transudative fluid for pulmonology and may be secondary to pancreatitis  · Blood cultures 1/2 group Actinomyces gram variable rods on 11/15  · Blood cultures x2 on 11/17 final: no growth   · Sputum culture on 11/15 grew Staph aureus  · Sputum culture on 11/16 final: no growth  · Planned for bronchoscopy and BAL 11/22, however, procedure aborted due to patient becoming hemodynamically unstable after anesthesia induction  Was in the ICU 11/22-11/23, transferred back to [facility]       Plan:  · Ceftriaxone 1 gm Q24H Day 9, continue per ID recommendations  · Will follow up with ID regarding any changes to abx regimen    · 11/22 bronchial cultures pending from tracheal aspirate - partial bronch completed at that time  · AFB stain from aspirate prelim negative    · 1/22 fungal cultures no growth as of 11/28   · 11/22 vital culture no virus isolated at 24 hours on 11/26  Next report to follow in 4 days  · Trend WBC and Fever curve  · Since patient has sinus tachycardia, ordered a d-dimer 11/26 which is 2 73    · CTA 11/26 shows:  No PE  Right-sided pleural effusion is now larger in size  Worsened right middle lobe and right lower lobe consolidation keeping with a combination patient's known in pneumonia and superimposed atelectasis  Worsened mucus plugging throughout the bronchus intermedius, right middle lobe and right lower lobe  Low-density necrotic/abscess component of the right lower lobe consolidation appears unchanged  Unchanged pulmonary edema  · Following up with pulm team, appreciate recommendations  · Patient is not stable for bronchoscopy given episode earlier in the week with initial bronch initiated  At this time recommend thoracentesis versus chest tube placement for right effusion    · Pulm will continue to follow and if patient becomes more stable can consider repeat bronchoscopy without sedation  · IR 11/28 drained approximately 2L - fluid studies show mixed picture  · LDH and protein ratio per light's criteria point to transudative however LDH is greater than upper 2/3 limit of normal so exudative  · Fluid described as yellow and clear, WBC 88, glucose 128, , and protein 2 9, ph 7 6  · Prelim culture shows no polys or bacteria seen   · Cytology and AFB pending   · Will follow up with pulmonology team regarding outpatient management of effusions and drainage since patient does not want chest tube to be placed  · Per respiratory team, patient doing better weaned down to 40 % on trach collar, will see if patient can tolerate weaning down to 35% and gradually to close to room air 21%  · Discontinued Chest PT and Mucomyst per respiratory recommendations  · Follow-up chest CT in 3 months as an outpatient to ensure resolution as an underlying neoplasm may have a similar appearance  * Sepsis with acute hypoxic respiratory failure ([facility] [address] )  Assessment & Plan  · Patient presented with abdominal pain, lightheadedness, intermittent shortness of breath, recent unexpected weight loss   · Met sepsis criteria on admission  · Lactate wnl but has significant hypercalcemia 14 2 on admission  · Patient is status post tracheostomy  · Completed cephalosporin 5 day course, completed azithromycin 3 day course    Plan:  · Completed 5 day course of antibiotics covering for CAP  · Follow up on blood cultures  Blood cultures x2 11/17 no growth 4 days  · Blood cultures x1 11/15 grew Actinomyces   · 11/15 sputum culture grew S  Aureus with repeat sputum culture 11/16 negative   · Bronch/BAL aborted due to patient becoming hemodynamically unstable after anesthesia induction  Patient was briefly taken to ICU for higher level of care  Now transferred to [place]   · 11/22 bronchial cultures pending from tracheal aspirate - partial bronch completed at that time  · AFB stain from aspirate prelim negative    · 1/22 fungal cultures no growth as of 11/28   · 11/22 vital culture no virus isolated at 24 hours on 11/26  Next report to follow in 4 days  · Will continue on ceftriaxone 1 gm Q24H per ID recommendations  Day 9  · Will follow up with ID regarding any changes to abx regimen   · CTA 11/26 shows:  No PE  Right-sided pleural effusion is now larger in size    Worsened right middle lobe and right lower lobe consolidation keeping with a combination patient's known in pneumonia and superimposed atelectasis  Worsened mucus plugging throughout the bronchus intermedius, right middle lobe and right lower lobe  Low-density necrotic/abscess component of the right lower lobe consolidation appears unchanged  Unchanged pulmonary edema  · Following up with pulm team, appreciate recommendations  · Patient is not stable for bronchoscopy given episode earlier in the week with initial bronch initiated  At this time recommend thoracentesis versus chest tube placement for right effusion  · Pulm will continue to follow and if patient becomes more stable can consider repeat bronchoscopy without sedation  · Follow up regarding outpatient setup for fluid drainage if recurrent effusions occur  · IR 11/28 drained approximately 2L of fluid from right side  · LDH and protein ratio per light's criteria point to transudative however LDH is greater than upper 2/3 limit of normal so exudative  · Fluid described as yellow and clear, WBC 88, glucose 128, , and protein 2 9, ph 7 6  · Prelim culture shows no polys or bacteria seen   · Cytology and AFB pending   · Continue trach collar oxygen as needed - able to wean down to 40% today per respiratory team  Will continue to monitor for improvement with gradual weaning  · Trend WBC and fever curve       Hypercalcemia-resolved as of 11/29/2022  Assessment & Plan  · In setting of CKD stage 3B, excessive use of vitamin-D and calcium supplementation at home per patient  · Further evidenced by elevated corrected calcium 14 2; supported by initial presentation with abdominal pain, fatigue, decreased appetite/poor PO intake; mentation at baseline, nontoxic on exam  · ECG without arrhythmia  · Further ED work up notable for multiple derangements including Hgb 11 1, low bicarb 18, elevated BUN 34, elevated creatinine 1 47 (baseline 1 11 4), GFR 32, , hypoalbuminemia 2 1; elevated lipase 2028, uric acid 10 4  · CT imaging findings concerning for acute pancreatitis,  necrotizing pneumonia vs  malignancy  · Etiology multifactorial, possibly due to hypercalcemia malignancy in light of CT findings, further complicated by home vitamin-D supplementation; med rec without any other causative agents  Bone demineralization also possible given her age  · Per chart review, patient noted to have elevated corrected calcium level since 08/2022 however does not appear to have been further worked up by PCP  · Unlikely primary parathyroidism or tertiary cause given PTH in 09/2022 was low; baseline renal function with CKD III  Vitamin-D 25 hydroxy level within normal limits  Calcium continues to be elevated   Status post 3 mg zoledronic acid     SPEP with faint gammopathy, PTH very low  UPEP shows selective proteinuria (54) with no monoclonal bands noted  PTHrP <2, WNL    Plan: Resolved  · Calcium level 9 4 corrected  · Vitamin D levels within normal limits: Vit D 25-OH 77 2, Vit D 1,25 OH 49 4  · PTH 6 3, compensating appropriately for elevated Ca level     Laryngeal carcinoma (HCC)  Assessment & Plan  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  · Follows with ENT as an outpatient with Dr Jie Strange; most recently evaluated for trich follow-up in 10/2022     Plan:  · Continue trach collar oxygen, able to wean to 40% today  · Patient has concerns that prosthesis is not functioning properly  Speech evaluated her yesterday, no leakage around trach and prosthesis  Pt did have this new prothesis placed in June 2022, since then has been having voice issues  Currently suctioning repeatedly from the trach  · ENT consulted, appreciate reccs   · No further interventions for her TPN at this time while inpatient  As outpatient can continue to work on keeping secretions then around TEP, cleaning the mechanism with new brushes, possible placement of TEP reinforcing bring under flange    · Recommend potential outpatient replacement or reinforcement of TEP · Will have nursing assist with TEP maintanence at this time    Gram-positive bacteria on cultures  Assessment & Plan  1/2 blood cultures grew Actinomyces    Plan:  See Sepsis and Right Lower Lobe Mass A/P    Hypothyroidism  Assessment & Plan  · Known history since 11/2021  · Most recent TSH 4 2 in 08/2022  · Managed on levothyroxine 100 mcg  · Repeat TSH within normal limits    Plan  Stable    · Continue home dose levothyroxine    Anemia of chronic disease  Assessment & Plan  · Known history, in setting of CKD stage IIIB  · Follows nephrology outpatient (Dr Nena Ohara)  · Hgb 11 1 on admission, at baseline  · Currently hemodynamically stable    Plan:  · Trend CBC, Hb 9 3  · No active bleeding present   · Transfuse if Hb < 7         Lung nodule seen on imaging study  Assessment & Plan  · Patient history of laryngeal carcinoma status post tracheostomy/laryngectomy on trach collar presented with fatigue; tachycardic on exam; labs notable for multiple derangements including hypercalcemia 14 3 and bicarb 18  · CT notable for nonspecific 7 x 6 mm lobulated left lower lobe nodule; new finding; CT imaging also revealing for multiple findings large heterogeneous airspace consolidation and right lower lobe with 6 x 5 x 5 lobulated hypodense component with moderate right-sided pleural effusion  concerning for necrotizing pneumonia  · Malignancy not entirely ruled out; does endorse decreased appetite, unexpected weight loss of up to 30 lb, and is a former smoker; quit 2002; hypercalcemia also noted on admission with recent outpatient PTH wnl  · 11/19 - repeat CT suggested of necrosis/abscess    Plan  · Inpatient pulmonology following, appreciate recs; 5 day course completed abx for suspecting necrotizing pneumonia with large heterogeneous airspace consolidation in right lower lobe  · ID consulted, appreciate reccs  On ceftriaxone 1 gm Q24H, day 8 today   Bronchial aspirates pending results   · Repeat CT future to assess for improvement, outpatient     JORDAN (acute kidney injury) ([facility] [address] )  Assessment & Plan  Creatinine of 1 47 on admission treated with IV fluid boluses, 3L total, NSS at 100ml/hr, renal function assessment and lab monitoring  Pt has CKD 3 and noted baseline of 1-1 2    Plan:  · Cr 1 2, back to baseline  · Avoid nephrotoxic agents  · Monitor urine output   · Encourage adequate intake    CKD (chronic kidney disease) stage 3, GFR 30-59 ml/min  Assessment & Plan  Lab Results   Component Value Date    EGFR 41 11/29/2022    EGFR 35 11/28/2022    EGFR 38 11/27/2022    CREATININE 1 20 11/29/2022    CREATININE 1 36 (H) 11/28/2022    CREATININE 1 27 11/27/2022     · Known history of CKD stage III states 2014  · Follows [facility] nephrology (Dr Alexey Lara)  · Cr on admission slightly elevated 1 47(baseline 1 1-1 4)  · Etiology likely 2/2 long history of hypertension, nephrosclerosis, age-related nephron loss     Plan  · Cr 1 36, which is around her baseline    Will continue to monitor BMP  · Avoid use of nephrotoxic agents  · Maintain euvolemia       Hypertension  Assessment & Plan  · Known history, maintained on losartan 50 mg daily at home  · Follows PCP outpatient      Plan:  · Will hold home dose losartan at this time  · Continue monitoring blood pressures    Hyperlipidemia  Assessment & Plan  · Known history of type 2 diabetes on metformin, hypertension on losartan  · Managed on Lipitor 40  · Most recent lipid panel with good control of LDL at 56    Plan    · Continue Lipitor 40mg QD        Asymptomatic carotid artery stenosis  Assessment & Plan  · Known hx, follows with vascular surgery (Dr Frederick Gabriel)  · Previously found to have recurrent left ICA stenosis and critical new occlusive right IC stenosis, with a which were asymptomatic, along with left vertebral artery atresia; previously worked up in outpatient setting by mass concerning symptoms and was recommended endovascular intervention  · On 07/2020, patient electively admitted to [facility] during which she underwent ultrasound-guided right CFA puncture with sternal closure, aortic arch arteriogram, left carotid during him with PTA and stenting under the care of Dr Brown  · Maintained on antiplatelet and anti-statin therapy with Plavix and Lipitor     Plan:   · Continue home dose lipitor   · Restarted home Plavix after bronch was cancelled        Disposition: Weaning trach collar oxygen by respiratory, will also coordinate outpatient pulm  Assess if patient is able to be weaned down gradually and tolerating,      SUBJECTIVE     Patient seen and examined, sitting in bed comfortably  No acute events overnight  Patient expressed wanting to go home, being able to walk around and get out of bed  We reassured her that PT will help her to get OOB and walk  Feels that her shortness of breath is better after IR drained 2L of fluid yesterday  She denies any fevers, chills, chest pain, nausea or vomiting, abdominal pain, constipation, urinary symptoms  She is able to tolerate being on 40% on her trach collar this AM      Plan is to see if she can continue to tolerate weaning down on trach collar    OBJECTIVE     Vitals:    22 0724 22 0724 22 1329 22 1450   BP:  110/69  109/68   Pulse:  (!) 119  (!) 110   Resp:  17     Temp:  (!) 97 2 °F (36 2 °C)  98 4 °F (36 9 °C)   TempSrc:       SpO2: 95% 97% 92% (!) 84%   Weight:       Height:          Temperature:   Temp (24hrs), Av 9 °F (36 6 °C), Min:97 2 °F (36 2 °C), Max:98 4 °F (36 9 °C)    Temperature: 98 4 °F (36 9 °C)  Intake & Output:  I/O        0701   0700  0701   0700  0701   0700    P  O   180 100    I V  (mL/kg)  30 (0 5)     IV Piggyback  100     Total Intake(mL/kg)  310 (4 9) 100 (1 6)    Urine (mL/kg/hr) 150 (0 1) 850 (0 6)     Other  2100     Stool 0 0     Total Output 150 2950     Net -150 -2640 +100           Unmeasured Urine Occurrence 1 x 1 x     Unmeasured Stool Occurrence 1 x 2 x Weights:        Body mass index is 31 04 kg/m²  Weight (last 2 days)     Date/Time Weight    11/28/22 0533 62 8 (138 45)    11/27/22 0530 61 8 (136 24)        Physical Exam  Vitals and nursing note reviewed  Constitutional:       General: She is not in acute distress  Appearance: She is not toxic-appearing  Comments: Sitting in bed and interactive   HENT:      Head: Normocephalic and atraumatic  Mouth/Throat:      Mouth: Mucous membranes are moist       Pharynx: Oropharynx is clear  Eyes:      General: No scleral icterus  Extraocular Movements: Extraocular movements intact  Conjunctiva/sclera: Conjunctivae normal    Neck:      Trachea: Tracheostomy present  Cardiovascular:      Rate and Rhythm: Regular rhythm  Tachycardia present  Pulses: Normal pulses  Heart sounds: No murmur heard  No gallop  Pulmonary:      Effort: Pulmonary effort is normal  No respiratory distress  Breath sounds: No wheezing or rales  Comments: Rhonchorous bilaterally on exam  Was on 40% trach collar saturating appropriately   Abdominal:      General: Bowel sounds are normal  There is no distension  Palpations: Abdomen is soft  Tenderness: There is no abdominal tenderness  There is no guarding or rebound  Musculoskeletal:      Right lower leg: Edema (+1 up to below knee) present  Left lower leg: Edema (improving ) present  Skin:     General: Skin is warm and dry  Capillary Refill: Capillary refill takes less than 2 seconds  Neurological:      Mental Status: She is alert and oriented to person, place, and time  Psychiatric:         Mood and Affect: Mood normal        LABORATORY DATA     Labs: I have personally reviewed pertinent reports    Results from last 7 days   Lab Units 11/29/22  0436 11/28/22  0506 11/27/22  0552 11/26/22  0624 11/25/22  0626   WBC Thousand/uL 13 03* 15 53* 13 63* 13 27* 13 71*   HEMOGLOBIN g/dL 9 3* 10 1* 9 4* 9 6* 9 2*   HEMATOCRIT % 31 7* 35 4 31 7* 32 7* 31 9*   PLATELETS Thousands/uL 196 229 199 228 239   NEUTROS PCT %  --  89*  --  88* 86*   MONOS PCT %  --  6  --  7 7   MONO PCT % 3*  --  2*  --   --       Results from last 7 days   Lab Units 11/29/22  0436 11/28/22  0506 11/27/22  0552   POTASSIUM mmol/L 3 4* 3 6 3 2*   CHLORIDE mmol/L 108 106 105   CO2 mmol/L 28 27 27   BUN mg/dL 10 11 10   CREATININE mg/dL 1 20 1 36* 1 27   CALCIUM mg/dL 7 6* 7 9* 8 3   ALK PHOS U/L 73 88 80   ALT U/L 19 19 17   AST U/L 25 26 32     Results from last 7 days   Lab Units 11/29/22 0436 11/28/22  0506 11/27/22  0552   MAGNESIUM mg/dL 1 6 1 6 1 8     Results from last 7 days   Lab Units 11/29/22 0436 11/28/22  0506 11/27/22  0552   PHOSPHORUS mg/dL 1 5* 1 7* 2 1*                    IMAGING & DIAGNOSTIC TESTING     Radiology Results: I have personally reviewed pertinent reports  XR chest portable    Result Date: 11/17/2022  Impression: Near complete drainage of right effusion with trace residual effusion  Masslike opacity in the right lower lobe better shown on CT  Mild pulmonary venous congestion  Workstation performed: [id number]     CT head wo contrast    Result Date: 11/15/2022  Impression: No acute intracranial abnormality  Chronic microangiopathic changes  Workstation performed: [id number]     CT chest wo contrast    Result Date: 11/19/2022  Impression: 1  Right lower lobe consolidation appears similar  There is relative internal hypodensity, along with air, consistent with abscess/necrosis  Recommend remains for a follow-up CT  2   Stable right lower lobe airway debris, with new airway debris in the middle lobe, consistent with interval aspiration event 3  Moderate right pleural effusion, not significantly changed in size  Trace left pleural effusion  4   Interstitial opacities consistent with mild edema 5  Stable left lower lobe nodule, attention on follow-up The study was marked in EP[facility] for immediate notification   Workstation performed: [id number]     IR IN-Patient Thoracentesis    Result Date: 11/18/2022  Impression: Impression: Ultrasound-guided thoracentesis yielding 900 mL clear yellow pleural fluid  Signed, performed, and dictated by CHANEL Andrew under the supervision of Dr Markos Cordon  Workstation performed: [id number]     PE Study with CT Abdomen and Pelvis with contrast    Result Date: 11/15/2022  Impression: No central, lobar, segmental or proximal subsegmental pulmonary embolism  Evaluation of the distal subsegmental pulmonary arteries is limited  Large, heterogeneous airspace consolidation in the right lower lobe, with a 6 3 x 5 2 x 4 8 cm somewhat lobulated hypodense component with a small droplet of air  This likely represents a necrotizing aspiration pneumonia, given mucous plugging throughout the right lower lobe and retained secretions/debris in the right mainstem bronchus and bronchus intermedius  Recommend follow-up chest CT in approximately 3 months to ensure resolution as an underlying neoplasm may have a similar appearance  Nonspecific 7 x 6 mm lobulated left lower lobe nodule  Attention on follow-up  Moderate right pleural effusion  No enhancement of the pleura to suggest empyema  Mediastinal and right hilar lymphadenopathy, likely reactive, however metastatic disease is not entirely excluded  Mild stranding around the proximal pancreas suggestive of acute interstitial pancreatitis  Recommend correlation with lipase level  No organized peripancreatic fluid collections, loss of parenchymal enhancement to suggest necrosis, or vascular complications of pancreatitis  No other acute abdominal or pelvic pathology  Multiple additional findings as above  The study was marked in [facility] for immediate notification  Workstation performed: [id number]     Other Diagnostic Testing: I have personally reviewed pertinent reports      ACTIVE MEDICATIONS     Current Facility-Administered Medications   Medication Dose Route Frequency • acetaminophen (TYLENOL) tablet 650 mg  650 mg Oral Q6H PRN   • acetylcysteine (MUCOMYST) 200 mg/mL inhalation solution 600 mg  3 mL Nebulization TID   • albuterol inhalation solution 2 5 mg  2 5 mg Nebulization Q4H PRN   • atorvastatin (LIPITOR) tablet 40 mg  40 mg Oral Daily With Dinner   • cefTRIAXone (ROCEPHIN) 1,000 mg in dextrose 5 % 50 mL IVPB  1,000 mg Intravenous Q24H   • clopidogrel (PLAVIX) tablet 75 mg  75 mg Oral Daily   • dextromethorphan-guaiFENesin (ROBITUSSIN DM) oral syrup 10 mL  10 mL Oral Q6H PRN   • glycerin-hypromellose- (ARTIFICIAL TEARS) ophthalmic solution 1 drop  1 drop Both Eyes PRN   • heparin (porcine) subcutaneous injection 5,000 Units  5,000 Units Subcutaneous Q8H [address]   • HYDROmorphone HCl (DILAUDID) injection 0 2 mg  0 2 mg Intravenous Q6H PRN   • hydrOXYzine HCL (ATARAX) tablet 25 mg  25 mg Oral Q6H PRN   • iron sucrose (VENOFER) 200 mg in sodium chloride 0 9 % 100 mL IVPB  200 mg Intravenous Once per day on Mon Wed Fri   • levalbuterol (XOPENEX) inhalation solution 1 25 mg  1 25 mg Nebulization TID   • levothyroxine tablet 100 mcg  100 mcg Oral Early Morning   • lidocaine (LIDODERM) 5 % patch 1 patch  1 patch Topical Daily   • loratadine (CLARITIN) tablet 5 mg  5 mg Oral Daily   • methocarbamol (ROBAXIN) tablet 500 mg  500 mg Oral Q6H PRN   • metoprolol tartrate (LOPRESSOR) partial tablet 12 5 mg  12 5 mg Oral Q12H HARRIS   • oxyCODONE (ROXICODONE) IR tablet 2 5 mg  2 5 mg Oral Q6H PRN    Or   • oxyCODONE (ROXICODONE) IR tablet 5 mg  5 mg Oral Q6H PRN   • sertraline (ZOLOFT) tablet 25 mg  25 mg Oral Daily       VTE Pharmacologic Prophylaxis: Heparin  VTE Mechanical Prophylaxis: sequential compression device    Portions of the record may have been created with voice recognition software  Occasional wrong word or "sound a like" substitutions may have occurred due to the inherent limitations of voice recognition software    Read the chart carefully and recognize, using context, where substitutions have occurred   ==  [address]  Internal Medicine Residency PGY-1

## 2022-11-29 NOTE — ASSESSMENT & PLAN NOTE
· Patient presented with abdominal pain, lightheadedness, intermittent shortness of breath, recent unexpected weight loss   · Met sepsis criteria on admission  · Lactate wnl but has significant hypercalcemia 14 2 on admission  · Patient is status post tracheostomy  · Completed cephalosporin 5 day course, completed azithromycin 3 day course    Plan:  · Completed 5 day course of antibiotics covering for CAP  · Follow up on blood cultures  Blood cultures x2 11/17 no growth 4 days  · Blood cultures x1 11/15 grew Actinomyces   · 11/15 sputum culture grew S  Aureus with repeat sputum culture 11/16 negative   · Bronch/BAL aborted due to patient becoming hemodynamically unstable after anesthesia induction  Patient was briefly taken to ICU for higher level of care  Now transferred to [place]   · 11/22 bronchial cultures pending from tracheal aspirate - partial bronch completed at that time  · AFB stain from aspirate prelim negative    · 1/22 fungal cultures no growth as of 11/28   · 11/22 vital culture no virus isolated at 24 hours on 11/26  Next report to follow in 4 days  · Will continue on ceftriaxone 1 gm Q24H per ID recommendations  Day 9  · Will follow up with ID regarding any changes to abx regimen   · CTA 11/26 shows:  No PE  Right-sided pleural effusion is now larger in size  Worsened right middle lobe and right lower lobe consolidation keeping with a combination patient's known in pneumonia and superimposed atelectasis  Worsened mucus plugging throughout the bronchus intermedius, right middle lobe and right lower lobe  Low-density necrotic/abscess component of the right lower lobe consolidation appears unchanged  Unchanged pulmonary edema  · Following up with pulm team, appreciate recommendations  · Patient is not stable for bronchoscopy given episode earlier in the week with initial bronch initiated  At this time recommend thoracentesis versus chest tube placement for right effusion    · Pulm will continue to follow and if patient becomes more stable can consider repeat bronchoscopy without sedation  · Follow up regarding outpatient setup for fluid drainage if recurrent effusions occur  · IR 11/28 drained approximately 2L of fluid from right side  · LDH and protein ratio per light's criteria point to transudative however LDH is greater than upper 2/3 limit of normal so exudative  · Fluid described as yellow and clear, WBC 88, glucose 128, , and protein 2 9, ph 7 6  · Prelim culture shows no polys or bacteria seen   · Cytology and AFB pending   · Continue trach collar oxygen as needed - able to wean down to 40% today per respiratory team  Will continue to monitor for improvement with gradual weaning  · Trend WBC and fever curve

## 2022-11-29 NOTE — ASSESSMENT & PLAN NOTE
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  · Follows with ENT as an outpatient with Dr Magdaleno Williamson; most recently evaluated for trich follow-up in 10/2022     Plan:  · Continue trach collar oxygen, able to wean to 40% today  · Patient has concerns that prosthesis is not functioning properly  Speech evaluated her yesterday, no leakage around trach and prosthesis  Pt did have this new prothesis placed in June 2022, since then has been having voice issues  Currently suctioning repeatedly from the trach  · ENT consulted, appreciate reccs   · No further interventions for her TPN at this time while inpatient  As outpatient can continue to work on keeping secretions then around TEP, cleaning the mechanism with new brushes, possible placement of TEP reinforcing bring under flange    · Recommend potential outpatient replacement or reinforcement of TEP   · Will have nursing assist with TEP maintanence at this time

## 2022-11-29 NOTE — ASSESSMENT & PLAN NOTE
Creatinine of 1 47 on admission treated with IV fluid boluses, 3L total, NSS at 100ml/hr, renal function assessment and lab monitoring      Pt has CKD 3 and noted baseline of 1-1 2    Plan:  · Cr 1 2, back to baseline  · Avoid nephrotoxic agents  · Monitor urine output   · Encourage adequate intake

## 2022-11-29 NOTE — SPEECH THERAPY NOTE
Speech/Language Pathology Progress Note    Patient Name: Edson ALANIS'U Date: 11/29/2022     Subjective:  Pt awake and alert, sitting up in bed  Objective:  Pt seen for ST f/u  She is currently not wearing an HME, no base plate  Assisted pt with suctioning, significant mucous at stoma  Following suctioning, Briefly occluded stoma x2 to test voice prosthesis  No voicing achieved  Trach collar placed back over stoma  Assessment:  Pt unable to use voice prosthesis, suspect it is clogged  Pt has been asked to f/u with ENT as an OP  Plan/Recommendations:  D/c speech services at this time

## 2022-11-29 NOTE — ASSESSMENT & PLAN NOTE
· Known history, maintained on losartan 50 mg daily at home  · Follows PCP outpatient      Plan:  · Will hold home dose losartan at this time  · Continue monitoring blood pressures

## 2022-11-29 NOTE — ASSESSMENT & PLAN NOTE
· Known history, in setting of CKD stage IIIB  · Follows nephrology outpatient (Dr Marc Santiago)  · Hgb 11 1 on admission, at baseline     · Currently hemodynamically stable    Plan:  · Trend CBC, Hb 9 3  · No active bleeding present   · Transfuse if Hb < 7

## 2022-11-29 NOTE — PROGRESS NOTES
PULMONOLOGY PROGRESS NOTE     Name: Fili Vela   Age & Sex: 81 y o  female   MRN: [id number]  Unit/Bed#: 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   8  Emphysema with possible underlying COPD - not in acute exacerbation    Plan:  • S/P right thoracentesis on 11/28 with removal of 2 1 L of magdiel colored fluid  Analysis consistent with Exudative effusion per Light's Criteria and serum-effusion albumin gradient  • Follow up culture results and cytology from thoracentesis  • Wean FiO2 as tolerated to maintain SpO2 > 88%  • CXR this morning with improvement in right pleural effusion after thoracentesis  • If pleural fluid continues to reaccumulate, she might require ASEPT catheter in the future  However, per ACP note overnight, patient would not want to pursue this treatment  • Antibiotics per ID  • Continue chest PT  • Patient is high risk for bronchoscopy  Will defer procedure  • Rest of care per primary team        SUBJECTIVE     Patient seen and examined  No acute events overnight  She denies worsening shortness of breath, chest pain, nausea, vomiting      OBJECTIVE     Vitals:    11/28/22 2318 11/29/22 0224 11/29/22 0724 11/29/22 0724   BP: (!) 95/48 97/67  110/69   Pulse: (!) 113 (!) 110  (!) 119   Resp: 18 19  17   Temp: 98 1 °F (36 7 °C) 98 °F (36 7 °C)  (!) 97 2 °F (36 2 °C)   TempSrc:       SpO2: 90% 91% 95% 97%   Weight:       Height:          Temperature:   Temp (24hrs), Av 9 °F (36 6 °C), Min:97 2 °F (36 2 °C), Max:98 2 °F (36 8 °C)    Temperature: (!) 97 2 °F (36 2 °C)  Intake & Output:  I/O        0701   0700  0701   07 07 0700    P  O   180 100    I V  (mL/kg)  30 (0 5)     IV Piggyback  100     Total Intake(mL/kg)  310 (4 9) 100 (1 6)    Urine (mL/kg/hr) 150 (0 1) 850 (0 6)     Other  2100     Stool 0 0     Total Output 150 2950     Net -150 -2640 +100           Unmeasured Urine Occurrence 1 x 1 x     Unmeasured Stool Occurrence 1 x 2 x         Weights:        Body mass index is 31 04 kg/m²  Weight (last 2 days)     Date/Time Weight    22 0533 62 8 (138 45)    22 0530 61 8 (136 24)        Physical Exam  Vitals and nursing note reviewed  Constitutional:       General: She is not in acute distress  Appearance: She is not ill-appearing or toxic-appearing  HENT:      Head: Normocephalic  Eyes:      General: No scleral icterus  Pupils: Pupils are equal, round, and reactive to light  Cardiovascular:      Rate and Rhythm: Regular rhythm  Tachycardia present  Heart sounds: No murmur heard  No gallop  Pulmonary:      Effort: Pulmonary effort is normal  No respiratory distress  Breath sounds: Decreased breath sounds (Right lower lobes) present  No wheezing, rhonchi or rales  Abdominal:      General: Bowel sounds are normal  There is no distension  Palpations: Abdomen is soft  Tenderness: There is no abdominal tenderness  There is no guarding  Musculoskeletal:      Cervical back: Normal range of motion  Right lower leg: Edema present  Left lower leg: Edema present  Skin:     General: Skin is warm  Capillary Refill: Capillary refill takes less than 2 seconds  Neurological:      Mental Status: She is alert and oriented to person, place, and time  Mental status is at baseline        Cranial Nerves: No cranial nerve deficit  Psychiatric:         Mood and Affect: Mood normal            LABORATORY DATA     Labs: I have personally reviewed pertinent reports  Results from last 7 days   Lab Units 11/29/22  0436 11/28/22  0506 11/27/22  0552 11/26/22  0624 11/25/22  0626   WBC Thousand/uL 13 03* 15 53* 13 63* 13 27* 13 71*   HEMOGLOBIN g/dL 9 3* 10 1* 9 4* 9 6* 9 2*   HEMATOCRIT % 31 7* 35 4 31 7* 32 7* 31 9*   PLATELETS Thousands/uL 196 229 199 228 239   NEUTROS PCT %  --  89*  --  88* 86*   MONOS PCT %  --  6  --  7 7   MONO PCT %  --   --  2*  --   --       Results from last 7 days   Lab Units 11/29/22  0436 11/28/22  0506 11/27/22  0552   POTASSIUM mmol/L 3 4* 3 6 3 2*   CHLORIDE mmol/L 108 106 105   CO2 mmol/L 28 27 27   BUN mg/dL 10 11 10   CREATININE mg/dL 1 20 1 36* 1 27   CALCIUM mg/dL 7 6* 7 9* 8 3   ALK PHOS U/L 73 88 80   ALT U/L 19 19 17   AST U/L 25 26 32     Results from last 7 days   Lab Units 11/29/22  0436 11/28/22  0506 11/27/22  0552   MAGNESIUM mg/dL 1 6 1 6 1 8     Results from last 7 days   Lab Units 11/29/22  0436 11/28/22  0506 11/27/22  0552   PHOSPHORUS mg/dL 1 5* 1 7* 2 1*                      ABG:       Micro:   Results from last 7 days   Lab Units 11/28/22  1159   GRAM STAIN RESULT  No Polys or Bacteria seen         IMAGING & DIAGNOSTIC TESTING     Radiology Results: I have personally reviewed pertinent reports  XR chest portable    Result Date: 11/17/2022  Impression: Near complete drainage of right effusion with trace residual effusion  Masslike opacity in the right lower lobe better shown on CT  Mild pulmonary venous congestion  Workstation performed: [id number]     CT head wo contrast    Result Date: 11/15/2022  Impression: No acute intracranial abnormality  Chronic microangiopathic changes  Workstation performed: [id number]     CT chest wo contrast    Result Date: 11/19/2022  Impression: 1  Right lower lobe consolidation appears similar    There is relative internal hypodensity, along with air, consistent with abscess/necrosis  Recommend remains for a follow-up CT  2   Stable right lower lobe airway debris, with new airway debris in the middle lobe, consistent with interval aspiration event 3  Moderate right pleural effusion, not significantly changed in size  Trace left pleural effusion  4   Interstitial opacities consistent with mild edema 5  Stable left lower lobe nodule, attention on follow-up The study was marked in EPIC for immediate notification  Workstation performed: [id number]     IR IN-Patient Thoracentesis    Result Date: 11/18/2022  Impression: Impression: Ultrasound-guided thoracentesis yielding 900 mL clear yellow pleural fluid  Signed, performed, and dictated by CHANEL Freed under the supervision of Dr Macho Perrin  Workstation performed: [id number]     PE Study with CT Abdomen and Pelvis with contrast    Result Date: 11/15/2022  Impression: No central, lobar, segmental or proximal subsegmental pulmonary embolism  Evaluation of the distal subsegmental pulmonary arteries is limited  Large, heterogeneous airspace consolidation in the right lower lobe, with a 6 3 x 5 2 x 4 8 cm somewhat lobulated hypodense component with a small droplet of air  This likely represents a necrotizing aspiration pneumonia, given mucous plugging throughout the right lower lobe and retained secretions/debris in the right mainstem bronchus and bronchus intermedius  Recommend follow-up chest CT in approximately 3 months to ensure resolution as an underlying neoplasm may have a similar appearance  Nonspecific 7 x 6 mm lobulated left lower lobe nodule  Attention on follow-up  Moderate right pleural effusion  No enhancement of the pleura to suggest empyema  Mediastinal and right hilar lymphadenopathy, likely reactive, however metastatic disease is not entirely excluded  Mild stranding around the proximal pancreas suggestive of acute interstitial pancreatitis    Recommend correlation with lipase level   No organized peripancreatic fluid collections, loss of parenchymal enhancement to suggest necrosis, or vascular complications of pancreatitis  No other acute abdominal or pelvic pathology  Multiple additional findings as above  The study was marked in [facility] for immediate notification  Workstation performed: [id number]     Other Diagnostic Testing: I have personally reviewed pertinent reports      ACTIVE MEDICATIONS     Current Facility-Administered Medications   Medication Dose Route Frequency   • acetaminophen (TYLENOL) tablet 650 mg  650 mg Oral Q6H PRN   • acetylcysteine (MUCOMYST) 200 mg/mL inhalation solution 600 mg  3 mL Nebulization TID   • albuterol inhalation solution 2 5 mg  2 5 mg Nebulization Q4H PRN   • atorvastatin (LIPITOR) tablet 40 mg  40 mg Oral Daily With Dinner   • cefTRIAXone (ROCEPHIN) 1,000 mg in dextrose 5 % 50 mL IVPB  1,000 mg Intravenous Q24H   • clopidogrel (PLAVIX) tablet 75 mg  75 mg Oral Daily   • dextromethorphan-guaiFENesin (ROBITUSSIN DM) oral syrup 10 mL  10 mL Oral Q6H PRN   • glycerin-hypromellose- (ARTIFICIAL TEARS) ophthalmic solution 1 drop  1 drop Both Eyes PRN   • heparin (porcine) subcutaneous injection 5,000 Units  5,000 Units Subcutaneous Q8H [address]   • HYDROmorphone HCl (DILAUDID) injection 0 2 mg  0 2 mg Intravenous Q6H PRN   • hydrOXYzine HCL (ATARAX) tablet 25 mg  25 mg Oral Q6H PRN   • iron sucrose (VENOFER) 200 mg in sodium chloride 0 9 % 100 mL IVPB  200 mg Intravenous Once per day on Mon Wed Fri   • levalbuterol (XOPENEX) inhalation solution 1 25 mg  1 25 mg Nebulization TID   • levothyroxine tablet 100 mcg  100 mcg Oral Early Morning   • lidocaine (LIDODERM) 5 % patch 1 patch  1 patch Topical Daily   • loratadine (CLARITIN) tablet 5 mg  5 mg Oral Daily   • methocarbamol (ROBAXIN) tablet 500 mg  500 mg Oral Q6H PRN   • metoprolol tartrate (LOPRESSOR) partial tablet 12 5 mg  12 5 mg Oral Q12H [place]IS   • oxyCODONE (ROXICODONE) IR tablet 2 5 mg  2 5 mg Oral Q6H PRN    Or   • oxyCODONE (ROXICODONE) IR tablet 5 mg  5 mg Oral Q6H PRN   • sertraline (ZOLOFT) tablet 25 mg  25 mg Oral Daily       VTE Pharmacologic Prophylaxis: Heparin  VTE Mechanical Prophylaxis: sequential compression device      Disclaimer: Portions of the record may have been created with voice recognition software  Occasional wrong word or "sound a like" substitutions may have occurred due to the inherent limitations of voice recognition software  Careful consideration should be taken to recognize, using context, where substitutions have occurred      Stas Loco MD   Pulmonary and Critical Care Fellow, PGY-4  [facility]

## 2022-11-29 NOTE — CONSULTS
Consultation - Palliative and Supportive Care   Azucena Whatley 81 y o  female [id number]    Patient Active Problem List   Diagnosis   • Asymptomatic carotid artery stenosis   • Aortoiliac occlusive disease ([facility] )   • Hyperlipidemia   • Hypertension   • Acute anterior circulation transient ischemic attack   • 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 (HCC)   • Tracheostomy care [facility])   • Laryngeal carcinoma (HCC)   • CKD (chronic kidney disease) stage 3, GFR 30-59 ml/min   • Renovascular hypertension   • Tracheitis   • SOB (shortness of breath)   • JORDAN (acute kidney injury) ([facility] )   • Acute blood loss anemia   • Elevated troponin   • Acute on chronic kidney failure (HCC)   • Pain in right hip   • Right lower lobe lung mass with right pleural effusion   • Lung nodule seen on imaging study   • Sepsis with acute hypoxic respiratory failure (HCC)   • Pleural effusion, right   • Elevated serum creatinine   • Anemia of chronic disease   • Hypothyroidism   • Gram-positive bacteria on cultures     Active issues specifically addressed today include:     Plan:  1  Symptom management -   #Shortness of breath - on 10 l of oxygen via trach collar  - rest of the management per primary team  # denied pain, no nausea     -     2  Goals - patient is DNAR/DNR  - Emotional support provided to the patient  -She reiterated the fact that she wanted to be in the nursing home  She was  willing to have a meeting together with her daughter, grand daughter and the primary care team, to establish goals of care  moving forward  We spoke with her grand daughter Radha Greene, who  Together with Meseret Rice would  like to have a meeting tomorrow 11/30 at 2 pm  We were told patient has a living will but no POA  Code Status: - Level 3   Decisional apparatus:  Patient is competent on my exam today  If competence is lost, patient's substitute decision maker would default daughter by [place]     Advance Directive / Living Will / POLST:  Not available      I have reviewed the patient's controlled substance dispensing history in the Prescription Drug Monitoring Program in compliance with the [facility] regulations before prescribing any controlled substances  We appreciate the invitation to be involved in this patient's care  We will follow up  Please do not hesitate to reach our on call provider through our clinic answering service at  should you have acute symptom control concerns  Sherman Bloom MD  Palliative and Supportive Care  Clinic/Answering Service: [phone]  You can find me on [employer]! IDENTIFICATION:  Consults  Physician Requesting Consult: Fito Brewer DO  Reason for Consult / Principal Problem:  palliative care, goals of care conversation  Hx and PE limited by: aphonia    HISTORY OF PRESENT ILLNESS:       Ania Arora is a 81 y o  female with PMH of laryngeal carcinoma status post tracheostomy, significant PAV- aorto iliac disease with claudication, asymptomatic bilateral carotid stenosis from radiation S/P TCAR  And PTA stent on plavix,  valvular heart disease, CKD IIIB, HTN, DM, hypothyroidism,   Per chart review, Alvina Monson presented on 11/15 at [facility] coming from [facility] with abdominal pain and back pain, weight los 20-30 lb as well as increasing shortness of breath  She had no fever or sick contact, No other symptoms on ROV  A the ED, she met sepsis criteria was hypoxic, hypotensive  Initial blood work showed leucocytosis, elevated procalcitonin, slightly elevated creatine, significant hypercalcemia- 14, signifcantly elevated lipase  CT chest showed right lung consolidation, lobulated aspect concerning for necrotizing pneumonia and concern fro probable underlying malignancy,  Mediastinal and hilar adenopathy, moderated pleural effusion  At the level of the abdomen- presence of mild standing at the pancreas    Patient was managed for sepsis- multifocal - necrotizing pneumonia with hypoxic respiratory failure, pancreatitis causing hypoxic respiratory failure  ID, Pulm, Oncology IR on board  Hypercalcemia was concerning for malignancy, patient currently being worked up  Received calcitonin and IV hydration   She was started on broad spectrum antibiotic Unasyn and vanco, which subsequently descalted to cefazolin and azithromycin and currenlty IV ceftriaxone per ID recommnedations   Thoracocentesis  with1 5 L Pleural fluid drained   on 11/16 and sent for cytology with no evidence of malignancy on cytology  Sputum  culture grew MSSA and  bloodcluture Actinomyces- with negative repeat cultures  Bronchoscopy was suggested with BAL and brushing  Attempt Bronchoscopy 11/22 aborted due to significant brochospasm and hypotension during anesthesia induction- patient was sent to ICU for stabilization  Patient continued to be febrile, tachycardiac and hypoxic- repeat CTA 11/25 showed large right pleural effusion and worsening consolidation s/p repeat IR  Thoracocentesis draining 2L fluid on 11/28  At this juncture, patient hypoxic and managed for pneumonia/abscess with possible parapneumonic effusion with risk of eventual re accumulation  Pulmonary team advised against repeat bronchoscopy due to significant risks assaciated  Beginning conversations of goals of care/ACP started with patient yesterday who has been expressing the desire not to have any invasive procedures  Family/ next of kin which consist her daughter Bill Sharp and grand daughter Chika Kaba have been updated  Patient seen  Bedside today, aphonic but communicated with her by reading her lips, and as well as using the board  She seemed a little upset and she firmly expressed her desire to go to the nursing home  She is competent and not delirius  She Does not know why she is still in the hospital at this time and became irritated as I tried to explore her understanding of her current management    She reiterated the fact that she wanted to be in the nursing home  She opened to have a meeting together with her daughter, grand daughter and the primary care team, to establish goals of care  moving forward  We spoke with her grand daughter who would  like to have a meeting tomorrow  at 2 pm  We were told patient has a living will but no POA      ROS    Past Medical History:   Diagnosis Date   • Aortoiliac occlusive disease ([facility] )    • 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 ([facility] )    • Carotid artery stenosis    • Hypercalcemia 11/15/2022   • Hyperlipidemia    • Hypertension    • PVD (peripheral vascular disease) ([facility] )    • Throat cancer [facility])      Past Surgical History:   Procedure Laterality Date   • GALLBLADDER SURGERY  2019   • ILIAC ARTERY STENT Left    • IR CEREBRAL ANGIOGRAPHY / INTERVENTION  2020   • IR THORACENTESIS  2022   • LARYNX SURGERY     • LEG SURGERY  07/10/2012     Social History     Socioeconomic History   • Marital status:       Spouse name: Not on file   • Number of children: Not on file   • Years of education: Not on file   • Highest education level: Not on file   Occupational History   • Not on file   Tobacco Use   • Smoking status: Former     Types: Cigarettes     Quit date:      Years since quittin 9   • Smokeless tobacco: Never   Substance and Sexual Activity   • Alcohol use: Never   • Drug use: Never   • Sexual activity: Not on file   Other Topics Concern   • Not on file   Social History Narrative   • Not on file     Social Determinants of Health     Financial Resource Strain: Not on file   Food Insecurity: Not on file   Transportation Needs: Not on file   Physical Activity: Not on file   Stress: Not on file   Social Connections: Not on file   Intimate Partner Violence: Not on file   Housing Stability: Not on file     Family History   Problem Relation Age of Onset   • Heart disease Brother    • No Known Problems Mother    • No Known Problems Father    • Breast cancer Sister 71   • No Known Problems Daughter    • No Known Problems Maternal Grandmother    • No Known Problems Maternal Grandfather    • No Known Problems Paternal Grandmother    • No Known Problems Paternal Grandfather    • No Known Problems Sister    • No Known Problems Maternal Aunt    • No Known Problems Maternal Aunt    • No Known Problems Maternal Aunt        MEDICATIONS / ALLERGIES:    all current active meds have been reviewed and current meds:   Current Facility-Administered Medications   Medication Dose Route Frequency   • acetaminophen (TYLENOL) tablet 650 mg  650 mg Oral Q6H PRN   • acetylcysteine (MUCOMYST) 200 mg/mL inhalation solution 600 mg  3 mL Nebulization TID   • albuterol inhalation solution 2 5 mg  2 5 mg Nebulization Q4H PRN   • atorvastatin (LIPITOR) tablet 40 mg  40 mg Oral Daily With Dinner   • clopidogrel (PLAVIX) tablet 75 mg  75 mg Oral Daily   • dextromethorphan-guaiFENesin (ROBITUSSIN DM) oral syrup 10 mL  10 mL Oral Q6H PRN   • glycerin-hypromellose- (ARTIFICIAL TEARS) ophthalmic solution 1 drop  1 drop Both Eyes PRN   • heparin (porcine) subcutaneous injection 5,000 Units  5,000 Units Subcutaneous Q8H [facility]   • HYDROmorphone HCl (DILAUDID) injection 0 2 mg  0 2 mg Intravenous Q6H PRN   • hydrOXYzine HCL (ATARAX) tablet 25 mg  25 mg Oral Q6H PRN   • iron sucrose (VENOFER) 200 mg in sodium chloride 0 9 % 100 mL IVPB  200 mg Intravenous Once per day on Mon Wed Fri   • levalbuterol (XOPENEX) inhalation solution 1 25 mg  1 25 mg Nebulization TID   • levothyroxine tablet 100 mcg  100 mcg Oral Early Morning   • lidocaine (LIDODERM) 5 % patch 1 patch  1 patch Topical Daily   • loratadine (CLARITIN) tablet 5 mg  5 mg Oral Daily   • methocarbamol (ROBAXIN) tablet 500 mg  500 mg Oral Q6H PRN   • metoprolol tartrate (LOPRESSOR) partial tablet 12 5 mg  12 5 mg Oral Q12H HARRIS   • oxyCODONE (ROXICODONE) IR tablet 2 5 mg  2 5 mg Oral Q6H PRN    Or   • oxyCODONE (ROXICODONE) IR tablet 5 mg  5 mg Oral Q6H PRN   • sertraline (ZOLOFT) tablet 25 mg  25 mg Oral Daily       Allergies   Allergen Reactions   • Benazepril Cough   • Solifenacin Other (See Comments)   • Olmesartan Rash       OBJECTIVE:    Physical Exam  Physical Exam    Lab Results:   I have personally reviewed pertinent labs  , CBC:   Lab Results   Component Value Date    WBC 13 03 (H) 11/29/2022    HGB 9 3 (L) 11/29/2022    HCT 31 7 (L) 11/29/2022    MCV 92 11/29/2022     11/29/2022    MCH 27 0 11/29/2022    MCHC 29 3 (L) 11/29/2022    RDW 22 4 (H) 11/29/2022    MPV 11 7 11/29/2022   , CMP:   Lab Results   Component Value Date    SODIUM 144 11/29/2022    K 3 4 (L) 11/29/2022     11/29/2022    CO2 28 11/29/2022    BUN 10 11/29/2022    CREATININE 1 20 11/29/2022    CALCIUM 7 6 (L) 11/29/2022    AST 25 11/29/2022    ALT 19 11/29/2022    ALKPHOS 73 11/29/2022    EGFR 41 11/29/2022   , BMP:  Lab Results   Component Value Date    SODIUM 144 11/29/2022    K 3 4 (L) 11/29/2022     11/29/2022    CO2 28 11/29/2022    BUN 10 11/29/2022    CREATININE 1 20 11/29/2022    GLUC 89 11/29/2022    CALCIUM 7 6 (L) 11/29/2022    AGAP 8 11/29/2022    EGFR 41 11/29/2022   , PT/PTT:No results found for: PT, PTT  Imaging Studies:   11/15 CT chest /A/P  No central, lobar, segmental or proximal subsegmental pulmonary embolism  Evaluation of the distal subsegmental pulmonary arteries is limited      Large, heterogeneous airspace consolidation in the right lower lobe, with a 6 3 x 5 2 x 4 8 cm somewhat lobulated hypodense component with a small droplet of air  This likely represents a necrotizing aspiration pneumonia, given mucous plugging throughout the right lower lobe and retained secretions/debris in the right mainstem bronchus and bronchus intermedius  Recommend follow-up chest CT in approximately 3 months to ensure resolution as an underlying neoplasm may have a similar appearance      Nonspecific 7 x 6 mm lobulated left lower lobe nodule  Attention on follow-up      Moderate right pleural effusion  No enhancement of the pleura to suggest empyema       Mediastinal and right hilar lymphadenopathy, likely reactive, however metastatic disease is not entirely excluded      Mild stranding around the proximal pancreas suggestive of acute interstitial pancreatitis  Recommend correlation with lipase level  No organized peripancreatic fluid collections, loss of parenchymal enhancement to suggest necrosis, or vascular   complications of pancreatitis  No other acute abdominal or pelvic pathology          11/15    CT   Head; No acute intracranial abnormality  Chronic microangiopathic changes  11/25 CT Chest PE study    Right-sided pleural effusion is now large in size      Worsened right middle lobe and right lower lobe consolidation in keeping with a combination of patient's known pneumonia and superimposed atelectasis  The atelectasis is likely secondary to both compression from the increased right pleural effusion and   worsened mucus plugging throughout the bronchus intermedius, right middle lobe and right lower lobe new      The low-density necrotic/abscess component of the right lower lobe consolidation appears unchanged      Unchanged pulmonary edema  11/ 29 Chest Xray     Interval decrease in size of the right sided pleural effusion compared to CT from 11/25/2022  No evidence of pneumothorax      Patchy opacity in the right lower lung, likely representing a combination of atelectasis and pneumonia based on the chest CT          EKG, Pathology, and Other Studies: Sinus tachycardia with Premature supraventricular complexes  Low voltage QRS  Borderline ECG    Counseling / Coordination of Care    Total floor / unit time spent today 30 minutes  Greater than 50% of total time was spent with the patient and / or family counseling and / or coordination of care   A description of the counseling / coordination of care:

## 2022-11-29 NOTE — ASSESSMENT & PLAN NOTE
Lab Results   Component Value Date    EGFR 41 11/29/2022    EGFR 35 11/28/2022    EGFR 38 11/27/2022    CREATININE 1 20 11/29/2022    CREATININE 1 36 (H) 11/28/2022    CREATININE 1 27 11/27/2022     · Known history of CKD stage III states 2014  · Follows B nephrology (Dr Nogueira Head)  · Cr on admission slightly elevated 1 47(baseline 1 1-1 4)  · Etiology likely 2/2 long history of hypertension, nephrosclerosis, age-related nephron loss     Plan  · Cr 1 36, which is around her baseline    Will continue to monitor BMP  · Avoid use of nephrotoxic agents  · Maintain euvolemia

## 2022-11-29 NOTE — CASE MANAGEMENT
Case Management Discharge Planning Note    Patient name Edson Beasley  Location [facility] 161-92 MRN [id number]  : 1935 Date 2022       Current Admission Date: 11/15/2022  Current Admission Diagnosis:Sepsis with acute hypoxic respiratory failure [facility])   Patient Active Problem List    Diagnosis Date Noted   • Gram-positive bacteria on cultures 2022   • Hypothyroidism 2022   • Hypercalcemia 11/15/2022   • Right lower lobe lung mass with right pleural effusion 11/15/2022   • Lung nodule seen on imaging study 11/15/2022   • Sepsis with acute hypoxic respiratory failure ([address] ) 11/15/2022   • Pleural effusion, right 11/15/2022   • Elevated serum creatinine 11/15/2022   • Anemia of chronic disease 11/15/2022   • Pain in right hip 2022   • SOB (shortness of breath) 2021   • JORDAN (acute kidney injury) ([facility] ) 2021   • Acute blood loss anemia 2021   • Elevated troponin 2021   • Acute on chronic kidney failure ([address] ) 2021   • Tracheitis 2020   • Renovascular hypertension 2020   • CKD (chronic kidney disease) stage 3, GFR 30-59 ml/min 2019   • Tracheostomy care ([facility] [address] ) 2019   • Laryngeal carcinoma ([facility] ) 2019   • Acute anterior circulation transient ischemic attack 10/30/2017   • 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 ([facility] [address] ) 2016   • Aortoiliac occlusive disease ([facility] [address] ) 2015   • Asymptomatic carotid artery stenosis 2013   • Hyperlipidemia 2013   • Hypertension 2013      LOS (days): 14  Geometric Mean LOS (GMLOS) (days): 5 00  Days to GMLOS:-8 8     OBJECTIVE:  Risk of Unplanned Readmission Score: 25 96         Current admission status: Inpatient   Preferred Pharmacy:   [address]  [address]  [address] [facility] [address]  Phone: [phone] Fax: [address] [place] [address] - [address] [place] [address]  Phone: [phone] Fax: [phone]    Primary Care Provider: Sotero Summers MD    Primary Insurance: MEDICARE  Secondary Insurance: [employer]    DISCHARGE DETAILS:    Discharge planning discussed with[de-identified] Daughter  Freedom of Choice: Yes  Comments - Freedom of Choice: Daughter-Teodora Kidd  CM contacted family/caregiver?: Yes  Were Treatment Team discharge recommendations reviewed with patient/caregiver?: Yes  Did patient/caregiver verbalize understanding of patient care needs?: Yes  Were patient/caregiver advised of the risks associated with not following Treatment Team discharge recommendations?: Yes    Contacts  Patient Contacts: Bob Jose Manuel  Relationship to Patient[de-identified] Family  Contact Method: Phone  Phone Number: [phone]  Reason/Outcome: Continuity of Care, Emergency Contact, Discharge Planning              Other Referral/Resources/Interventions Provided:  Referral Comments: Patient now referred to STR at discharge  This CM spoke with daughter Dennis Landeros to discuss rehab options  Dennis Landeros stated that she is having a meeting at 1400 to discuss Hospice/discharge options for her mother  I that in the meantime, I could send out referrals to SNF's because they could also provide  Hospice Services  Patient lives alone at an [facility]  Hospice could be covered under Medicare, but the room and board is OOP or she can choose a SNF who accepts Medicaid  She did convey that her mother is [address] at her highrise  Daughter works full time and was a bit overwhelmed with the idea of having to apply for Medicaid  I told her that the CM could reconvene with her tomorrow after the meeting and discuss the goals of care outcome  She may defer to her daughter who may understand and can handle the financial process  In the meantime, this CM did send referrals to SNF's for rehab at this time

## 2022-11-29 NOTE — ASSESSMENT & PLAN NOTE
· Known hx, follows with vascular surgery (Dr Myrna Connell)  · Previously found to have recurrent left ICA stenosis and critical new occlusive right IC stenosis, with a which were asymptomatic, along with left vertebral artery atresia; previously worked up in outpatient setting by mass concerning symptoms and was recommended endovascular intervention  · On 07/2020, patient electively admitted to [facility] during which she underwent ultrasound-guided right CFA puncture with sternal closure, aortic arch arteriogram, left carotid during him with PTA and stenting under the care of Dr Brown  · Maintained on antiplatelet and anti-statin therapy with Plavix and Lipitor     Plan:   · Continue home dose lipitor   · Restarted home Plavix after bronch was cancelled

## 2022-11-29 NOTE — OCCUPATIONAL THERAPY NOTE
Occupational Therapy Progress Note     Patient Name: Ti Alvarez  [id number] Date: 11/29/2022  Problem List  Principal Problem:    Sepsis with acute hypoxic respiratory failure [facility])  Active Problems:    Asymptomatic carotid artery stenosis    Hyperlipidemia    Hypertension    Laryngeal carcinoma (HCC)    CKD (chronic kidney disease) stage 3, GFR 30-59 ml/min    JORDAN (acute kidney injury) ([facility] [address] )    Hypercalcemia    Right lower lobe lung mass with right pleural effusion    Lung nodule seen on imaging study    Anemia of chronic disease    Hypothyroidism    Gram-positive bacteria on cultures              11/29/22 0927   OT Last Visit   OT Visit Date 11/29/22   Pain Assessment   Pain Assessment Tool 0-10   Pain Score No Pain   Restrictions/Precautions   Other Precautions Multiple lines;Telemetry;O2;Bed Alarm  (trach collar)   Lifestyle   Autonomy I with ADLs and completes functional mobility with RW   Reciprocal Relationships Support of facility   Service to Others Retired   [address] Enjoys going on the facility bus to activities within the community   ADL   Grooming Assistance 5  Supervision/Setup   Grooming Deficit Setup;Supervision/safety; Increased time to complete   Toileting Assistance  2  Maximal Assistance   Toileting Deficit Setup;Supervison/safety; Increased time to complete;Perineal hygiene   Toileting Comments A for hygiene after BM   Bed Mobility   Supine to Sit 3  Moderate assistance   Additional items Assist x 1; Increased time required;Verbal cues;LE management   Additional Comments After OT session pt seated EOB with alarm on and all needs within reach  Transfers   Sit to Stand 3  Moderate assistance   Additional items Assist x 1; Increased time required;Verbal cues   Stand to Sit 3  Moderate assistance   Additional items Assist x 1; Increased time required;Verbal cues   Toilet transfer 3  Moderate assistance   Additional items Assist x 1; Increased time required;Verbal cues;Standard toilet   Functional Mobility   Functional Mobility 4  Minimal assistance   Additional Comments Pt demonstrated short household mobility with RW  Additional items Rolling walker   Cognition   Overall Cognitive Status WFL   Arousal/Participation Alert; Cooperative   Attention Attends with cues to redirect   Orientation Level Oriented to person;Oriented to place   Memory Decreased recall of precautions   Following Commands Follows one step commands without difficulty   Comments Pt is pleasant and cooperative  Communicates with mouthing words and writing when speaking valve is not in  Activity Tolerance   Activity Tolerance Patient limited by fatigue   Medical Staff Made Aware RN confirmed okay to see pt   Assessment   Assessment Patient participated in Skilled OT session this date with interventions consisting of ADL re training with the use of correct body mechanics and  therapeutic activities to: increase activity tolerance   Patient agreeable to OT treatment session, upon arrival patient was found supine in bed  Patient requiring frequent rest periods  Patient continues to be functioning below baseline level, occupational performance remains limited secondary to factors listed above and increased risk for falls and injury  From OT standpoint, recommendation at time of d/c would be Short Term Rehab  Patient to benefit from continued Occupational Therapy treatment while in the hospital to address deficits as defined above and maximize level of functional independence with ADLs and functional mobility  Plan   Treatment Interventions ADL retraining;Visual perceptual retraining;Functional transfer training; Endurance training   Goal Expiration Date 12/13/22  (goals extended +14 days)   OT Treatment Day 3   OT Frequency Other (comment)  (2-4x)   Recommendation   OT Discharge Recommendation (S)  Post acute rehabilitation services   AM-PAC Daily Activity Inpatient   Lower Body Dressing 2   Bathing 2   Toileting 2 Upper Body Dressing 3   Grooming 4   Eating 4   Daily Activity Raw Score 17   Daily Activity Standardized Score (Calc for Raw Score >=11) 37 26   AM-PAC Applied Cognition Inpatient   Following a Speech/Presentation 3   Understanding Ordinary Conversation 4   Taking Medications 3   Remembering Where Things Are Placed or Put Away 4   Remembering List of 4-5 Errands 4   Taking Care of Complicated Tasks 3   Applied Cognition Raw Score 21   Applied Cognition Standardized Score 44 3   Modified [place] Scale   Modified [place] Scale 4       Supriya Tripathi, SHIRLEY, OTR/L

## 2022-11-29 NOTE — RESPIRATORY THERAPY NOTE
RT Protocol Note  Yasmine Brain 81 y o  female MRN: [id number]  Unit/Bed#: [facility] 401-01 Encounter: [id number]    Assessment    Principal Problem:    Sepsis with acute hypoxic respiratory failure ([facility] )  Active Problems:    Asymptomatic carotid artery stenosis    Hyperlipidemia    Hypertension    Laryngeal carcinoma (HCC)    CKD (chronic kidney disease) stage 3, GFR 30-59 ml/min    JORDAN (acute kidney injury) ([facility] )    Hypercalcemia    Right lower lobe lung mass with right pleural effusion    Lung nodule seen on imaging study    Anemia of chronic disease    Hypothyroidism    Gram-positive bacteria on cultures      Home Pulmonary Medications:  na       Past Medical History:   Diagnosis Date    Aortoiliac occlusive disease (HCC)     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 (HCC)     Carotid artery stenosis     Hyperlipidemia     Hypertension     PVD (peripheral vascular disease) ([place] [address] )     Throat cancer ([place] [address] )      Social History     Socioeconomic History    Marital status:       Spouse name: None    Number of children: None    Years of education: None    Highest education level: None   Occupational History    None   Tobacco Use    Smoking status: Former     Types: Cigarettes     Quit date:      Years since quittin 9    Smokeless tobacco: Never   Substance and Sexual Activity    Alcohol use: Never    Drug use: Never    Sexual activity: None   Other Topics Concern    None   Social History Narrative    None     Social Determinants of Health     Financial Resource Strain: Not on file   Food Insecurity: Not on file   Transportation Needs: Not on file   Physical Activity: Not on file   Stress: Not on file   Social Connections: Not on file   Intimate Partner Violence: Not on file   Housing Stability: Not on file       Subjective         Objective    Physical Exam:   Assessment Type: Pre-treatment  General Appearance: Alert, Awake  Respiratory Pattern: Normal  Chest Assessment: Chest expansion symmetrical  Bilateral Breath Sounds: Diminished, Coarse  O2 Device: TC    Vitals:  Blood pressure 110/69, pulse (!) 119, temperature 98 °F (36 7 °C), resp  rate 17, height 4' 8" (1 422 m), weight 62 8 kg (138 lb 7 2 oz), SpO2 97 %, not currently breastfeeding  Imaging and other studies: I have personally reviewed pertinent reports        O2 Device: TC     Plan    Respiratory Plan: Home Bronchodilator Patient pathway  Airway Clearance Plan: Percussive Vest     Resp Comments: (P) no cpt/vest given, pt eating breakfast, pt tolerating TC well, will continue to monitor

## 2022-11-29 NOTE — PHYSICAL THERAPY NOTE
Physical Therapy Cancellation Note           11/29/22 1310   Note Type   Note Type Cancelled Session   Cancel Reasons Refusal     Chart reviewed; attempted to see pt in PM; pt is observed in bed; indicates she is supposed to stay off her feet and declines amb; also declines LE therex; will follow as clinical course allows      Kelly Fritz

## 2022-11-29 NOTE — PLAN OF CARE
Problem: OCCUPATIONAL THERAPY ADULT  Goal: Performs self-care activities at highest level of function for planned discharge setting  See evaluation for individualized goals  Description: Treatment Interventions: ADL retraining, Functional transfer training, UE strengthening/ROM, Cognitive reorientation, Endurance training, Patient/family training, Equipment evaluation/education, Compensatory technique education, Activityengagement, Energy conservation          See flowsheet documentation for full assessment, interventions and recommendations  Outcome: Progressing  Note: Limitation: Decreased ADL status, Decreased Safe judgement during ADL, Decreased UE ROM, Decreased UE strength, Decreased endurance, Decreased cognition, Decreased self-care trans, Decreased high-level ADLs  Prognosis: Fair  Assessment: Patient participated in Skilled OT session this date with interventions consisting of ADL re training with the use of correct body mechanics and  therapeutic activities to: increase activity tolerance   Patient agreeable to OT treatment session, upon arrival patient was found supine in bed  Patient requiring frequent rest periods  Patient continues to be functioning below baseline level, occupational performance remains limited secondary to factors listed above and increased risk for falls and injury  From OT standpoint, recommendation at time of d/c would be Short Term Rehab  Patient to benefit from continued Occupational Therapy treatment while in the hospital to address deficits as defined above and maximize level of functional independence with ADLs and functional mobility       OT Discharge Recommendation: (S) Post acute rehabilitation services

## 2022-11-29 NOTE — ASSESSMENT & PLAN NOTE
· In setting of CKD stage 3B, excessive use of vitamin-D and calcium supplementation at home per patient  · Further evidenced by elevated corrected calcium 14 2; supported by initial presentation with abdominal pain, fatigue, decreased appetite/poor PO intake; mentation at baseline, nontoxic on exam  · ECG without arrhythmia  · Further ED work up notable for multiple derangements including Hgb 11 1, low bicarb 18, elevated BUN 34, elevated creatinine 1 47 (baseline 1 11 4), GFR 32, , hypoalbuminemia 2 1; elevated lipase 2028, uric acid 10 4  · CT imaging findings concerning for acute pancreatitis,  necrotizing pneumonia vs  malignancy  · Etiology multifactorial, possibly due to hypercalcemia malignancy in light of CT findings, further complicated by home vitamin-D supplementation; med rec without any other causative agents  Bone demineralization also possible given her age  · Per chart review, patient noted to have elevated corrected calcium level since 08/2022 however does not appear to have been further worked up by PCP  · Unlikely primary parathyroidism or tertiary cause given PTH in 09/2022 was low; baseline renal function with CKD III  Vitamin-D 25 hydroxy level within normal limits  Calcium continues to be elevated   Status post 3 mg zoledronic acid     SPEP with faint gammopathy, PTH very low  UPEP shows selective proteinuria (54) with no monoclonal bands noted   PTHrP <2, WNL    Plan: Resolved  · Calcium level 9 4 corrected  · Vitamin D levels within normal limits: Vit D 25-OH 77 2, Vit D 1,25 OH 49 4  · PTH 6 3, compensating appropriately for elevated Ca level

## 2022-11-29 NOTE — QUICK NOTE
Quick note:  - Pt unable to tolerate bronch/BAL  -  Cytology from thoracentesis was negative for malignancy 11/16, repeat thora from 11/28 pending and will take likely 7-14 days to result  - We have no definitive evidence of maligancy  Pt has follow up with pulmonology scheduled outpatient  - Palliative care plans for family meeting tomorrow- so plans may change pending this and [facility]  - Hematology/Oncology will sign off at this point  No follow up needed as no positive pathology  If this is to change, we can be re-consulted/referral to outpatient oncology can be made at that time  If you have any questions or concerns in the future, please do not hesitate to reach out to us  I did review this patient with Dr Mary Veliz and she is in agreement with this plan        RAMONITA Villagomez-BC  Hematology/Oncology Nurse Practitioner

## 2022-11-29 NOTE — ASSESSMENT & PLAN NOTE
· Patient history of laryngeal carcinoma status post tracheostomy/laryngectomy who presented to ED with tachycardia, hypoxia, fatigue  · 11/15 CTA chest notable for significant findings including:  · Large, heterogeneous airspace consolidation in the right lower lobe, with a 6 3 x 5 2 x 4 8 cm somewhat lobulated hypodense component likely a necrotizing aspiration pneumonia  · Moderate right pleural effusion  No enhancement of the pleura to suggest empyema  · Mediastinal and right hilar lymphadenopathy, likely reactive, however metastatic disease is not entirely excluded  · Legionella and strep antigens negative  · 11/18 IR Thoracentesis - Status post 900 mL fluid thoracentesis suggestive of transudative fluid for pulmonology and may be secondary to pancreatitis  · Blood cultures 1/2 group Actinomyces gram variable rods on 11/15  · Blood cultures x2 on 11/17 final: no growth   · Sputum culture on 11/15 grew Staph aureus  · Sputum culture on 11/16 final: no growth  · Planned for bronchoscopy and BAL 11/22, however, procedure aborted due to patient becoming hemodynamically unstable after anesthesia induction  Was in the ICU 11/22-11/23, transferred back to [facility]       Plan:  · Ceftriaxone 1 gm Q24H Day 9, continue per ID recommendations  · Will follow up with ID regarding any changes to abx regimen    · 11/22 bronchial cultures pending from tracheal aspirate - partial bronch completed at that time  · AFB stain from aspirate prelim negative    · 1/22 fungal cultures no growth as of 11/28   · 11/22 vital culture no virus isolated at 24 hours on 11/26  Next report to follow in 4 days  · Trend WBC and Fever curve  · Since patient has sinus tachycardia, ordered a d-dimer 11/26 which is 2 73    · CTA 11/26 shows:  No PE  Right-sided pleural effusion is now larger in size  Worsened right middle lobe and right lower lobe consolidation keeping with a combination patient's known in pneumonia and superimposed atelectasis  Worsened mucus plugging throughout the bronchus intermedius, right middle lobe and right lower lobe  Low-density necrotic/abscess component of the right lower lobe consolidation appears unchanged  Unchanged pulmonary edema  · Following up with pulm team, appreciate recommendations  · Patient is not stable for bronchoscopy given episode earlier in the week with initial bronch initiated  At this time recommend thoracentesis versus chest tube placement for right effusion  · Pulm will continue to follow and if patient becomes more stable can consider repeat bronchoscopy without sedation  · IR 11/28 drained approximately 2L - fluid studies show mixed picture  · LDH and protein ratio per light's criteria point to transudative however LDH is greater than upper 2/3 limit of normal so exudative  · Fluid described as yellow and clear, WBC 88, glucose 128, , and protein 2 9, ph 7 6  · Prelim culture shows no polys or bacteria seen   · Cytology and AFB pending   · Will follow up with pulmonology team regarding outpatient management of effusions and drainage since patient does not want chest tube to be placed  · Per respiratory team, patient doing better weaned down to 40 % on trach collar, will see if patient can tolerate weaning down to 35% and gradually to close to room air 21%  · Discontinued Chest PT and Mucomyst per respiratory recommendations  · Follow-up chest CT in 3 months as an outpatient to ensure resolution as an underlying neoplasm may have a similar appearance

## 2022-11-30 NOTE — ASSESSMENT & PLAN NOTE
· Known hx, follows with vascular surgery (Dr Frankie Cobian)  · Previously found to have recurrent left ICA stenosis and critical new occlusive right IC stenosis, with a which were asymptomatic, along with left vertebral artery atresia; previously worked up in outpatient setting by mass concerning symptoms and was recommended endovascular intervention  · On 07/2020, patient electively admitted to [facility] during which she underwent ultrasound-guided right CFA puncture with sternal closure, aortic arch arteriogram, left carotid during him with PTA and stenting under the care of Dr Brown  · Maintained on antiplatelet and anti-statin therapy with Plavix and Lipitor     Plan:   · Continue home dose lipitor   · Restarted home Plavix after bronch was cancelled

## 2022-11-30 NOTE — PROGRESS NOTES
Progress Note - Infectious Disease   Neda Whatley 81 y o  female MRN: [id number]  Unit/Bed#: [facility] 401-01 Encounter: [id number]      Impression:  1  Necrotizing RLL pneumonia R/0 abscess S/P bronchoscopy/BAL  2  History of laryngeal carcinoma s/p laryngectomy/tracheitis  3  DM type 2  4  CAD  5  Extensive PVD    Recommendations:  Patient is afebrile with elevated but decreased WBC count 13,030  Discussed with primary service  1  Bronchoscopy/BAL was only partially complete  A CTA done  shows a large right-sided pleural effusion with worsened RML and RLL consolidation compatible with pneumonia/atelectasis  Underwent IR right thoracentesis   2  No further IV access  3  All cultures negative so far  Antibiotics:  1  Agree with discontinuing antibiotics    Subjective: The patient has no complaints  Denies shortness of breath or cough  Refusing IV  Denies fevers, chills, or sweats  Denies nausea, vomiting, or diarrhea  Objective:  Vitals:  Temp:  [97 2 °F (36 2 °C)-98 4 °F (36 9 °C)] 98 4 °F (36 9 °C)  HR:  [103-119] 110  Resp:  [17-19] 17  BP: ()/(43-69) 109/68  SpO2:  [84 %-97 %] 95 %  Temp (24hrs), Av 9 °F (36 6 °C), Min:97 2 °F (36 2 °C), Max:98 4 °F (36 9 °C)  Current: Temperature: 98 4 °F (36 9 °C)    Physical Exam:     General Appearance:    Eyes:   Alert, responsive, chronically ill-appearing nontoxic, no acute distress  Conjunctiva pale   Throat: Oropharynx moist without lesions  Lips, mucosa, and tongue normal   Neck: Tracheostomy, surgical scars   Lungs:   Bibasilar dullness > right   Heart:  Regular rate with ectopics and rhythm, S1, S2 normal, no murmur, rub or gallop   Abdomen:   Soft, non-tender, non-distended, positive bowel sounds    No masses, no organomegaly    No CVA tenderness, external urinary catheter   Extremities: Extremities normal, atraumatic, no clubbing, cyanosis or edema   Skin: Skin color pale, surgical scars, as above texture, turgor normal, no rashes or lesions  No draining wounds noted           Invasive Devices     Peripheral Intravenous Line  Duration           Peripheral IV 11/29/22 Right;Ventral (anterior) Wrist <1 day          Airway  Duration           Surgical Airway -- days                Labs, Imaging, & Other studies:   All pertinent labs were personally reviewed  Results from last 7 days   Lab Units 11/29/22  0436 11/28/22  0506 11/27/22  0552   WBC Thousand/uL 13 03* 15 53* 13 63*   HEMOGLOBIN g/dL 9 3* 10 1* 9 4*   PLATELETS Thousands/uL 196 229 199     Results from last 7 days   Lab Units 11/29/22  0436 11/28/22  0506 11/27/22  0552   SODIUM mmol/L 144 142 139   POTASSIUM mmol/L 3 4* 3 6 3 2*   CHLORIDE mmol/L 108 106 105   CO2 mmol/L 28 27 27   BUN mg/dL 10 11 10   CREATININE mg/dL 1 20 1 36* 1 27   EGFR ml/min/1 73sq m 41 35 38   CALCIUM mg/dL 7 6* 7 9* 8 3   AST U/L 25 26 32   ALT U/L 19 19 17   ALK PHOS U/L 73 88 80     Results from last 7 days   Lab Units 11/28/22  1159   GRAM STAIN RESULT  No Polys or Bacteria seen   BODY FLUID CULTURE, STERILE  No growth

## 2022-11-30 NOTE — PROGRESS NOTES
PULMONOLOGY PROGRESS NOTE     Name: Giovanni Lee   Age & Sex: 81 y o  female   MRN: [id number]  Unit/Bed#: [facility] 401-01   Encounter: [id number]    PATIENT INFORMATION     Name: Giovanni Lee   Age & Sex: 81 y o  female   MRN: [id number]  Hospital Stay Days: 15    ASSESSMENT/PLAN     Assessment:   1  RLL lung mass/consolidation  2  Acute hypoxic respiratory failure  3  Right pleural effusion - s/p thoracentesis on 03/02 with neutrophilic predominant transudate  Now repeat thoracentesis on 11/28 with exudative effusion  4  Actinomyces bacteremia  5  Bronchospasm after induction of anesthesia with bradycardia and hypotension - Resolved  6  Hypercalcemia  7  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   8  Emphysema with possible underlying COPD - not in acute exacerbation    Plan:  • Wean FiO2 as tolerated to maintain SpO2 > 88%  • Follow up cytology and culture results from thoracentesis 11/28  • Patient would possibly be a candidate for ASEPT catheter if right pleural effusion reaccumulates  However, patient might not want to pursue invasive treatment  • Antibiotics per ID  Ceftriaxone discontinued yesterday  • Continue chest PT   • Patient is high risk for bronchoscopy  Will defer procedure  • Primary team and palliative care managing goals of care discussions with patient and family  Meeting planned for today at 2 pm    • Rest of care per primary team        SUBJECTIVE     Patient seen and examined  Patient with poor IV access and declining PICC line placement  Did have an event of desaturation overnight but she recovered  She denies worsening shortness of breath and chest pain       OBJECTIVE     Vitals:    11/29/22 2237 11/30/22 0402 11/30/22 0711 11/30/22 0750   BP: (!) 98/46  (!) 103/44    BP Location:   Left arm    Pulse: 76  (!) 110    Resp:   16    Temp: 97 6 °F (36 4 °C)  98 8 °F (37 1 °C)    TempSrc:   Oral    SpO2: (!) 87%  92% 90%   Weight:  59 2 kg (130 lb 8 2 oz)     Height:          Temperature:   Temp (24hrs), Av 3 °F (36 8 °C), Min:97 6 °F (36 4 °C), Max:98 8 °F (37 1 °C)    Temperature: 98 8 °F (37 1 °C)  Intake & Output:  I/O        0701   0700  0701   0700  0701   0700    P  O  180 280     I V  (mL/kg) 30 (0 5)      IV Piggyback 100      Total Intake(mL/kg) 310 (4 9) 280 (4 7)     Urine (mL/kg/hr) 850 (0 6) 750 (0 5)     Other 2100      Stool 0 0     Total Output 2950 750     Net -2640 -470            Unmeasured Urine Occurrence 1 x 1 x     Unmeasured Stool Occurrence 2 x 1 x         Weights:        Body mass index is 29 26 kg/m²  Weight (last 2 days)     Date/Time Weight    22 0402 59 2 (130 51)    22 0533 62 8 (138 45)        Physical Exam  Vitals and nursing note reviewed  Constitutional:       General: She is not in acute distress  Appearance: She is not ill-appearing or toxic-appearing  HENT:      Head: Normocephalic  Eyes:      General: No scleral icterus  Pupils: Pupils are equal, round, and reactive to light  Cardiovascular:      Rate and Rhythm: Regular rhythm  Tachycardia present  Heart sounds: No murmur heard  No gallop  Pulmonary:      Effort: Pulmonary effort is normal  No respiratory distress  Breath sounds: Decreased breath sounds (Right lower lobes) present  No wheezing, rhonchi or rales  Abdominal:      General: Bowel sounds are normal  There is no distension  Palpations: Abdomen is soft  Tenderness: There is no abdominal tenderness  There is no guarding  Musculoskeletal:      Cervical back: Normal range of motion  Right lower leg: Edema present  Left lower leg: Edema present  Skin:     General: Skin is warm  Capillary Refill: Capillary refill takes less than 2 seconds  Neurological:      Mental Status: She is alert and oriented to person, place, and time  Mental status is at baseline        Cranial Nerves: No cranial nerve deficit  Psychiatric:         Mood and Affect: Mood normal            LABORATORY DATA     Labs: I have personally reviewed pertinent reports  Results from last 7 days   Lab Units 11/30/22  0538 11/29/22  0436 11/28/22  0506 11/27/22  0552 11/26/22  0624   WBC Thousand/uL 14 44* 13 03* 15 53*   < > 13 27*   HEMOGLOBIN g/dL 9 3* 9 3* 10 1*   < > 9 6*   HEMATOCRIT % 33 0* 31 7* 35 4   < > 32 7*   PLATELETS Thousands/uL 177 196 229   < > 228   NEUTROS PCT % 91*  --  89*  --  88*   MONOS PCT % 5  --  6  --  7   MONO PCT %  --  3*  --    < >  --     < > = values in this interval not displayed  Results from last 7 days   Lab Units 11/30/22  0538 11/29/22  0436 11/28/22  0506   POTASSIUM mmol/L 3 6 3 4* 3 6   CHLORIDE mmol/L 107 108 106   CO2 mmol/L 27 28 27   BUN mg/dL 10 10 11   CREATININE mg/dL 1 15 1 20 1 36*   CALCIUM mg/dL 7 5* 7 6* 7 9*   ALK PHOS U/L 76 73 88   ALT U/L 22 19 19   AST U/L 26 25 26     Results from last 7 days   Lab Units 11/30/22  0538 11/29/22  0436 11/28/22  0506   MAGNESIUM mg/dL 1 5* 1 6 1 6     Results from last 7 days   Lab Units 11/30/22  0538 11/29/22  0436 11/28/22  0506   PHOSPHORUS mg/dL 1 5* 1 5* 1 7*                      ABG:       Micro:   Results from last 7 days   Lab Units 11/28/22  1159   GRAM STAIN RESULT  No Polys or Bacteria seen   BODY FLUID CULTURE, STERILE  No growth         IMAGING & DIAGNOSTIC TESTING     Radiology Results: I have personally reviewed pertinent reports  XR chest portable    Result Date: 11/17/2022  Impression: Near complete drainage of right effusion with trace residual effusion  Masslike opacity in the right lower lobe better shown on CT  Mild pulmonary venous congestion  Workstation performed: [id number]     CT head wo contrast    Result Date: 11/15/2022  Impression: No acute intracranial abnormality  Chronic microangiopathic changes  Workstation performed: [id number]     CT chest wo contrast    Result Date: 11/19/2022  Impression: 1    Right lower lobe consolidation appears similar  There is relative internal hypodensity, along with air, consistent with abscess/necrosis  Recommend remains for a follow-up CT  2   Stable right lower lobe airway debris, with new airway debris in the middle lobe, consistent with interval aspiration event 3  Moderate right pleural effusion, not significantly changed in size  Trace left pleural effusion  4   Interstitial opacities consistent with mild edema 5  Stable left lower lobe nodule, attention on follow-up The study was marked in EPIC for immediate notification  Workstation performed: [id number]     IR IN-Patient Thoracentesis    Result Date: 11/18/2022  Impression: Impression: Ultrasound-guided thoracentesis yielding 900 mL clear yellow pleural fluid  Signed, performed, and dictated by CHANEL Fierro under the supervision of Dr Vonnie Tinajero  Workstation performed: [id number]     PE Study with CT Abdomen and Pelvis with contrast    Result Date: 11/15/2022  Impression: No central, lobar, segmental or proximal subsegmental pulmonary embolism  Evaluation of the distal subsegmental pulmonary arteries is limited  Large, heterogeneous airspace consolidation in the right lower lobe, with a 6 3 x 5 2 x 4 8 cm somewhat lobulated hypodense component with a small droplet of air  This likely represents a necrotizing aspiration pneumonia, given mucous plugging throughout the right lower lobe and retained secretions/debris in the right mainstem bronchus and bronchus intermedius  Recommend follow-up chest CT in approximately 3 months to ensure resolution as an underlying neoplasm may have a similar appearance  Nonspecific 7 x 6 mm lobulated left lower lobe nodule  Attention on follow-up  Moderate right pleural effusion  No enhancement of the pleura to suggest empyema  Mediastinal and right hilar lymphadenopathy, likely reactive, however metastatic disease is not entirely excluded   Mild stranding around the proximal pancreas suggestive of acute interstitial pancreatitis  Recommend correlation with lipase level  No organized peripancreatic fluid collections, loss of parenchymal enhancement to suggest necrosis, or vascular complications of pancreatitis  No other acute abdominal or pelvic pathology  Multiple additional findings as above  The study was marked in [facility] for immediate notification  Workstation performed: [id number]     Other Diagnostic Testing: I have personally reviewed pertinent reports      ACTIVE MEDICATIONS     Current Facility-Administered Medications   Medication Dose Route Frequency   • acetaminophen (TYLENOL) tablet 650 mg  650 mg Oral Q6H PRN   • albuterol inhalation solution 2 5 mg  2 5 mg Nebulization Q4H PRN   • atorvastatin (LIPITOR) tablet 40 mg  40 mg Oral Daily With Dinner   • clopidogrel (PLAVIX) tablet 75 mg  75 mg Oral Daily   • dextromethorphan-guaiFENesin (ROBITUSSIN DM) oral syrup 10 mL  10 mL Oral Q6H PRN   • glycerin-hypromellose- (ARTIFICIAL TEARS) ophthalmic solution 1 drop  1 drop Both Eyes PRN   • heparin (porcine) subcutaneous injection 5,000 Units  5,000 Units Subcutaneous Q8H [address]   • HYDROmorphone HCl (DILAUDID) injection 0 2 mg  0 2 mg Intravenous Q6H PRN   • hydrOXYzine HCL (ATARAX) tablet 25 mg  25 mg Oral Q6H PRN   • iron sucrose (VENOFER) 200 mg in sodium chloride 0 9 % 100 mL IVPB  200 mg Intravenous Once per day on Mon Wed Fri   • levalbuterol (XOPENEX) inhalation solution 1 25 mg  1 25 mg Nebulization TID   • levothyroxine tablet 100 mcg  100 mcg Oral Early Morning   • lidocaine (LIDODERM) 5 % patch 1 patch  1 patch Topical Daily   • loratadine (CLARITIN) tablet 5 mg  5 mg Oral Daily   • magnesium sulfate IVPB (premix) SOLN 1 g  1 g Intravenous Once   • methocarbamol (ROBAXIN) tablet 500 mg  500 mg Oral Q6H PRN   • metoprolol tartrate (LOPRESSOR) partial tablet 12 5 mg  12 5 mg Oral Q12H HARRIS   • oxyCODONE (ROXICODONE) IR tablet 2 5 mg  2 5 mg Oral Q6H PRN    Or   • oxyCODONE (ROXICODONE) IR tablet 5 mg  5 mg Oral Q6H PRN   • sertraline (ZOLOFT) tablet 25 mg  25 mg Oral Daily       VTE Pharmacologic Prophylaxis: Heparin  VTE Mechanical Prophylaxis: sequential compression device      Disclaimer: Portions of the record may have been created with voice recognition software  Occasional wrong word or "sound a like" substitutions may have occurred due to the inherent limitations of voice recognition software  Careful consideration should be taken to recognize, using context, where substitutions have occurred      Rivera Tompkins MD   Pulmonary and Critical Care Fellow, PGY-4  [facility]

## 2022-11-30 NOTE — ASSESSMENT & PLAN NOTE
Creatinine of 1 47 on admission treated with IV fluid boluses, 3L total, NSS at 100ml/hr, renal function assessment and lab monitoring      Pt has CKD 3 and noted baseline of 1-1 2    Plan:  · Cr 1 15, back to baseline  · Avoid nephrotoxic agents  · Monitor urine output   · Encourage adequate intake

## 2022-11-30 NOTE — PHYSICAL THERAPY NOTE
PHYSICAL THERAPY NOTE          Patient Name: Yasmine Hunter  [id number] Date: 11/30/2022 11/30/22 1343   PT Last Visit   PT Visit Date 11/30/22   Note Type   Note Type Treatment   Pain Assessment   Pain Assessment Tool FLACC   Pain Location/Orientation Orientation: Left; Location: Leg   Pain Onset/Description Onset: Ongoing;Frequency: Intermittent; Descriptor: Aching;Descriptor: Discomfort   Effect of Pain on Daily Activities guarding w/ positional changes   Patient's Stated Pain Goal No pain   Hospital Pain Intervention(s) Ambulation/increased activity; Emotional support   Pain Rating: FLACC (Rest) - Face 0   Pain Rating: FLACC (Rest) - Legs 0   Pain Rating: FLACC (Rest) - Activity 0   Pain Rating: FLACC (Rest) - Cry 0   Pain Rating: FLACC (Rest) - Consolability 0   Score: FLACC (Rest) 0   Pain Rating: FLACC (Activity) - Face 1   Pain Rating: FLACC (Activity) - Legs 0   Pain Rating: FLACC (Activity) - Activity 0   Pain Rating: FLACC (Activity) - Cry 0   Pain Rating: FLACC (Activity) - Consolability 1   Score: FLACC (Activity) 2   Restrictions/Precautions   Other Precautions Telemetry;O2;Contact/isolation;Hard of hearing  (trach collar)   General   Chart Reviewed Yes   Response to Previous Treatment Patient with no complaints from previous session  Cognition   Overall Cognitive Status WFL   Arousal/Participation Arousable; Cooperative   Attention Attends with cues to redirect   Orientation Level Unable to assess   Memory Decreased recall of precautions   Following Commands Follows one step commands with increased time or repetition   Subjective   Subjective Pt is resting in bed; arousable; agreeable to mobilize   Bed Mobility   Supine to Sit 3  Moderate assistance   Additional items Assist x 1; Increased time required;Verbal cues   Sit to Supine 3  Moderate assistance   Additional items Assist x 1; Increased time required;Verbal cues;LE management   Transfers   Sit to Stand 4  Minimal assistance   Additional items Assist x 1;Verbal cues   Stand to Sit 4  Minimal assistance   Additional items Assist x 1;Verbal cues   Stand pivot 4  Minimal assistance   Additional items Assist x 1; Increased time required;Verbal cues  (bed to chair transition prior to amb (noted to have soiled slipper socks and linen upon standing; PCA is present and pericare and bed linen change was completed))   Ambulation/Elevation   Gait pattern Excessively slow; Short stride; Inconsistent kaiser   Gait Assistance 4  Minimal assist   Additional items Assist x 1;Verbal cues; Tactile cues  (chair follow)   Assistive Device Rolling walker   Distance 2 x 25 ft w/ seated rest period in between   Balance   Static Sitting Fair +   Dynamic Sitting Fair   Static Standing Fair -   Dynamic Standing Fair -   Ambulatory Poor +   Activity Tolerance   Activity Tolerance Patient limited by fatigue;Treatment limited secondary to medical complications (Comment)  (elevated HR to 130s bpm post mobilization; fluctuating O2 sat)   Exercises   Knee AROM Long Arc Quad Sitting;10 reps;AROM; Bilateral   Ankle Pumps Sitting;10 reps;AROM; Bilateral   Assessment   Prognosis Guarded   Problem List Decreased strength;Decreased endurance; Impaired balance;Decreased mobility   Assessment Functional mobility re-assessment performed; pt is generally weak and deconditioned w/ balance impairment and associated mobility deficits, incl gait dysfunction requiring (A)x1 w/ all aspects of mobility to assure safety; at this time, overall mobility status appears to be mod below premorbid level --> recommend rehab upon D/C; will follow  Barriers to Discharge Decreased caregiver support   Goals   Patient Goals to rest   STG Expiration Date 12/10/22   PT Treatment Day 3   Plan   Treatment/Interventions Functional transfer training;LE strengthening/ROM; Therapeutic exercise; Endurance training;Bed mobility;Gait training;Spoke to nursing   Progress Progressing toward goals   PT Frequency 2-3x/wk   Recommendation   PT Discharge Recommendation (S)  Post acute rehabilitation services  (change from prior recommendations)   Equipment Recommended [facility] Recommended Wheeled walker   Elsie Hewitt 435   Turning in Bed Without Bedrails 4   Lying on Back to Sitting on Edge of Flat Bed 2   Moving Bed to Chair 3   Standing Up From Chair 3   Walk in Room 3   Climb 3-5 Stairs 2   Basic Mobility Inpatient Raw Score 17   Basic Mobility Standardized Score 39 67   Highest Level Of Mobility   -HL Goal 5: Stand one or more mins   -HLM Achieved 7: Walk 25 feet or more   Education   Education Provided Mobility training;Assistive device   Patient Demonstrates verbal understanding   End of Consult   Patient Position at End of Consult Supine; All needs within [facility]

## 2022-11-30 NOTE — ASSESSMENT & PLAN NOTE
· Known history, in setting of CKD stage IIIB  · Follows nephrology outpatient (Dr Janice Sharma)  · Hgb 11 1 on admission, at baseline     · Currently hemodynamically stable    Plan:  · Trend CBC, Hb 9 3  · No active bleeding present   · Transfuse if Hb < 7

## 2022-11-30 NOTE — ASSESSMENT & PLAN NOTE
· Patient history of laryngeal carcinoma status post tracheostomy/laryngectomy on trach collar presented with fatigue; tachycardic on exam; labs notable for multiple derangements including hypercalcemia 14 3 and bicarb 18  · CT notable for nonspecific 7 x 6 mm lobulated left lower lobe nodule; new finding; CT imaging also revealing for multiple findings large heterogeneous airspace consolidation and right lower lobe with 6 x 5 x 5 lobulated hypodense component with moderate right-sided pleural effusion  concerning for necrotizing pneumonia  · Malignancy not entirely ruled out; does endorse decreased appetite, unexpected weight loss of up to 30 lb, and is a former smoker; quit 2002; hypercalcemia also noted on admission with recent outpatient PTH wnl  · 11/19 - repeat CT suggested of necrosis/abscess    Plan  · Inpatient pulmonology following, appreciate recs; 5 day course completed abx for suspecting necrotizing pneumonia with large heterogeneous airspace consolidation in right lower lobe  · ID consulted, appreciate reccs  No longer on abx   Bronchial aspirates pending results   · Repeat CT future to assess for improvement, outpatient

## 2022-11-30 NOTE — ASSESSMENT & PLAN NOTE
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  · Follows with ENT as an outpatient with Dr Cady Mendoza; most recently evaluated for trich follow-up in 10/2022     Plan:  · Continue trach collar oxygen, able to wean to 35%  Continue to monitor  · Patient has concerns that prosthesis is not functioning properly  Speech evaluated her yesterday, no leakage around trach and prosthesis  Pt did have this new prothesis placed in June 2022, since then has been having voice issues  Currently suctioning repeatedly from the trach  · ENT consulted, appreciate reccs   · No further interventions for her TPN at this time while inpatient  As outpatient can continue to work on keeping secretions then around TEP, cleaning the mechanism with new brushes, possible placement of TEP reinforcing bring under flange    · Recommend potential outpatient replacement or reinforcement of TEP   · Will have nursing assist with TEP maintanence at this time

## 2022-11-30 NOTE — ASSESSMENT & PLAN NOTE
Lab Results   Component Value Date    EGFR 43 11/30/2022    EGFR 41 11/29/2022    EGFR 35 11/28/2022    CREATININE 1 15 11/30/2022    CREATININE 1 20 11/29/2022    CREATININE 1 36 (H) 11/28/2022     · Known history of CKD stage III states 2014  · Follows B nephrology (Dr Jose Mcfadden)  · Cr on admission slightly elevated 1 47(baseline 1 1-1 4)  · Etiology likely 2/2 long history of hypertension, nephrosclerosis, age-related nephron loss     Plan  · Cr 1 15, which is around her baseline    Will continue to monitor BMP  · Avoid use of nephrotoxic agents  · Maintain euvolemia

## 2022-11-30 NOTE — PROGRESS NOTES
Progress Note - Infectious Disease   Janae Whatley 81 y o  female MRN: [id number]  Unit/Bed#: [facility] 401-01 Encounter: [id number]      Impression:  1  Necrotizing RLL pneumonia R/0 abscess S/P bronchoscopy/BAL  2  History of laryngeal carcinoma s/p laryngectomy/tracheitis  3  DM type 2  4  CAD  5  Extensive PVD    Recommendations:  Patient is afebrile with elevated  WBC count 14,440  Patient has been referred to hospice  Necrotizing RLL pneumonia  1  Bronchoscopy/BAL was only partially complete  A CTA done  shows a large right-sided pleural effusion with worsened RML and RLL consolidation compatible with pneumonia/atelectasis  Underwent IR right thoracentesis   2  No further IV access  3  All cultures negative  Diarrhea  1  C diff PCR pending  Antibiotics:  1  Observe off antibiotics    Subjective:  Had several diarrhea episodes today  Denies fevers, chills, or sweats  Denies nausea, vomiting, or diarrhea  Objective:  Vitals:  Temp:  [97 6 °F (36 4 °C)-99 5 °F (37 5 °C)] 99 1 °F (37 3 °C)  HR:  [] 110  Resp:  [16] 16  BP: ()/(44-46) 117/44  SpO2:  [84 %-99 %] 94 %  Temp (24hrs), Av 8 °F (37 1 °C), Min:97 6 °F (36 4 °C), Max:99 5 °F (37 5 °C)  Current: Temperature: 99 1 °F (37 3 °C)    Physical Exam:     General Appearance:    Eyes:   Alert, responsive, chronically ill-appearing nontoxic, no acute distress  Conjunctiva pale   Throat: Oropharynx moist without lesions  Lips, mucosa, and tongue normal   Neck: Tracheostomy, surgical scars   Lungs:   Bibasilar dullness > right   Heart:  Regular rate with ectopics and rhythm, S1, S2 normal, no murmur, rub or gallop   Abdomen:   Soft, non-tender, non-distended, positive bowel sounds  No masses, no organomegaly    No CVA tenderness, external urinary catheter   Extremities: Extremities normal, atraumatic, no clubbing, cyanosis or edema   Skin: Skin color pale, surgical scars, as above texture, turgor normal, no rashes or lesions   No draining wounds noted           Invasive Devices     Peripheral Intravenous Line  Duration           Peripheral IV 11/30/22 Right;Ventral (anterior) Forearm <1 day          Airway  Duration           Surgical Airway -- days                Labs, Imaging, & Other studies:   All pertinent labs were personally reviewed  Results from last 7 days   Lab Units 11/30/22  0538 11/29/22  0436 11/28/22  0506   WBC Thousand/uL 14 44* 13 03* 15 53*   HEMOGLOBIN g/dL 9 3* 9 3* 10 1*   PLATELETS Thousands/uL 177 196 229     Results from last 7 days   Lab Units 11/30/22  0538 11/29/22  0436 11/28/22  0506   SODIUM mmol/L 142 144 142   POTASSIUM mmol/L 3 6 3 4* 3 6   CHLORIDE mmol/L 107 108 106   CO2 mmol/L 27 28 27   BUN mg/dL 10 10 11   CREATININE mg/dL 1 15 1 20 1 36*   EGFR ml/min/1 73sq m 43 41 35   CALCIUM mg/dL 7 5* 7 6* 7 9*   AST U/L 26 25 26   ALT U/L 22 19 19   ALK PHOS U/L 76 73 88     Results from last 7 days   Lab Units 11/28/22  1159   GRAM STAIN RESULT  No Polys or Bacteria seen   BODY FLUID CULTURE, STERILE  No growth

## 2022-11-30 NOTE — HOSPICE NOTE
HOSPICE REFERRAL RECEIVED  BRIDGETTE JACKSON AWARE I WILL BE WORKING ON IT  MET WITH PT AT THE BEDSIDE  PRESENTLY, PT IS NOT IPU APPROPRIATE  DGTR IGNACIA NOT PRESENT  WILL RE-EVALUATE PT AGAIN IN THE MORNING

## 2022-11-30 NOTE — ASSESSMENT & PLAN NOTE
· Patient history of laryngeal carcinoma status post tracheostomy/laryngectomy who presented to ED with tachycardia, hypoxia, fatigue  · 11/15 CTA chest notable for significant findings including:  · Large, heterogeneous airspace consolidation in the right lower lobe, with a 6 3 x 5 2 x 4 8 cm somewhat lobulated hypodense component likely a necrotizing aspiration pneumonia  · Moderate right pleural effusion  No enhancement of the pleura to suggest empyema  · Mediastinal and right hilar lymphadenopathy, likely reactive, however metastatic disease is not entirely excluded  · Legionella and strep antigens negative  · 11/18 IR Thoracentesis - Status post 900 mL fluid thoracentesis suggestive of transudative fluid for pulmonology and may be secondary to pancreatitis  · Blood cultures 1/2 group Actinomyces gram variable rods on 11/15  · Blood cultures x2 on 11/17 final: no growth   · Sputum culture on 11/15 grew Staph aureus  · Sputum culture on 11/16 final: no growth  · Planned for bronchoscopy and BAL 11/22, however, procedure aborted due to patient becoming hemodynamically unstable after anesthesia induction  Was in the ICU 11/22-11/23, transferred back to [facility]       Plan:  · D/c'd abx yesterday 11/29  · 11/22 bronchial cultures pending from tracheal aspirate - partial bronch completed at that time  · AFB stain from aspirate prelim negative    · 1/22 fungal cultures no growth as of 11/28   · 11/22 vital culture no virus isolated at 24 hours on 11/26  Next report to follow in 4 days  · Trend WBC and Fever curve  · Since patient has sinus tachycardia, ordered a d-dimer 11/26 which is 2 73    · CTA 11/26 shows:  No PE  Right-sided pleural effusion is now larger in size  Worsened right middle lobe and right lower lobe consolidation keeping with a combination patient's known in pneumonia and superimposed atelectasis  Worsened mucus plugging throughout the bronchus intermedius, right middle lobe and right lower lobe  Low-density necrotic/abscess component of the right lower lobe consolidation appears unchanged  Unchanged pulmonary edema  · Following up with pulm team, appreciate recommendations  · Patient is not stable for bronchoscopy given episode earlier in the week with initial bronch initiated  At this time recommend thoracentesis versus chest tube placement for right effusion  · Pulm will continue to follow and if patient becomes more stable can consider repeat bronchoscopy without sedation  · IR 11/28 drained approximately 2L - fluid studies show mixed picture  · LDH and protein ratio per light's criteria point to transudative however LDH is greater than upper 2/3 limit of normal so exudative  · Fluid described as yellow and clear, WBC 88, glucose 128, , and protein 2 9, ph 7 6  · Prelim culture shows no polys or bacteria seen   · Cytology and AFB pending   · Plan is for 2:00 p m today  Family meeting with patient, palliative Care and primary team to discuss next steps regarding management and goals of care discussion  · Per respiratory team, patient doing better weaned down to 35 % on trach collar, but could not tolerate when brought down to 21-25% on trach collar  Will continue to monitor   · Discontinued Chest PT and Mucomyst per respiratory recommendations  · Follow-up chest CT in 3 months as an outpatient to ensure resolution as an underlying neoplasm may have a similar appearance

## 2022-11-30 NOTE — PROGRESS NOTES
Progress note - Palliative and Supportive Care   [facility] 81 y o  female [id number]    Patient Active Problem List   Diagnosis   • Asymptomatic carotid artery stenosis   • Aortoiliac occlusive disease ([facility] [address] )   • Hyperlipidemia   • Hypertension   • Acute anterior circulation transient ischemic attack   • 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 (HCC)   • Tracheostomy care [facility])   • Laryngeal carcinoma (HCC)   • CKD (chronic kidney disease) stage 3, GFR 30-59 ml/min   • Renovascular hypertension   • Tracheitis   • SOB (shortness of breath)   • JORDAN (acute kidney injury) ([facility] [address] )   • Acute blood loss anemia   • Elevated troponin   • Acute on chronic kidney failure (HCC)   • Pain in right hip   • Right lower lobe lung mass with right pleural effusion   • Lung nodule seen on imaging study   • Sepsis with acute hypoxic respiratory failure (HCC)   • Pleural effusion, right   • Elevated serum creatinine   • Anemia of chronic disease   • Hypothyroidism   • Gram-positive bacteria on cultures     Active issues specifically addressed today include:   Sepsis with acute hypoxic respiratory failure   Right lower lobe lung mass with right pleural effusion  Laryngeal carcinoma (HCC)  Lung nodule seen on imaging study      Plan:  1  Symptom management   · Comfort care orders placed   · Oxycodone IR tablet 5 mg p o  q 2 hours p r n  for pain or dyspnea  · Morphine 2 mg IV q 1 hour p r n  breakthrough pain or dyspnea or if unable to tolerate p o  · Ativan 0 5 mg q 6 hours p r n  anxiety  · Haldol 0 5 mg q 2 hours p r n  for agitation or vomiting  · Continue Tylenol 650 mg q 6 hours p r n  for mild pain or headache  · Continue albuterol nebulizer q 4 hours p r n  for wheezing or shortness of breath  · Continue sodium chloride inhalation nebulizer t i d   · Continue Xopenex inhalation nebulization t i d   · Continue Robitussin DM 10 mL q 6 hours p r n  for cough    2   Goals:  Palliative & Supportive Care: Family Conference     Time of Meetinpm  Participants:  Palliative care team (myself, Lorne Vitale) internal medicine, pulmonology, patient, patient's daughter France Mae partner Pieter Chacko, granddaughter Adelso Farmer, Malathi's partner Dennison Hodgkin  Patient Participation: yes  Patient Support System: family as above   Meeting Location: patient room    Advanced Directive available: yes    A family meeting was held for goals of care discussion  This meeting was necessary for determine the appropriate course of treatment  Topics of Discussion:  • Reviewed current status and hospital course  • Assessed patient's and family's understanding of patient's current condition and prognosis  • Asked permission to discuss prognosis and likely outcome  • Discussed options for treatment (scheduled thoracenteses, PleurX catheter, no medical intervention in hospice care, PleurX with hospice)  o Patient's goals are clearly that she does not want a PleurX catheter or to have scheduled thoracentesis and would like to be able to proceed with hospice care  o She shared that her sister passed on hospice at a facility, and she has an idea of what hospice looks like  • Discussed options for hospice / comfort care at home, in the hospital, at a facility, at hospice IPU  • Discussed risks of patient being at home - family is unable to care for patient at home  o Patient and family interested in exploring possibilities for for now and our understanding that if patient's symptoms were to be unmanageable, especially if/when pleural fluid reaccumulates, that she can be re-evaluated for IPU any time    • Discussed moving forward with hospice consultation with Case Management, comfort care measures, and beginning to search for a facility that accepts medical assistance  •  Answered questions for patient and family    PLAN:  • Patient code status changed to level for comfort care  • Will place comfort care orders,  • Hospice case management consultation placed, communicated plan with 4th floor  Wili Wong directly after meeting via tiger text  Time Involved in Meetin, beginning at approximately 2pm and ending at approximately 240pm       Code Status: DNR/DNI - Level 3   Decisional apparatus:  Patient is competent on my exam today  If competence is lost, patient's substitute decision maker would default to daughter by [place]  Advance Directive / Living Will / POLST:  ACP document in chart (dated from )  Interval history:  Please see family meeting documentation as above  MEDICATIONS / ALLERGIES:     all current active meds have been reviewed    Allergies   Allergen Reactions   • Benazepril Cough   • Solifenacin Other (See Comments)   • Olmesartan Rash       OBJECTIVE:    Physical Exam  Physical Exam  Vitals and nursing note reviewed  Constitutional:       General: She is not in acute distress  Appearance: She is underweight  She is ill-appearing  She is not toxic-appearing or diaphoretic  HENT:      Head: Normocephalic and atraumatic  Mouth/Throat:      Pharynx: Oropharynx is clear  Eyes:      Pupils: Pupils are equal, round, and reactive to light  Neck:      Comments: Tracheostomy in place  Cardiovascular:      Rate and Rhythm: Normal rate  Pulmonary:      Effort: No respiratory distress  Abdominal:      General: There is no distension  Musculoskeletal:      Cervical back: Neck supple  Right lower leg: No edema  Left lower leg: No edema  Skin:     General: Skin is warm and dry  Neurological:      General: No focal deficit present  Mental Status: She is alert  Mental status is at baseline  Psychiatric:         Mood and Affect: Mood normal          Behavior: Behavior normal          Lab Results:   I have personally reviewed pertinent labs  , CBC:   Lab Results   Component Value Date    WBC 14 44 (H) 2022    HGB 9 3 (L) 2022    HCT 33 0 (L) 2022    MCV 95 11/30/2022     11/30/2022    MCH 26 7 (L) 11/30/2022    MCHC 28 2 (L) 11/30/2022    RDW 22 2 (H) 11/30/2022    MPV 11 7 11/30/2022    NRBC 0 11/30/2022   , CMP:   Lab Results   Component Value Date    SODIUM 142 11/30/2022    K 3 6 11/30/2022     11/30/2022    CO2 27 11/30/2022    BUN 10 11/30/2022    CREATININE 1 15 11/30/2022    CALCIUM 7 5 (L) 11/30/2022    AST 26 11/30/2022    ALT 22 11/30/2022    ALKPHOS 76 11/30/2022    EGFR 43 11/30/2022     Imaging Studies:   CXR 11/29/22: IMPRESSION:     Interval decrease in size of the right sided pleural effusion compared to CT from 11/25/2022  No evidence of pneumothorax      Patchy opacity in the right lower lung, likely representing a combination of atelectasis and pneumonia based on the chest CT  EKG, Pathology, and Other Studies: cytology and culture from bronchoscopy 11/28/22 still in process  Counseling / Coordination of Care    Total floor / unit time spent today 60 minutes  Greater than 50% of total time was spent with the patient and / or family counseling and / or coordination of care   A description of the counseling / coordination of care: chart review, goals of care discussion, coordination with primary team, pulmonology, in case management

## 2022-11-30 NOTE — ASSESSMENT & PLAN NOTE
· Patient presented with abdominal pain, lightheadedness, intermittent shortness of breath, recent unexpected weight loss   · Met sepsis criteria on admission  · Lactate wnl but has significant hypercalcemia 14 2 on admission  · Patient is status post tracheostomy  · Completed cephalosporin 5 day course, completed azithromycin 3 day course    Plan:  · Completed 5 day course of antibiotics covering for CAP  · Follow up on blood cultures  Blood cultures x2 11/17 no growth 4 days  · Blood cultures x1 11/15 grew Actinomyces   · 11/15 sputum culture grew S  Aureus with repeat sputum culture 11/16 negative   · Bronch/BAL aborted due to patient becoming hemodynamically unstable after anesthesia induction  Patient was briefly taken to ICU for higher level of care  Now transferred to [place]   · 11/22 bronchial cultures pending from tracheal aspirate - partial bronch completed at that time  · AFB stain from aspirate prelim negative    · 1/22 fungal cultures no growth as of 11/28   · 11/22 vital culture no virus isolated at 24 hours on 11/26  Next report to follow in 4 days  · Discontinued abx yesterday 11/30  · CTA 11/26 shows:  No PE  Right-sided pleural effusion is now larger in size  Worsened right middle lobe and right lower lobe consolidation keeping with a combination patient's known in pneumonia and superimposed atelectasis  Worsened mucus plugging throughout the bronchus intermedius, right middle lobe and right lower lobe  Low-density necrotic/abscess component of the right lower lobe consolidation appears unchanged  Unchanged pulmonary edema  · Following up with pulm team, appreciate recommendations  · Patient is not stable for bronchoscopy given episode earlier in the week with initial bronch initiated  At this time recommend thoracentesis versus chest tube placement for right effusion    · Pulm will continue to follow and if patient becomes more stable can consider repeat bronchoscopy without sedation  · IR 11/28 drained approximately 2L of fluid from right side  · LDH and protein ratio per light's criteria point to transudative however LDH is greater than upper 2/3 limit of normal so exudative  · Fluid described as yellow and clear, WBC 88, glucose 128, , and protein 2 9, ph 7 6  · Prelim culture shows no polys or bacteria seen   · Cytology and AFB pending   · Continue trach collar oxygen as needed - able to wean down to 35%  · Trend WBC and fever curve

## 2022-11-30 NOTE — PROGRESS NOTES
INTERNAL MEDICINE RESIDENCY PROGRESS NOTE     Name: Adeline Corrales   Age & Sex: 81 y o  female   MRN: [id number]  Unit/Bed#: [facility] 401-01   Encounter: [id number]  Team: [facility] Team C     PATIENT INFORMATION     Name: Adeline Corrales   Age & Sex: 81 y o  female   MRN: [id number]  Hospital Stay Days: 15    ASSESSMENT/PLAN     Principal Problem:    Sepsis with acute hypoxic respiratory failure ([facility] )  Active Problems:    Right lower lobe lung mass with right pleural effusion    L[facility] carcinoma ([facility] )    Asymptomatic carotid artery stenosis    Hyperlipidemia    Hypertension    CKD (chronic kidney disease) stage 3, GFR 30-59 ml/min    JORDAN (acute kidney injury) ([facility] )    Lung nodule seen on imaging study    Anemia of chronic disease    Hypothyroidism    Gram-positive bacteria on cultures      Right lower lobe lung mass with right pleural effusion  Assessment & Plan  · Patient history of laryngeal carcinoma status post tracheostomy/laryngectomy who presented to ED with tachycardia, hypoxia, fatigue  · 11/15 CTA chest notable for significant findings including:  · Large, heterogeneous airspace consolidation in the right lower lobe, with a 6 3 x 5 2 x 4 8 cm somewhat lobulated hypodense component likely a necrotizing aspiration pneumonia  · Moderate right pleural effusion  No enhancement of the pleura to suggest empyema  · Mediastinal and right hilar lymphadenopathy, likely reactive, however metastatic disease is not entirely excluded    · Legionella and strep antigens negative  · 11/18 IR Thoracentesis - Status post 900 mL fluid thoracentesis suggestive of transudative fluid for pulmonology and may be secondary to pancreatitis  · Blood cultures 1/2 group Actinomyces gram variable rods on 11/15  · Blood cultures x2 on 11/17 final: no growth   · Sputum culture on 11/15 grew Staph aureus  · Sputum culture on 11/16 final: no growth  · Planned for bronchoscopy and BAL 11/22, however, procedure aborted due to patient becoming hemodynamically unstable after anesthesia induction  Was in the ICU 11/22-11/23, transferred back to [facility]       Plan:  · D/c'd abx yesterday 11/29  · 11/22 bronchial cultures pending from tracheal aspirate - partial bronch completed at that time  · AFB stain from aspirate prelim negative    · 1/22 fungal cultures no growth as of 11/28   · 11/22 vital culture no virus isolated at 24 hours on 11/26  Next report to follow in 4 days  · Trend WBC and Fever curve  · Since patient has sinus tachycardia, ordered a d-dimer 11/26 which is 2 73    · CTA 11/26 shows:  No PE  Right-sided pleural effusion is now larger in size  Worsened right middle lobe and right lower lobe consolidation keeping with a combination patient's known in pneumonia and superimposed atelectasis  Worsened mucus plugging throughout the bronchus intermedius, right middle lobe and right lower lobe  Low-density necrotic/abscess component of the right lower lobe consolidation appears unchanged  Unchanged pulmonary edema  · Following up with pulm team, appreciate recommendations  · Patient is not stable for bronchoscopy given episode earlier in the week with initial bronch initiated  At this time recommend thoracentesis versus chest tube placement for right effusion  · Pulm will continue to follow and if patient becomes more stable can consider repeat bronchoscopy without sedation  · IR 11/28 drained approximately 2L - fluid studies show mixed picture  · LDH and protein ratio per light's criteria point to transudative however LDH is greater than upper 2/3 limit of normal so exudative  · Fluid described as yellow and clear, WBC 88, glucose 128, , and protein 2 9, ph 7 6  · Prelim culture shows no polys or bacteria seen   · Cytology and AFB pending   · Plan is for 2:00 p m today  Family meeting with patient, palliative Care and primary team to discuss next steps regarding management and goals of care discussion     · Per respiratory team, patient doing better weaned down to 35 % on trach collar, but could not tolerate when brought down to 21-25% on trach collar  Will continue to monitor   · Discontinued Chest PT and Mucomyst per respiratory recommendations  · Follow-up chest CT in 3 months as an outpatient to ensure resolution as an underlying neoplasm may have a similar appearance  * Sepsis with acute hypoxic respiratory failure ([facility] )  Assessment & Plan  · Patient presented with abdominal pain, lightheadedness, intermittent shortness of breath, recent unexpected weight loss   · Met sepsis criteria on admission  · Lactate wnl but has significant hypercalcemia 14 2 on admission  · Patient is status post tracheostomy  · Completed cephalosporin 5 day course, completed azithromycin 3 day course    Plan:  · Completed 5 day course of antibiotics covering for CAP  · Follow up on blood cultures  Blood cultures x2 11/17 no growth 4 days  · Blood cultures x1 11/15 grew Actinomyces   · 11/15 sputum culture grew S  Aureus with repeat sputum culture 11/16 negative   · Bronch/BAL aborted due to patient becoming hemodynamically unstable after anesthesia induction  Patient was briefly taken to ICU for higher level of care  Now transferred to [facility]   · 11/22 bronchial cultures pending from tracheal aspirate - partial bronch completed at that time  · AFB stain from aspirate prelim negative    · 1/22 fungal cultures no growth as of 11/28   · 11/22 vital culture no virus isolated at 24 hours on 11/26  Next report to follow in 4 days  · Discontinued abx yesterday 11/30  · CTA 11/26 shows:  No PE  Right-sided pleural effusion is now larger in size  Worsened right middle lobe and right lower lobe consolidation keeping with a combination patient's known in pneumonia and superimposed atelectasis  Worsened mucus plugging throughout the bronchus intermedius, right middle lobe and right lower lobe    Low-density necrotic/abscess component of the right lower lobe consolidation appears unchanged  Unchanged pulmonary edema  · Following up with pulm team, appreciate recommendations  · Patient is not stable for bronchoscopy given episode earlier in the week with initial bronch initiated  At this time recommend thoracentesis versus chest tube placement for right effusion  · Pulm will continue to follow and if patient becomes more stable can consider repeat bronchoscopy without sedation  · IR 11/28 drained approximately 2L of fluid from right side  · LDH and protein ratio per light's criteria point to transudative however LDH is greater than upper 2/3 limit of normal so exudative  · Fluid described as yellow and clear, WBC 88, glucose 128, , and protein 2 9, ph 7 6  · Prelim culture shows no polys or bacteria seen   · Cytology and AFB pending   · Continue trach collar oxygen as needed - able to wean down to 35%  · Trend WBC and fever curve  Laryngeal carcinoma [facility])  Assessment & Plan  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  · Follows with ENT as an outpatient with Dr Jo Arreguin; most recently evaluated for trich follow-up in 10/2022     Plan:  · Continue trach collar oxygen, able to wean to 35%  Continue to monitor  · Patient has concerns that prosthesis is not functioning properly  Speech evaluated her yesterday, no leakage around trach and prosthesis  Pt did have this new prothesis placed in June 2022, since then has been having voice issues  Currently suctioning repeatedly from the trach  · ENT consulted, appreciate reccs   · No further interventions for her TPN at this time while inpatient  As outpatient can continue to work on keeping secretions then around TEP, cleaning the mechanism with new brushes, possible placement of TEP reinforcing bring under flange    · Recommend potential outpatient replacement or reinforcement of TEP   · Will have nursing assist with TEP maintanence at this time    Gram-positive bacteria on cultures  Assessment & Plan  1/2 blood cultures grew Actinomyces    Plan:  See Sepsis and Right Lower Lobe Mass A/P     Hypothyroidism  Assessment & Plan  · Known history since 11/2021  · Most recent TSH 4 2 in 08/2022  · Managed on levothyroxine 100 mcg  · Repeat TSH within normal limits    Plan  Stable    · Continue home dose levothyroxine     Anemia of chronic disease  Assessment & Plan  · Known history, in setting of CKD stage IIIB  · Follows nephrology outpatient (Dr Philippe Roche)  · Hgb 11 1 on admission, at baseline  · Currently hemodynamically stable    Plan:  · Trend CBC, Hb 9 3  · No active bleeding present   · Transfuse if Hb < 7         Lung nodule seen on imaging study  Assessment & Plan  · Patient history of laryngeal carcinoma status post tracheostomy/laryngectomy on trach collar presented with fatigue; tachycardic on exam; labs notable for multiple derangements including hypercalcemia 14 3 and bicarb 18  · CT notable for nonspecific 7 x 6 mm lobulated left lower lobe nodule; new finding; CT imaging also revealing for multiple findings large heterogeneous airspace consolidation and right lower lobe with 6 x 5 x 5 lobulated hypodense component with moderate right-sided pleural effusion  concerning for necrotizing pneumonia  · Malignancy not entirely ruled out; does endorse decreased appetite, unexpected weight loss of up to 30 lb, and is a former smoker; quit 2002; hypercalcemia also noted on admission with recent outpatient PTH wnl  · 11/19 - repeat CT suggested of necrosis/abscess    Plan  · Inpatient pulmonology following, appreciate recs; 5 day course completed abx for suspecting necrotizing pneumonia with large heterogeneous airspace consolidation in right lower lobe  · ID consulted, appreciate reccs  No longer on abx   Bronchial aspirates pending results   · Repeat CT future to assess for improvement, outpatient     JORDAN (acute kidney injury) [facility])  Assessment & Plan  Creatinine of 1 47 on admission treated with IV fluid boluses, 3L total, NSS at 100ml/hr, renal function assessment and lab monitoring  Pt has CKD 3 and noted baseline of 1-1 2    Plan:  · Cr 1 15, back to baseline  · Avoid nephrotoxic agents  · Monitor urine output   · Encourage adequate intake    CKD (chronic kidney disease) stage 3, GFR 30-59 ml/min  Assessment & Plan  Lab Results   Component Value Date    EGFR 43 11/30/2022    EGFR 41 11/29/2022    EGFR 35 11/28/2022    CREATININE 1 15 11/30/2022    CREATININE 1 20 11/29/2022    CREATININE 1 36 (H) 11/28/2022     · Known history of CKD stage III states 2014  · Follows [facility] nephrology (Dr Prosper José)  · Cr on admission slightly elevated 1 47(baseline 1 1-1 4)  · Etiology likely 2/2 long history of hypertension, nephrosclerosis, age-related nephron loss     Plan  · Cr 1 15, which is around her baseline    Will continue to monitor BMP  · Avoid use of nephrotoxic agents  · Maintain euvolemia       Hypertension  Assessment & Plan  · Known history, maintained on losartan 50 mg daily at home  · Follows PCP outpatient      Plan:  · Will hold home dose losartan at this time  · Continue monitoring blood pressures     Hyperlipidemia  Assessment & Plan  · Known history of type 2 diabetes on metformin, hypertension on losartan  · Managed on Lipitor 40  · Most recent lipid panel with good control of LDL at 56    Plan    · Continue Lipitor 40mg QD     Asymptomatic carotid artery stenosis  Assessment & Plan  · Known hx, follows with vascular surgery (Dr Myrna Connell)  · Previously found to have recurrent left ICA stenosis and critical new occlusive right IC stenosis, with a which were asymptomatic, along with left vertebral artery atresia; previously worked up in outpatient setting by [facility] concerning symptoms and was recommended endovascular intervention  · On 07/2020, patient electively admitted to [facility] during which she underwent ultrasound-guided right CFA puncture with sternal closure, aortic arch arteriogram, left carotid during him with PTA and stenting under the care of Dr Brown  · Maintained on antiplatelet and anti-statin therapy with Plavix and Lipitor     Plan:   · Continue home dose lipitor   · Restarted home Plavix after bronch was cancelled        Disposition: Nadine 65 discussion today     SUBJECTIVE     Patient seen and examined, sitting in bed comfortably  No acute events overnight  Patient is frustrated with the current situation, especially being in the hospital for some time  She expresses wanting to go home and repeatedly asks what is going on  I explained to her that we will have a family meeting today afternoon to further discuss and honor Alessandra's wishes regarding her care  The patient is agreeable to that  Otherwise offers no complaints  Denies any fevers, chills, chest pain, naseua or vomiting, abdominal pain  She states her shortness of breath is better after the thoracentesis  She is currently at 35% on her trach collar and saturating appropriately around 93-94%  The plan is for a family Byvalarie 65 discussion today in coordination with the palliative care team      OBJECTIVE     Vitals:    22 2237 22 0402 22 0711 22 0750   BP: (!) 98/46  (!) 103/44    BP Location:   Left arm    Pulse: 76  (!) 110    Resp:   16    Temp: 97 6 °F (36 4 °C)  98 8 °F (37 1 °C)    TempSrc:   Oral    SpO2: (!) 87%  92% 90%   Weight:  59 2 kg (130 lb 8 2 oz)     Height:          Temperature:   Temp (24hrs), Av 3 °F (36 8 °C), Min:97 6 °F (36 4 °C), Max:98 8 °F (37 1 °C)    Temperature: 98 8 °F (37 1 °C)  Intake & Output:  I/O        07 07 07 07 07 07    P  O  180 280 100    I V  (mL/kg) 30 (0 5)      IV Piggyback 100  100    Total Intake(mL/kg) 310 (4 9) 280 (4 7) 200 (3 4)    Urine (mL/kg/hr) 850 (0 6) 750 (0 5)     Other 2100      Stool 0 0     Total Output 2950 750     Net -2640 -470 +200           Unmeasured Urine Occurrence 1 x 1 x     Unmeasured Stool Occurrence 2 x 1 x         Weights:        Body mass index is 29 26 kg/m²  Weight (last 2 days)     Date/Time Weight    11/30/22 0402 59 2 (130 51)    11/28/22 0533 62 8 (138 45)        Physical Exam  Vitals and nursing note reviewed  Constitutional:       General: She is not in acute distress  Appearance: She is not toxic-appearing  HENT:      Head: Normocephalic and atraumatic  Mouth/Throat:      Mouth: Mucous membranes are moist       Pharynx: Oropharynx is clear  Eyes:      General: No scleral icterus  Extraocular Movements: Extraocular movements intact  Conjunctiva/sclera: Conjunctivae normal    Neck:      Trachea: Tracheostomy present  Comments: Ashley Hurt is patent with humidification administer via collar  Cardiovascular:      Rate and Rhythm: Regular rhythm  Tachycardia present  Pulses: Normal pulses  Heart sounds: No murmur heard  No gallop  Pulmonary:      Effort: Pulmonary effort is normal  No respiratory distress  Breath sounds: No wheezing or rales  Comments: Rhonchorous bilaterally on exam  Was on 35% trach collar saturating appropriately   Abdominal:      General: Bowel sounds are normal  There is no distension  Palpations: Abdomen is soft  Tenderness: There is no abdominal tenderness  There is no guarding or rebound  Musculoskeletal:      Right lower leg: Edema (+1 up to below knee) present  Skin:     General: Skin is warm and dry  Capillary Refill: Capillary refill takes less than 2 seconds  Neurological:      Mental Status: She is alert and oriented to person, place, and time  Psychiatric:         Mood and Affect: Mood normal        LABORATORY DATA     Labs: I have personally reviewed pertinent reports    Results from last 7 days   Lab Units 11/30/22  0538 11/29/22  0436 11/28/22  0506 11/27/22  0552 11/26/22  0624   WBC Thousand/uL 14 44* 13 03* 15 53*   < > 13 27*   HEMOGLOBIN g/dL 9  3* 9 3* 10 1*   < > 9 6*   HEMATOCRIT % 33 0* 31 7* 35 4   < > 32 7*   PLATELETS Thousands/uL 177 196 229   < > 228   NEUTROS PCT % 91*  --  89*  --  88*   MONOS PCT % 5  --  6  --  7   MONO PCT %  --  3*  --    < >  --     < > = values in this interval not displayed  Results from last 7 days   Lab Units 11/30/22  0538 11/29/22  0436 11/28/22  0506   POTASSIUM mmol/L 3 6 3 4* 3 6   CHLORIDE mmol/L 107 108 106   CO2 mmol/L 27 28 27   BUN mg/dL 10 10 11   CREATININE mg/dL 1 15 1 20 1 36*   CALCIUM mg/dL 7 5* 7 6* 7 9*   ALK PHOS U/L 76 73 88   ALT U/L 22 19 19   AST U/L 26 25 26     Results from last 7 days   Lab Units 11/30/22  0538 11/29/22  0436 11/28/22  0506   MAGNESIUM mg/dL 1 5* 1 6 1 6     Results from last 7 days   Lab Units 11/30/22  0538 11/29/22  0436 11/28/22  0506   PHOSPHORUS mg/dL 1 5* 1 5* 1 7*                    IMAGING & DIAGNOSTIC TESTING     Radiology Results: I have personally reviewed pertinent reports  XR chest portable    Result Date: 11/17/2022  Impression: Near complete drainage of right effusion with trace residual effusion  Masslike opacity in the right lower lobe better shown on CT  Mild pulmonary venous congestion  Workstation performed: [id number]     CT head wo contrast    Result Date: 11/15/2022  Impression: No acute intracranial abnormality  Chronic microangiopathic changes  Workstation performed: [id number]     CT chest wo contrast    Result Date: 11/19/2022  Impression: 1  Right lower lobe consolidation appears similar  There is relative internal hypodensity, along with air, consistent with abscess/necrosis  Recommend remains for a follow-up CT  2   Stable right lower lobe airway debris, with new airway debris in the middle lobe, consistent with interval aspiration event 3  Moderate right pleural effusion, not significantly changed in size  Trace left pleural effusion  4   Interstitial opacities consistent with mild edema 5    Stable left lower lobe nodule, attention on follow-up The study was marked in [facility] for immediate notification  Workstation performed: [id number]     IR IN-Patient Thoracentesis    Result Date: 11/18/2022  Impression: Impression: Ultrasound-guided thoracentesis yielding 900 mL clear yellow pleural fluid  Signed, performed, and dictated by CHANEL Wyman under the supervision of Dr Diamond Mota  Workstation performed: [id number]     PE Study with CT Abdomen and Pelvis with contrast    Result Date: 11/15/2022  Impression: No central, lobar, segmental or proximal subsegmental pulmonary embolism  Evaluation of the distal subsegmental pulmonary arteries is limited  Large, heterogeneous airspace consolidation in the right lower lobe, with a 6 3 x 5 2 x 4 8 cm somewhat lobulated hypodense component with a small droplet of air  This likely represents a necrotizing aspiration pneumonia, given mucous plugging throughout the right lower lobe and retained secretions/debris in the right mainstem bronchus and bronchus intermedius  Recommend follow-up chest CT in approximately 3 months to ensure resolution as an underlying neoplasm may have a similar appearance  Nonspecific 7 x 6 mm lobulated left lower lobe nodule  Attention on follow-up  Moderate right pleural effusion  No enhancement of the pleura to suggest empyema  Mediastinal and right hilar lymphadenopathy, likely reactive, however metastatic disease is not entirely excluded  Mild stranding around the proximal pancreas suggestive of acute interstitial pancreatitis  Recommend correlation with lipase level  No organized peripancreatic fluid collections, loss of parenchymal enhancement to suggest necrosis, or vascular complications of pancreatitis  No other acute abdominal or pelvic pathology  Multiple additional findings as above  The study was marked in [facility] for immediate notification  Workstation performed: [id number]     Other Diagnostic Testing: I have personally reviewed pertinent reports      ACTIVE MEDICATIONS     Current Facility-Administered Medications   Medication Dose Route Frequency   • acetaminophen (TYLENOL) tablet 650 mg  650 mg Oral Q6H PRN   • albuterol inhalation solution 2 5 mg  2 5 mg Nebulization Q4H PRN   • atorvastatin (LIPITOR) tablet 40 mg  40 mg Oral Daily With Dinner   • clopidogrel (PLAVIX) tablet 75 mg  75 mg Oral Daily   • dextromethorphan-guaiFENesin (ROBITUSSIN DM) oral syrup 10 mL  10 mL Oral Q6H PRN   • glycerin-hypromellose- (ARTIFICIAL TEARS) ophthalmic solution 1 drop  1 drop Both Eyes PRN   • heparin (porcine) subcutaneous injection 5,000 Units  5,000 Units Subcutaneous Q8H [address]   • HYDROmorphone HCl (DILAUDID) injection 0 2 mg  0 2 mg Intravenous Q6H PRN   • hydrOXYzine HCL (ATARAX) tablet 25 mg  25 mg Oral Q6H PRN   • iron sucrose (VENOFER) 200 mg in sodium chloride 0 9 % 100 mL IVPB  200 mg Intravenous Once per day on Mon Wed Fri   • levalbuterol (XOPENEX) inhalation solution 1 25 mg  1 25 mg Nebulization TID   • levothyroxine tablet 100 mcg  100 mcg Oral Early Morning   • lidocaine (LIDODERM) 5 % patch 1 patch  1 patch Topical Daily   • loratadine (CLARITIN) tablet 5 mg  5 mg Oral Daily   • magnesium sulfate IVPB (premix) SOLN 1 g  1 g Intravenous Once   • methocarbamol (ROBAXIN) tablet 500 mg  500 mg Oral Q6H PRN   • metoprolol tartrate (LOPRESSOR) partial tablet 12 5 mg  12 5 mg Oral Q12H HARRIS   • oxyCODONE (ROXICODONE) IR tablet 2 5 mg  2 5 mg Oral Q6H PRN    Or   • oxyCODONE (ROXICODONE) IR tablet 5 mg  5 mg Oral Q6H PRN   • sertraline (ZOLOFT) tablet 25 mg  25 mg Oral Daily       VTE Pharmacologic Prophylaxis: Heparin  VTE Mechanical Prophylaxis: sequential compression device    Portions of the record may have been created with voice recognition software  Occasional wrong word or "sound a like" substitutions may have occurred due to the inherent limitations of voice recognition software    Read the chart carefully and recognize, using context, where substitutions have occurred   ==  Julien [address]  Internal Medicine Residency PGY-1

## 2022-11-30 NOTE — CASE MANAGEMENT
Case Management Discharge Planning Note    Patient name Yasmine Brain  Location [facility] 401/[facility] 472-27 MRN [id number]  : 1935 Date 2022       Current Admission Date: 11/15/2022  Current Admission Diagnosis:Sepsis with acute hypoxic respiratory failure [facility])   Patient Active Problem List    Diagnosis Date Noted   • Gram-positive bacteria on cultures 2022   • Hypothyroidism 2022   • Right lower lobe lung mass with right pleural effusion 11/15/2022   • Lung nodule seen on imaging study 11/15/2022   • Sepsis with acute hypoxic respiratory failure ([facility] [address] ) 11/15/2022   • Pleural effusion, right 11/15/2022   • Elevated serum creatinine 11/15/2022   • Anemia of chronic disease 11/15/2022   • Pain in right hip 2022   • SOB (shortness of breath) 2021   • JORDAN (acute kidney injury) [facility] ) 2021   • Acute blood loss anemia 2021   • Elevated troponin 2021   • Acute on chronic kidney failure ([facility] [address] ) 2021   • Tracheitis 2020   • Renovascular hypertension 2020   • CKD (chronic kidney disease) stage 3, GFR 30-59 ml/min 2019   • Tracheostomy care ([facility] ) 2019   • Laryngeal carcinoma ([facility] ) 2019   • Acute anterior circulation transient ischemic attack 10/30/2017   • 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 ([facility] ) 2016   • Aortoiliac occlusive disease ([facility] ) 2015   • Asymptomatic carotid artery stenosis 2013   • Hyperlipidemia 2013   • Hypertension 2013      LOS (days): 15  Geometric Mean LOS (GMLOS) (days): 5 00  Days to GMLOS:-9 9     OBJECTIVE:  Risk of Unplanned Readmission Score: 26 06         Current admission status: Inpatient   Preferred Pharmacy:   [facility], [address]  [address]  [address] [facility] [address]  Phone: [phone] Fax: [place] [facility] - [address] [place] [address]  Phone: [phone] Fax: [phone]    Primary Care Provider: Sp López MD    Primary Insurance: MEDICARE  Secondary Insurance: [employer]    DISCHARGE DETAILS:    CM met with pt and her family at bedside  Pt has decided on hospice and would like to go to  hospice house  If they are unable to accept she will be interested in a MA facility  Family are unable to care for her at home       CM placed referral

## 2022-11-30 NOTE — PLAN OF CARE
Problem: PHYSICAL THERAPY ADULT  Goal: Performs mobility at highest level of function for planned discharge setting  See evaluation for individualized goals  Description: Treatment/Interventions: ADL retraining, Functional transfer training, LE strengthening/ROM, Therapeutic exercise, Endurance training, Patient/family training, Equipment eval/education, Bed mobility, Gait training, Spoke to nursing, Spoke to case management, Spoke to advanced practitioner, OT  Equipment Recommended: Chino Pandey       See flowsheet documentation for full assessment, interventions and recommendations  11/30/2022 1354 by Isiah Irvin PT  Outcome: Progressing  Note: Prognosis: Guarded  Problem List: Decreased strength, Decreased endurance, Impaired balance, Decreased mobility  Assessment: Functional mobility re-assessment performed; pt is generally weak and deconditioned w/ balance impairment and associated mobility deficits, incl gait dysfunction requiring (A)x1 w/ all aspects of mobility to assure safety; at this time, overall mobility status appears to be mod below premorbid level --> recommend rehab upon D/C; will follow  Barriers to Discharge: Decreased caregiver support  Barriers to Discharge Comments: lives alone- in [address] [place] [address]  PT Discharge Recommendation: (S) Post acute rehabilitation services (change from prior recommendations)    See flowsheet documentation for full assessment

## 2022-12-01 NOTE — CASE COMMUNICATION
Kelsey Pool  37 now for IPU  After speaking the Dr Mary Murray, there is a concern that pt’s pain was not well managed during the night  Pt rec’d morphine 2mg x 3 but was found with increased discomfort today  Pt refused PO meds and is now receiving scheduled Robinal 0 1mg and Ativan 0 5mg q 12 hours, Ativan 0 5 mg and Dilaudid 1 mg q 2hr prn  Marked decline from this morning  PPS 20  Approve for IPU?

## 2022-12-01 NOTE — ASSESSMENT & PLAN NOTE
· Patient presented with abdominal pain, lightheadedness, intermittent shortness of breath, recent unexpected weight loss   · Met sepsis criteria on admission  · Lactate wnl but has significant hypercalcemia 14 2 on admission  · Patient is status post tracheostomy  · Completed cephalosporin 5 day course, completed azithromycin 3 day course    Plan:  · Completed 5 day course of antibiotics covering for CAP  · Follow up on blood cultures  Blood cultures x2 11/17 no growth 4 days  · Blood cultures x1 11/15 grew Actinomyces   · 11/15 sputum culture grew S  Aureus with repeat sputum culture 11/16 negative   · Bronch/BAL aborted due to patient becoming hemodynamically unstable after anesthesia induction  Patient was briefly taken to ICU for higher level of care  Now transferred to [place]   · 11/22 bronchial cultures pending from tracheal aspirate - partial bronch completed at that time  · AFB stain from aspirate prelim negative    · 1/22 fungal cultures no growth as of 11/28   · 11/22 vital culture no virus isolated at 24 hours on 11/26  Next report to follow in 4 days  · Discontinued abx yesterday 11/30  · CTA 11/26 shows:  No PE  Right-sided pleural effusion is now larger in size  Worsened right middle lobe and right lower lobe consolidation keeping with a combination patient's known in pneumonia and superimposed atelectasis  Worsened mucus plugging throughout the bronchus intermedius, right middle lobe and right lower lobe  Low-density necrotic/abscess component of the right lower lobe consolidation appears unchanged  Unchanged pulmonary edema  · Following up with pulm team, appreciate recommendations  · Patient is not stable for bronchoscopy given episode earlier in the week with initial bronch initiated  At this time recommend thoracentesis versus chest tube placement for right effusion    · Pulm will continue to follow and if patient becomes more stable can consider repeat bronchoscopy without sedation  · IR 11/28 drained approximately 2L of fluid from right side  · LDH and protein ratio per light's criteria point to transudative however LDH is greater than upper 2/3 limit of normal so exudative  · Fluid described as yellow and clear, WBC 88, glucose 128, , and protein 2 9, ph 7 6  · Prelim culture shows no polys or bacteria seen   · Cytology and AFB pending   · Continue trach collar oxygen as needed - able to wean down to 35%  · Trend WBC and fever curve       Hospice/Comfort care at this time

## 2022-12-01 NOTE — ASSESSMENT & PLAN NOTE
· Patient history of laryngeal carcinoma status post tracheostomy/laryngectomy who presented to ED with tachycardia, hypoxia, fatigue  · 11/15 CTA chest notable for significant findings including:  · Large, heterogeneous airspace consolidation in the right lower lobe, with a 6 3 x 5 2 x 4 8 cm somewhat lobulated hypodense component likely a necrotizing aspiration pneumonia  · Moderate right pleural effusion  No enhancement of the pleura to suggest empyema  · Mediastinal and right hilar lymphadenopathy, likely reactive, however metastatic disease is not entirely excluded  · Legionella and strep antigens negative  · 11/18 IR Thoracentesis - Status post 900 mL fluid thoracentesis suggestive of transudative fluid for pulmonology and may be secondary to pancreatitis  · Blood cultures 1/2 group Actinomyces gram variable rods on 11/15  · Blood cultures x2 on 11/17 final: no growth   · Sputum culture on 11/15 grew Staph aureus  · Sputum culture on 11/16 final: no growth  · Planned for bronchoscopy and BAL 11/22, however, procedure aborted due to patient becoming hemodynamically unstable after anesthesia induction  Was in the ICU 11/22-11/23, transferred back to [facility]       Plan:  · D/c'd abx yesterday 11/29  · 11/22 bronchial cultures pending from tracheal aspirate - partial bronch completed at that time  · AFB stain from aspirate prelim negative    · 1/22 fungal cultures no growth as of 11/28   · 11/22 vital culture no virus isolated at 24 hours on 11/26  Next report to follow in 4 days  · Trend WBC and Fever curve  · Since patient has sinus tachycardia, ordered a d-dimer 11/26 which is 2 73    · CTA 11/26 shows:  No PE  Right-sided pleural effusion is now larger in size  Worsened right middle lobe and right lower lobe consolidation keeping with a combination patient's known in pneumonia and superimposed atelectasis  Worsened mucus plugging throughout the bronchus intermedius, right middle lobe and right lower lobe  Low-density necrotic/abscess component of the right lower lobe consolidation appears unchanged  Unchanged pulmonary edema  · Following up with pulm team, appreciate recommendations  · Patient is not stable for bronchoscopy given episode earlier in the week with initial bronch initiated  At this time recommend thoracentesis versus chest tube placement for right effusion  · Pulm will continue to follow and if patient becomes more stable can consider repeat bronchoscopy without sedation  · IR 11/28 drained approximately 2L - fluid studies show mixed picture  · LDH and protein ratio per light's criteria point to transudative however LDH is greater than upper 2/3 limit of normal so exudative  · Fluid described as yellow and clear, WBC 88, glucose 128, , and protein 2 9, ph 7 6  · Prelim culture shows no polys or bacteria seen   · Cytology and AFB pending   · 11/30 Family meeting with patient, palliative Care and primary team to discuss next steps regarding management and goals of care discussion  · Discontinued Chest PT and Mucomyst per respiratory recommendations  · Follow-up chest CT in 3 months as an outpatient to ensure resolution as an underlying neoplasm may have a similar appearance      Hospice/Comfort care at this time

## 2022-12-01 NOTE — ASSESSMENT & PLAN NOTE
· Known history since 11/2021  · Most recent TSH 4 2 in 08/2022  · Managed on levothyroxine 100 mcg  · Repeat TSH within normal limits    Plan  Hospice/Comfort care at this time

## 2022-12-01 NOTE — ASSESSMENT & PLAN NOTE
Lab Results   Component Value Date    EGFR 43 11/30/2022    EGFR 41 11/29/2022    EGFR 35 11/28/2022    CREATININE 1 15 11/30/2022    CREATININE 1 20 11/29/2022    CREATININE 1 36 (H) 11/28/2022     · Known history of CKD stage III states 2014  · Follows [facility] nephrology (Dr Nena Ohara)  · Cr on admission slightly elevated 1 47(baseline 1 1-1 4)  · Etiology likely 2/2 long history of hypertension, nephrosclerosis, age-related nephron loss     Plan      Hospice/Comfort care at this time

## 2022-12-01 NOTE — ASSESSMENT & PLAN NOTE
· Patient history of laryngeal carcinoma status post tracheostomy/laryngectomy on trach collar presented with fatigue; tachycardic on exam; labs notable for multiple derangements including hypercalcemia 14 3 and bicarb 18  · CT notable for nonspecific 7 x 6 mm lobulated left lower lobe nodule; new finding; CT imaging also revealing for multiple findings large heterogeneous airspace consolidation and right lower lobe with 6 x 5 x 5 lobulated hypodense component with moderate right-sided pleural effusion  concerning for necrotizing pneumonia  · Malignancy not entirely ruled out; does endorse decreased appetite, unexpected weight loss of up to 30 lb, and is a former smoker; quit 2002; hypercalcemia also noted on admission with recent outpatient PTH wnl  · 11/19 - repeat CT suggested of necrosis/abscess    Plan  Hospice/Comfort care at this time

## 2022-12-01 NOTE — QUICK NOTE
Progress Note - Palliative & Supportive Care     Hospice scripts sent to [address] [place] [address] for hospice discharge  Please notify [facility] with further questions/concerns  Otherwise, [facility] will follow peripherally  Thank you      Tom Rivas, DO  Palliative and Supportive Care  [phone]

## 2022-12-01 NOTE — ASSESSMENT & PLAN NOTE
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

## 2022-12-01 NOTE — ASSESSMENT & PLAN NOTE
· Known history of type 2 diabetes on metformin, hypertension on losartan  · Managed on Lipitor 40  · Most recent lipid panel with good control of LDL at 56    Plan      Hospice/Comfort care at this time

## 2022-12-01 NOTE — CASE MANAGEMENT
Case Management Progress Note    Patient name Sharla [facility]  Location [facility] 401/PP[facility] 401-01 MRN [id number]  : 1935 Date 2022       LOS (days): 16  Geometric Mean LOS (GMLOS) (days): 5 00  Days to GMLOS:-10 7        OBJECTIVE:        Current admission status: Inpatient  Preferred Pharmacy:   [facility] 12, [facility] [address] [address]  [address]  [address] [facility] [address]  Phone: [phone] Fax: [phone] [address] [facility]  [address]  [facility] [address] [facility] [address] [facility] [address] [place] [address]  Phone: [phone] Fax: [address], [facility] [address] [facility] [facility] [address] [address] [facility] [address]  Phone: [phone] Fax: [phone]    Primary Care Provider: Zuleima Hamilton MD    Primary Insurance: MEDICARE  Secondary Insurance: CIGNA    PROGRESS NOTE:  CM called and left a message for the Pt's daughter Juan Miguel Ascencio [phone], requesting a call back to follow up her preference of MA facility for the Pt's d/c dispo  CM will continue to follow

## 2022-12-01 NOTE — ASSESSMENT & PLAN NOTE
· Known history, maintained on losartan 50 mg daily at home  · Follows PCP outpatient      Plan:  Hospice/Comfort care at this time

## 2022-12-01 NOTE — PROGRESS NOTES
INTERNAL MEDICINE RESIDENCY PROGRESS NOTE     Name: Adeline Corrales   Age & Sex: 81 y o  female   MRN: [id number]  Unit/Bed#: [facility] 401-01   Encounter: [id number]  Team: [facility] Team C     PATIENT INFORMATION     Name: Adeline Corrales   Age & Sex: 81 y o  female   MRN: [id number]  Hospital Stay Days: 16    ASSESSMENT/PLAN     Principal Problem:    Sepsis with acute hypoxic respiratory failure ([facility] [address] )  Active Problems:    Right lower lobe lung mass with right pleural effusion    L[facility] carcinoma ([facility] )    Asymptomatic carotid artery stenosis    Hyperlipidemia    Hypertension    CKD (chronic kidney disease) stage 3, GFR 30-59 ml/min    JORDAN (acute kidney injury) ([facility] )    Lung nodule seen on imaging study    Anemia of chronic disease    Hypothyroidism    Gram-positive bacteria on cultures      Right lower lobe lung mass with right pleural effusion  Assessment & Plan  · Patient history of laryngeal carcinoma status post tracheostomy/laryngectomy who presented to ED with tachycardia, hypoxia, fatigue  · 11/15 CTA chest notable for significant findings including:  · Large, heterogeneous airspace consolidation in the right lower lobe, with a 6 3 x 5 2 x 4 8 cm somewhat lobulated hypodense component likely a necrotizing aspiration pneumonia  · Moderate right pleural effusion  No enhancement of the pleura to suggest empyema  · Mediastinal and right hilar lymphadenopathy, likely reactive, however metastatic disease is not entirely excluded    · Legionella and strep antigens negative  · 11/18 IR Thoracentesis - Status post 900 mL fluid thoracentesis suggestive of transudative fluid for pulmonology and may be secondary to pancreatitis  · Blood cultures 1/2 group Actinomyces gram variable rods on 11/15  · Blood cultures x2 on 11/17 final: no growth   · Sputum culture on 11/15 grew Staph aureus  · Sputum culture on 11/16 final: no growth  · Planned for bronchoscopy and BAL 11/22, however, procedure aborted due to patient becoming hemodynamically unstable after anesthesia induction  Was in the ICU 11/22-11/23, transferred back to [facility]       Plan:  · D/c'd abx yesterday 11/29  · 11/22 bronchial cultures pending from tracheal aspirate - partial bronch completed at that time  · AFB stain from aspirate prelim negative    · 1/22 fungal cultures no growth as of 11/28   · 11/22 vital culture no virus isolated at 24 hours on 11/26  Next report to follow in 4 days  · Trend WBC and Fever curve  · Since patient has sinus tachycardia, ordered a d-dimer 11/26 which is 2 73    · CTA 11/26 shows:  No PE  Right-sided pleural effusion is now larger in size  Worsened right middle lobe and right lower lobe consolidation keeping with a combination patient's known in pneumonia and superimposed atelectasis  Worsened mucus plugging throughout the bronchus intermedius, right middle lobe and right lower lobe  Low-density necrotic/abscess component of the right lower lobe consolidation appears unchanged  Unchanged pulmonary edema  · Following up with pulm team, appreciate recommendations  · Patient is not stable for bronchoscopy given episode earlier in the week with initial bronch initiated  At this time recommend thoracentesis versus chest tube placement for right effusion  · Pulm will continue to follow and if patient becomes more stable can consider repeat bronchoscopy without sedation  · IR 11/28 drained approximately 2L - fluid studies show mixed picture  · LDH and protein ratio per light's criteria point to transudative however LDH is greater than upper 2/3 limit of normal so exudative  · Fluid described as yellow and clear, WBC 88, glucose 128, , and protein 2 9, ph 7 6  · Prelim culture shows no polys or bacteria seen   · Cytology and AFB pending   · 11/30 Family meeting with patient, palliative Care and primary team to discuss next steps regarding management and goals of care discussion     · Discontinued Chest PT and Mucomyst per respiratory recommendations  · Follow-up chest CT in 3 months as an outpatient to ensure resolution as an underlying neoplasm may have a similar appearance  Hospice/Comfort care at this time    * Sepsis with acute hypoxic respiratory failure ([address] )  Assessment & Plan  · Patient presented with abdominal pain, lightheadedness, intermittent shortness of breath, recent unexpected weight loss   · Met sepsis criteria on admission  · Lactate wnl but has significant hypercalcemia 14 2 on admission  · Patient is status post tracheostomy  · Completed cephalosporin 5 day course, completed azithromycin 3 day course    Plan:  · Completed 5 day course of antibiotics covering for CAP  · Follow up on blood cultures  Blood cultures x2 11/17 no growth 4 days  · Blood cultures x1 11/15 grew Actinomyces   · 11/15 sputum culture grew S  Aureus with repeat sputum culture 11/16 negative   · Bronch/BAL aborted due to patient becoming hemodynamically unstable after anesthesia induction  Patient was briefly taken to ICU for higher level of care  Now transferred to [place]   · 11/22 bronchial cultures pending from tracheal aspirate - partial bronch completed at that time  · AFB stain from aspirate prelim negative    · 1/22 fungal cultures no growth as of 11/28   · 11/22 vital culture no virus isolated at 24 hours on 11/26  Next report to follow in 4 days  · Discontinued abx yesterday 11/30  · CTA 11/26 shows:  No PE  Right-sided pleural effusion is now larger in size  Worsened right middle lobe and right lower lobe consolidation keeping with a combination patient's known in pneumonia and superimposed atelectasis  Worsened mucus plugging throughout the bronchus intermedius, right middle lobe and right lower lobe  Low-density necrotic/abscess component of the right lower lobe consolidation appears unchanged  Unchanged pulmonary edema    · Following up with pulm team, appreciate recommendations  · Patient is not stable for bronchoscopy given episode earlier in the week with initial bronch initiated  At this time recommend thoracentesis versus chest tube placement for right effusion  · Pulm will continue to follow and if patient becomes more stable can consider repeat bronchoscopy without sedation  · IR 11/28 drained approximately 2L of fluid from right side  · LDH and protein ratio per light's criteria point to transudative however LDH is greater than upper 2/3 limit of normal so exudative  · Fluid described as yellow and clear, WBC 88, glucose 128, , and protein 2 9, ph 7 6  · Prelim culture shows no polys or bacteria seen   · Cytology and AFB pending   · Continue trach collar oxygen as needed - able to wean down to 35%  · Trend WBC and fever curve  Hospice/Comfort care at this time    Hypercalcemia-resolved as of 11/29/2022  Assessment & Plan  · In setting of CKD stage 3B, excessive use of vitamin-D and calcium supplementation at home per patient  · Further evidenced by elevated corrected calcium 14 2; supported by initial presentation with abdominal pain, fatigue, decreased appetite/poor PO intake; mentation at baseline, nontoxic on exam  · ECG without arrhythmia  · Further ED work up notable for multiple derangements including Hgb 11 1, low bicarb 18, elevated BUN 34, elevated creatinine 1 47 (baseline 1 11 4), GFR 32, , hypoalbuminemia 2 1; elevated lipase 2028, uric acid 10 4  · CT imaging findings concerning for acute pancreatitis,  necrotizing pneumonia vs  malignancy  · Etiology multifactorial, possibly due to hypercalcemia malignancy in light of CT findings, further complicated by home vitamin-D supplementation; med rec without any other causative agents    Bone demineralization also possible given her age  · Per chart review, patient noted to have elevated corrected calcium level since 08/2022 however does not appear to have been further worked up by PCP  · Unlikely primary parathyroidism or tertiary cause given PTH in 09/2022 was low; baseline renal function with CKD III  Vitamin-D 25 hydroxy level within normal limits  Calcium continues to be elevated   Status post 3 mg zoledronic acid     SPEP with faint gammopathy, PTH very low  UPEP shows selective proteinuria (54) with no monoclonal bands noted  PTHrP <2, WNL    Plan: Resolved  · Calcium level 9 4 corrected  · Vitamin D levels within normal limits: Vit D 25-OH 77 2, Vit D 1,25 OH 49 4  · PTH 6 3, compensating appropriately for elevated Ca level     Hospice/Comfort care at this time    Laryngeal carcinoma [facility])  Assessment & Plan  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  · Follows with ENT as an outpatient with Dr Cady Mendoza; most recently evaluated for trich follow-up in 10/2022     Plan:  Hospice/Comfort care at this time    Gram-positive bacteria on cultures  Assessment & Plan  1/2 blood cultures grew Actinomyces    Plan:  See Sepsis and Right Lower Lobe Mass A/P     Hospice/Comfort care at this time    Hypothyroidism  Assessment & Plan  · Known history since 11/2021  · Most recent TSH 4 2 in 08/2022  · Managed on levothyroxine 100 mcg  · Repeat TSH within normal limits    Plan  Hospice/Comfort care at this time    Anemia of chronic disease  Assessment & Plan  · Known history, in setting of CKD stage IIIB  · Follows nephrology outpatient (Dr Keshawn Galarza)  · Hgb 11 1 on admission, at baseline     · Currently hemodynamically stable    Plan:  Hospice/Comfort care at this time        Lung nodule seen on imaging study  Assessment & Plan  · Patient history of laryngeal carcinoma status post tracheostomy/laryngectomy on trach collar presented with fatigue; tachycardic on exam; labs notable for multiple derangements including hypercalcemia 14 3 and bicarb 18  · CT notable for nonspecific 7 x 6 mm lobulated left lower lobe nodule; new finding; CT imaging also revealing for multiple findings large heterogeneous airspace consolidation and right lower lobe with 6 x 5 x 5 lobulated hypodense component with moderate right-sided pleural effusion  concerning for necrotizing pneumonia  · Malignancy not entirely ruled out; does endorse decreased appetite, unexpected weight loss of up to 30 lb, and is a former smoker; quit 2002; hypercalcemia also noted on admission with recent outpatient PTH wnl  · 11/19 - repeat CT suggested of necrosis/abscess    Plan  Hospice/Comfort care at this time    JORDAN (acute kidney injury) [facility])  Assessment & Plan  Creatinine of 1 47 on admission treated with IV fluid boluses, 3L total, NSS at 100ml/hr, renal function assessment and lab monitoring      Pt has CKD 3 and noted baseline of 1-1 2    Plan:      Hospice/Comfort care at this time    CKD (chronic kidney disease) stage 3, GFR 30-59 ml/min  Assessment & Plan  Lab Results   Component Value Date    EGFR 43 11/30/2022    EGFR 41 11/29/2022    EGFR 35 11/28/2022    CREATININE 1 15 11/30/2022    CREATININE 1 20 11/29/2022    CREATININE 1 36 (H) 11/28/2022     · Known history of CKD stage III states 2014  · Follows [facility] nephrology (Dr Soumya Curry)  · Cr on admission slightly elevated 1 47(baseline 1 1-1 4)  · Etiology likely 2/2 long history of hypertension, nephrosclerosis, age-related nephron loss     Plan      Hospice/Comfort care at this time      Hypertension  Assessment & Plan  · Known history, maintained on losartan 50 mg daily at home  · Follows PCP outpatient      Plan:  Hospice/Comfort care at this time    Hyperlipidemia  Assessment & Plan  · Known history of type 2 diabetes on metformin, hypertension on losartan  · Managed on Lipitor 40  · Most recent lipid panel with good control of LDL at 64    Plan      Hospice/Comfort care at this time    Asymptomatic carotid artery stenosis  Assessment & Plan  · Known hx, follows with vascular surgery (Dr Dave Ruvalcaba)  · Previously found to have recurrent left ICA stenosis and critical new occlusive right IC stenosis, with a which were asymptomatic, along with left vertebral artery atresia; previously worked up in outpatient setting by mass concerning symptoms and was recommended endovascular intervention  · On 07/2020, patient electively admitted to [facility] during which she underwent ultrasound-guided right CFA puncture with sternal closure, aortic arch arteriogram, left carotid during him with PTA and stenting under the care of Dr Brown  · Maintained on antiplatelet and anti-statin therapy with Plavix and Lipitor     Plan:     Hospice/Comfort care at this time        Disposition: Hospice - CM assisting placement     SUBJECTIVE     Patient seen and examined, laying bed  No acute events overnight  She was not able to verbalize her symptoms but appeared uncomfortable and in pain  Wiped secretions from her trach and called the nurse to help suction  Patient appeared fatigued, tired and sleeping at this time  Family stated they felt patient's pain was not well-managed overnight  Have made modifications to pain and comfort meds for patient to feel more comfortable  Plan is to continue comfort care for patient, making her symptoms well-manageable and for her to be comfortable  CM is assisting to find hospice care facility at this time  OBJECTIVE     Vitals:    11/30/22 2200 11/30/22 2200 12/01/22 0435 12/01/22 0800   BP: (!) 100/45 (!) 100/45     BP Location:       Pulse:    (!) 124   Resp:    20   Temp:       TempSrc:       SpO2:       Weight:   59 4 kg (130 lb 15 3 oz)    Height:          Temperature:   No data recorded  Temperature: 99 1 °F (37 3 °C)  Intake & Output:  I/O       11/29 0701  11/30 0700 11/30 0701  12/01 0700 12/01 0701 12/02 0700    P  O  280 100     I V  (mL/kg)  50 (0 8)     IV Piggyback  200     Total Intake(mL/kg) 280 (4 7) 350 (5 9)     Urine (mL/kg/hr) 750 (0 5)      Other       Stool 0      Total Output 750      Net -470 +350            Unmeasured Urine Occurrence 1 x      Unmeasured Stool Occurrence 1 x 1 x Weights:        Body mass index is 29 36 kg/m²  Weight (last 2 days)     Date/Time Weight    12/01/22 0435 59 4 (130 95)    11/30/22 0402 59 2 (130 51)        Physical Exam  Constitutional:       Appearance: She is ill-appearing  Comments: Tired-appearing, fatigue and laying in bed   HENT:      Head: Normocephalic and atraumatic  Nose: Nose normal       Mouth/Throat:      Pharynx: Oropharynx is clear  Eyes:      General: No scleral icterus  Conjunctiva/sclera: Conjunctivae normal       Comments: Opened eyes to voice, not able to make eye contact   Cardiovascular:      Rate and Rhythm: Regular rhythm  Tachycardia present  Heart sounds: No murmur heard  No gallop  Pulmonary:      Breath sounds: Rhonchi present  Comments: Trach collar for humidification and comfort    Rhonchorous bilaterally on exam  Was on 35% trach collar saturating appropriately   Abdominal:      General: Bowel sounds are normal  There is no distension  Palpations: Abdomen is soft  Tenderness: There is no abdominal tenderness  There is no guarding or rebound  Musculoskeletal:      Right lower leg: Edema present  Left lower leg: No edema  Skin:     General: Skin is warm and dry  Capillary Refill: Capillary refill takes less than 2 seconds  Neurological:      Comments: Opens eyes to voice, not making eye contact  Appears to fatigued and tired, cannot verbalize       LABORATORY DATA     Labs: I have personally reviewed pertinent reports  Results from last 7 days   Lab Units 11/30/22  0538 11/29/22  0436 11/28/22  0506 11/27/22  0552 11/26/22  0624   WBC Thousand/uL 14 44* 13 03* 15 53*   < > 13 27*   HEMOGLOBIN g/dL 9 3* 9 3* 10 1*   < > 9 6*   HEMATOCRIT % 33 0* 31 7* 35 4   < > 32 7*   PLATELETS Thousands/uL 177 196 229   < > 228   NEUTROS PCT % 91*  --  89*  --  88*   MONOS PCT % 5  --  6  --  7   MONO PCT %  --  3*  --    < >  --     < > = values in this interval not displayed  Results from last 7 days   Lab Units 11/30/22  0538 11/29/22  0436 11/28/22  0506   POTASSIUM mmol/L 3 6 3 4* 3 6   CHLORIDE mmol/L 107 108 106   CO2 mmol/L 27 28 27   BUN mg/dL 10 10 11   CREATININE mg/dL 1 15 1 20 1 36*   CALCIUM mg/dL 7 5* 7 6* 7 9*   ALK PHOS U/L 76 73 88   ALT U/L 22 19 19   AST U/L 26 25 26     Results from last 7 days   Lab Units 11/30/22  0538 11/29/22  0436 11/28/22  0506   MAGNESIUM mg/dL 1 5* 1 6 1 6     Results from last 7 days   Lab Units 11/30/22  0538 11/29/22  0436 11/28/22  0506   PHOSPHORUS mg/dL 1 5* 1 5* 1 7*                    IMAGING & DIAGNOSTIC TESTING     Radiology Results: I have personally reviewed pertinent reports  XR chest portable    Result Date: 11/17/2022  Impression: Near complete drainage of right effusion with trace residual effusion  Masslike opacity in the right lower lobe better shown on CT  Mild pulmonary venous congestion  Workstation performed: [id number]     CT head wo contrast    Result Date: 11/15/2022  Impression: No acute intracranial abnormality  Chronic microangiopathic changes  Workstation performed: [id number]     CT chest wo contrast    Result Date: 11/19/2022  Impression: 1  Right lower lobe consolidation appears similar  There is relative internal hypodensity, along with air, consistent with abscess/necrosis  Recommend remains for a follow-up CT  2   Stable right lower lobe airway debris, with new airway debris in the middle lobe, consistent with interval aspiration event 3  Moderate right pleural effusion, not significantly changed in size  Trace left pleural effusion  4   Interstitial opacities consistent with mild edema 5  Stable left lower lobe nodule, attention on follow-up The study was marked in EPIC for immediate notification  Workstation performed: [id number]     IR IN-Patient Thoracentesis    Result Date: 11/18/2022  Impression: Impression: Ultrasound-guided thoracentesis yielding 900 mL clear yellow pleural fluid   Signed, performed, and dictated by CHANEL Mchugh under the supervision of Dr Fouzia Mulligan  Workstation performed: [id number]     PE Study with CT Abdomen and Pelvis with contrast    Result Date: 11/15/2022  Impression: No central, lobar, segmental or proximal subsegmental pulmonary embolism  Evaluation of the distal subsegmental pulmonary arteries is limited  Large, heterogeneous airspace consolidation in the right lower lobe, with a 6 3 x 5 2 x 4 8 cm somewhat lobulated hypodense component with a small droplet of air  This likely represents a necrotizing aspiration pneumonia, given mucous plugging throughout the right lower lobe and retained secretions/debris in the right mainstem bronchus and bronchus intermedius  Recommend follow-up chest CT in approximately 3 months to ensure resolution as an underlying neoplasm may have a similar appearance  Nonspecific 7 x 6 mm lobulated left lower lobe nodule  Attention on follow-up  Moderate right pleural effusion  No enhancement of the pleura to suggest empyema  Mediastinal and right hilar lymphadenopathy, likely reactive, however metastatic disease is not entirely excluded  Mild stranding around the proximal pancreas suggestive of acute interstitial pancreatitis  Recommend correlation with lipase level  No organized peripancreatic fluid collections, loss of parenchymal enhancement to suggest necrosis, or vascular complications of pancreatitis  No other acute abdominal or pelvic pathology  Multiple additional findings as above  The study was marked in [facility] for immediate notification  Workstation performed: [id number]     Other Diagnostic Testing: I have personally reviewed pertinent reports      ACTIVE MEDICATIONS     Current Facility-Administered Medications   Medication Dose Route Frequency   • bisacodyl (DULCOLAX) rectal suppository 10 mg  10 mg Rectal Daily PRN   • glycerin-hypromellose- (ARTIFICIAL TEARS) ophthalmic solution 1 drop  1 drop Both Eyes PRN   • glycopyrrolate (ROBINUL) injection 0 1 mg  0 1 mg Intravenous BID   • glycopyrrolate (ROBINUL) injection 0 1 mg  0 1 mg Intravenous Q4H PRN   • haloperidol lactate (HALDOL) injection 0 5 mg  0 5 mg Intravenous Q2H PRN   • HYDROmorphone (DILAUDID) injection 1 mg  1 mg Intravenous Q2H PRN   • levothyroxine tablet 100 mcg  100 mcg Oral Early Morning   • lidocaine (LIDODERM) 5 % patch 1 patch  1 patch Topical Daily   • LORazepam (ATIVAN) injection 0 5 mg  0 5 mg Intravenous Q12H   • LORazepam (ATIVAN) injection 0 5 mg  0 5 mg Intravenous Q6H PRN   • metoprolol tartrate (LOPRESSOR) partial tablet 12 5 mg  12 5 mg Oral Q12H HARRIS   • oxyCODONE (ROXICODONE) IR tablet 5 mg  5 mg Oral Q8H   • sertraline (ZOLOFT) tablet 25 mg  25 mg Oral Daily       VTE Pharmacologic Prophylaxis: Reason for no pharmacologic prophylaxis Hospice  VTE Mechanical Prophylaxis: sequential compression device    Portions of the record may have been created with voice recognition software  Occasional wrong word or "sound a like" substitutions may have occurred due to the inherent limitations of voice recognition software    Read the chart carefully and recognize, using context, where substitutions have occurred   ==  [address]  Internal Medicine Residency PGY-1

## 2022-12-01 NOTE — ASSESSMENT & PLAN NOTE
Creatinine of 1 47 on admission treated with IV fluid boluses, 3L total, NSS at 100ml/hr, renal function assessment and lab monitoring      Pt has CKD 3 and noted baseline of 1-1 2    Plan:      Hospice/Comfort care at this time

## 2022-12-01 NOTE — PROGRESS NOTES
Progress Note - Infectious Disease   Annamarie Whatley 81 y o  female MRN: [id number]  Unit/Bed#: [facility] 401-01 Encounter: [id number]      Impression:  1  Necrotizing RLL pneumonia R/0 abscess S/P bronchoscopy/BAL  2  History of laryngeal carcinoma s/p laryngectomy/tracheitis  3  DM type 2  4  CAD  5  Extensive PVD    Recommendations:  Patient is afebrile with elevated  WBC count 14,440 when last taken  Patient has been referred to hospice  Necrotizing RLL pneumonia  1  Bronchoscopy/BAL was only partially complete  A CTA done 11/25 shows a large right-sided pleural effusion with worsened RML and RLL consolidation compatible with pneumonia/atelectasis  Underwent IR right thoracentesis 11/28  2  No further IV access  3  All cultures negative  Diarrhea  1  C diff PCR negative  Antibiotics:  1  Observe off antibiotics    Subjective: Intermittent diarrhea  Denies fevers, chills, or sweats  Denies nausea, vomiting, or diarrhea  Objective:  Vitals:  HR:  [124] 124  Resp:  [20] 20  BP: (100)/(45) 100/45  SpO2:  [95 %] 95 %  No data recorded  Current: Temperature: 99 1 °F (37 3 °C)    Physical Exam:     General Appearance:    Eyes:   Alert, responsive, chronically ill-appearing nontoxic, no acute distress  Conjunctiva pale   Throat: Oropharynx moist without lesions  Lips, mucosa, and tongue normal   Neck: Tracheostomy, surgical scars   Lungs:   Bibasilar dullness > right   Heart:  Regular rate with ectopics and rhythm, S1, S2 normal, no murmur, rub or gallop   Abdomen:   Soft, non-tender, non-distended, positive bowel sounds  No masses, no organomegaly    No CVA tenderness, external urinary catheter   Extremities: Extremities normal, atraumatic, no clubbing, cyanosis or edema   Skin: Skin color pale, surgical scars, as above texture, turgor normal, no rashes or lesions  No draining wounds noted           Invasive Devices     Peripheral Intravenous Line  Duration           Peripheral IV 11/30/22 Right;Ventral (anterior) Forearm 1 day          Airway  Duration           Surgical Airway -- days                Labs, Imaging, & Other studies:   All pertinent labs were personally reviewed  Results from last 7 days   Lab Units 11/30/22  0538 11/29/22  0436 11/28/22  0506   WBC Thousand/uL 14 44* 13 03* 15 53*   HEMOGLOBIN g/dL 9 3* 9 3* 10 1*   PLATELETS Thousands/uL 177 196 229     Results from last 7 days   Lab Units 11/30/22  0538 11/29/22  0436 11/28/22  0506   SODIUM mmol/L 142 144 142   POTASSIUM mmol/L 3 6 3 4* 3 6   CHLORIDE mmol/L 107 108 106   CO2 mmol/L 27 28 27   BUN mg/dL 10 10 11   CREATININE mg/dL 1 15 1 20 1 36*   EGFR ml/min/1 73sq m 43 41 35   CALCIUM mg/dL 7 5* 7 6* 7 9*   AST U/L 26 25 26   ALT U/L 22 19 19   ALK PHOS U/L 76 73 88     Results from last 7 days   Lab Units 11/30/22  1112 11/28/22  1159   GRAM STAIN RESULT   --  No Polys or Bacteria seen   BODY FLUID CULTURE, STERILE   --  No growth   C DIFF TOXIN B BY PCR  Negative  --

## 2022-12-01 NOTE — CASE COMMUNICATION
Daniel Palmer  37 at [facility] for [address]  Pt presented to ED 11-15 with abdominal and back pain  CT revealed RRL mass w/pleural effusion  Underwent IR right thoracentesis x 2  Pt defers PICC and central line placements as well as further thoracenteses  Relevant comorbities sepsis with acute hypoxic respiratory failure, necrotizing RLL pneumonia, laryngeal carcinoma s/p, laryngectomy/tracheitis, back pain, reports 20-30 lb weight loss   PPS 40-30, currently at 35% on trach collar w/ sats 93-94%  Approve for RLOC?

## 2022-12-01 NOTE — ASSESSMENT & PLAN NOTE
· Known hx, follows with vascular surgery (Dr Aidan Avelar)  · Previously found to have recurrent left ICA stenosis and critical new occlusive right IC stenosis, with a which were asymptomatic, along with left vertebral artery atresia; previously worked up in outpatient setting by mass concerning symptoms and was recommended endovascular intervention  · On 07/2020, patient electively admitted to [facility] during which she underwent ultrasound-guided right CFA puncture with sternal closure, aortic arch arteriogram, left carotid during him with PTA and stenting under the care of Dr Brown  · Maintained on antiplatelet and anti-statin therapy with Plavix and Lipitor     Plan:     Hospice/Comfort care at this time

## 2022-12-01 NOTE — ASSESSMENT & PLAN NOTE
1/2 blood cultures grew Actinomyces    Plan:  See Sepsis and Right Lower Lobe Mass A/P     Hospice/Comfort care at this time

## 2022-12-01 NOTE — ASSESSMENT & PLAN NOTE
· In setting of CKD stage 3B, excessive use of vitamin-D and calcium supplementation at home per patient  · Further evidenced by elevated corrected calcium 14 2; supported by initial presentation with abdominal pain, fatigue, decreased appetite/poor PO intake; mentation at baseline, nontoxic on exam  · ECG without arrhythmia  · Further ED work up notable for multiple derangements including Hgb 11 1, low bicarb 18, elevated BUN 34, elevated creatinine 1 47 (baseline 1 11 4), GFR 32, , hypoalbuminemia 2 1; elevated lipase 2028, uric acid 10 4  · CT imaging findings concerning for acute pancreatitis,  necrotizing pneumonia vs  malignancy  · Etiology multifactorial, possibly due to hypercalcemia malignancy in light of CT findings, further complicated by home vitamin-D supplementation; med rec without any other causative agents  Bone demineralization also possible given her age  · Per chart review, patient noted to have elevated corrected calcium level since 08/2022 however does not appear to have been further worked up by PCP  · Unlikely primary parathyroidism or tertiary cause given PTH in 09/2022 was low; baseline renal function with CKD III  Vitamin-D 25 hydroxy level within normal limits  Calcium continues to be elevated   Status post 3 mg zoledronic acid     SPEP with faint gammopathy, PTH very low  UPEP shows selective proteinuria (54) with no monoclonal bands noted   PTHrP <2, WNL    Plan: Resolved  · Calcium level 9 4 corrected  · Vitamin D levels within normal limits: Vit D 25-OH 77 2, Vit D 1,25 OH 49 4  · PTH 6 3, compensating appropriately for elevated Ca level     Hospice/Comfort care at this time

## 2022-12-01 NOTE — ASSESSMENT & PLAN NOTE
· Known history, in setting of CKD stage IIIB  · Follows nephrology outpatient (Dr Jesus Carr)  · Hgb 11 1 on admission, at baseline     · Currently hemodynamically stable    Plan:  Hospice/Comfort care at this time

## 2022-12-02 NOTE — ASSESSMENT & PLAN NOTE
· Patient presented with abdominal pain, lightheadedness, intermittent shortness of breath, recent unexpected weight loss   · Met sepsis criteria on admission  · Lactate wnl but has significant hypercalcemia 14 2 on admission  · Patient is status post tracheostomy  · Completed cephalosporin 5 day course, completed azithromycin 3 day course    Plan:  · Completed 5 day course of antibiotics covering for CAP  · Follow up on blood cultures  Blood cultures x2 11/17 no growth 4 days  · Blood cultures x1 11/15 grew Actinomyces   · 11/15 sputum culture grew S  Aureus with repeat sputum culture 11/16 negative   · Bronch/BAL aborted due to patient becoming hemodynamically unstable after anesthesia induction  Patient was briefly taken to ICU for higher level of care  Now transferred to [place]   · 11/22 bronchial cultures pending from tracheal aspirate - partial bronch completed at that time  · AFB stain from aspirate prelim negative    · 1/22 fungal cultures no growth as of 11/28   · 11/22 vital culture no virus isolated at 24 hours on 11/26  Next report to follow in 4 days  · Discontinued abx yesterday 11/30  · CTA 11/26 shows:  No PE  Right-sided pleural effusion is now larger in size  Worsened right middle lobe and right lower lobe consolidation keeping with a combination patient's known in pneumonia and superimposed atelectasis  Worsened mucus plugging throughout the bronchus intermedius, right middle lobe and right lower lobe  Low-density necrotic/abscess component of the right lower lobe consolidation appears unchanged  Unchanged pulmonary edema  · Following up with pulm team, appreciate recommendations  · Patient is not stable for bronchoscopy given episode earlier in the week with initial bronch initiated  At this time recommend thoracentesis versus chest tube placement for right effusion    · Pulm will continue to follow and if patient becomes more stable can consider repeat bronchoscopy without sedation  · IR 11/28 drained approximately 2L of fluid from right side  · LDH and protein ratio per light's criteria point to transudative however LDH is greater than upper 2/3 limit of normal so exudative  · Fluid described as yellow and clear, WBC 88, glucose 128, , and protein 2 9, ph 7 6  · Prelim culture shows no polys or bacteria seen   · Cytology and AFB pending   · Continue trach collar oxygen as needed - able to wean down to 35%  · Trend WBC and fever curve       Hospice/Comfort care at this time  Patient passed away on 12/01/22

## 2022-12-02 NOTE — PROGRESS NOTES
Two  homes were noted from the family, unsure which  home family will be using   The other  home mentioned was [facility] [phone]

## 2022-12-02 NOTE — ASSESSMENT & PLAN NOTE
Lab Results   Component Value Date    EGFR 43 11/30/2022    EGFR 41 11/29/2022    EGFR 35 11/28/2022    CREATININE 1 15 11/30/2022    CREATININE 1 20 11/29/2022    CREATININE 1 36 (H) 11/28/2022     · Known history of CKD stage III states 2014  · Follows [facility] nephrology (Dr Marilyn Esteban)  · Cr on admission slightly elevated 1 47(baseline 1 1-1 4)  · Etiology likely 2/2 long history of hypertension, nephrosclerosis, age-related nephron loss     Plan      Hospice/Comfort care at this time  Patient passed away on 12/01/22

## 2022-12-02 NOTE — ASSESSMENT & PLAN NOTE
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

## 2022-12-02 NOTE — ASSESSMENT & PLAN NOTE
· Patient history of laryngeal carcinoma status post tracheostomy/laryngectomy who presented to ED with tachycardia, hypoxia, fatigue  · 11/15 CTA chest notable for significant findings including:  · Large, heterogeneous airspace consolidation in the right lower lobe, with a 6 3 x 5 2 x 4 8 cm somewhat lobulated hypodense component likely a necrotizing aspiration pneumonia  · Moderate right pleural effusion  No enhancement of the pleura to suggest empyema  · Mediastinal and right hilar lymphadenopathy, likely reactive, however metastatic disease is not entirely excluded  · Legionella and strep antigens negative  · 11/18 IR Thoracentesis - Status post 900 mL fluid thoracentesis suggestive of transudative fluid for pulmonology and may be secondary to pancreatitis  · Blood cultures 1/2 group Actinomyces gram variable rods on 11/15  · Blood cultures x2 on 11/17 final: no growth   · Sputum culture on 11/15 grew Staph aureus  · Sputum culture on 11/16 final: no growth  · Planned for bronchoscopy and BAL 11/22, however, procedure aborted due to patient becoming hemodynamically unstable after anesthesia induction  Was in the ICU 11/22-11/23, transferred back to [facility]       Plan:  · D/c'd abx yesterday 11/29  · 11/22 bronchial cultures pending from tracheal aspirate - partial bronch completed at that time  · AFB stain from aspirate prelim negative    · 1/22 fungal cultures no growth as of 11/28   · 11/22 vital culture no virus isolated at 24 hours on 11/26  Next report to follow in 4 days  · Trend WBC and Fever curve  · Since patient has sinus tachycardia, ordered a d-dimer 11/26 which is 2 73    · CTA 11/26 shows:  No PE  Right-sided pleural effusion is now larger in size  Worsened right middle lobe and right lower lobe consolidation keeping with a combination patient's known in pneumonia and superimposed atelectasis  Worsened mucus plugging throughout the bronchus intermedius, right middle lobe and right lower lobe  Low-density necrotic/abscess component of the right lower lobe consolidation appears unchanged  Unchanged pulmonary edema  · Following up with pulm team, appreciate recommendations  · Patient is not stable for bronchoscopy given episode earlier in the week with initial bronch initiated  At this time recommend thoracentesis versus chest tube placement for right effusion  · Pulm will continue to follow and if patient becomes more stable can consider repeat bronchoscopy without sedation  · IR 11/28 drained approximately 2L - fluid studies show mixed picture  · LDH and protein ratio per light's criteria point to transudative however LDH is greater than upper 2/3 limit of normal so exudative  · Fluid described as yellow and clear, WBC 88, glucose 128, , and protein 2 9, ph 7 6  · Prelim culture shows no polys or bacteria seen   · Cytology and AFB pending   · 11/30 Family meeting with patient, palliative Care and primary team to discuss next steps regarding management and goals of care discussion  · Discontinued Chest PT and Mucomyst per respiratory recommendations  · Follow-up chest CT in 3 months as an outpatient to ensure resolution as an underlying neoplasm may have a similar appearance      Hospice/Comfort care at this time  Patient passed away on 12/01/22

## 2022-12-02 NOTE — DISCHARGE SUMMARY
INTERNAL MEDICINE RESIDENCY DISCHARGE SUMMARY     Janae Whatley   81 y o  female  MRN: [id number]  Room[facility]Bed: [facility] 401/[facility] 0-200     [address] [facility]    Encounter: [id number]    Principal Problem:    Sepsis with acute hypoxic respiratory failure [facility])  Active Problems:    Right lower lobe lung mass with right pleural effusion    Laryngeal carcinoma (HCC)    Asymptomatic carotid artery stenosis    Hyperlipidemia    Hypertension    CKD (chronic kidney disease) stage 3, GFR 30-59 ml/min    JORDAN (acute kidney injury) ([facility] [address] )    Lung nodule seen on imaging study    Anemia of chronic disease    Hypothyroidism    Gram-positive bacteria on cultures      Right lower lobe lung mass with right pleural effusion  Assessment & Plan  · Patient history of laryngeal carcinoma status post tracheostomy/laryngectomy who presented to ED with tachycardia, hypoxia, fatigue  · 11/15 CTA chest notable for significant findings including:  · Large, heterogeneous airspace consolidation in the right lower lobe, with a 6 3 x 5 2 x 4 8 cm somewhat lobulated hypodense component likely a necrotizing aspiration pneumonia  · Moderate right pleural effusion  No enhancement of the pleura to suggest empyema  · Mediastinal and right hilar lymphadenopathy, likely reactive, however metastatic disease is not entirely excluded  · Legionella and strep antigens negative  · 11/18 IR Thoracentesis - Status post 900 mL fluid thoracentesis suggestive of transudative fluid for pulmonology and may be secondary to pancreatitis  · Blood cultures 1/2 group Actinomyces gram variable rods on 11/15  · Blood cultures x2 on 11/17 final: no growth   · Sputum culture on 11/15 grew Staph aureus  · Sputum culture on 11/16 final: no growth  · Planned for bronchoscopy and BAL 11/22, however, procedure aborted due to patient becoming hemodynamically unstable after anesthesia induction   Was in the ICU 11/22-11/23, transferred back to [facility]       Plan:  · D/c'd abx yesterday 11/29  · 11/22 bronchial cultures pending from tracheal aspirate - partial bronch completed at that time  · AFB stain from aspirate prelim negative    · 1/22 fungal cultures no growth as of 11/28   · 11/22 vital culture no virus isolated at 24 hours on 11/26  Next report to follow in 4 days  · Trend WBC and Fever curve  · Since patient has sinus tachycardia, ordered a d-dimer 11/26 which is 2 73    · CTA 11/26 shows:  No PE  Right-sided pleural effusion is now larger in size  Worsened right middle lobe and right lower lobe consolidation keeping with a combination patient's known in pneumonia and superimposed atelectasis  Worsened mucus plugging throughout the bronchus intermedius, right middle lobe and right lower lobe  Low-density necrotic/abscess component of the right lower lobe consolidation appears unchanged  Unchanged pulmonary edema  · Following up with pulm team, appreciate recommendations  · Patient is not stable for bronchoscopy given episode earlier in the week with initial bronch initiated  At this time recommend thoracentesis versus chest tube placement for right effusion  · Pulm will continue to follow and if patient becomes more stable can consider repeat bronchoscopy without sedation  · IR 11/28 drained approximately 2L - fluid studies show mixed picture  · LDH and protein ratio per light's criteria point to transudative however LDH is greater than upper 2/3 limit of normal so exudative  · Fluid described as yellow and clear, WBC 88, glucose 128, , and protein 2 9, ph 7 6  · Prelim culture shows no polys or bacteria seen   · Cytology and AFB pending   · 11/30 Family meeting with patient, palliative Care and primary team to discuss next steps regarding management and goals of care discussion     · Discontinued Chest PT and Mucomyst per respiratory recommendations  · Follow-up chest CT in 3 months as an outpatient to ensure resolution as an underlying neoplasm may have a similar appearance  Hospice/Comfort care at this time  Patient passed away on 12/01/22    * Sepsis with acute hypoxic respiratory failure ([facility] [address] )  Assessment & Plan  · Patient presented with abdominal pain, lightheadedness, intermittent shortness of breath, recent unexpected weight loss   · Met sepsis criteria on admission  · Lactate wnl but has significant hypercalcemia 14 2 on admission  · Patient is status post tracheostomy  · Completed cephalosporin 5 day course, completed azithromycin 3 day course    Plan:  · Completed 5 day course of antibiotics covering for CAP  · Follow up on blood cultures  Blood cultures x2 11/17 no growth 4 days  · Blood cultures x1 11/15 grew Actinomyces   · 11/15 sputum culture grew S  Aureus with repeat sputum culture 11/16 negative   · Bronch/BAL aborted due to patient becoming hemodynamically unstable after anesthesia induction  Patient was briefly taken to ICU for higher level of care  Now transferred to [place]   · 11/22 bronchial cultures pending from tracheal aspirate - partial bronch completed at that time  · AFB stain from aspirate prelim negative    · 1/22 fungal cultures no growth as of 11/28   · 11/22 vital culture no virus isolated at 24 hours on 11/26  Next report to follow in 4 days  · Discontinued abx yesterday 11/30  · CTA 11/26 shows:  No PE  Right-sided pleural effusion is now larger in size  Worsened right middle lobe and right lower lobe consolidation keeping with a combination patient's known in pneumonia and superimposed atelectasis  Worsened mucus plugging throughout the bronchus intermedius, right middle lobe and right lower lobe  Low-density necrotic/abscess component of the right lower lobe consolidation appears unchanged  Unchanged pulmonary edema  · Following up with pulm team, appreciate recommendations  · Patient is not stable for bronchoscopy given episode earlier in the week with initial bronch initiated    At this time recommend thoracentesis versus chest tube placement for right effusion  · Pulm will continue to follow and if patient becomes more stable can consider repeat bronchoscopy without sedation  · IR 11/28 drained approximately 2L of fluid from right side  · LDH and protein ratio per light's criteria point to transudative however LDH is greater than upper 2/3 limit of normal so exudative  · Fluid described as yellow and clear, WBC 88, glucose 128, , and protein 2 9, ph 7 6  · Prelim culture shows no polys or bacteria seen   · Cytology and AFB pending   · Continue trach collar oxygen as needed - able to wean down to 35%  · Trend WBC and fever curve  Hospice/Comfort care at this time  Patient passed away on 12/01/22    Laryngeal carcinoma [facility])  Assessment & Plan  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  · Follows with ENT as an outpatient with Dr Magdaleno Williamson; most recently evaluated for trich follow-up in 10/2022     Plan:  Hospice/Comfort care at this time  Patient passed away on 12/01/22    Gram-positive bacteria on cultures  Assessment & Plan  1/2 blood cultures grew Actinomyces    Plan:  See Sepsis and Right Lower Lobe Mass A/P     Hospice/Comfort care at this time     Hypothyroidism  Assessment & Plan  · Known history since 11/2021  · Most recent TSH 4 2 in 08/2022  · Managed on levothyroxine 100 mcg  · Repeat TSH within normal limits    Plan  Hospice/Comfort care at this time     Anemia of chronic disease  Assessment & Plan  · Known history, in setting of CKD stage IIIB  · Follows nephrology outpatient (Dr Yassine Gilman)  · Hgb 11 1 on admission, at baseline     · Currently hemodynamically stable    Plan:  Hospice/Comfort care at this time         Lung nodule seen on imaging study  Assessment & Plan  · Patient history of laryngeal carcinoma status post tracheostomy/laryngectomy on trach collar presented with fatigue; tachycardic on exam; labs notable for multiple derangements including hypercalcemia 14 3 and bicarb 18  · CT notable for nonspecific 7 x 6 mm lobulated left lower lobe nodule; new finding; CT imaging also revealing for multiple findings large heterogeneous airspace consolidation and right lower lobe with 6 x 5 x 5 lobulated hypodense component with moderate right-sided pleural effusion  concerning for necrotizing pneumonia  · Malignancy not entirely ruled out; does endorse decreased appetite, unexpected weight loss of up to 30 lb, and is a former smoker; quit 2002; hypercalcemia also noted on admission with recent outpatient PTH wnl  · 11/19 - repeat CT suggested of necrosis/abscess    Plan  Hospice/Comfort care at this time     JORDAN (acute kidney injury) [facility])  Assessment & Plan  Creatinine of 1 47 on admission treated with IV fluid boluses, 3L total, NSS at 100ml/hr, renal function assessment and lab monitoring      Pt has CKD 3 and noted baseline of 1-1 2    Plan:      Hospice/Comfort care at this time     CKD (chronic kidney disease) stage 3, GFR 30-59 ml/min  Assessment & Plan  Lab Results   Component Value Date    EGFR 43 11/30/2022    EGFR 41 11/29/2022    EGFR 35 11/28/2022    CREATININE 1 15 11/30/2022    CREATININE 1 20 11/29/2022    CREATININE 1 36 (H) 11/28/2022     · Known history of CKD stage III states 2014  · Follows [facility] nephrology (Dr Janice Sharma)  · Cr on admission slightly elevated 1 47(baseline 1 1-1 4)  · Etiology likely 2/2 long history of hypertension, nephrosclerosis, age-related nephron loss     Plan      Hospice/Comfort care at this time  Patient passed away on 12/01/22      Hypertension  Assessment & Plan  · Known history, maintained on losartan 50 mg daily at home  · Follows PCP outpatient      Plan:  Hospice/Comfort care at this time    Hyperlipidemia  Assessment & Plan  · Known history of type 2 diabetes on metformin, hypertension on losartan  · Managed on Lipitor 40  · Most recent lipid panel with good control of LDL at 56    Plan Hospice/Comfort care at this time    Asymptomatic carotid artery stenosis  Assessment & Plan  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, with a which were asymptomatic, along with left vertebral artery atresia; previously worked up in outpatient setting by mass concerning symptoms and was recommended endovascular intervention  · On 07/2020, patient electively admitted to [facility] during which she underwent ultrasound-guided right CFA puncture with sternal closure, aortic arch arteriogram, left carotid during him with PTA and stenting under the care of Dr Brown  · Maintained on antiplatelet and anti-statin therapy with Plavix and Lipitor     Plan:     Hospice/Comfort care at this time  Patient passed away on 12/01/22        [address]     Per Dr Vera Job H+P "Anibal Miller is a 81y o  year old female with a past medical history of 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, CKD stage IIIB (baseline Cr 1 1-1 4), HLD and 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, PAD with intermittent claudication, asymptomatic bilateral carotid stenosis 2/2 radiation s/p bilateral  TCAR followed by R carotid PTA stent (7/2020) on Plavix and Lipitor, hypothyroidism on levothyroxine, HTN controlled with losartan, and T2DM controlled with metformin, who presented to ED from SNF on 11/15 with 1mo  of vague symptoms including abdominal pain, lightheadedness, intermittent SOB, and unexpected recent 20-30 lb weight loss  Denies chest pain  Denies syncope/LOC, falls, recent head strikes  Denies fever or chills, n/v/d  Denies overt signs of bleeding including hematuria, melena/hematochezia  Denies urinary symptoms  Mentions she has been frequently taking vitamin-D-calcium-magnesium supplementation  Compliant with all other home meds  Denies recent long distance travel or sick contacts  No recent hospital admissions or ED evaluations  Per chart review, patient regularly follows with Nephrology, ENT, vascular surgery, and PCP  Does not have an established care with outpatient pulmonology       ED course: On initial presentation, patient nontoxic appearing, VS significant for tachycardia with HR in 120s, tachypnea RR 20s, borderline hypotensive systolic in the 99N to 38E and hypoxia requiring trach O2 5 L  Exam notable for tachycardia, systolic ejection murmur at right upper sternal border, rales at the R lower base, diffuse abdominal tenderness to palpation; trach with voice box functioning well  Workup in the ED significant for multiple derangements including elevated WBC 15 58, hemoglobin 11 1 (baseline 12s), low bicarb 18, elevated BUN 34, elevated CR 1 47 (baseline 1 1-1 4), corrected calcium 14 3, , hypoalbuminemia 2 1 gamma gap 5 6, lipase around 2000, elevated procalcitonin 14 9, uric acid 10 4  Subsequent imaging including CT head showed chronic microangiopathic changes; CT C/A/P showed large airspace consolidation in our LB with heterogeneous hypodense component measuring at 4 8 x 5 2 x 6 3 cm with moderate right pleural effusion and hilar lymphadenopathy concerning for necrotizing pneumonia versus malignancy; furthermore, CT imaging also revealed new 7 x 6 mm left lower lobe nodule and mild fat stranding surrounding the pancreas concerning for acute pancreatitis  Patient initially received 1 L bolus isolate attempts to however was subsequently given 1L bolus 0 9% on NS it treat acute pancreatitis, and was initiated on IV Unasyn 3 g treat suspected necrotizing PNA  Was subsequently admitted to [facility] further evaluation"     Ms Steve Mcclellan had a prolonged hospital course in which her pancreatitis resolved as well as her hypercalcemia, however, she kept needing oxygen and had higher oxygen requirements compared to her baseline  Interventional Radiology drained her pleural effusion on the right side on 11/18 in which 900 mL of fluid was drained and was transudative in nature  She was on antibiotics due to suspected infection since sputum cultures also grew Staph aureus on 11/15 and blood cultures 1/2 grew Actinomyces  She was planned for a bronchoscopy with BAL on 11/22, however the procedure was aborted due to the patient becoming hemodynamically unstable after anesthesia induction  She became bradycardic and hypotensive  She went to the ICU on 11/22-11/23  She was eventually stabilized and came back to our service  Given that patient had persistent tachycardia especially when suctioning around her trach to clear secretions as well as continued oxygen requirements, CTA was done on 11/26 which was negative for a PE however showed right-sided pleural effusion now larger in size  Worsened right middle lobe and right lower lobe consolidation keeping with possible atelectasis superimposed on pneumonia  Low-density necrotic/abscess component of the left right lower lobe consolidation appears unchanged  Antibiotics were continued given this finding  Bronchoscopy was not recommended at that time because she was on stable from the bronchoscopy that was done earlier  Considering this, it was recommended to do a thoracentesis versus chest tube placement for right pleural effusion  Patient had a repeat thoracentesis draining 2L by IR on 11/28 11/30 - When discussing next steps with Palliative, Pulmonology and Primary teams, and Bishop Wharton and her family in regards to goals of care and different options for treatment if fluid reaccumulated, continued antibiotics with IV medications, and considering she needed continued oxygen requirements, Bishop Wharton expressed not wanting IV line to be placed or IR intervention for an IJ placed   When discussing the chance that she may require chest tube/PleurX cath in the future given having a re-accumulation of significant pleural effusion over the course of 1 week, patient expressed not wanting to have these interventions be done  Patient's goals her clearly that she does not want a PleurX catheter or to have a scheduled thoracentesis, and would like to be able to proceed with hospice care  She shared that her sister passed away on hospice and she understood the idea of what hospice would look like  With the patient deciding on pursuing hospice care, code status changed to level 4 comfort care/hospice  Hospice CM consulted with plans of finding a facility  On 2022, patient passed away in the evening  DISCHARGE INFORMATION     PCP at Discharge: Joe Cabezas MD    Admitting Provider: Yonatan Enriquez MD  Admission Date: 11/15/2022    Discharge Provider: No att  providers found  Discharge Date: 22    Discharge Disposition:   Discharge Condition:   Discharge with Lines: no      Discharge Diagnoses:  Principal Problem:    Sepsis with acute hypoxic respiratory failure ([address] )  Active Problems:    Right lower lobe lung mass with right pleural effusion    Laryngeal carcinoma (HCC)    Asymptomatic carotid artery stenosis    Hyperlipidemia    Hypertension    CKD (chronic kidney disease) stage 3, GFR 30-59 ml/min    JORDAN (acute kidney injury) ([address] )    Lung nodule seen on imaging study    Anemia of chronic disease    Hypothyroidism    Gram-positive bacteria on cultures  Resolved Problems:    Hypercalcemia    Pancreatitis      Consulting Providers:      Diagnostic & Therapeutic Procedures Performed:  XR chest portable    Result Date: 2022  Impression: Near complete drainage of right effusion with trace residual effusion  Masslike opacity in the right lower lobe better shown on CT  Mild pulmonary venous congestion  Workstation performed: [id number]     CT head wo contrast    Result Date: 11/15/2022  Impression: No acute intracranial abnormality  Chronic microangiopathic changes   Workstation performed: [id number]     CT chest wo contrast    Result Date: 11/19/2022  Impression: 1  Right lower lobe consolidation appears similar  There is relative internal hypodensity, along with air, consistent with abscess/necrosis  Recommend remains for a follow-up CT  2   Stable right lower lobe airway debris, with new airway debris in the middle lobe, consistent with interval aspiration event 3  Moderate right pleural effusion, not significantly changed in size  Trace left pleural effusion  4   Interstitial opacities consistent with mild edema 5  Stable left lower lobe nodule, attention on follow-up The study was marked in EPIC for immediate notification  Workstation performed: [id number]     IR IN-Patient Thoracentesis    Result Date: 11/18/2022  Impression: Impression: Ultrasound-guided thoracentesis yielding 900 mL clear yellow pleural fluid  Signed, performed, and dictated by CHANEL Cohen under the supervision of Dr Smith Organ  Workstation performed: [id number]     PE Study with CT Abdomen and Pelvis with contrast    Result Date: 11/15/2022  Impression: No central, lobar, segmental or proximal subsegmental pulmonary embolism  Evaluation of the distal subsegmental pulmonary arteries is limited  Large, heterogeneous airspace consolidation in the right lower lobe, with a 6 3 x 5 2 x 4 8 cm somewhat lobulated hypodense component with a small droplet of air  This likely represents a necrotizing aspiration pneumonia, given mucous plugging throughout the right lower lobe and retained secretions/debris in the right mainstem bronchus and bronchus intermedius  Recommend follow-up chest CT in approximately 3 months to ensure resolution as an underlying neoplasm may have a similar appearance  Nonspecific 7 x 6 mm lobulated left lower lobe nodule  Attention on follow-up  Moderate right pleural effusion  No enhancement of the pleura to suggest empyema   Mediastinal and right hilar lymphadenopathy, likely reactive, however metastatic disease is not entirely excluded  Mild stranding around the proximal pancreas suggestive of acute interstitial pancreatitis  Recommend correlation with lipase level  No organized peripancreatic fluid collections, loss of parenchymal enhancement to suggest necrosis, or vascular complications of pancreatitis  No other acute abdominal or pelvic pathology  Multiple additional findings as above  The study was marked in [facility] for immediate notification   Workstation performed: [id number]       Code Status: Prior  Advance Directive & Living Will: Received  Power of :    POLST:      Medications:  Discharge Medication List as of 12/1/2022  9:56 PM      STOP taking these medications       acetaminophen (TYLENOL) 325 mg tablet Comments:   Reason for Stopping:         atorvastatin (LIPITOR) 40 mg tablet Comments:   Reason for Stopping:         Calcium-Magnesium-Vitamin D - MG-MG-UNIT TB24 Comments:   Reason for Stopping:         cefuroxime (CEFTIN) 250 mg tablet Comments:   Reason for Stopping:         cetirizine (ZyrTEC) 5 MG tablet Comments:   Reason for Stopping:         Cholecalciferol (VITAMIN D-3) 1000 units CAPS Comments:   Reason for Stopping:         clopidogrel (PLAVIX) 75 mg tablet Comments:   Reason for Stopping:         co-enzyme Q-10 30 mg Comments:   Reason for Stopping:         fluticasone (FLONASE) 50 mcg/act nasal spray Comments:   Reason for Stopping:         Krill Oil 1000 MG CAPS Comments:   Reason for Stopping:         levothyroxine 100 mcg tablet Comments:   Reason for Stopping:         LORazepam (ATIVAN) 0 5 mg tablet Comments:   Reason for Stopping:         losartan (COZAAR) 50 mg tablet Comments:   Reason for Stopping:         Magnesium 250 MG TABS Comments:   Reason for Stopping:         metFORMIN (GLUCOPHAGE) 500 mg tablet Comments:   Reason for Stopping:         methocarbamol (ROBAXIN) 500 mg tablet Comments:   Reason for Stopping:         Multiple Vitamins-Minerals (CENTRUM SILVER 50+WOMEN PO) Comments:   Reason for Stopping:         niacin 500 mg ER capsule Comments:   Reason for Stopping:         nystatin (MYCOSTATIN) 500,000 units/5 mL suspension Comments:   Reason for Stopping:         Omega-3 1000 MG CAPS Comments:   Reason for Stopping:         oxyCODONE (ROXICODONE) 5 immediate release tablet Comments:   Reason for Stopping:         pantoprazole (PROTONIX) 40 mg tablet Comments:   Reason for Stopping:         sertraline (ZOLOFT) 25 mg tablet Comments:   Reason for Stopping:         VENTOLIN  (90 Base) MCG/ACT inhaler Comments:   Reason for Stopping:             Discharge Medication List as of 12/1/2022  9:56 PM        Discharge Medication List as of 12/1/2022  9:56 PM          Allergies: Allergies   Allergen Reactions   • Benazepril Cough   • Solifenacin Other (See Comments)   • Olmesartan Rash       FOLLOW-UP     PCP Outpatient Follow-up:  none required    Consulting Providers Follow-up:  none required     Active Issues Requiring Follow-up:   none    Discharge Statement:   I spent 45 minutes minutes discharging the patient  This time was spent on the day of discharge  I had direct contact with the patient on the day of discharge  Additional documentation is required if more than 30 minutes were spent on discharge  Portions of the record may have been created with voice recognition software  Occasional wrong word or "sound a like" substitutions may have occurred due to the inherent limitations of voice recognition software    Read the chart carefully and recognize, using context, where substitutions have occurred     ==  [address]  Internal Medicine Resident PGY-1

## 2022-12-02 NOTE — ASSESSMENT & PLAN NOTE
· Known hx, follows with vascular surgery (Dr Arun Cash)  · Previously found to have recurrent left ICA stenosis and critical new occlusive right IC stenosis, with a which were asymptomatic, along with left vertebral artery atresia; previously worked up in outpatient setting by mass concerning symptoms and was recommended endovascular intervention  · On 07/2020, patient electively admitted to [facility] [place] during which she underwent ultrasound-guided right CFA puncture with sternal closure, aortic arch arteriogram, left carotid during him with PTA and stenting under the care of Dr Brown  · Maintained on antiplatelet and anti-statin therapy with Plavix and Lipitor     Plan:     Hospice/Comfort care at this time   Patient passed away on 12/01/22

## 2022-12-02 NOTE — DEATH NOTE
INPATIENT DEATH NOTE  Nolan Inocencio 81 y o  female MRN: [id number]  Unit/Bed#: [facility] 401-01 Encounter: [id number]         Patient's Information  Did the patient's death occur in the ED?: No  Did the patient's death occur in the OR?: No  Did the patient's death occur less than 10 days post-op?: No  Did the patient's death occur within 24 hours of admission?: No  Was code status DNR at the time of death?: Yes    PHYSICAL EXAM:  Spontaneous respirations absent, Breath sounds absent, Carotid pulse absent, Heart sounds absent, Pupillary light reflex absent and Corneal blink reflex absent    Medical Examiner notification criteria:  NONE APPLICABLE   Medical Examiner's office notified?:  Yes   Medical Examiner accepted case?:  No  Name of Medical Examiner: N/a  Family Notification  Was the family notified?: Yes  Notified by: at bedside  Family Notification Route:  At bedside  Was the family told to contact a  home?: Yes  Name of [facility][de-identified]  (Two  homes were mentioned by the famil, other  home mentioned was [facility] [phone])    Autopsy Options:  Post-mortem examination declined by next of kin    Primary Service Attending Physician notified?:  yes - Attending:  Annette Espinal, DO    Physician/Resident responsible for completing Discharge Summary:  Dr Too Gonsalez

## 2022-12-02 NOTE — ASSESSMENT & PLAN NOTE
· Known history, in setting of CKD stage IIIB  · Follows nephrology outpatient (Dr Denny Addison)  · Hgb 11 1 on admission, at baseline     · Currently hemodynamically stable    Plan:  Hospice/Comfort care at this time

## 2022-12-02 NOTE — HOSPICE NOTE
PT RE-EVALUATED FOR THR [facility]  PT'S MEDICATION REGIME HAS BEEN UPDATED AS PT IS REQUIRING INCREASED ACUTE SYMPTOM MANAGEMENT  PT IS APPROVED FOR THE IPU  CONSENTS OBTAINED AND FAXED TO THE [facility]  CM TO ARRANGE TRANSPORT FOR 12-2-22 FOR 11AM OR LATER  [facility] STAFF NURSE TO GIVE COMFORT MEDICATIONTS AND REPORT TO THE [facility] CHARGE NURSE 30 PRIOR TO TRANSPORT   KRAIG

## 2022-12-03 ENCOUNTER — HOME CARE VISIT (OUTPATIENT)
Dept: HOME HEALTH SERVICES | Facility: HOME HEALTHCARE | Age: 87
End: 2022-12-03

## 2022-12-05 LAB — FUNGUS SPEC CULT: NORMAL

## 2022-12-12 LAB — FUNGUS SPEC CULT: NORMAL

## 2022-12-19 LAB — FUNGUS SPEC CULT: NORMAL

## 2022-12-27 LAB
FUNGUS SPEC CULT: NORMAL
MYCOBACTERIUM SPEC CULT: NORMAL
MYCOBACTERIUM SPEC CULT: NORMAL
RHODAMINE-AURAMINE STN SPEC: NORMAL
RHODAMINE-AURAMINE STN SPEC: NORMAL

## 2023-01-16 LAB
MYCOBACTERIUM SPEC CULT: NORMAL
RHODAMINE-AURAMINE STN SPEC: NORMAL
